# Patient Record
Sex: FEMALE | Race: WHITE | Employment: OTHER | URBAN - METROPOLITAN AREA
[De-identification: names, ages, dates, MRNs, and addresses within clinical notes are randomized per-mention and may not be internally consistent; named-entity substitution may affect disease eponyms.]

---

## 2017-02-06 ENCOUNTER — HOSPITAL ENCOUNTER (OUTPATIENT)
Dept: LAB | Age: 82
Discharge: HOME | End: 2017-02-06
Payer: COMMERCIAL

## 2017-02-06 LAB
ALBUMIN SERPL-MCNC: 3.8 G/DL (ref 3.9–4.9)
ALP BLD-CCNC: 94 U/L (ref 40–130)
ALT SERPL-CCNC: 18 U/L (ref 0–33)
ANION GAP SERPL CALCULATED.3IONS-SCNC: 14 MEQ/L (ref 7–13)
AST SERPL-CCNC: 21 U/L (ref 0–35)
BASOPHILS ABSOLUTE: 0.1 K/UL (ref 0–0.2)
BASOPHILS RELATIVE PERCENT: 0.7 %
BILIRUB SERPL-MCNC: 0.6 MG/DL (ref 0–1.2)
BUN BLDV-MCNC: 14 MG/DL (ref 8–23)
CALCIUM SERPL-MCNC: 8.4 MG/DL (ref 8.6–10.2)
CHLORIDE BLD-SCNC: 98 MEQ/L (ref 98–107)
CHOLESTEROL, TOTAL: 173 MG/DL (ref 0–199)
CO2: 26 MEQ/L (ref 22–29)
CREAT SERPL-MCNC: 0.58 MG/DL (ref 0.5–0.9)
EOSINOPHILS ABSOLUTE: 0.3 K/UL (ref 0–0.7)
EOSINOPHILS RELATIVE PERCENT: 3.5 %
GFR AFRICAN AMERICAN: >60
GFR NON-AFRICAN AMERICAN: >60
GLOBULIN: 2.8 G/DL (ref 2.3–3.5)
GLUCOSE BLD-MCNC: 192 MG/DL (ref 74–109)
HCT VFR BLD CALC: 43.1 % (ref 37–47)
HDLC SERPL-MCNC: 45 MG/DL (ref 40–59)
HEMOGLOBIN: 14.3 G/DL (ref 12–16)
LDL CHOLESTEROL CALCULATED: 91 MG/DL (ref 0–129)
LYMPHOCYTES ABSOLUTE: 2.7 K/UL (ref 1–4.8)
LYMPHOCYTES RELATIVE PERCENT: 35.1 %
MCH RBC QN AUTO: 28.5 PG (ref 27–31.3)
MCHC RBC AUTO-ENTMCNC: 33.3 % (ref 33–37)
MCV RBC AUTO: 85.5 FL (ref 82–100)
MONOCYTES ABSOLUTE: 0.8 K/UL (ref 0.2–0.8)
MONOCYTES RELATIVE PERCENT: 10.5 %
NEUTROPHILS ABSOLUTE: 3.9 K/UL (ref 1.4–6.5)
NEUTROPHILS RELATIVE PERCENT: 50.2 %
PDW BLD-RTO: 13.8 % (ref 11.5–14.5)
PLATELET # BLD: 218 K/UL (ref 130–400)
POTASSIUM SERPL-SCNC: 3.9 MEQ/L (ref 3.5–5.1)
PRO-BNP: 213 PG/ML
RBC # BLD: 5.04 M/UL (ref 4.2–5.4)
SODIUM BLD-SCNC: 138 MEQ/L (ref 132–144)
TOTAL PROTEIN: 6.6 G/DL (ref 6.4–8.1)
TRIGL SERPL-MCNC: 184 MG/DL (ref 0–200)
WBC # BLD: 7.8 K/UL (ref 4.8–10.8)

## 2017-02-06 PROCEDURE — 80053 COMPREHEN METABOLIC PANEL: CPT

## 2017-02-06 PROCEDURE — 80061 LIPID PANEL: CPT

## 2017-02-06 PROCEDURE — 85025 COMPLETE CBC W/AUTO DIFF WBC: CPT

## 2017-02-06 PROCEDURE — 83880 ASSAY OF NATRIURETIC PEPTIDE: CPT

## 2017-02-25 ENCOUNTER — HOSPITAL ENCOUNTER (OUTPATIENT)
Dept: ULTRASOUND IMAGING | Age: 82
Discharge: HOME | End: 2017-02-25
Payer: MEDICARE

## 2017-02-25 ENCOUNTER — HOSPITAL ENCOUNTER (OUTPATIENT)
Dept: GENERAL RADIOLOGY | Age: 82
Discharge: HOME | End: 2017-02-25
Payer: MEDICARE

## 2017-02-25 DIAGNOSIS — R31.9 HEMATURIA: ICD-10-CM

## 2017-02-25 DIAGNOSIS — Z00.00 REGULAR CHECK-UP: ICD-10-CM

## 2017-02-25 PROCEDURE — 76770 US EXAM ABDO BACK WALL COMP: CPT

## 2017-02-25 PROCEDURE — 71020 XR CHEST STANDARD TWO VW: CPT

## 2017-03-03 ENCOUNTER — HOSPITAL ENCOUNTER (OUTPATIENT)
Dept: WOMENS IMAGING | Age: 82
Discharge: HOME | End: 2017-03-03
Payer: MEDICARE

## 2017-03-03 DIAGNOSIS — Z12.39 SCREENING BREAST EXAMINATION: ICD-10-CM

## 2017-06-07 ENCOUNTER — HOSPITAL ENCOUNTER (OUTPATIENT)
Dept: LAB | Age: 82
Discharge: HOME | End: 2017-06-07
Payer: COMMERCIAL

## 2017-06-07 PROCEDURE — 85025 COMPLETE CBC W/AUTO DIFF WBC: CPT

## 2017-06-07 PROCEDURE — 80053 COMPREHEN METABOLIC PANEL: CPT

## 2017-06-07 PROCEDURE — 80061 LIPID PANEL: CPT

## 2017-10-13 ENCOUNTER — HOSPITAL ENCOUNTER (OUTPATIENT)
Dept: WOMENS IMAGING | Age: 82
Discharge: HOME | End: 2017-10-13
Payer: MEDICARE

## 2017-10-13 ENCOUNTER — HOSPITAL ENCOUNTER (OUTPATIENT)
Dept: LAB | Age: 82
Discharge: HOME | End: 2017-10-13
Payer: MEDICARE

## 2017-10-13 DIAGNOSIS — Z12.31 ENCOUNTER FOR SCREENING MAMMOGRAM FOR BREAST CANCER: ICD-10-CM

## 2017-10-13 LAB
ALBUMIN SERPL-MCNC: 3.8 G/DL (ref 3.9–4.9)
ALP BLD-CCNC: 101 U/L (ref 40–130)
ALT SERPL-CCNC: 18 U/L (ref 0–33)
ANION GAP SERPL CALCULATED.3IONS-SCNC: 16 MEQ/L (ref 7–13)
AST SERPL-CCNC: 21 U/L (ref 0–35)
BASOPHILS ABSOLUTE: 0.1 K/UL (ref 0–0.2)
BASOPHILS RELATIVE PERCENT: 0.8 %
BILIRUB SERPL-MCNC: 0.6 MG/DL (ref 0–1.2)
BUN BLDV-MCNC: 20 MG/DL (ref 8–23)
CALCIUM SERPL-MCNC: 8.5 MG/DL (ref 8.6–10.2)
CHLORIDE BLD-SCNC: 98 MEQ/L (ref 98–107)
CHOLESTEROL, TOTAL: 182 MG/DL (ref 0–199)
CO2: 26 MEQ/L (ref 22–29)
CREAT SERPL-MCNC: 0.5 MG/DL (ref 0.5–0.9)
EOSINOPHILS ABSOLUTE: 0.1 K/UL (ref 0–0.7)
EOSINOPHILS RELATIVE PERCENT: 1.8 %
GFR AFRICAN AMERICAN: >60
GFR NON-AFRICAN AMERICAN: >60
GLOBULIN: 3.3 G/DL (ref 2.3–3.5)
GLUCOSE BLD-MCNC: 226 MG/DL (ref 74–109)
HBA1C MFR BLD: 8.6 % (ref 4.8–5.9)
HCT VFR BLD CALC: 43.9 % (ref 37–47)
HDLC SERPL-MCNC: 46 MG/DL (ref 40–59)
HEMOGLOBIN: 14.4 G/DL (ref 12–16)
LDL CHOLESTEROL CALCULATED: 101 MG/DL (ref 0–129)
LYMPHOCYTES ABSOLUTE: 2.3 K/UL (ref 1–4.8)
LYMPHOCYTES RELATIVE PERCENT: 31.7 %
MCH RBC QN AUTO: 28.2 PG (ref 27–31.3)
MCHC RBC AUTO-ENTMCNC: 32.7 % (ref 33–37)
MCV RBC AUTO: 86.3 FL (ref 82–100)
MONOCYTES ABSOLUTE: 0.8 K/UL (ref 0.2–0.8)
MONOCYTES RELATIVE PERCENT: 11.9 %
NEUTROPHILS ABSOLUTE: 3.8 K/UL (ref 1.4–6.5)
NEUTROPHILS RELATIVE PERCENT: 53.8 %
PDW BLD-RTO: 13.8 % (ref 11.5–14.5)
PLATELET # BLD: 229 K/UL (ref 130–400)
POTASSIUM SERPL-SCNC: 4.4 MEQ/L (ref 3.5–5.1)
RBC # BLD: 5.08 M/UL (ref 4.2–5.4)
SODIUM BLD-SCNC: 140 MEQ/L (ref 132–144)
TOTAL PROTEIN: 7.1 G/DL (ref 6.4–8.1)
TRIGL SERPL-MCNC: 175 MG/DL (ref 0–200)
WBC # BLD: 7.1 K/UL (ref 4.8–10.8)

## 2017-10-13 PROCEDURE — 85025 COMPLETE CBC W/AUTO DIFF WBC: CPT

## 2017-10-13 PROCEDURE — 80053 COMPREHEN METABOLIC PANEL: CPT

## 2017-10-13 PROCEDURE — G0202 SCR MAMMO BI INCL CAD: HCPCS

## 2017-10-13 PROCEDURE — 83036 HEMOGLOBIN GLYCOSYLATED A1C: CPT

## 2017-10-13 PROCEDURE — 80061 LIPID PANEL: CPT

## 2018-01-12 ENCOUNTER — HOSPITAL ENCOUNTER (OUTPATIENT)
Dept: LAB | Age: 83
Discharge: HOME | End: 2018-01-12
Payer: MEDICARE

## 2018-01-12 LAB
ALBUMIN SERPL-MCNC: 3.7 G/DL (ref 3.9–4.9)
ALP BLD-CCNC: 87 U/L (ref 40–130)
ALT SERPL-CCNC: 19 U/L (ref 0–33)
ANION GAP SERPL CALCULATED.3IONS-SCNC: 16 MEQ/L (ref 7–13)
AST SERPL-CCNC: 20 U/L (ref 0–35)
BASOPHILS ABSOLUTE: 0.1 K/UL (ref 0–0.2)
BASOPHILS RELATIVE PERCENT: 0.8 %
BILIRUB SERPL-MCNC: 0.7 MG/DL (ref 0–1.2)
BUN BLDV-MCNC: 18 MG/DL (ref 8–23)
CALCIUM SERPL-MCNC: 8.8 MG/DL (ref 8.6–10.2)
CHLORIDE BLD-SCNC: 96 MEQ/L (ref 98–107)
CHOLESTEROL, TOTAL: 184 MG/DL (ref 0–199)
CO2: 27 MEQ/L (ref 22–29)
CREAT SERPL-MCNC: 0.56 MG/DL (ref 0.5–0.9)
EOSINOPHILS ABSOLUTE: 0.1 K/UL (ref 0–0.7)
EOSINOPHILS RELATIVE PERCENT: 1.4 %
GFR AFRICAN AMERICAN: >60
GFR NON-AFRICAN AMERICAN: >60
GLOBULIN: 3.1 G/DL (ref 2.3–3.5)
GLUCOSE BLD-MCNC: 238 MG/DL (ref 74–109)
HCT VFR BLD CALC: 42.9 % (ref 37–47)
HDLC SERPL-MCNC: 47 MG/DL (ref 40–59)
HEMOGLOBIN: 14.1 G/DL (ref 12–16)
LDL CHOLESTEROL CALCULATED: 104 MG/DL (ref 0–129)
LYMPHOCYTES ABSOLUTE: 2.5 K/UL (ref 1–4.8)
LYMPHOCYTES RELATIVE PERCENT: 30 %
MCH RBC QN AUTO: 28.7 PG (ref 27–31.3)
MCHC RBC AUTO-ENTMCNC: 32.8 % (ref 33–37)
MCV RBC AUTO: 87.6 FL (ref 82–100)
MONOCYTES ABSOLUTE: 0.8 K/UL (ref 0.2–0.8)
MONOCYTES RELATIVE PERCENT: 9.2 %
NEUTROPHILS ABSOLUTE: 4.8 K/UL (ref 1.4–6.5)
NEUTROPHILS RELATIVE PERCENT: 58.6 %
PDW BLD-RTO: 13.5 % (ref 11.5–14.5)
PLATELET # BLD: 226 K/UL (ref 130–400)
POTASSIUM SERPL-SCNC: 4.4 MEQ/L (ref 3.5–5.1)
RBC # BLD: 4.9 M/UL (ref 4.2–5.4)
SODIUM BLD-SCNC: 139 MEQ/L (ref 132–144)
TOTAL PROTEIN: 6.8 G/DL (ref 6.4–8.1)
TRIGL SERPL-MCNC: 163 MG/DL (ref 0–200)
WBC # BLD: 8.2 K/UL (ref 4.8–10.8)

## 2018-01-12 PROCEDURE — 87086 URINE CULTURE/COLONY COUNT: CPT

## 2018-01-12 PROCEDURE — 80053 COMPREHEN METABOLIC PANEL: CPT

## 2018-01-12 PROCEDURE — 80061 LIPID PANEL: CPT

## 2018-01-12 PROCEDURE — 85025 COMPLETE CBC W/AUTO DIFF WBC: CPT

## 2018-01-14 LAB — URINE CULTURE, ROUTINE: NORMAL

## 2018-02-15 ENCOUNTER — HOSPITAL ENCOUNTER (OUTPATIENT)
Dept: LAB | Age: 83
Discharge: HOME | End: 2018-02-15
Payer: MEDICARE

## 2018-02-15 LAB
ANION GAP SERPL CALCULATED.3IONS-SCNC: 15 MEQ/L (ref 7–13)
BUN BLDV-MCNC: 26 MG/DL (ref 8–23)
CALCIUM SERPL-MCNC: 8.5 MG/DL (ref 8.6–10.2)
CHLORIDE BLD-SCNC: 93 MEQ/L (ref 98–107)
CHOLESTEROL, TOTAL: 161 MG/DL (ref 0–199)
CO2: 27 MEQ/L (ref 22–29)
CREAT SERPL-MCNC: 0.69 MG/DL (ref 0.5–0.9)
GFR AFRICAN AMERICAN: >60
GFR NON-AFRICAN AMERICAN: >60
GLUCOSE BLD-MCNC: 172 MG/DL (ref 74–109)
HDLC SERPL-MCNC: 41 MG/DL (ref 40–59)
LDL CHOLESTEROL CALCULATED: 92 MG/DL (ref 0–129)
POTASSIUM SERPL-SCNC: 4.1 MEQ/L (ref 3.5–5.1)
SODIUM BLD-SCNC: 135 MEQ/L (ref 132–144)
TRIGL SERPL-MCNC: 139 MG/DL (ref 0–200)

## 2018-02-15 PROCEDURE — 80048 BASIC METABOLIC PNL TOTAL CA: CPT

## 2018-02-15 PROCEDURE — 80061 LIPID PANEL: CPT

## 2018-05-21 ENCOUNTER — HOSPITAL ENCOUNTER (OUTPATIENT)
Dept: LAB | Age: 83
Discharge: HOME | End: 2018-05-21
Payer: MEDICARE

## 2018-05-21 LAB
ALBUMIN SERPL-MCNC: 3.8 G/DL (ref 3.9–4.9)
ALP BLD-CCNC: 92 U/L (ref 40–130)
ALT SERPL-CCNC: 23 U/L (ref 0–33)
ANION GAP SERPL CALCULATED.3IONS-SCNC: 17 MEQ/L (ref 7–13)
AST SERPL-CCNC: 23 U/L (ref 0–35)
BASOPHILS ABSOLUTE: 0.1 K/UL (ref 0–0.2)
BASOPHILS RELATIVE PERCENT: 0.8 %
BILIRUB SERPL-MCNC: 0.6 MG/DL (ref 0–1.2)
BUN BLDV-MCNC: 23 MG/DL (ref 8–23)
CALCIUM SERPL-MCNC: 8.6 MG/DL (ref 8.6–10.2)
CHLORIDE BLD-SCNC: 96 MEQ/L (ref 98–107)
CHOLESTEROL, TOTAL: 141 MG/DL (ref 0–199)
CO2: 25 MEQ/L (ref 22–29)
CREAT SERPL-MCNC: 0.78 MG/DL (ref 0.5–0.9)
EOSINOPHILS ABSOLUTE: 0.1 K/UL (ref 0–0.7)
EOSINOPHILS RELATIVE PERCENT: 1.8 %
GFR AFRICAN AMERICAN: >60
GFR NON-AFRICAN AMERICAN: >60
GLOBULIN: 2.9 G/DL (ref 2.3–3.5)
GLUCOSE BLD-MCNC: 246 MG/DL (ref 74–109)
HBA1C MFR BLD: 8.2 % (ref 4.8–5.9)
HCT VFR BLD CALC: 36.5 % (ref 37–47)
HDLC SERPL-MCNC: 42 MG/DL (ref 40–59)
HEMOGLOBIN: 12.4 G/DL (ref 12–16)
LDL CHOLESTEROL CALCULATED: 81 MG/DL (ref 0–129)
LYMPHOCYTES ABSOLUTE: 2.3 K/UL (ref 1–4.8)
LYMPHOCYTES RELATIVE PERCENT: 32.3 %
MCH RBC QN AUTO: 29.5 PG (ref 27–31.3)
MCHC RBC AUTO-ENTMCNC: 34.1 % (ref 33–37)
MCV RBC AUTO: 86.5 FL (ref 82–100)
MONOCYTES ABSOLUTE: 0.8 K/UL (ref 0.2–0.8)
MONOCYTES RELATIVE PERCENT: 11.2 %
NEUTROPHILS ABSOLUTE: 3.9 K/UL (ref 1.4–6.5)
NEUTROPHILS RELATIVE PERCENT: 53.9 %
PDW BLD-RTO: 13.3 % (ref 11.5–14.5)
PLATELET # BLD: 224 K/UL (ref 130–400)
POTASSIUM SERPL-SCNC: 4.1 MEQ/L (ref 3.5–5.1)
RBC # BLD: 4.21 M/UL (ref 4.2–5.4)
SODIUM BLD-SCNC: 138 MEQ/L (ref 132–144)
TOTAL PROTEIN: 6.7 G/DL (ref 6.4–8.1)
TRIGL SERPL-MCNC: 92 MG/DL (ref 0–200)
WBC # BLD: 7.2 K/UL (ref 4.8–10.8)

## 2018-05-21 PROCEDURE — 83036 HEMOGLOBIN GLYCOSYLATED A1C: CPT

## 2018-05-21 PROCEDURE — 80061 LIPID PANEL: CPT

## 2018-05-21 PROCEDURE — 85025 COMPLETE CBC W/AUTO DIFF WBC: CPT

## 2018-05-21 PROCEDURE — 80053 COMPREHEN METABOLIC PANEL: CPT

## 2018-09-24 ENCOUNTER — HOSPITAL ENCOUNTER (OUTPATIENT)
Dept: LAB | Age: 83
Discharge: HOME | End: 2018-09-24
Payer: MEDICARE

## 2018-09-24 LAB
ALBUMIN SERPL-MCNC: 3.9 G/DL (ref 3.9–4.9)
ALP BLD-CCNC: 89 U/L (ref 40–130)
ALT SERPL-CCNC: 21 U/L (ref 0–33)
ANION GAP SERPL CALCULATED.3IONS-SCNC: 16 MEQ/L (ref 7–13)
AST SERPL-CCNC: 24 U/L (ref 0–35)
BASOPHILS ABSOLUTE: 0.1 K/UL (ref 0–0.2)
BASOPHILS RELATIVE PERCENT: 1.4 %
BILIRUB SERPL-MCNC: 0.5 MG/DL (ref 0–1.2)
BUN BLDV-MCNC: 21 MG/DL (ref 8–23)
CALCIUM SERPL-MCNC: 8.8 MG/DL (ref 8.6–10.2)
CHLORIDE BLD-SCNC: 97 MEQ/L (ref 98–107)
CHOLESTEROL, TOTAL: 144 MG/DL (ref 0–199)
CO2: 27 MEQ/L (ref 22–29)
CREAT SERPL-MCNC: 0.74 MG/DL (ref 0.5–0.9)
EOSINOPHILS ABSOLUTE: 0.2 K/UL (ref 0–0.7)
EOSINOPHILS RELATIVE PERCENT: 2.1 %
GFR AFRICAN AMERICAN: >60
GFR NON-AFRICAN AMERICAN: >60
GLOBULIN: 3.1 G/DL (ref 2.3–3.5)
GLUCOSE BLD-MCNC: 174 MG/DL (ref 74–109)
HCT VFR BLD CALC: 39.4 % (ref 37–47)
HDLC SERPL-MCNC: 44 MG/DL (ref 40–59)
HEMOGLOBIN: 13.1 G/DL (ref 12–16)
LDL CHOLESTEROL CALCULATED: 80 MG/DL (ref 0–129)
LYMPHOCYTES ABSOLUTE: 2.1 K/UL (ref 1–4.8)
LYMPHOCYTES RELATIVE PERCENT: 29.8 %
MCH RBC QN AUTO: 28.3 PG (ref 27–31.3)
MCHC RBC AUTO-ENTMCNC: 33.2 % (ref 33–37)
MCV RBC AUTO: 85.1 FL (ref 82–100)
MONOCYTES ABSOLUTE: 0.9 K/UL (ref 0.2–0.8)
MONOCYTES RELATIVE PERCENT: 12.7 %
NEUTROPHILS ABSOLUTE: 3.8 K/UL (ref 1.4–6.5)
NEUTROPHILS RELATIVE PERCENT: 54 %
PDW BLD-RTO: 14.2 % (ref 11.5–14.5)
PLATELET # BLD: 195 K/UL (ref 130–400)
POTASSIUM SERPL-SCNC: 4 MEQ/L (ref 3.5–5.1)
RBC # BLD: 4.62 M/UL (ref 4.2–5.4)
SODIUM BLD-SCNC: 140 MEQ/L (ref 132–144)
TOTAL PROTEIN: 7 G/DL (ref 6.4–8.1)
TRIGL SERPL-MCNC: 98 MG/DL (ref 0–200)
WBC # BLD: 7.1 K/UL (ref 4.8–10.8)

## 2018-09-24 PROCEDURE — 80053 COMPREHEN METABOLIC PANEL: CPT

## 2018-09-24 PROCEDURE — 85025 COMPLETE CBC W/AUTO DIFF WBC: CPT

## 2018-09-24 PROCEDURE — 80061 LIPID PANEL: CPT

## 2018-10-04 ENCOUNTER — HOSPITAL ENCOUNTER (OUTPATIENT)
Dept: WOMENS IMAGING | Age: 83
Discharge: HOME | End: 2018-10-06
Payer: MEDICARE

## 2018-10-04 DIAGNOSIS — Z12.31 ENCOUNTER FOR SCREENING MAMMOGRAM FOR BREAST CANCER: ICD-10-CM

## 2018-10-04 PROCEDURE — 77067 SCR MAMMO BI INCL CAD: CPT

## 2019-01-08 ENCOUNTER — HOSPITAL ENCOUNTER (OUTPATIENT)
Dept: LAB | Age: 84
Discharge: HOME | End: 2019-01-08
Payer: MEDICARE

## 2019-01-08 LAB
ALBUMIN SERPL-MCNC: 3.6 G/DL (ref 3.9–4.9)
ALP BLD-CCNC: 88 U/L (ref 40–130)
ALT SERPL-CCNC: 15 U/L (ref 0–33)
ANION GAP SERPL CALCULATED.3IONS-SCNC: 16 MEQ/L (ref 7–13)
AST SERPL-CCNC: 20 U/L (ref 0–35)
BASOPHILS ABSOLUTE: 0.1 K/UL (ref 0–0.2)
BASOPHILS RELATIVE PERCENT: 0.7 %
BILIRUB SERPL-MCNC: 0.7 MG/DL (ref 0–1.2)
BUN BLDV-MCNC: 22 MG/DL (ref 8–23)
CALCIUM SERPL-MCNC: 8.7 MG/DL (ref 8.6–10.2)
CHLORIDE BLD-SCNC: 99 MEQ/L (ref 98–107)
CHOLESTEROL, TOTAL: 145 MG/DL (ref 0–199)
CO2: 27 MEQ/L (ref 22–29)
CREAT SERPL-MCNC: 0.63 MG/DL (ref 0.5–0.9)
EOSINOPHILS ABSOLUTE: 0.2 K/UL (ref 0–0.7)
EOSINOPHILS RELATIVE PERCENT: 1.9 %
GFR AFRICAN AMERICAN: >60
GFR NON-AFRICAN AMERICAN: >60
GLOBULIN: 3.3 G/DL (ref 2.3–3.5)
GLUCOSE BLD-MCNC: 245 MG/DL (ref 74–109)
HCT VFR BLD CALC: 40.9 % (ref 37–47)
HDLC SERPL-MCNC: 41 MG/DL (ref 40–59)
HEMOGLOBIN: 13.7 G/DL (ref 12–16)
LDL CHOLESTEROL CALCULATED: 79 MG/DL (ref 0–129)
LYMPHOCYTES ABSOLUTE: 2.4 K/UL (ref 1–4.8)
LYMPHOCYTES RELATIVE PERCENT: 31.2 %
MCH RBC QN AUTO: 28.8 PG (ref 27–31.3)
MCHC RBC AUTO-ENTMCNC: 33.5 % (ref 33–37)
MCV RBC AUTO: 86 FL (ref 82–100)
MONOCYTES ABSOLUTE: 0.7 K/UL (ref 0.2–0.8)
MONOCYTES RELATIVE PERCENT: 9.3 %
NEUTROPHILS ABSOLUTE: 4.4 K/UL (ref 1.4–6.5)
NEUTROPHILS RELATIVE PERCENT: 56.9 %
PDW BLD-RTO: 13.6 % (ref 11.5–14.5)
PLATELET # BLD: 208 K/UL (ref 130–400)
POTASSIUM SERPL-SCNC: 3.8 MEQ/L (ref 3.5–5.1)
RBC # BLD: 4.76 M/UL (ref 4.2–5.4)
SODIUM BLD-SCNC: 142 MEQ/L (ref 132–144)
TOTAL PROTEIN: 6.9 G/DL (ref 6.4–8.1)
TRIGL SERPL-MCNC: 127 MG/DL (ref 0–200)
WBC # BLD: 7.8 K/UL (ref 4.8–10.8)

## 2019-01-08 PROCEDURE — 85025 COMPLETE CBC W/AUTO DIFF WBC: CPT

## 2019-01-08 PROCEDURE — 80061 LIPID PANEL: CPT

## 2019-01-08 PROCEDURE — 80053 COMPREHEN METABOLIC PANEL: CPT

## 2019-03-22 ENCOUNTER — HOSPITAL ENCOUNTER (OUTPATIENT)
Dept: LAB | Age: 84
Discharge: HOME | End: 2019-03-22
Payer: MEDICARE

## 2019-03-22 LAB
ALBUMIN SERPL-MCNC: 3.6 G/DL (ref 3.5–4.6)
ALP BLD-CCNC: 94 U/L (ref 40–130)
ALT SERPL-CCNC: 14 U/L (ref 0–33)
ANION GAP SERPL CALCULATED.3IONS-SCNC: 16 MEQ/L (ref 9–15)
AST SERPL-CCNC: 20 U/L (ref 0–35)
BASOPHILS ABSOLUTE: 0.1 K/UL (ref 0–0.2)
BASOPHILS RELATIVE PERCENT: 1.1 %
BILIRUB SERPL-MCNC: 0.5 MG/DL (ref 0.2–0.7)
BUN BLDV-MCNC: 22 MG/DL (ref 8–23)
CALCIUM SERPL-MCNC: 8.3 MG/DL (ref 8.5–9.9)
CHLORIDE BLD-SCNC: 97 MEQ/L (ref 95–107)
CHOLESTEROL, TOTAL: 170 MG/DL (ref 0–199)
CO2: 27 MEQ/L (ref 20–31)
CREAT SERPL-MCNC: 0.6 MG/DL (ref 0.5–0.9)
EOSINOPHILS ABSOLUTE: 0.2 K/UL (ref 0–0.7)
EOSINOPHILS RELATIVE PERCENT: 2.9 %
GFR AFRICAN AMERICAN: >60
GFR NON-AFRICAN AMERICAN: >60
GLOBULIN: 3.4 G/DL (ref 2.3–3.5)
GLUCOSE BLD-MCNC: 178 MG/DL (ref 70–99)
HCT VFR BLD CALC: 38.4 % (ref 37–47)
HDLC SERPL-MCNC: 43 MG/DL (ref 40–59)
HEMOGLOBIN: 12.9 G/DL (ref 12–16)
LDL CHOLESTEROL CALCULATED: 99 MG/DL (ref 0–129)
LYMPHOCYTES ABSOLUTE: 2.2 K/UL (ref 1–4.8)
LYMPHOCYTES RELATIVE PERCENT: 31.1 %
MCH RBC QN AUTO: 28.9 PG (ref 27–31.3)
MCHC RBC AUTO-ENTMCNC: 33.7 % (ref 33–37)
MCV RBC AUTO: 85.8 FL (ref 82–100)
MONOCYTES ABSOLUTE: 0.7 K/UL (ref 0.2–0.8)
MONOCYTES RELATIVE PERCENT: 9.3 %
NEUTROPHILS ABSOLUTE: 4 K/UL (ref 1.4–6.5)
NEUTROPHILS RELATIVE PERCENT: 55.6 %
PDW BLD-RTO: 13.7 % (ref 11.5–14.5)
PLATELET # BLD: 208 K/UL (ref 130–400)
POTASSIUM SERPL-SCNC: 3.5 MEQ/L (ref 3.4–4.9)
RBC # BLD: 4.47 M/UL (ref 4.2–5.4)
SODIUM BLD-SCNC: 140 MEQ/L (ref 135–144)
TOTAL PROTEIN: 7 G/DL (ref 6.3–8)
TRIGL SERPL-MCNC: 141 MG/DL (ref 0–150)
TSH SERPL DL<=0.05 MIU/L-ACNC: 0.45 UIU/ML (ref 0.44–3.86)
WBC # BLD: 7.1 K/UL (ref 4.8–10.8)

## 2019-03-22 PROCEDURE — 80053 COMPREHEN METABOLIC PANEL: CPT

## 2019-03-22 PROCEDURE — 85025 COMPLETE CBC W/AUTO DIFF WBC: CPT

## 2019-03-22 PROCEDURE — 84443 ASSAY THYROID STIM HORMONE: CPT

## 2019-03-22 PROCEDURE — 80061 LIPID PANEL: CPT

## 2019-05-24 ENCOUNTER — HOSPITAL ENCOUNTER (OUTPATIENT)
Dept: LAB | Age: 84
Discharge: HOME | End: 2019-05-24
Payer: MEDICARE

## 2019-05-24 LAB
ALBUMIN SERPL-MCNC: 3.5 G/DL (ref 3.5–4.6)
ALP BLD-CCNC: 96 U/L (ref 40–130)
ALT SERPL-CCNC: 16 U/L (ref 0–33)
ANION GAP SERPL CALCULATED.3IONS-SCNC: 15 MEQ/L (ref 9–15)
AST SERPL-CCNC: 23 U/L (ref 0–35)
BASOPHILS ABSOLUTE: 0 K/UL (ref 0–0.2)
BASOPHILS RELATIVE PERCENT: 0.6 %
BILIRUB SERPL-MCNC: 0.3 MG/DL (ref 0.2–0.7)
BUN BLDV-MCNC: 20 MG/DL (ref 8–23)
CALCIUM SERPL-MCNC: 8.5 MG/DL (ref 8.5–9.9)
CHLORIDE BLD-SCNC: 100 MEQ/L (ref 95–107)
CHOLESTEROL, TOTAL: 160 MG/DL (ref 0–199)
CO2: 25 MEQ/L (ref 20–31)
CREAT SERPL-MCNC: 0.64 MG/DL (ref 0.5–0.9)
EOSINOPHILS ABSOLUTE: 0.2 K/UL (ref 0–0.7)
EOSINOPHILS RELATIVE PERCENT: 2.8 %
GFR AFRICAN AMERICAN: >60
GFR NON-AFRICAN AMERICAN: >60
GLOBULIN: 3.5 G/DL (ref 2.3–3.5)
GLUCOSE BLD-MCNC: 206 MG/DL (ref 70–99)
HCT VFR BLD CALC: 38.4 % (ref 37–47)
HDLC SERPL-MCNC: 41 MG/DL (ref 40–59)
HEMOGLOBIN: 12.8 G/DL (ref 12–16)
LDL CHOLESTEROL CALCULATED: 88 MG/DL (ref 0–129)
LYMPHOCYTES ABSOLUTE: 2.5 K/UL (ref 1–4.8)
LYMPHOCYTES RELATIVE PERCENT: 31 %
MCH RBC QN AUTO: 28.9 PG (ref 27–31.3)
MCHC RBC AUTO-ENTMCNC: 33.3 % (ref 33–37)
MCV RBC AUTO: 86.8 FL (ref 82–100)
MONOCYTES ABSOLUTE: 0.9 K/UL (ref 0.2–0.8)
MONOCYTES RELATIVE PERCENT: 11 %
NEUTROPHILS ABSOLUTE: 4.4 K/UL (ref 1.4–6.5)
NEUTROPHILS RELATIVE PERCENT: 54.6 %
PDW BLD-RTO: 14 % (ref 11.5–14.5)
PLATELET # BLD: 230 K/UL (ref 130–400)
POTASSIUM SERPL-SCNC: 3.8 MEQ/L (ref 3.4–4.9)
RBC # BLD: 4.42 M/UL (ref 4.2–5.4)
SODIUM BLD-SCNC: 140 MEQ/L (ref 135–144)
TOTAL PROTEIN: 7 G/DL (ref 6.3–8)
TRIGL SERPL-MCNC: 153 MG/DL (ref 0–150)
WBC # BLD: 8.1 K/UL (ref 4.8–10.8)

## 2019-05-24 PROCEDURE — 80053 COMPREHEN METABOLIC PANEL: CPT

## 2019-05-24 PROCEDURE — 85025 COMPLETE CBC W/AUTO DIFF WBC: CPT

## 2019-05-24 PROCEDURE — 80061 LIPID PANEL: CPT

## 2019-06-12 ENCOUNTER — HOSPITAL ENCOUNTER (OUTPATIENT)
Dept: GENERAL RADIOLOGY | Age: 84
Discharge: HOME | End: 2019-06-14
Payer: MEDICARE

## 2019-06-12 DIAGNOSIS — R05.3 CHRONIC COUGH: ICD-10-CM

## 2019-06-12 PROCEDURE — 71046 X-RAY EXAM CHEST 2 VIEWS: CPT

## 2019-09-23 ENCOUNTER — HOSPITAL ENCOUNTER (OUTPATIENT)
Dept: LAB | Age: 84
Discharge: HOME | End: 2019-09-23
Payer: MEDICARE

## 2019-09-23 LAB
ALBUMIN SERPL-MCNC: 3.6 G/DL (ref 3.5–4.6)
ALP BLD-CCNC: 79 U/L (ref 40–130)
ALT SERPL-CCNC: 14 U/L (ref 0–33)
ANION GAP SERPL CALCULATED.3IONS-SCNC: 16 MEQ/L (ref 9–15)
AST SERPL-CCNC: 21 U/L (ref 0–35)
BASOPHILS ABSOLUTE: 0.1 K/UL (ref 0–0.2)
BASOPHILS RELATIVE PERCENT: 0.7 %
BILIRUB SERPL-MCNC: 0.5 MG/DL (ref 0.2–0.7)
BUN BLDV-MCNC: 25 MG/DL (ref 8–23)
CALCIUM SERPL-MCNC: 9 MG/DL (ref 8.5–9.9)
CHLORIDE BLD-SCNC: 99 MEQ/L (ref 95–107)
CHOLESTEROL, TOTAL: 155 MG/DL (ref 0–199)
CO2: 26 MEQ/L (ref 20–31)
CREAT SERPL-MCNC: 0.65 MG/DL (ref 0.5–0.9)
EOSINOPHILS ABSOLUTE: 0.1 K/UL (ref 0–0.7)
EOSINOPHILS RELATIVE PERCENT: 1.8 %
GFR AFRICAN AMERICAN: >60
GFR NON-AFRICAN AMERICAN: >60
GLOBULIN: 3.5 G/DL (ref 2.3–3.5)
GLUCOSE BLD-MCNC: 106 MG/DL (ref 70–99)
HCT VFR BLD CALC: 36.6 % (ref 37–47)
HDLC SERPL-MCNC: 40 MG/DL (ref 40–59)
HEMOGLOBIN: 12 G/DL (ref 12–16)
LDL CHOLESTEROL CALCULATED: 88 MG/DL (ref 0–129)
LYMPHOCYTES ABSOLUTE: 2.4 K/UL (ref 1–4.8)
LYMPHOCYTES RELATIVE PERCENT: 30.6 %
MCH RBC QN AUTO: 27.2 PG (ref 27–31.3)
MCHC RBC AUTO-ENTMCNC: 32.6 % (ref 33–37)
MCV RBC AUTO: 83.5 FL (ref 82–100)
MONOCYTES ABSOLUTE: 0.9 K/UL (ref 0.2–0.8)
MONOCYTES RELATIVE PERCENT: 11.2 %
NEUTROPHILS ABSOLUTE: 4.3 K/UL (ref 1.4–6.5)
NEUTROPHILS RELATIVE PERCENT: 55.7 %
PDW BLD-RTO: 14.3 % (ref 11.5–14.5)
PLATELET # BLD: 239 K/UL (ref 130–400)
POTASSIUM SERPL-SCNC: 3.8 MEQ/L (ref 3.4–4.9)
RBC # BLD: 4.39 M/UL (ref 4.2–5.4)
SODIUM BLD-SCNC: 141 MEQ/L (ref 135–144)
TOTAL PROTEIN: 7.1 G/DL (ref 6.3–8)
TRIGL SERPL-MCNC: 133 MG/DL (ref 0–150)
WBC # BLD: 7.8 K/UL (ref 4.8–10.8)

## 2019-09-23 PROCEDURE — 80061 LIPID PANEL: CPT

## 2019-09-23 PROCEDURE — 85025 COMPLETE CBC W/AUTO DIFF WBC: CPT

## 2019-09-23 PROCEDURE — 80053 COMPREHEN METABOLIC PANEL: CPT

## 2019-10-15 ENCOUNTER — HOSPITAL ENCOUNTER (OUTPATIENT)
Dept: WOMENS IMAGING | Age: 84
Discharge: HOME | End: 2019-10-17
Payer: MEDICARE

## 2019-10-15 DIAGNOSIS — Z12.31 ENCOUNTER FOR SCREENING MAMMOGRAM FOR BREAST CANCER: ICD-10-CM

## 2019-10-15 PROCEDURE — 77063 BREAST TOMOSYNTHESIS BI: CPT

## 2020-01-22 ENCOUNTER — HOSPITAL ENCOUNTER (OUTPATIENT)
Dept: LAB | Age: 85
Discharge: HOME | End: 2020-01-22
Payer: MEDICARE

## 2020-01-22 LAB
ALBUMIN SERPL-MCNC: 3.6 G/DL (ref 3.5–4.6)
ALP BLD-CCNC: 92 U/L (ref 40–130)
ALT SERPL-CCNC: 18 U/L (ref 0–33)
ANION GAP SERPL CALCULATED.3IONS-SCNC: 14 MEQ/L (ref 9–15)
AST SERPL-CCNC: 23 U/L (ref 0–35)
BASOPHILS ABSOLUTE: 0.1 K/UL (ref 0–0.2)
BASOPHILS RELATIVE PERCENT: 1 %
BILIRUB SERPL-MCNC: 0.3 MG/DL (ref 0.2–0.7)
BUN BLDV-MCNC: 20 MG/DL (ref 8–23)
CALCIUM SERPL-MCNC: 8.6 MG/DL (ref 8.5–9.9)
CHLORIDE BLD-SCNC: 100 MEQ/L (ref 95–107)
CHOLESTEROL, TOTAL: 153 MG/DL (ref 0–199)
CO2: 26 MEQ/L (ref 20–31)
CREAT SERPL-MCNC: 0.74 MG/DL (ref 0.5–0.9)
EOSINOPHILS ABSOLUTE: 0.2 K/UL (ref 0–0.7)
EOSINOPHILS RELATIVE PERCENT: 2.3 %
GFR AFRICAN AMERICAN: >60
GFR NON-AFRICAN AMERICAN: >60
GLOBULIN: 3.3 G/DL (ref 2.3–3.5)
GLUCOSE BLD-MCNC: 197 MG/DL (ref 70–99)
HCT VFR BLD CALC: 37.1 % (ref 37–47)
HDLC SERPL-MCNC: 45 MG/DL (ref 40–59)
HEMOGLOBIN: 11.9 G/DL (ref 12–16)
LDL CHOLESTEROL CALCULATED: 83 MG/DL (ref 0–129)
LYMPHOCYTES ABSOLUTE: 2 K/UL (ref 1–4.8)
LYMPHOCYTES RELATIVE PERCENT: 27.9 %
MCH RBC QN AUTO: 25.8 PG (ref 27–31.3)
MCHC RBC AUTO-ENTMCNC: 32 % (ref 33–37)
MCV RBC AUTO: 80.7 FL (ref 82–100)
MONOCYTES ABSOLUTE: 0.7 K/UL (ref 0.2–0.8)
MONOCYTES RELATIVE PERCENT: 10.3 %
NEUTROPHILS ABSOLUTE: 4.1 K/UL (ref 1.4–6.5)
NEUTROPHILS RELATIVE PERCENT: 58.5 %
PDW BLD-RTO: 15.3 % (ref 11.5–14.5)
PLATELET # BLD: 218 K/UL (ref 130–400)
POTASSIUM SERPL-SCNC: 4.4 MEQ/L (ref 3.4–4.9)
RBC # BLD: 4.59 M/UL (ref 4.2–5.4)
SODIUM BLD-SCNC: 140 MEQ/L (ref 135–144)
TOTAL PROTEIN: 6.9 G/DL (ref 6.3–8)
TRIGL SERPL-MCNC: 123 MG/DL (ref 0–150)
WBC # BLD: 7 K/UL (ref 4.8–10.8)

## 2020-01-22 PROCEDURE — 85025 COMPLETE CBC W/AUTO DIFF WBC: CPT

## 2020-01-22 PROCEDURE — 80053 COMPREHEN METABOLIC PANEL: CPT

## 2020-01-22 PROCEDURE — 80061 LIPID PANEL: CPT

## 2020-06-10 ENCOUNTER — HOSPITAL ENCOUNTER (OUTPATIENT)
Dept: ULTRASOUND IMAGING | Age: 85
Discharge: HOME | End: 2020-06-12
Payer: MEDICARE

## 2020-06-10 PROCEDURE — 76770 US EXAM ABDO BACK WALL COMP: CPT

## 2020-06-10 PROCEDURE — 51798 US URINE CAPACITY MEASURE: CPT

## 2020-09-21 ENCOUNTER — HOSPITAL ENCOUNTER (OUTPATIENT)
Dept: LAB | Age: 85
Discharge: HOME | End: 2020-09-21
Payer: MEDICARE

## 2020-09-21 LAB
ALBUMIN SERPL-MCNC: 3.8 G/DL (ref 3.5–4.6)
ALP BLD-CCNC: 103 U/L (ref 40–130)
ALT SERPL-CCNC: 28 U/L (ref 0–33)
ANION GAP SERPL CALCULATED.3IONS-SCNC: 16 MEQ/L (ref 9–15)
ANISOCYTOSIS: ABNORMAL
AST SERPL-CCNC: 30 U/L (ref 0–35)
ATYPICAL LYMPHOCYTE RELATIVE PERCENT: 3 %
BASOPHILS ABSOLUTE: 0 K/UL (ref 0–0.2)
BASOPHILS RELATIVE PERCENT: 0.8 %
BILIRUB SERPL-MCNC: 0.4 MG/DL (ref 0.2–0.7)
BUN BLDV-MCNC: 27 MG/DL (ref 8–23)
BURR CELLS: ABNORMAL
CALCIUM SERPL-MCNC: 8.7 MG/DL (ref 8.5–9.9)
CHLORIDE BLD-SCNC: 99 MEQ/L (ref 95–107)
CHOLESTEROL, TOTAL: 135 MG/DL (ref 0–199)
CO2: 24 MEQ/L (ref 20–31)
CREAT SERPL-MCNC: 0.87 MG/DL (ref 0.5–0.9)
EOSINOPHILS ABSOLUTE: 0.1 K/UL (ref 0–0.7)
EOSINOPHILS RELATIVE PERCENT: 1 %
GFR AFRICAN AMERICAN: >60
GFR NON-AFRICAN AMERICAN: >60
GLOBULIN: 3.4 G/DL (ref 2.3–3.5)
GLUCOSE BLD-MCNC: 205 MG/DL (ref 70–99)
HCT VFR BLD CALC: 29.7 % (ref 37–47)
HDLC SERPL-MCNC: 41 MG/DL (ref 40–59)
HEMOGLOBIN: 9.1 G/DL (ref 12–16)
LDL CHOLESTEROL CALCULATED: 71 MG/DL (ref 0–129)
LYMPHOCYTES ABSOLUTE: 1.8 K/UL (ref 1–4.8)
LYMPHOCYTES RELATIVE PERCENT: 16 %
MCH RBC QN AUTO: 21.1 PG (ref 27–31.3)
MCHC RBC AUTO-ENTMCNC: 30.7 % (ref 33–37)
MCV RBC AUTO: 69 FL (ref 82–100)
MONOCYTES ABSOLUTE: 0.5 K/UL (ref 0.2–0.8)
MONOCYTES RELATIVE PERCENT: 4.9 %
NEUTROPHILS ABSOLUTE: 7.4 K/UL (ref 1.4–6.5)
NEUTROPHILS RELATIVE PERCENT: 76 %
OVALOCYTES: ABNORMAL
PDW BLD-RTO: 17.7 % (ref 11.5–14.5)
PLATELET # BLD: 278 K/UL (ref 130–400)
PLATELET SLIDE REVIEW: ADEQUATE
POIKILOCYTES: ABNORMAL
POTASSIUM SERPL-SCNC: 4.6 MEQ/L (ref 3.4–4.9)
RBC # BLD: 4.31 M/UL (ref 4.2–5.4)
SODIUM BLD-SCNC: 139 MEQ/L (ref 135–144)
TOTAL PROTEIN: 7.2 G/DL (ref 6.3–8)
TRIGL SERPL-MCNC: 116 MG/DL (ref 0–150)
WBC # BLD: 9.7 K/UL (ref 4.8–10.8)

## 2020-09-21 PROCEDURE — 80061 LIPID PANEL: CPT

## 2020-09-21 PROCEDURE — 85025 COMPLETE CBC W/AUTO DIFF WBC: CPT

## 2020-09-21 PROCEDURE — 80053 COMPREHEN METABOLIC PANEL: CPT

## 2020-10-13 ENCOUNTER — OFFICE VISIT (OUTPATIENT)
Dept: ENDOCRINOLOGY | Age: 85
End: 2020-10-13
Payer: MEDICARE

## 2020-10-13 VITALS
SYSTOLIC BLOOD PRESSURE: 147 MMHG | BODY MASS INDEX: 30.39 KG/M2 | HEIGHT: 64 IN | DIASTOLIC BLOOD PRESSURE: 75 MMHG | HEART RATE: 66 BPM | WEIGHT: 178 LBS | OXYGEN SATURATION: 98 %

## 2020-10-13 PROBLEM — E11.65 UNCONTROLLED TYPE 2 DIABETES MELLITUS WITH HYPERGLYCEMIA (HCC): Status: ACTIVE | Noted: 2020-10-13

## 2020-10-13 PROCEDURE — 99203 OFFICE O/P NEW LOW 30 MIN: CPT | Performed by: INTERNAL MEDICINE

## 2020-10-13 PROCEDURE — 1090F PRES/ABSN URINE INCON ASSESS: CPT | Performed by: INTERNAL MEDICINE

## 2020-10-13 PROCEDURE — G8427 DOCREV CUR MEDS BY ELIG CLIN: HCPCS | Performed by: INTERNAL MEDICINE

## 2020-10-13 PROCEDURE — G8484 FLU IMMUNIZE NO ADMIN: HCPCS | Performed by: INTERNAL MEDICINE

## 2020-10-13 PROCEDURE — G8417 CALC BMI ABV UP PARAM F/U: HCPCS | Performed by: INTERNAL MEDICINE

## 2020-10-13 RX ORDER — NIFEDIPINE 60 MG/1
60 TABLET, EXTENDED RELEASE ORAL DAILY
COMMUNITY
Start: 2020-10-01

## 2020-10-13 RX ORDER — ALENDRONATE SODIUM 70 MG/1
70 TABLET ORAL DAILY
COMMUNITY
Start: 2020-08-10

## 2020-10-13 RX ORDER — PANTOPRAZOLE SODIUM 40 MG/1
40 TABLET, DELAYED RELEASE ORAL DAILY
COMMUNITY
Start: 2020-10-01

## 2020-10-13 ASSESSMENT — ENCOUNTER SYMPTOMS
TROUBLE SWALLOWING: 1
SHORTNESS OF BREATH: 1
SWOLLEN GLANDS: 0

## 2020-10-13 NOTE — PROGRESS NOTES
Subjective:      Patient ID: Devora Brooks is a 80 y.o. female. Patient referred here for multinodular goiter was evaluated 6 years ago in view of patient's age and abnormal thyroid function test was advised against biopsy or surgery patient still complains of occasional neck pain and minimal dysphagia no worsening over the last 6 years reviewed thyroid function test both have been normal patient does have anemia and is on replacement  Thyroid gland showing right lobe at 4.7 cm left lobe was 6.58 cm there was a large cystic area noted on the isthmus  measuring 2.4 cm  Other   This is a new (Goiter thyroid nodules cysts) problem. The current episode started more than 1 year ago. The problem occurs intermittently. The problem has been waxing and waning. Associated symptoms include fatigue and neck pain. Pertinent negatives include no anorexia, swollen glands or urinary symptoms. Associated symptoms comments: Patient denies any hot or cold intolerance does have minimal dysphagia. The symptoms are aggravated by eating. She has tried nothing for the symptoms. The treatment provided no relief. EXAMINATION THYROID ULTRASOUND         CLINICAL HISTORY Goiter         COMPARISONS None available.         FINDINGS Multiple sector real-time images of the thyroid gland were    obtained.  The gland is enlarged more pronounced on the left.  The    right lobe measures 4.79 x 3.2 x 2.86 cm. the left lobe measures 6.58    at 2.93 x 2.4 cm.  The isthmus measures . 43 cm.  The gland demonstrates    an inhomogeneous echotexture.  Multiple variable sized hypoechoic    nodules are seen.  Diffuse cystic changes are noted.  Largest cyst in    the isthmus is seen measuring 2.2 x 2.4 x 1.0 cm.  Dense calcification    in the left lobe is seen measuring 1 cm in size.         IMPRESSION FINDINGS ARE CONSISTENT WITH A MULTINODULAR GOITER. Results for Neris Griffiths (MRN [id number]) as of 10/13/2020 09:44   Ref.  Range 9/25/2015 14:20 2/8/2016 18:12 3/24/2016 08:45 6/6/2016 14:04 3/22/2019 18:17   TSH Latest Ref Range: 0.440 - 3.860 uIU/mL 0.580 0.559 0.865 0.552 0.448     Results for Gregor Cameron (MRN [id number]) as of 10/13/2020 09:44   Ref. Range 10/23/2012 09:15 2/25/2014 15:37   T4 Free Latest Ref Range: 0.93 - 1.70 ng/dL 1.33 1.43     Patient Active Problem List   Diagnosis    HTN (hypertension)    Goiter    Hypercholesterolemia    Anxiety    Uncontrolled type 2 diabetes mellitus with hyperglycemia (HCC)         Social History     Socioeconomic History    Marital status:      Spouse name: Not on file    Number of children: Not on file    Years of education: Not on file    Highest education level: Not on file   Occupational History    Not on file   Social Needs    Financial resource strain: Not on file    Food insecurity     Worry: Not on file     Inability: Not on file    Transportation needs     Medical: Not on file     Non-medical: Not on file   Tobacco Use    Smoking status: Never Smoker    Smokeless tobacco: Never Used   Substance and Sexual Activity    Alcohol use: Not on file    Drug use: Not on file    Sexual activity: Not on file   Lifestyle    Physical activity     Days per week: Not on file     Minutes per session: Not on file    Stress: Not on file   Relationships    Social connections     Talks on phone: Not on file     Gets together: Not on file     Attends Sabianism service: Not on file     Active member of club or organization: Not on file     Attends meetings of clubs or organizations: Not on file     Relationship status: Not on file    Intimate partner violence     Fear of current or ex partner: Not on file     Emotionally abused: Not on file     Physically abused: Not on file     Forced sexual activity: Not on file   Other Topics Concern    Not on file   Social History Narrative    Not on file     History reviewed. No pertinent surgical history. History reviewed.  No pertinent family history. No Known Allergies        Current Outpatient Medications:     alendronate (FOSAMAX) 70 MG tablet, Take 70 mg by mouth daily, Disp: , Rfl:     NIFEdipine (PROCARDIA XL) 60 MG extended release tablet, Take 60 mg by mouth daily, Disp: , Rfl:     pantoprazole (PROTONIX) 40 MG tablet, Take 40 mg by mouth daily, Disp: , Rfl:     amLODIPine (NORVASC) 5 MG tablet, Take 5 mg by mouth daily. , Disp: , Rfl:     cloNIDine (CATAPRES) 0.2 MG tablet, Take 0.2 mg by mouth Daily. , Disp: , Rfl:     metoprolol (TOPROL-XL) 100 MG XL tablet, Take 100 mg by mouth 2 times daily. , Disp: , Rfl:     torsemide (DEMADEX) 20 MG tablet, Take 20 mg by mouth daily. , Disp: , Rfl:     LORazepam (ATIVAN) 1 MG tablet, Take 1 mg by mouth every 6 hours as needed for Anxiety. , Disp: , Rfl:     insulin 70-30 (NOVOLIN 70/30) (70-30) 100 UNIT/ML injection, Inject  into the skin 2 times daily. 20 units am 15 units pm, Disp: , Rfl:     atorvastatin (LIPITOR) 40 MG tablet, Take 40 mg by mouth daily. , Disp: , Rfl:     losartan (COZAAR) 100 MG tablet, Take 100 mg by mouth daily. , Disp: , Rfl:     lisinopril (PRINIVIL;ZESTRIL) 20 MG tablet, Take 20 mg by mouth daily. , Disp: , Rfl:     Review of Systems   Constitutional: Positive for fatigue. Negative for unexpected weight change. Fatigue   HENT: Positive for trouble swallowing. Respiratory: Positive for shortness of breath. Gastrointestinal: Negative for anorexia. Endocrine: Negative for cold intolerance and heat intolerance. Musculoskeletal: Positive for neck pain. Hematological:        Anemia   All other systems reviewed and are negative. Vitals:    10/13/20 0937   BP: (!) 147/75   Pulse: 66   SpO2: 98%   Weight: 178 lb (80.7 kg)   Height: 5' 4\" (1.626 m)       Objective:   Physical Exam  Constitutional:       Appearance: Normal appearance. She is obese. HENT:      Head: Normocephalic and atraumatic.       Right Ear: External ear normal.      Left Ear: External ear normal.      Nose: Nose normal.      Mouth/Throat:      Mouth: Mucous membranes are moist.   Eyes:      General: No scleral icterus. Right eye: No discharge. Left eye: No discharge. Extraocular Movements: Extraocular movements intact. Conjunctiva/sclera: Conjunctivae normal.   Neck:      Musculoskeletal: Normal range of motion and neck supple. Cardiovascular:      Rate and Rhythm: Normal rate and regular rhythm. Heart sounds: Normal heart sounds. Abdominal:      Palpations: Abdomen is soft. Musculoskeletal: Normal range of motion. Skin:     General: Skin is warm. Neurological:      General: No focal deficit present. Mental Status: She is alert. Psychiatric:         Mood and Affect: Mood normal.         Behavior: Behavior normal.         Assessment:       Diagnosis Orders   1. Goiter, nontoxic, multinodular  T4, Free    TSH without Reflex    Thyroid Peroxidase Antibody    Thyroid Stimulating Immunoglobulin    US HEAD NECK SOFT TISSUE THYROID   2.  Cystic thyroid nodule             Plan:      Orders Placed This Encounter   Procedures    US HEAD NECK SOFT TISSUE THYROID     Standing Status:   Future     Standing Expiration Date:   10/13/2021    T4, Free     Standing Status:   Future     Standing Expiration Date:   10/13/2021    TSH without Reflex     Standing Status:   Future     Standing Expiration Date:   10/13/2021    Thyroid Peroxidase Antibody     Standing Status:   Future     Standing Expiration Date:   10/13/2021    Thyroid Stimulating Immunoglobulin     Standing Status:   Future     Standing Expiration Date:   10/13/2021     Will repeat another thyroid ultrasound get thyroid function test with thyroid antibodies  Condition discussed with patient's daughter who is here accompanied with patient made aware very low risk of thyroid cancer with her having the cyst and avoid any surgery or biopsy patient to follow-up in 4 weeks after she has repeat thyroid ultrasound and thyroid function test  More than 50% of 40 minutes spent patient education counseling thank you for the referral of Dr. Scottie Winters MD

## 2020-10-15 ENCOUNTER — HOSPITAL ENCOUNTER (OUTPATIENT)
Dept: LAB | Age: 85
Discharge: HOME | End: 2020-10-15
Payer: MEDICARE

## 2020-10-15 ENCOUNTER — HOSPITAL ENCOUNTER (OUTPATIENT)
Dept: GENERAL RADIOLOGY | Age: 85
Discharge: HOME | End: 2020-10-17
Payer: MEDICARE

## 2020-10-15 ENCOUNTER — HOSPITAL ENCOUNTER (OUTPATIENT)
Dept: ULTRASOUND IMAGING | Age: 85
Discharge: HOME | End: 2020-10-17
Payer: MEDICARE

## 2020-10-15 LAB
ANISOCYTOSIS: ABNORMAL
BASOPHILS ABSOLUTE: 0.2 K/UL (ref 0–0.2)
BASOPHILS RELATIVE PERCENT: 2.1 %
EOSINOPHILS ABSOLUTE: 0.2 K/UL (ref 0–0.7)
EOSINOPHILS RELATIVE PERCENT: 2.3 %
FERRITIN: 33.1 NG/ML (ref 13–150)
HCT VFR BLD CALC: 32.8 % (ref 37–47)
HEMOGLOBIN: 10.1 G/DL (ref 12–16)
HYPOCHROMIA: ABNORMAL
IRON SATURATION: 7 % (ref 11–46)
IRON: 27 UG/DL (ref 37–145)
LYMPHOCYTES ABSOLUTE: 2.5 K/UL (ref 1–4.8)
LYMPHOCYTES RELATIVE PERCENT: 30.6 %
MCH RBC QN AUTO: 21 PG (ref 27–31.3)
MCHC RBC AUTO-ENTMCNC: 30.8 % (ref 33–37)
MCV RBC AUTO: 68.3 FL (ref 82–100)
MICROCYTES: ABNORMAL
MONOCYTES ABSOLUTE: 0.8 K/UL (ref 0.2–0.8)
MONOCYTES RELATIVE PERCENT: 9.9 %
NEUTROPHILS ABSOLUTE: 4.4 K/UL (ref 1.4–6.5)
NEUTROPHILS RELATIVE PERCENT: 55.1 %
OVALOCYTES: ABNORMAL
PDW BLD-RTO: 19.1 % (ref 11.5–14.5)
PLATELET # BLD: 359 K/UL (ref 130–400)
PLATELET SLIDE REVIEW: NORMAL
POIKILOCYTES: ABNORMAL
RBC # BLD: 4.8 M/UL (ref 4.2–5.4)
SLIDE REVIEW: ABNORMAL
T4 FREE: 1.37 NG/DL (ref 0.84–1.68)
TOTAL IRON BINDING CAPACITY: 408 UG/DL (ref 178–450)
TSH SERPL DL<=0.05 MIU/L-ACNC: 0.81 UIU/ML (ref 0.44–3.86)
WBC # BLD: 8 K/UL (ref 4.8–10.8)

## 2020-10-15 PROCEDURE — 83550 IRON BINDING TEST: CPT

## 2020-10-15 PROCEDURE — 84443 ASSAY THYROID STIM HORMONE: CPT

## 2020-10-15 PROCEDURE — 76536 US EXAM OF HEAD AND NECK: CPT

## 2020-10-15 PROCEDURE — 84439 ASSAY OF FREE THYROXINE: CPT

## 2020-10-15 PROCEDURE — 84445 ASSAY OF TSI GLOBULIN: CPT

## 2020-10-15 PROCEDURE — 86376 MICROSOMAL ANTIBODY EACH: CPT

## 2020-10-15 PROCEDURE — 85025 COMPLETE CBC W/AUTO DIFF WBC: CPT

## 2020-10-15 PROCEDURE — 83540 ASSAY OF IRON: CPT

## 2020-10-15 PROCEDURE — 71046 X-RAY EXAM CHEST 2 VIEWS: CPT

## 2020-10-15 PROCEDURE — 82728 ASSAY OF FERRITIN: CPT

## 2020-10-17 LAB — THYROID PEROXIDASE (TPO) ABS: 13.1 IU/ML (ref 0–35)

## 2020-10-19 LAB — THYROID STIMULATING IMMUNOGLOBULIN: <0.1 IU/L

## 2020-11-11 ENCOUNTER — HOSPITAL ENCOUNTER (OUTPATIENT)
Dept: WOMENS IMAGING | Age: 85
Discharge: HOME | End: 2020-11-13
Payer: MEDICARE

## 2020-11-11 PROCEDURE — 77067 SCR MAMMO BI INCL CAD: CPT

## 2020-11-17 ENCOUNTER — OFFICE VISIT (OUTPATIENT)
Dept: ENDOCRINOLOGY | Age: 85
End: 2020-11-17
Payer: MEDICARE

## 2020-11-17 VITALS
OXYGEN SATURATION: 98 % | WEIGHT: 177 LBS | HEART RATE: 56 BPM | BODY MASS INDEX: 30.38 KG/M2 | SYSTOLIC BLOOD PRESSURE: 153 MMHG | DIASTOLIC BLOOD PRESSURE: 76 MMHG

## 2020-11-17 PROCEDURE — G8417 CALC BMI ABV UP PARAM F/U: HCPCS | Performed by: INTERNAL MEDICINE

## 2020-11-17 PROCEDURE — 1036F TOBACCO NON-USER: CPT | Performed by: INTERNAL MEDICINE

## 2020-11-17 PROCEDURE — G8484 FLU IMMUNIZE NO ADMIN: HCPCS | Performed by: INTERNAL MEDICINE

## 2020-11-17 PROCEDURE — 99213 OFFICE O/P EST LOW 20 MIN: CPT | Performed by: INTERNAL MEDICINE

## 2020-11-17 PROCEDURE — G8400 PT W/DXA NO RESULTS DOC: HCPCS | Performed by: INTERNAL MEDICINE

## 2020-11-17 PROCEDURE — 4040F PNEUMOC VAC/ADMIN/RCVD: CPT | Performed by: INTERNAL MEDICINE

## 2020-11-17 PROCEDURE — 1090F PRES/ABSN URINE INCON ASSESS: CPT | Performed by: INTERNAL MEDICINE

## 2020-11-17 PROCEDURE — 1123F ACP DISCUSS/DSCN MKR DOCD: CPT | Performed by: INTERNAL MEDICINE

## 2020-11-17 PROCEDURE — G8427 DOCREV CUR MEDS BY ELIG CLIN: HCPCS | Performed by: INTERNAL MEDICINE

## 2020-11-17 ASSESSMENT — ENCOUNTER SYMPTOMS
SWOLLEN GLANDS: 0
TROUBLE SWALLOWING: 0

## 2020-11-17 NOTE — PROGRESS NOTES
Subjective:      Patient ID: Baylee Koehler is a 80 y.o. female. Follow-up for multinodular goiter labs show normal thyroid function test thyroid antibodies were negative ruling out Hashimoto thyroiditis thyroid nodules have increased specially in the estimates from 2.4 to 3.2 cm patient denies any neck pain difficulty swallowing and at this time does not want any surgical intervention  Other   This is a chronic (Goiter) problem. The current episode started more than 1 year ago. The problem occurs rarely. The problem has been gradually worsening. Pertinent negatives include no neck pain or swollen glands. Nothing aggravates the symptoms. She has tried nothing for the symptoms. Results for Kanwal Dye (MRN [id number]) as of 11/17/2020 09:35   Ref. Range 10/15/2020 10:12   TSH Latest Ref Range: 0.440 - 3.860 uIU/mL 0.814   THYROID PEROXIDASE (TPO) ABS Latest Ref Range: 0.0 - 35.0 IU/mL 13.1   T4 Free Latest Ref Range: 0.84 - 1.68 ng/dL 1.37       EXAMINATION: ULTRASOUND THYROID         CLINICAL HISTORY: Follow-up multinodular nontoxic goiter. Palpable nodule at the isthmus         COMPARISONS: 2/25/2014; CT chest: 1/27/2014         FINDINGS: Biplanar images were obtained. The right lobe measures 5.8 x 3 x 3.2 cm with a volume of 29 mL.  The left lobe measures 6.3 x 2.9 x 2.6 cm with a volume of 24.7 mL.   The isthmus is difficult to measure since it contains a large nodule. The    thyroid gland is enlarged. There are multiple cysts and Spongiform cysts and calcifications throughout both lobes. The gland has a multinodular goiter appearance. It is difficult to single out dominant nodules due to the diffuse heterogeneous appearance.    Mariela Priyanka is a dominant enlarging nodule within the isthmus, see below. There are calcifications beneath this nodule which shadow and may contain difficult to evaluate the underlying thyroid tissue. These calcifications were seen on previous CT scan.  Also    possible prominent nodule in the inferior left lobe, see below.         Right Lobe:  No definite suspicious nodule in this lobe.      Isthmus: Location: Mid, slightly to left    Size: 2.8 x 3.2 x 2 cm, previously measured 2.4 x 2.2 x 1 cm (2014)    Composition: Mixed cystic and solid : 1 Point    Echogenicity:Hypoechoic: 2 Points    Shape: Wider than tall : 0 Points    Margin: Extrathyroidal extension: 3 Points    Echogenic Foci: Punctate echogenic foci: 3 Points    ACR Ti-Rads Category : TR 5: 7 Points or more         Highly suspicious      FNA if greater than 1 cm      Follow is greater than 0.5 cm         Left Lobe: Location: Inferior    Size: 2.2 x 2.1 cm    Composition: Solid or almost completely solid: 2 Points    Echogenicity:Hyperechoic or isechoic: 1 Point    Shape: Wider than tall : 0 Points    Margin: Ill-defined: 0 Points    Echogenic Foci: Punctate echogenic foci: 3 Points    ACR Ti-Rads Category : TR 4: 4-6 Points         Moderately suspicious      FNA if greater than 1.5 cm      Follow if greater than 1 cm              Impression         DIFFUSE MULTINODULAR GOITER CONTAIN NUMEROUS CYSTS AND CALCIFICATIONS.         ENLARGING SUSPICIOUS NODULE ISTHMUS, RECOMMEND BIOPSY TO EXCLUDE NEOPLASM.         MILDLY SUSPICIOUS NODULE LEFT INFERIOR LOBE, CONSIDER BIOPSY DUE TO LARGE SIZE.           Patient Active Problem List   Diagnosis    HTN (hypertension)    Goiter    Hypercholesterolemia    Anxiety    Uncontrolled type 2 diabetes mellitus with hyperglycemia (HCC)     No Known Allergies      Current Outpatient Medications:     alendronate (FOSAMAX) 70 MG tablet, Take 70 mg by mouth daily, Disp: , Rfl:     NIFEdipine (PROCARDIA XL) 60 MG extended release tablet, Take 60 mg by mouth daily, Disp: , Rfl:     pantoprazole (PROTONIX) 40 MG tablet, Take 40 mg by mouth daily, Disp: , Rfl:     amLODIPine (NORVASC) 5 MG tablet, Take 5 mg by mouth daily. , Disp: , Rfl:     cloNIDine (CATAPRES) 0.2 MG tablet, Take 0.2 mg by mouth Daily. , Disp: , Rfl:     metoprolol (TOPROL-XL) 100 MG XL tablet, Take 100 mg by mouth 2 times daily. , Disp: , Rfl:     torsemide (DEMADEX) 20 MG tablet, Take 20 mg by mouth daily. , Disp: , Rfl:     LORazepam (ATIVAN) 1 MG tablet, Take 1 mg by mouth every 6 hours as needed for Anxiety. , Disp: , Rfl:     insulin 70-30 (NOVOLIN 70/30) (70-30) 100 UNIT/ML injection, Inject  into the skin 2 times daily. 20 units am 15 units pm, Disp: , Rfl:     atorvastatin (LIPITOR) 40 MG tablet, Take 40 mg by mouth daily. , Disp: , Rfl:     losartan (COZAAR) 100 MG tablet, Take 100 mg by mouth daily. , Disp: , Rfl:     lisinopril (PRINIVIL;ZESTRIL) 20 MG tablet, Take 20 mg by mouth daily. , Disp: , Rfl:         Review of Systems   HENT: Negative for trouble swallowing. Endocrine: Negative. Musculoskeletal: Negative for neck pain. All other systems reviewed and are negative. Vitals:    11/17/20 0916 11/17/20 0918   BP: (!) 148/79 (!) 153/76   Pulse: 56    SpO2: 98%    Weight: 177 lb (80.3 kg)        Objective:   Physical Exam  Vitals signs reviewed. Constitutional:       Appearance: Normal appearance. She is obese. HENT:      Head: Normocephalic and atraumatic. Nose: Nose normal.   Neck:      Musculoskeletal: Normal range of motion and neck supple. Cardiovascular:      Rate and Rhythm: Bradycardia present. Musculoskeletal: Normal range of motion. Neurological:      General: No focal deficit present. Mental Status: She is alert. Psychiatric:         Mood and Affect: Mood normal.         Assessment:       Diagnosis Orders   1.  Goiter, nontoxic, multinodular  T4, Free    TSH without Reflex    US HEAD NECK SOFT TISSUE THYROID           Plan:      Orders Placed This Encounter   Procedures    US HEAD NECK SOFT TISSUE THYROID     Standing Status:   Future     Standing Expiration Date:   11/17/2021    T4, Free     Standing Status:   Future     Standing Expiration Date:   11/17/2021    TSH without Reflex     Standing Status:   Future     Standing Expiration Date:   11/17/2021     Repeat thyroid function test thyroid ultrasound in 12 months hold off any replacement we will also hold off any biopsy or surgery in view of patient's age and multiple other comorbid condition        Mariajose Victor MD

## 2021-01-08 ENCOUNTER — HOSPITAL ENCOUNTER (OUTPATIENT)
Dept: LAB | Age: 86
Discharge: HOME | End: 2021-01-08
Payer: MEDICARE

## 2021-01-08 LAB
T4 FREE: 1.08 NG/DL (ref 0.84–1.68)
TSH SERPL DL<=0.05 MIU/L-ACNC: 0.97 UIU/ML (ref 0.44–3.86)

## 2021-01-08 PROCEDURE — 84443 ASSAY THYROID STIM HORMONE: CPT

## 2021-01-08 PROCEDURE — 84439 ASSAY OF FREE THYROXINE: CPT

## 2021-04-09 ENCOUNTER — HOSPITAL ENCOUNTER (OUTPATIENT)
Dept: LAB | Age: 86
Discharge: HOME | End: 2021-04-09
Payer: MEDICARE

## 2021-04-09 LAB
ALBUMIN SERPL-MCNC: 3.6 G/DL (ref 3.5–4.6)
ALP BLD-CCNC: 101 U/L (ref 40–130)
ALT SERPL-CCNC: 12 U/L (ref 0–33)
ANION GAP SERPL CALCULATED.3IONS-SCNC: 14 MEQ/L (ref 9–15)
AST SERPL-CCNC: 20 U/L (ref 0–35)
BASOPHILS ABSOLUTE: 0 K/UL (ref 0–0.2)
BASOPHILS RELATIVE PERCENT: 0.7 %
BILIRUB SERPL-MCNC: 0.3 MG/DL (ref 0.2–0.7)
BUN BLDV-MCNC: 34 MG/DL (ref 8–23)
CALCIUM SERPL-MCNC: 8.8 MG/DL (ref 8.5–9.9)
CHLORIDE BLD-SCNC: 96 MEQ/L (ref 95–107)
CHOLESTEROL, TOTAL: 170 MG/DL (ref 0–199)
CO2: 29 MEQ/L (ref 20–31)
CREAT SERPL-MCNC: 0.99 MG/DL (ref 0.5–0.9)
EOSINOPHILS ABSOLUTE: 0.2 K/UL (ref 0–0.7)
EOSINOPHILS RELATIVE PERCENT: 2.4 %
GFR AFRICAN AMERICAN: >60
GFR NON-AFRICAN AMERICAN: 53.2
GLOBULIN: 3.3 G/DL (ref 2.3–3.5)
GLUCOSE BLD-MCNC: 187 MG/DL (ref 70–99)
HCT VFR BLD CALC: 33.8 % (ref 37–47)
HDLC SERPL-MCNC: 42 MG/DL (ref 40–59)
HEMOGLOBIN: 10.8 G/DL (ref 12–16)
LDL CHOLESTEROL CALCULATED: 94 MG/DL (ref 0–129)
LYMPHOCYTES ABSOLUTE: 2.8 K/UL (ref 1–4.8)
LYMPHOCYTES RELATIVE PERCENT: 36.3 %
MCH RBC QN AUTO: 25.3 PG (ref 27–31.3)
MCHC RBC AUTO-ENTMCNC: 31.8 % (ref 33–37)
MCV RBC AUTO: 79.4 FL (ref 82–100)
MONOCYTES ABSOLUTE: 1 K/UL (ref 0.2–0.8)
MONOCYTES RELATIVE PERCENT: 12.9 %
NEUTROPHILS ABSOLUTE: 3.7 K/UL (ref 1.4–6.5)
NEUTROPHILS RELATIVE PERCENT: 47.7 %
PDW BLD-RTO: 14.8 % (ref 11.5–14.5)
PLATELET # BLD: 278 K/UL (ref 130–400)
POTASSIUM SERPL-SCNC: 4 MEQ/L (ref 3.4–4.9)
RBC # BLD: 4.26 M/UL (ref 4.2–5.4)
SODIUM BLD-SCNC: 139 MEQ/L (ref 135–144)
TOTAL PROTEIN: 6.9 G/DL (ref 6.3–8)
TRIGL SERPL-MCNC: 172 MG/DL (ref 0–150)
WBC # BLD: 7.7 K/UL (ref 4.8–10.8)

## 2021-04-09 PROCEDURE — 80053 COMPREHEN METABOLIC PANEL: CPT

## 2021-04-09 PROCEDURE — 85025 COMPLETE CBC W/AUTO DIFF WBC: CPT

## 2021-04-09 PROCEDURE — 80061 LIPID PANEL: CPT

## 2021-07-19 ENCOUNTER — HOSPITAL ENCOUNTER (OUTPATIENT)
Dept: LAB | Age: 86
Discharge: HOME | End: 2021-07-19
Payer: MEDICARE

## 2021-07-19 LAB
ALBUMIN SERPL-MCNC: 3.5 G/DL (ref 3.5–4.6)
ALP BLD-CCNC: 121 U/L (ref 40–130)
ALT SERPL-CCNC: 19 U/L (ref 0–33)
ANION GAP SERPL CALCULATED.3IONS-SCNC: 16 MEQ/L (ref 9–15)
ANISOCYTOSIS: ABNORMAL
AST SERPL-CCNC: 27 U/L (ref 0–35)
BASOPHILS ABSOLUTE: 0.1 K/UL (ref 0–0.2)
BASOPHILS RELATIVE PERCENT: 0.8 %
BILIRUB SERPL-MCNC: 0.5 MG/DL (ref 0.2–0.7)
BUN BLDV-MCNC: 24 MG/DL (ref 8–23)
CALCIUM SERPL-MCNC: 8.6 MG/DL (ref 8.5–9.9)
CHLORIDE BLD-SCNC: 98 MEQ/L (ref 95–107)
CHOLESTEROL, TOTAL: 141 MG/DL (ref 0–199)
CO2: 26 MEQ/L (ref 20–31)
CREAT SERPL-MCNC: 0.95 MG/DL (ref 0.5–0.9)
EOSINOPHILS ABSOLUTE: 0.2 K/UL (ref 0–0.7)
EOSINOPHILS RELATIVE PERCENT: 1.9 %
GFR AFRICAN AMERICAN: >60
GFR NON-AFRICAN AMERICAN: 55.8
GLOBULIN: 3.4 G/DL (ref 2.3–3.5)
GLUCOSE BLD-MCNC: 241 MG/DL (ref 70–99)
HCT VFR BLD CALC: 32.7 % (ref 37–47)
HDLC SERPL-MCNC: 41 MG/DL (ref 40–59)
HEMOGLOBIN: 9.8 G/DL (ref 12–16)
LDL CHOLESTEROL CALCULATED: 76 MG/DL (ref 0–129)
LYMPHOCYTES ABSOLUTE: 2.3 K/UL (ref 1–4.8)
LYMPHOCYTES RELATIVE PERCENT: 28 %
MCH RBC QN AUTO: 22.1 PG (ref 27–31.3)
MCHC RBC AUTO-ENTMCNC: 30.1 % (ref 33–37)
MCV RBC AUTO: 73.3 FL (ref 82–100)
MONOCYTES ABSOLUTE: 0.9 K/UL (ref 0.2–0.8)
MONOCYTES RELATIVE PERCENT: 10.7 %
NEUTROPHILS ABSOLUTE: 4.8 K/UL (ref 1.4–6.5)
NEUTROPHILS RELATIVE PERCENT: 58.6 %
OVALOCYTES: ABNORMAL
PDW BLD-RTO: 19.3 % (ref 11.5–14.5)
PLATELET # BLD: 310 K/UL (ref 130–400)
PLATELET SLIDE REVIEW: NORMAL
POIKILOCYTES: ABNORMAL
POTASSIUM SERPL-SCNC: 4.1 MEQ/L (ref 3.4–4.9)
RBC # BLD: 4.46 M/UL (ref 4.2–5.4)
SLIDE REVIEW: ABNORMAL
SODIUM BLD-SCNC: 140 MEQ/L (ref 135–144)
TOTAL PROTEIN: 6.9 G/DL (ref 6.3–8)
TRIGL SERPL-MCNC: 120 MG/DL (ref 0–150)
WBC # BLD: 8.2 K/UL (ref 4.8–10.8)

## 2021-07-19 PROCEDURE — 80061 LIPID PANEL: CPT

## 2021-07-19 PROCEDURE — 80053 COMPREHEN METABOLIC PANEL: CPT

## 2021-07-19 PROCEDURE — 85025 COMPLETE CBC W/AUTO DIFF WBC: CPT

## 2021-11-30 LAB — VITAMIN D 25-HYDROXY: 13 NG/ML

## 2021-12-01 LAB
FOLATE: 18.1 NG/ML
TSH SERPL DL<=0.05 MIU/L-ACNC: 0.44 MIU/L (ref 0.44–3.9)
VITAMIN B-12: >1500 PG/ML (ref 211–911)

## 2021-12-28 ENCOUNTER — HOSPITAL ENCOUNTER (OUTPATIENT)
Dept: LAB | Age: 86
Discharge: HOME | End: 2021-12-28
Payer: MEDICARE

## 2021-12-28 LAB
ALBUMIN SERPL-MCNC: 4 G/DL (ref 3.5–4.6)
ALP BLD-CCNC: 113 U/L (ref 40–130)
ALT SERPL-CCNC: 14 U/L (ref 0–33)
ANION GAP SERPL CALCULATED.3IONS-SCNC: 26 MEQ/L (ref 9–15)
AST SERPL-CCNC: 22 U/L (ref 0–35)
BASOPHILS ABSOLUTE: 0 K/UL (ref 0–0.2)
BASOPHILS RELATIVE PERCENT: 0.7 %
BILIRUB SERPL-MCNC: 0.3 MG/DL (ref 0.2–0.7)
BUN BLDV-MCNC: 23 MG/DL (ref 8–23)
CALCIUM SERPL-MCNC: 9.1 MG/DL (ref 8.5–9.9)
CHLORIDE BLD-SCNC: 102 MEQ/L (ref 95–107)
CO2: 17 MEQ/L (ref 20–31)
CREAT SERPL-MCNC: 1.08 MG/DL (ref 0.5–0.9)
EOSINOPHILS ABSOLUTE: 0.1 K/UL (ref 0–0.7)
EOSINOPHILS RELATIVE PERCENT: 1.6 %
GFR AFRICAN AMERICAN: 58.1
GFR NON-AFRICAN AMERICAN: 48
GLOBULIN: 3.8 G/DL (ref 2.3–3.5)
GLUCOSE BLD-MCNC: 362 MG/DL (ref 70–99)
HCT VFR BLD CALC: 37.4 % (ref 37–47)
HEMOGLOBIN: 11.6 G/DL (ref 12–16)
LYMPHOCYTES ABSOLUTE: 2 K/UL (ref 1–4.8)
LYMPHOCYTES RELATIVE PERCENT: 29.6 %
MCH RBC QN AUTO: 26.7 PG (ref 27–31.3)
MCHC RBC AUTO-ENTMCNC: 31.1 % (ref 33–37)
MCV RBC AUTO: 85.7 FL (ref 82–100)
MONOCYTES ABSOLUTE: 0.7 K/UL (ref 0.2–0.8)
MONOCYTES RELATIVE PERCENT: 10.9 %
NEUTROPHILS ABSOLUTE: 3.9 K/UL (ref 1.4–6.5)
NEUTROPHILS RELATIVE PERCENT: 57.2 %
PDW BLD-RTO: 17 % (ref 11.5–14.5)
PLATELET # BLD: 265 K/UL (ref 130–400)
POTASSIUM SERPL-SCNC: 3.9 MEQ/L (ref 3.4–4.9)
RBC # BLD: 4.36 M/UL (ref 4.2–5.4)
SODIUM BLD-SCNC: 145 MEQ/L (ref 135–144)
TOTAL PROTEIN: 7.8 G/DL (ref 6.3–8)
WBC # BLD: 6.8 K/UL (ref 4.8–10.8)

## 2021-12-28 PROCEDURE — 85025 COMPLETE CBC W/AUTO DIFF WBC: CPT

## 2021-12-28 PROCEDURE — 80053 COMPREHEN METABOLIC PANEL: CPT

## 2022-02-23 LAB — SURGICAL PATHOLOGY REPORT: NORMAL

## 2022-03-01 LAB — OCCULT BLOOD, GASTRIC: POSITIVE

## 2022-04-11 ENCOUNTER — HOSPITAL ENCOUNTER (OUTPATIENT)
Dept: LAB | Age: 87
Discharge: HOME | End: 2022-04-11
Payer: MEDICARE

## 2022-04-11 LAB
ALBUMIN SERPL-MCNC: 3.7 G/DL (ref 3.5–4.6)
ALP BLD-CCNC: 131 U/L (ref 40–130)
ALT SERPL-CCNC: 19 U/L (ref 0–33)
ANION GAP SERPL CALCULATED.3IONS-SCNC: 15 MEQ/L (ref 9–15)
AST SERPL-CCNC: 29 U/L (ref 0–35)
BASOPHILS ABSOLUTE: 0.1 K/UL (ref 0–0.2)
BASOPHILS RELATIVE PERCENT: 0.9 %
BILIRUB SERPL-MCNC: 0.3 MG/DL (ref 0.2–0.7)
BUN BLDV-MCNC: 16 MG/DL (ref 8–23)
CALCIUM SERPL-MCNC: 8.9 MG/DL (ref 8.5–9.9)
CHLORIDE BLD-SCNC: 96 MEQ/L (ref 95–107)
CHOLESTEROL, TOTAL: 164 MG/DL (ref 0–199)
CO2: 25 MEQ/L (ref 20–31)
CREAT SERPL-MCNC: 0.77 MG/DL (ref 0.5–0.9)
EOSINOPHILS ABSOLUTE: 0.2 K/UL (ref 0–0.7)
EOSINOPHILS RELATIVE PERCENT: 2.3 %
GFR AFRICAN AMERICAN: >60
GFR NON-AFRICAN AMERICAN: >60
GLOBULIN: 3.6 G/DL (ref 2.3–3.5)
GLUCOSE BLD-MCNC: 205 MG/DL (ref 70–99)
HCT VFR BLD CALC: 36.7 % (ref 37–47)
HDLC SERPL-MCNC: 48 MG/DL (ref 40–59)
HEMOGLOBIN: 11.7 G/DL (ref 12–16)
LDL CHOLESTEROL CALCULATED: 82 MG/DL (ref 0–129)
LYMPHOCYTES ABSOLUTE: 2.1 K/UL (ref 1–4.8)
LYMPHOCYTES RELATIVE PERCENT: 30.5 %
MCH RBC QN AUTO: 27.6 PG (ref 27–31.3)
MCHC RBC AUTO-ENTMCNC: 31.8 % (ref 33–37)
MCV RBC AUTO: 86.7 FL (ref 82–100)
MONOCYTES ABSOLUTE: 0.8 K/UL (ref 0.2–0.8)
MONOCYTES RELATIVE PERCENT: 10.7 %
NEUTROPHILS ABSOLUTE: 3.9 K/UL (ref 1.4–6.5)
NEUTROPHILS RELATIVE PERCENT: 55.6 %
PDW BLD-RTO: 14.8 % (ref 11.5–14.5)
PLATELET # BLD: 338 K/UL (ref 130–400)
POTASSIUM SERPL-SCNC: 3.6 MEQ/L (ref 3.4–4.9)
RBC # BLD: 4.23 M/UL (ref 4.2–5.4)
SODIUM BLD-SCNC: 136 MEQ/L (ref 135–144)
TOTAL PROTEIN: 7.3 G/DL (ref 6.3–8)
TRIGL SERPL-MCNC: 169 MG/DL (ref 0–150)
URIC ACID, SERUM: 8.1 MG/DL (ref 2.4–5.7)
WBC # BLD: 7 K/UL (ref 4.8–10.8)

## 2022-04-11 PROCEDURE — 84550 ASSAY OF BLOOD/URIC ACID: CPT

## 2022-04-11 PROCEDURE — 80053 COMPREHEN METABOLIC PANEL: CPT

## 2022-04-11 PROCEDURE — 85025 COMPLETE CBC W/AUTO DIFF WBC: CPT

## 2022-04-11 PROCEDURE — 80061 LIPID PANEL: CPT

## 2022-07-08 ENCOUNTER — HOSPITAL ENCOUNTER (OUTPATIENT)
Dept: LAB | Age: 87
Discharge: HOME | End: 2022-07-08
Payer: MEDICARE

## 2022-07-08 LAB
ALBUMIN SERPL-MCNC: 4 G/DL (ref 3.5–4.6)
ALP BLD-CCNC: 117 U/L (ref 40–130)
ALT SERPL-CCNC: 19 U/L (ref 0–33)
ANION GAP SERPL CALCULATED.3IONS-SCNC: 15 MEQ/L (ref 9–15)
AST SERPL-CCNC: 26 U/L (ref 0–35)
BASOPHILS ABSOLUTE: 0.1 K/UL (ref 0–0.2)
BASOPHILS RELATIVE PERCENT: 0.8 %
BILIRUB SERPL-MCNC: 0.3 MG/DL (ref 0.2–0.7)
BUN BLDV-MCNC: 20 MG/DL (ref 8–23)
CALCIUM SERPL-MCNC: 8.7 MG/DL (ref 8.5–9.9)
CHLORIDE BLD-SCNC: 100 MEQ/L (ref 95–107)
CHOLESTEROL, TOTAL: 139 MG/DL (ref 0–199)
CO2: 24 MEQ/L (ref 20–31)
CREAT SERPL-MCNC: 0.95 MG/DL (ref 0.5–0.9)
EOSINOPHILS ABSOLUTE: 0.2 K/UL (ref 0–0.7)
EOSINOPHILS RELATIVE PERCENT: 3.1 %
GFR AFRICAN AMERICAN: >60
GFR NON-AFRICAN AMERICAN: 55.6
GLOBULIN: 2.9 G/DL (ref 2.3–3.5)
GLUCOSE BLD-MCNC: 241 MG/DL (ref 70–99)
HCT VFR BLD CALC: 35.1 % (ref 37–47)
HDLC SERPL-MCNC: 50 MG/DL (ref 40–59)
HEMOGLOBIN: 11.4 G/DL (ref 12–16)
LDL CHOLESTEROL CALCULATED: 68 MG/DL (ref 0–129)
LYMPHOCYTES ABSOLUTE: 2 K/UL (ref 1–4.8)
LYMPHOCYTES RELATIVE PERCENT: 29.5 %
MCH RBC QN AUTO: 29.3 PG (ref 27–31.3)
MCHC RBC AUTO-ENTMCNC: 32.4 % (ref 33–37)
MCV RBC AUTO: 90.4 FL (ref 82–100)
MONOCYTES ABSOLUTE: 0.6 K/UL (ref 0.2–0.8)
MONOCYTES RELATIVE PERCENT: 8.8 %
NEUTROPHILS ABSOLUTE: 4 K/UL (ref 1.4–6.5)
NEUTROPHILS RELATIVE PERCENT: 57.8 %
PDW BLD-RTO: 15.1 % (ref 11.5–14.5)
PLATELET # BLD: 259 K/UL (ref 130–400)
POTASSIUM SERPL-SCNC: 4.2 MEQ/L (ref 3.4–4.9)
RBC # BLD: 3.88 M/UL (ref 4.2–5.4)
SODIUM BLD-SCNC: 139 MEQ/L (ref 135–144)
TOTAL PROTEIN: 6.9 G/DL (ref 6.3–8)
TRIGL SERPL-MCNC: 107 MG/DL (ref 0–150)
WBC # BLD: 6.9 K/UL (ref 4.8–10.8)

## 2022-07-08 PROCEDURE — 85025 COMPLETE CBC W/AUTO DIFF WBC: CPT

## 2022-07-08 PROCEDURE — 80061 LIPID PANEL: CPT

## 2022-07-08 PROCEDURE — 80053 COMPREHEN METABOLIC PANEL: CPT

## 2022-11-10 ENCOUNTER — HOSPITAL ENCOUNTER (OUTPATIENT)
Dept: LAB | Age: 87
Discharge: HOME | End: 2022-11-10
Payer: MEDICARE

## 2022-11-10 LAB
ALBUMIN SERPL-MCNC: 4.2 G/DL (ref 3.5–4.6)
ALP BLD-CCNC: 111 U/L (ref 40–130)
ALT SERPL-CCNC: 7 U/L (ref 0–33)
ANION GAP SERPL CALCULATED.3IONS-SCNC: 14 MEQ/L (ref 9–15)
AST SERPL-CCNC: 19 U/L (ref 0–35)
BASOPHILS ABSOLUTE: 0.1 K/UL (ref 0–0.2)
BASOPHILS RELATIVE PERCENT: 0.6 %
BILIRUB SERPL-MCNC: 0.3 MG/DL (ref 0.2–0.7)
BUN BLDV-MCNC: 26 MG/DL (ref 8–23)
CALCIUM SERPL-MCNC: 8.9 MG/DL (ref 8.5–9.9)
CHLORIDE BLD-SCNC: 98 MEQ/L (ref 95–107)
CHOLESTEROL, TOTAL: 138 MG/DL (ref 0–199)
CO2: 26 MEQ/L (ref 20–31)
CREAT SERPL-MCNC: 0.83 MG/DL (ref 0.5–0.9)
EOSINOPHILS ABSOLUTE: 0.1 K/UL (ref 0–0.7)
EOSINOPHILS RELATIVE PERCENT: 1.2 %
GFR SERPL CREATININE-BSD FRML MDRD: >60 ML/MIN/{1.73_M2}
GLOBULIN: 3.1 G/DL (ref 2.3–3.5)
GLUCOSE BLD-MCNC: 328 MG/DL (ref 70–99)
HBA1C MFR BLD: 8 % (ref 4.8–5.9)
HCT VFR BLD CALC: 35 % (ref 37–47)
HDLC SERPL-MCNC: 45 MG/DL (ref 40–59)
HEMOGLOBIN: 11.6 G/DL (ref 12–16)
LDL CHOLESTEROL CALCULATED: 62 MG/DL (ref 0–129)
LYMPHOCYTES ABSOLUTE: 1.8 K/UL (ref 1–4.8)
LYMPHOCYTES RELATIVE PERCENT: 21.6 %
MCH RBC QN AUTO: 29.5 PG (ref 27–31.3)
MCHC RBC AUTO-ENTMCNC: 33.2 % (ref 33–37)
MCV RBC AUTO: 88.8 FL (ref 79.4–94.8)
MONOCYTES ABSOLUTE: 0.6 K/UL (ref 0.2–0.8)
MONOCYTES RELATIVE PERCENT: 7.6 %
NEUTROPHILS ABSOLUTE: 5.7 K/UL (ref 1.4–6.5)
NEUTROPHILS RELATIVE PERCENT: 69 %
PDW BLD-RTO: 15.1 % (ref 11.5–14.5)
PHOSPHORUS: 3.1 MG/DL (ref 2.3–4.8)
PLATELET # BLD: 256 K/UL (ref 130–400)
POTASSIUM SERPL-SCNC: 3.9 MEQ/L (ref 3.4–4.9)
RBC # BLD: 3.95 M/UL (ref 4.2–5.4)
SODIUM BLD-SCNC: 138 MEQ/L (ref 135–144)
TOTAL PROTEIN: 7.3 G/DL (ref 6.3–8)
TRIGL SERPL-MCNC: 157 MG/DL (ref 0–150)
URIC ACID, SERUM: 5.8 MG/DL (ref 2.4–5.7)
WBC # BLD: 8.3 K/UL (ref 4.8–10.8)

## 2022-11-10 PROCEDURE — 80061 LIPID PANEL: CPT

## 2022-11-10 PROCEDURE — 84100 ASSAY OF PHOSPHORUS: CPT

## 2022-11-10 PROCEDURE — 83036 HEMOGLOBIN GLYCOSYLATED A1C: CPT

## 2022-11-10 PROCEDURE — 83970 ASSAY OF PARATHORMONE: CPT

## 2022-11-10 PROCEDURE — 80053 COMPREHEN METABOLIC PANEL: CPT

## 2022-11-10 PROCEDURE — 85025 COMPLETE CBC W/AUTO DIFF WBC: CPT

## 2022-11-10 PROCEDURE — 84550 ASSAY OF BLOOD/URIC ACID: CPT

## 2022-11-12 LAB
MISCELLANEOUS LAB TEST ORDER: ABNORMAL
WHOPPER PROMPT: ABNORMAL

## 2022-12-06 ENCOUNTER — HOSPITAL ENCOUNTER (OUTPATIENT)
Dept: GENERAL RADIOLOGY | Age: 87
Discharge: HOME | End: 2022-12-08
Payer: MEDICARE

## 2022-12-06 DIAGNOSIS — R05.9 COUGH, UNSPECIFIED TYPE: ICD-10-CM

## 2022-12-06 PROCEDURE — 71046 X-RAY EXAM CHEST 2 VIEWS: CPT

## 2023-03-16 ENCOUNTER — HOSPITAL ENCOUNTER (OUTPATIENT)
Dept: LAB | Age: 88
Discharge: HOME | End: 2023-03-16
Payer: MEDICARE

## 2023-03-16 LAB
ALBUMIN SERPL-MCNC: 4.2 G/DL (ref 3.5–4.6)
ALP SERPL-CCNC: 117 U/L (ref 40–130)
ALT SERPL-CCNC: 12 U/L (ref 0–33)
ANION GAP SERPL CALCULATED.3IONS-SCNC: 13 MEQ/L (ref 9–15)
AST SERPL-CCNC: 20 U/L (ref 0–35)
BASOPHILS # BLD: 0.1 K/UL (ref 0–0.2)
BASOPHILS NFR BLD: 0.8 %
BILIRUB SERPL-MCNC: 0.3 MG/DL (ref 0.2–0.7)
BUN SERPL-MCNC: 34 MG/DL (ref 8–23)
CALCIUM SERPL-MCNC: 9.1 MG/DL (ref 8.5–9.9)
CHLORIDE SERPL-SCNC: 95 MEQ/L (ref 95–107)
CHOLEST SERPL-MCNC: 136 MG/DL (ref 0–199)
CO2 SERPL-SCNC: 26 MEQ/L (ref 20–31)
CREAT SERPL-MCNC: 0.97 MG/DL (ref 0.5–0.9)
EOSINOPHIL # BLD: 0.2 K/UL (ref 0–0.7)
EOSINOPHIL NFR BLD: 2.6 %
ERYTHROCYTE [DISTWIDTH] IN BLOOD BY AUTOMATED COUNT: 14.7 % (ref 11.5–14.5)
GLOBULIN SER CALC-MCNC: 3.3 G/DL (ref 2.3–3.5)
GLUCOSE SERPL-MCNC: 358 MG/DL (ref 70–99)
HBA1C MFR BLD: 8.8 % (ref 4.8–5.9)
HCT VFR BLD AUTO: 38.5 % (ref 37–47)
HDLC SERPL-MCNC: 45 MG/DL (ref 40–59)
HGB BLD-MCNC: 12.7 G/DL (ref 12–16)
LDLC SERPL CALC-MCNC: 61 MG/DL (ref 0–129)
LYMPHOCYTES # BLD: 2.2 K/UL (ref 1–4.8)
LYMPHOCYTES NFR BLD: 28 %
MCH RBC QN AUTO: 29.5 PG (ref 27–31.3)
MCHC RBC AUTO-ENTMCNC: 33 % (ref 33–37)
MCV RBC AUTO: 89.3 FL (ref 79.4–94.8)
MONOCYTES # BLD: 0.7 K/UL (ref 0.2–0.8)
MONOCYTES NFR BLD: 8.6 %
NEUTROPHILS # BLD: 4.6 K/UL (ref 1.4–6.5)
NEUTS SEG NFR BLD: 60 %
PHOSPHATE SERPL-MCNC: 3.6 MG/DL (ref 2.3–4.8)
PLATELET # BLD AUTO: 273 K/UL (ref 130–400)
POTASSIUM SERPL-SCNC: 4.2 MEQ/L (ref 3.4–4.9)
PROT SERPL-MCNC: 7.5 G/DL (ref 6.3–8)
RBC # BLD AUTO: 4.31 M/UL (ref 4.2–5.4)
SODIUM SERPL-SCNC: 134 MEQ/L (ref 135–144)
TRIGL SERPL-MCNC: 148 MG/DL (ref 0–150)
URATE SERPL-MCNC: 5.9 MG/DL (ref 2.4–5.7)
WBC # BLD AUTO: 7.7 K/UL (ref 4.8–10.8)

## 2023-03-16 PROCEDURE — 85025 COMPLETE CBC W/AUTO DIFF WBC: CPT

## 2023-03-16 PROCEDURE — 80053 COMPREHEN METABOLIC PANEL: CPT

## 2023-03-16 PROCEDURE — 84100 ASSAY OF PHOSPHORUS: CPT

## 2023-03-16 PROCEDURE — 84550 ASSAY OF BLOOD/URIC ACID: CPT

## 2023-03-16 PROCEDURE — 80061 LIPID PANEL: CPT

## 2023-03-16 PROCEDURE — 83970 ASSAY OF PARATHORMONE: CPT

## 2023-03-16 PROCEDURE — 83036 HEMOGLOBIN GLYCOSYLATED A1C: CPT

## 2023-03-18 LAB
MISCELLANEOUS LAB TEST RESULT: ABNORMAL
MISCELLANEOUS PROMPT 2: ABNORMAL

## 2023-03-24 LAB
MISCELLANEOUS LAB TEST RESULT: NORMAL
MISCELLANEOUS PROMPT 2: NORMAL

## 2023-07-31 ENCOUNTER — HOSPITAL ENCOUNTER (OUTPATIENT)
Dept: DATA CONVERSION | Facility: HOSPITAL | Age: 88
End: 2023-07-31
Attending: INTERNAL MEDICINE | Admitting: INTERNAL MEDICINE
Payer: MEDICARE

## 2023-07-31 DIAGNOSIS — Z86.010 PERSONAL HISTORY OF COLONIC POLYPS: ICD-10-CM

## 2023-07-31 DIAGNOSIS — Z12.11 ENCOUNTER FOR SCREENING FOR MALIGNANT NEOPLASM OF COLON: ICD-10-CM

## 2023-07-31 DIAGNOSIS — K63.89 OTHER SPECIFIED DISEASES OF INTESTINE: ICD-10-CM

## 2023-07-31 DIAGNOSIS — D12.3 BENIGN NEOPLASM OF TRANSVERSE COLON: ICD-10-CM

## 2023-07-31 LAB
ATRIAL RATE: 95 BPM
P AXIS: 75 DEGREES
P OFFSET: 186 MS
P ONSET: 136 MS
POCT GLUCOSE: 175 MG/DL (ref 74–99)
POCT GLUCOSE: 312 MG/DL (ref 74–99)
POCT GLUCOSE: 358 MG/DL (ref 74–99)
POCT GLUCOSE: 82 MG/DL (ref 74–99)
POCT GLUCOSE: 98 MG/DL (ref 74–99)
PR INTERVAL: 166 MS
Q ONSET: 219 MS
QRS COUNT: 16 BEATS
QRS DURATION: 76 MS
QT INTERVAL: 372 MS
QTC CALCULATION(BAZETT): 467 MS
QTC FREDERICIA: 433 MS
R AXIS: 21 DEGREES
T AXIS: 60 DEGREES
T OFFSET: 405 MS
VENTRICULAR RATE: 95 BPM

## 2023-08-04 LAB
COMPLETE PATHOLOGY REPORT: NORMAL
CONVERTED CLINICAL DIAGNOSIS-HISTORY: NORMAL
CONVERTED FINAL DIAGNOSIS: NORMAL
CONVERTED FINAL REPORT PDF LINK TO COPY AND PASTE: NORMAL
CONVERTED GROSS DESCRIPTION: NORMAL

## 2023-08-08 ENCOUNTER — HOSPITAL ENCOUNTER (OUTPATIENT)
Dept: LAB | Age: 88
Discharge: HOME | End: 2023-08-08
Payer: MEDICARE

## 2023-08-08 LAB
ALBUMIN SERPL-MCNC: 4.2 G/DL (ref 3.5–4.6)
ALP SERPL-CCNC: 108 U/L (ref 40–130)
ALT SERPL-CCNC: 14 U/L (ref 0–33)
ANION GAP SERPL CALCULATED.3IONS-SCNC: 15 MEQ/L (ref 9–15)
AST SERPL-CCNC: 18 U/L (ref 0–35)
BASOPHILS # BLD: 0.1 K/UL (ref 0–0.2)
BASOPHILS NFR BLD: 0.7 %
BILIRUB SERPL-MCNC: 0.4 MG/DL (ref 0.2–0.7)
BUN SERPL-MCNC: 27 MG/DL (ref 8–23)
CALCIUM SERPL-MCNC: 9.2 MG/DL (ref 8.5–9.9)
CHLORIDE SERPL-SCNC: 99 MEQ/L (ref 95–107)
CHOLEST SERPL-MCNC: 141 MG/DL (ref 0–199)
CO2 SERPL-SCNC: 25 MEQ/L (ref 20–31)
CREAT SERPL-MCNC: 0.99 MG/DL (ref 0.5–0.9)
CREAT UR-MCNC: 63.3 MG/DL
EOSINOPHIL # BLD: 0.2 K/UL (ref 0–0.7)
EOSINOPHIL NFR BLD: 2.4 %
ERYTHROCYTE [DISTWIDTH] IN BLOOD BY AUTOMATED COUNT: 14.4 % (ref 11.5–14.5)
GLOBULIN SER CALC-MCNC: 3.2 G/DL (ref 2.3–3.5)
GLUCOSE SERPL-MCNC: 324 MG/DL (ref 70–99)
HCT VFR BLD AUTO: 38.1 % (ref 37–47)
HDLC SERPL-MCNC: 47 MG/DL (ref 40–59)
HGB BLD-MCNC: 12.5 G/DL (ref 12–16)
LDLC SERPL CALC-MCNC: 63 MG/DL (ref 0–129)
LYMPHOCYTES # BLD: 2 K/UL (ref 1–4.8)
LYMPHOCYTES NFR BLD: 25.3 %
MCH RBC QN AUTO: 28.7 PG (ref 27–31.3)
MCHC RBC AUTO-ENTMCNC: 32.9 % (ref 33–37)
MCV RBC AUTO: 87.2 FL (ref 79.4–94.8)
MICROALBUMIN UR-MCNC: 7.5 MG/DL
MICROALBUMIN/CREAT UR-RTO: 118.5 MG/G (ref 0–30)
MONOCYTES # BLD: 0.7 K/UL (ref 0.2–0.8)
MONOCYTES NFR BLD: 8.2 %
NEUTROPHILS # BLD: 5.1 K/UL (ref 1.4–6.5)
NEUTS SEG NFR BLD: 63.4 %
PHOSPHATE SERPL-MCNC: 3.5 MG/DL (ref 2.3–4.8)
PLATELET # BLD AUTO: 267 K/UL (ref 130–400)
POTASSIUM SERPL-SCNC: 4.2 MEQ/L (ref 3.4–4.9)
PROT SERPL-MCNC: 7.4 G/DL (ref 6.3–8)
RBC # BLD AUTO: 4.37 M/UL (ref 4.2–5.4)
SODIUM SERPL-SCNC: 139 MEQ/L (ref 135–144)
TRIGL SERPL-MCNC: 155 MG/DL (ref 0–150)
URATE SERPL-MCNC: 5.6 MG/DL (ref 2.4–5.7)
WBC # BLD AUTO: 8 K/UL (ref 4.8–10.8)

## 2023-08-08 PROCEDURE — 80053 COMPREHEN METABOLIC PANEL: CPT

## 2023-08-08 PROCEDURE — 83970 ASSAY OF PARATHORMONE: CPT

## 2023-08-08 PROCEDURE — 84550 ASSAY OF BLOOD/URIC ACID: CPT

## 2023-08-08 PROCEDURE — 84100 ASSAY OF PHOSPHORUS: CPT

## 2023-08-08 PROCEDURE — 80061 LIPID PANEL: CPT

## 2023-08-08 PROCEDURE — 82043 UR ALBUMIN QUANTITATIVE: CPT

## 2023-08-08 PROCEDURE — 82570 ASSAY OF URINE CREATININE: CPT

## 2023-08-08 PROCEDURE — 36415 COLL VENOUS BLD VENIPUNCTURE: CPT

## 2023-08-08 PROCEDURE — 85025 COMPLETE CBC W/AUTO DIFF WBC: CPT

## 2023-08-10 LAB
COMMENT: ABNORMAL
MISCELLANEOUS LAB TEST RESULT: ABNORMAL

## 2023-09-30 NOTE — H&P
History & Physical Reviewed:   I have reviewed the History and Physical dated:  31-Jul-2023   History and Physical reviewed and relevant findings noted. Patient examined to review pertinent physical  findings.: No significant changes   Home Medications Reviewed: no changes noted   Allergies Reviewed: no changes noted       ERAS (Enhanced Recovery After Surgery):  ·  ERAS Patient: no     Consent:   COVID-19 Consent:  ·  COVID-19 Risk Consent Surgeon has reviewed key risks related to the risk of kimmy COVID-19 and if they contract COVID-19 what the risks are.       Electronic Signatures:  Clifton Deleon)  (Signed 31-Jul-2023 11:50)   Authored: History & Physical Reviewed, ERAS, Consent,  Note Completion      Last Updated: 31-Jul-2023 11:50 by Clifton Deleon)

## 2023-12-12 ENCOUNTER — HOSPITAL ENCOUNTER (OUTPATIENT)
Dept: LAB | Age: 88
Discharge: HOME | End: 2023-12-12
Payer: MEDICARE

## 2023-12-12 LAB
ALBUMIN SERPL-MCNC: 4.1 G/DL (ref 3.5–4.6)
ALP SERPL-CCNC: 119 U/L (ref 40–130)
ALT SERPL-CCNC: 15 U/L (ref 0–33)
ANION GAP SERPL CALCULATED.3IONS-SCNC: 13 MEQ/L (ref 9–15)
AST SERPL-CCNC: 21 U/L (ref 0–35)
BACTERIA URNS QL MICRO: ABNORMAL /HPF
BASOPHILS # BLD: 0.1 K/UL (ref 0–0.2)
BASOPHILS NFR BLD: 0.6 %
BILIRUB SERPL-MCNC: 0.4 MG/DL (ref 0.2–0.7)
BILIRUB UR QL STRIP: NEGATIVE
BUN SERPL-MCNC: 23 MG/DL (ref 8–23)
CALCIUM SERPL-MCNC: 9.2 MG/DL (ref 8.5–9.9)
CHLORIDE SERPL-SCNC: 100 MEQ/L (ref 95–107)
CHOLEST SERPL-MCNC: 149 MG/DL (ref 0–199)
CLARITY UR: ABNORMAL
CO2 SERPL-SCNC: 26 MEQ/L (ref 20–31)
COLOR UR: YELLOW
CREAT SERPL-MCNC: 1.01 MG/DL (ref 0.5–0.9)
EOSINOPHIL # BLD: 0.2 K/UL (ref 0–0.7)
EOSINOPHIL NFR BLD: 2.5 %
EPI CELLS #/AREA URNS AUTO: ABNORMAL /HPF (ref 0–5)
ERYTHROCYTE [DISTWIDTH] IN BLOOD BY AUTOMATED COUNT: 14.5 % (ref 11.5–14.5)
GLOBULIN SER CALC-MCNC: 3.3 G/DL (ref 2.3–3.5)
GLUCOSE SERPL-MCNC: 302 MG/DL (ref 70–99)
GLUCOSE UR STRIP-MCNC: NEGATIVE MG/DL
HBA1C MFR BLD: 8.6 % (ref 4.8–5.9)
HCT VFR BLD AUTO: 38.5 % (ref 37–47)
HDLC SERPL-MCNC: 55 MG/DL (ref 40–59)
HGB BLD-MCNC: 12.8 G/DL (ref 12–16)
HGB UR QL STRIP: NEGATIVE
HYALINE CASTS #/AREA URNS AUTO: ABNORMAL /HPF (ref 0–5)
HYALINE CASTS #/AREA URNS LPF: ABNORMAL /LPF (ref 0–5)
KETONES UR STRIP-MCNC: NEGATIVE MG/DL
LDLC SERPL CALC-MCNC: 78 MG/DL (ref 0–129)
LEUKOCYTE ESTERASE UR QL STRIP: ABNORMAL
LYMPHOCYTES # BLD: 1.9 K/UL (ref 1–4.8)
LYMPHOCYTES NFR BLD: 20 %
MCH RBC QN AUTO: 29.2 PG (ref 27–31.3)
MCHC RBC AUTO-ENTMCNC: 33.2 % (ref 33–37)
MCV RBC AUTO: 87.7 FL (ref 79.4–94.8)
MONOCYTES # BLD: 0.7 K/UL (ref 0.2–0.8)
MONOCYTES NFR BLD: 8 %
NEUTROPHILS # BLD: 6.4 K/UL (ref 1.4–6.5)
NEUTS SEG NFR BLD: 68.7 %
NITRITE UR QL STRIP: NEGATIVE
PH UR STRIP: 6 [PH] (ref 5–9)
PHOSPHATE SERPL-MCNC: 3.4 MG/DL (ref 2.3–4.8)
PLATELET # BLD AUTO: 282 K/UL (ref 130–400)
POTASSIUM SERPL-SCNC: 4.3 MEQ/L (ref 3.4–4.9)
PROT SERPL-MCNC: 7.4 G/DL (ref 6.3–8)
PROT UR STRIP-MCNC: 100 MG/DL
RBC # BLD AUTO: 4.39 M/UL (ref 4.2–5.4)
RBC #/AREA URNS AUTO: ABNORMAL /HPF (ref 0–5)
SODIUM SERPL-SCNC: 139 MEQ/L (ref 135–144)
SP GR UR STRIP: 1.01 (ref 1–1.03)
TRIGL SERPL-MCNC: 81 MG/DL (ref 0–150)
URATE SERPL-MCNC: 5.8 MG/DL (ref 2.4–5.7)
UROBILINOGEN UR STRIP-ACNC: 0.2 E.U./DL
WBC # BLD AUTO: 9.3 K/UL (ref 4.8–10.8)
WBC #/AREA URNS AUTO: ABNORMAL /HPF (ref 0–5)

## 2023-12-12 PROCEDURE — 80061 LIPID PANEL: CPT

## 2023-12-12 PROCEDURE — 81001 URINALYSIS AUTO W/SCOPE: CPT

## 2023-12-12 PROCEDURE — 36415 COLL VENOUS BLD VENIPUNCTURE: CPT

## 2023-12-12 PROCEDURE — 84100 ASSAY OF PHOSPHORUS: CPT

## 2023-12-12 PROCEDURE — 80053 COMPREHEN METABOLIC PANEL: CPT

## 2023-12-12 PROCEDURE — 84550 ASSAY OF BLOOD/URIC ACID: CPT

## 2023-12-12 PROCEDURE — 85025 COMPLETE CBC W/AUTO DIFF WBC: CPT

## 2023-12-12 PROCEDURE — 83970 ASSAY OF PARATHORMONE: CPT

## 2023-12-12 PROCEDURE — 83036 HEMOGLOBIN GLYCOSYLATED A1C: CPT

## 2023-12-14 LAB
COMMENT: ABNORMAL
MISCELLANEOUS LAB TEST RESULT: ABNORMAL

## 2024-03-21 ENCOUNTER — APPOINTMENT (OUTPATIENT)
Dept: SURGERY | Facility: CLINIC | Age: 89
End: 2024-03-21
Payer: MEDICARE

## 2024-04-15 ENCOUNTER — HOSPITAL ENCOUNTER (OUTPATIENT)
Dept: LAB | Age: 89
Discharge: HOME | End: 2024-04-15
Payer: MEDICARE

## 2024-04-15 LAB
ALBUMIN SERPL-MCNC: 4.1 G/DL (ref 3.5–4.6)
ALP SERPL-CCNC: 108 U/L (ref 40–130)
ALT SERPL-CCNC: 14 U/L (ref 0–33)
ANION GAP SERPL CALCULATED.3IONS-SCNC: 16 MEQ/L (ref 9–15)
AST SERPL-CCNC: 20 U/L (ref 0–35)
BASOPHILS # BLD: 0.1 K/UL (ref 0–0.2)
BASOPHILS NFR BLD: 0.7 %
BILIRUB SERPL-MCNC: 0.4 MG/DL (ref 0.2–0.7)
BUN SERPL-MCNC: 27 MG/DL (ref 8–23)
CALCIUM SERPL-MCNC: 9 MG/DL (ref 8.5–9.9)
CHLORIDE SERPL-SCNC: 101 MEQ/L (ref 95–107)
CHOLEST SERPL-MCNC: 139 MG/DL (ref 0–199)
CO2 SERPL-SCNC: 22 MEQ/L (ref 20–31)
CREAT SERPL-MCNC: 1.03 MG/DL (ref 0.5–0.9)
EOSINOPHIL # BLD: 0.3 K/UL (ref 0–0.7)
EOSINOPHIL NFR BLD: 2.7 %
ERYTHROCYTE [DISTWIDTH] IN BLOOD BY AUTOMATED COUNT: 14.5 % (ref 11.5–14.5)
ESTIMATED AVERAGE GLUCOSE: 183 MG/DL
GLOBULIN SER CALC-MCNC: 3.4 G/DL (ref 2.3–3.5)
GLUCOSE SERPL-MCNC: 316 MG/DL (ref 70–99)
HBA1C MFR BLD: 8 % (ref 4–6)
HCT VFR BLD AUTO: 38.8 % (ref 37–47)
HDLC SERPL-MCNC: 51 MG/DL (ref 40–59)
HGB BLD-MCNC: 12.7 G/DL (ref 12–16)
LDLC SERPL CALC-MCNC: 64 MG/DL (ref 0–129)
LYMPHOCYTES # BLD: 2.3 K/UL (ref 1–4.8)
LYMPHOCYTES NFR BLD: 24.1 %
MCH RBC QN AUTO: 28.9 PG (ref 27–31.3)
MCHC RBC AUTO-ENTMCNC: 32.7 % (ref 33–37)
MCV RBC AUTO: 88.4 FL (ref 79.4–94.8)
MONOCYTES # BLD: 0.8 K/UL (ref 0.2–0.8)
MONOCYTES NFR BLD: 7.9 %
NEUTROPHILS # BLD: 6.2 K/UL (ref 1.4–6.5)
NEUTS SEG NFR BLD: 64.3 %
PHOSPHATE SERPL-MCNC: 3.4 MG/DL (ref 2.3–4.8)
PLATELET # BLD AUTO: 304 K/UL (ref 130–400)
POTASSIUM SERPL-SCNC: 4.3 MEQ/L (ref 3.4–4.9)
PROT SERPL-MCNC: 7.5 G/DL (ref 6.3–8)
RBC # BLD AUTO: 4.39 M/UL (ref 4.2–5.4)
SODIUM SERPL-SCNC: 139 MEQ/L (ref 135–144)
TRIGL SERPL-MCNC: 120 MG/DL (ref 0–150)
URATE SERPL-MCNC: 5.7 MG/DL (ref 2.4–5.7)
WBC # BLD AUTO: 9.7 K/UL (ref 4.8–10.8)

## 2024-04-15 PROCEDURE — 80061 LIPID PANEL: CPT

## 2024-04-15 PROCEDURE — 85025 COMPLETE CBC W/AUTO DIFF WBC: CPT

## 2024-04-15 PROCEDURE — 83036 HEMOGLOBIN GLYCOSYLATED A1C: CPT

## 2024-04-15 PROCEDURE — 80053 COMPREHEN METABOLIC PANEL: CPT

## 2024-04-15 PROCEDURE — 84100 ASSAY OF PHOSPHORUS: CPT

## 2024-04-15 PROCEDURE — 84550 ASSAY OF BLOOD/URIC ACID: CPT

## 2024-04-15 PROCEDURE — 36415 COLL VENOUS BLD VENIPUNCTURE: CPT

## 2024-04-15 PROCEDURE — 83970 ASSAY OF PARATHORMONE: CPT

## 2024-04-17 LAB
MISCELLANEOUS LAB TEST ORDER: ABNORMAL
WHOPPER PROMPT: ABNORMAL

## 2024-07-10 ENCOUNTER — HOSPITAL ENCOUNTER (EMERGENCY)
Facility: HOSPITAL | Age: 89
Discharge: HOME | End: 2024-07-10
Attending: STUDENT IN AN ORGANIZED HEALTH CARE EDUCATION/TRAINING PROGRAM
Payer: MEDICARE

## 2024-07-10 ENCOUNTER — APPOINTMENT (OUTPATIENT)
Dept: RADIOLOGY | Facility: HOSPITAL | Age: 89
End: 2024-07-10
Payer: MEDICARE

## 2024-07-10 VITALS
RESPIRATION RATE: 16 BRPM | BODY MASS INDEX: 25.61 KG/M2 | SYSTOLIC BLOOD PRESSURE: 179 MMHG | DIASTOLIC BLOOD PRESSURE: 68 MMHG | HEART RATE: 95 BPM | HEIGHT: 64 IN | TEMPERATURE: 97.5 F | OXYGEN SATURATION: 98 % | WEIGHT: 150 LBS

## 2024-07-10 DIAGNOSIS — T14.90XA TRAUMA: ICD-10-CM
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
## 2024-07-10 DIAGNOSIS — S00.03XA HEMATOMA OF SCALP, INITIAL ENCOUNTER: ICD-10-CM

## 2024-07-10 PROCEDURE — 99285 EMERGENCY DEPT VISIT HI MDM: CPT

## 2024-07-10 PROCEDURE — 99285 EMERGENCY DEPT VISIT HI MDM: CPT | Mod: 25

## 2024-07-10 PROCEDURE — 70450 CT HEAD/BRAIN W/O DYE: CPT

## 2024-07-10 PROCEDURE — 72125 CT NECK SPINE W/O DYE: CPT | Performed by: RADIOLOGY

## 2024-07-10 PROCEDURE — 70450 CT HEAD/BRAIN W/O DYE: CPT | Performed by: RADIOLOGY

## 2024-07-10 PROCEDURE — 72125 CT NECK SPINE W/O DYE: CPT

## 2024-07-10 RX ORDER — ACETAMINOPHEN 325 MG/1
975 TABLET ORAL ONCE
Status: COMPLETED | OUTPATIENT
Start: 2024-07-10 | End: 2024-07-10

## 2024-07-10 ASSESSMENT — COLUMBIA-SUICIDE SEVERITY RATING SCALE - C-SSRS
1. IN THE PAST MONTH, HAVE YOU WISHED YOU WERE DEAD OR WISHED YOU COULD GO TO SLEEP AND NOT WAKE UP?: NO
6. HAVE YOU EVER DONE ANYTHING, STARTED TO DO ANYTHING, OR PREPARED TO DO ANYTHING TO END YOUR LIFE?: NO
2. HAVE YOU ACTUALLY HAD ANY THOUGHTS OF KILLING YOURSELF?: NO

## 2024-07-10 ASSESSMENT — LIFESTYLE VARIABLES
HAVE PEOPLE ANNOYED YOU BY CRITICIZING YOUR DRINKING: NO
EVER FELT BAD OR GUILTY ABOUT YOUR DRINKING: NO
HAVE YOU EVER FELT YOU SHOULD CUT DOWN ON YOUR DRINKING: NO
TOTAL SCORE: 0
EVER HAD A DRINK FIRST THING IN THE MORNING TO STEADY YOUR NERVES TO GET RID OF A HANGOVER: NO

## 2024-07-10 ASSESSMENT — PAIN SCALES - GENERAL: PAINLEVEL_OUTOF10: 6

## 2024-07-10 ASSESSMENT — PAIN - FUNCTIONAL ASSESSMENT: PAIN_FUNCTIONAL_ASSESSMENT: 0-10

## 2024-07-10 ASSESSMENT — PAIN DESCRIPTION - LOCATION: LOCATION: HEAD

## 2024-07-10 ASSESSMENT — PAIN DESCRIPTION - PAIN TYPE: TYPE: ACUTE PAIN

## 2024-07-11 NOTE — ED PROVIDER NOTES
HPI: The patient is a 89-year-old woman history of strokes who is on Plavix who presents to the Emergency Department with a chief complaint of fall.  Patient reports that she was standing up from a chair when she lost her balance and fell striking her head on the ground.  She reports that she has pain on her right parietal scalp but has no pain elsewhere in her body and denies loss of consciousness.  She denied feeling any dizziness or any chest pain prior to the fall.     PAST MEDICAL HISTORY:  as per HPI  ALLERGIES:  as per HPI  MEDICATIONS:  as per HPI  FAMILY HISTORY: as per HPI  SURGICAL HISTORY: as per HPI  SOCIAL HISTORY: as per HPI     PHYSICAL EXAM:  VITAL SIGNS: Nursing notes reviewed.  GENERAL:  Alert and interactive  EYES:   Eyes track.  ENT:  Airway patent.  RESPIRATORY:  Nonlabored breathing.  No chest wall tenderness  CARDIOVASCULAR:  [Regular rate.] [Regular rhythm.]  GASTROINTESTINAL:  No distension.  Nontender  MUSCULOSKELETAL:  No deformity.  No tenderness  NEUROLOGICAL:  Awake.  SKIN:  Dry.  HEAD: Mild tenderness over the right prior occipital scalp with a small hematoma in that area without any bleeding.  NECK: No midline C-spine tenderness step-offs or deformities.  BACK: Midline T L-spine tenderness step-offs or deformities.        MEDICAL DECISION MAKING (MDM):     Critical Care Time  Authorized and Performed by: Guille Shaw MD  Total critical care time: [32] minutes  Due to a high probability of clinically significant, life threatening deterioration, the patient required my highest level of preparedness to intervene emergently and I personally spent this critical care time directly and personally managing the patient. This critical care time included obtaining a history; examining the patient; pulse oximetry; ordering and review of studies; arranging urgent treatment with development of a management plan; evaluation of patient's response to treatment; frequent reassessment; and,  discussions with other providers and patient/family.  This critical care time was performed to assess and manage the high probability of imminent, life-threatening deterioration that could result in multi-organ failure. It was exclusive of separately billable procedures and treating other patients and teaching time.  Please see MDM section and the rest of the note for further information on patient assessment and treatment.     SUMMARY:  The patient is admitted to the Emergency Department for evaluation of above. Complete history and physical examination was performed by me.  Patient presents as an HIA activation due to a head strike while on anticoagulation.  She is hemodynamically stable and well-appearing I think the fall is likely mechanical.  She is prior to ice for CT head and C-spine.  Patient was given Tylenol for analgesia.  No evidence of traumatic injuries elsewhere.    CT head and C-spine were negative for acute traumatic pathology.  Patient did have incidental findings which he was notified of and recommended follow-up with her regular doctor to discuss.  Clinically she does not have any evidence of a fungal sinusitis or cellulitis.  Low suspicion for delayed bleed and did discuss return precautions as well as recommended using Tylenol as needed for analgesia.  The work-up and plan were discussed with the patient/caregiver and questions were answered regarding the ED visit.  Educational materials were provided as well as return precautions including returning for any persisting or worsening symptoms.  Patient was recommended to follow-up with PCP in the next few days in addition to any potential specialists that were discussed.  The patient/family expressed understanding and agreed to the described plan.  Patient was discharged in stable condition.     DIAGNOSIS:    Scalp hematoma  Trauma     DISPOSITION:    1) discharged  [2) Please see Exit Care and discharge instructions for complete details.]      Guille Shaw MD  07/10/24 [id number]

## 2024-08-19 ENCOUNTER — HOSPITAL ENCOUNTER (OUTPATIENT)
Dept: LAB | Age: 89
Discharge: HOME | End: 2024-08-19
Payer: MEDICARE

## 2024-08-19 LAB
ALBUMIN SERPL-MCNC: 3.9 G/DL (ref 3.5–4.6)
ALP SERPL-CCNC: 130 U/L (ref 40–130)
ALT SERPL-CCNC: 12 U/L (ref 0–33)
ANION GAP SERPL CALCULATED.3IONS-SCNC: 15 MEQ/L (ref 9–15)
AST SERPL-CCNC: 25 U/L (ref 0–35)
BASOPHILS # BLD: 0.1 K/UL (ref 0–0.2)
BASOPHILS NFR BLD: 0.8 %
BILIRUB SERPL-MCNC: 0.3 MG/DL (ref 0.2–0.7)
BUN SERPL-MCNC: 26 MG/DL (ref 8–23)
CALCIUM SERPL-MCNC: 9 MG/DL (ref 8.5–9.9)
CHLORIDE SERPL-SCNC: 98 MEQ/L (ref 95–107)
CO2 SERPL-SCNC: 24 MEQ/L (ref 20–31)
CREAT SERPL-MCNC: 1.04 MG/DL (ref 0.5–0.9)
EOSINOPHIL # BLD: 0.3 K/UL (ref 0–0.7)
EOSINOPHIL NFR BLD: 3.1 %
ERYTHROCYTE [DISTWIDTH] IN BLOOD BY AUTOMATED COUNT: 14 % (ref 11.5–14.5)
GLOBULIN SER CALC-MCNC: 3.5 G/DL (ref 2.3–3.5)
GLUCOSE SERPL-MCNC: 294 MG/DL (ref 70–99)
HCT VFR BLD AUTO: 34.6 % (ref 37–47)
HGB BLD-MCNC: 11.8 G/DL (ref 12–16)
LYMPHOCYTES # BLD: 2.2 K/UL (ref 1–4.8)
LYMPHOCYTES NFR BLD: 24.7 %
MCH RBC QN AUTO: 29.6 PG (ref 27–31.3)
MCHC RBC AUTO-ENTMCNC: 34.1 % (ref 33–37)
MCV RBC AUTO: 86.7 FL (ref 79.4–94.8)
MONOCYTES # BLD: 0.9 K/UL (ref 0.2–0.8)
MONOCYTES NFR BLD: 9.7 %
NEUTROPHILS # BLD: 5.4 K/UL (ref 1.4–6.5)
NEUTS SEG NFR BLD: 61.4 %
PHOSPHATE SERPL-MCNC: 3.6 MG/DL (ref 2.3–4.8)
PLATELET # BLD AUTO: 320 K/UL (ref 130–400)
POTASSIUM SERPL-SCNC: 4.8 MEQ/L (ref 3.4–4.9)
PROT SERPL-MCNC: 7.4 G/DL (ref 6.3–8)
RBC # BLD AUTO: 3.99 M/UL (ref 4.2–5.4)
SODIUM SERPL-SCNC: 137 MEQ/L (ref 135–144)
URATE SERPL-MCNC: 5.4 MG/DL (ref 2.4–5.7)
WBC # BLD AUTO: 8.8 K/UL (ref 4.8–10.8)

## 2024-08-19 PROCEDURE — 36415 COLL VENOUS BLD VENIPUNCTURE: CPT

## 2024-08-19 PROCEDURE — 85025 COMPLETE CBC W/AUTO DIFF WBC: CPT

## 2024-08-19 PROCEDURE — 80053 COMPREHEN METABOLIC PANEL: CPT

## 2024-08-19 PROCEDURE — 83036 HEMOGLOBIN GLYCOSYLATED A1C: CPT

## 2024-08-19 PROCEDURE — 84100 ASSAY OF PHOSPHORUS: CPT

## 2024-08-19 PROCEDURE — 84550 ASSAY OF BLOOD/URIC ACID: CPT

## 2024-08-20 LAB
ESTIMATED AVERAGE GLUCOSE: 197 MG/DL
HBA1C MFR BLD: 8.5 % (ref 4–6)

## 2024-08-21 LAB
REASON FOR REJECTION: NORMAL
REJECTED TEST: NORMAL

## 2025-01-06 ENCOUNTER — HOSPITAL ENCOUNTER (OUTPATIENT)
Dept: LAB | Age: 89
Discharge: HOME | End: 2025-01-06
Payer: MEDICARE

## 2025-01-06 LAB
ALBUMIN SERPL-MCNC: 4.1 G/DL (ref 3.5–4.6)
ALP SERPL-CCNC: 156 U/L (ref 40–130)
ALT SERPL-CCNC: 13 U/L (ref 0–33)
ANION GAP SERPL CALCULATED.3IONS-SCNC: 13 MEQ/L (ref 9–15)
AST SERPL-CCNC: 20 U/L (ref 0–35)
BASOPHILS # BLD: 0.1 K/UL (ref 0–0.2)
BASOPHILS NFR BLD: 0.8 %
BILIRUB SERPL-MCNC: 0.3 MG/DL (ref 0.2–0.7)
BUN SERPL-MCNC: 34 MG/DL (ref 8–23)
CALCIUM SERPL-MCNC: 8.9 MG/DL (ref 8.5–9.9)
CHLORIDE SERPL-SCNC: 102 MEQ/L (ref 95–107)
CHOLEST SERPL-MCNC: 137 MG/DL (ref 0–199)
CO2 SERPL-SCNC: 25 MEQ/L (ref 20–31)
CREAT SERPL-MCNC: 1.04 MG/DL (ref 0.5–0.9)
EOSINOPHIL # BLD: 0.4 K/UL (ref 0–0.7)
EOSINOPHIL NFR BLD: 3.7 %
ERYTHROCYTE [DISTWIDTH] IN BLOOD BY AUTOMATED COUNT: 14.6 % (ref 11.5–14.5)
GLOBULIN SER CALC-MCNC: 3.2 G/DL (ref 2.3–3.5)
GLUCOSE SERPL-MCNC: 336 MG/DL (ref 70–99)
HCT VFR BLD AUTO: 35.6 % (ref 37–47)
HDLC SERPL-MCNC: 52 MG/DL (ref 40–59)
HGB BLD-MCNC: 12 G/DL (ref 12–16)
LDLC SERPL CALC-MCNC: 69 MG/DL (ref 0–129)
LYMPHOCYTES # BLD: 2 K/UL (ref 1–4.8)
LYMPHOCYTES NFR BLD: 20.5 %
MCH RBC QN AUTO: 29.2 PG (ref 27–31.3)
MCHC RBC AUTO-ENTMCNC: 33.7 % (ref 33–37)
MCV RBC AUTO: 86.6 FL (ref 79.4–94.8)
MONOCYTES # BLD: 0.8 K/UL (ref 0.2–0.8)
MONOCYTES NFR BLD: 8.7 %
NEUTROPHILS # BLD: 6.3 K/UL (ref 1.4–6.5)
NEUTS SEG NFR BLD: 66 %
PHOSPHATE SERPL-MCNC: 3 MG/DL (ref 2.3–4.8)
PLATELET # BLD AUTO: 303 K/UL (ref 130–400)
POTASSIUM SERPL-SCNC: 4 MEQ/L (ref 3.4–4.9)
PROT SERPL-MCNC: 7.3 G/DL (ref 6.3–8)
PTH-INTACT SERPL-MCNC: 71.4 PG/ML (ref 15–65)
RBC # BLD AUTO: 4.11 M/UL (ref 4.2–5.4)
SODIUM SERPL-SCNC: 140 MEQ/L (ref 135–144)
TRIGL SERPL-MCNC: 80 MG/DL (ref 0–150)
URATE SERPL-MCNC: 8 MG/DL (ref 2.4–5.7)
WBC # BLD AUTO: 9.6 K/UL (ref 4.8–10.8)

## 2025-01-06 PROCEDURE — 80061 LIPID PANEL: CPT

## 2025-01-06 PROCEDURE — 83036 HEMOGLOBIN GLYCOSYLATED A1C: CPT

## 2025-01-06 PROCEDURE — 85025 COMPLETE CBC W/AUTO DIFF WBC: CPT

## 2025-01-06 PROCEDURE — 84100 ASSAY OF PHOSPHORUS: CPT

## 2025-01-06 PROCEDURE — 80053 COMPREHEN METABOLIC PANEL: CPT

## 2025-01-06 PROCEDURE — 36415 COLL VENOUS BLD VENIPUNCTURE: CPT

## 2025-01-06 PROCEDURE — 83970 ASSAY OF PARATHORMONE: CPT

## 2025-01-06 PROCEDURE — 84550 ASSAY OF BLOOD/URIC ACID: CPT

## 2025-01-07 LAB
ESTIMATED AVERAGE GLUCOSE: 180 MG/DL
HBA1C MFR BLD: 7.9 % (ref 4–6)

## 2025-04-06 ENCOUNTER — APPOINTMENT (OUTPATIENT)
Dept: CARDIOLOGY | Facility: HOSPITAL | Age: OVER 89
DRG: 194 | End: 2025-04-06
Payer: MEDICARE

## 2025-04-06 ENCOUNTER — APPOINTMENT (OUTPATIENT)
Dept: RADIOLOGY | Facility: HOSPITAL | Age: OVER 89
DRG: 194 | End: 2025-04-06
Payer: MEDICARE

## 2025-04-06 ENCOUNTER — HOSPITAL ENCOUNTER (INPATIENT)
Facility: HOSPITAL | Age: OVER 89
DRG: 194 | End: 2025-04-06
Attending: STUDENT IN AN ORGANIZED HEALTH CARE EDUCATION/TRAINING PROGRAM | Admitting: INTERNAL MEDICINE
Payer: MEDICARE

## 2025-04-06 DIAGNOSIS — N30.00 ACUTE CYSTITIS WITHOUT HEMATURIA: ICD-10-CM

## 2025-04-06 DIAGNOSIS — R53.1 GENERALIZED WEAKNESS: Primary | ICD-10-CM

## 2025-04-06 DIAGNOSIS — E11.22 TYPE 2 DIABETES MELLITUS WITH STAGE 3A CHRONIC KIDNEY DISEASE, WITH LONG-TERM CURRENT USE OF INSULIN (MULTI): ICD-10-CM

## 2025-04-06 DIAGNOSIS — R33.9 URINARY RETENTION: ICD-10-CM

## 2025-04-06 DIAGNOSIS — R60.9 EDEMA, UNSPECIFIED TYPE: ICD-10-CM

## 2025-04-06 DIAGNOSIS — J18.9 PNEUMONIA OF LEFT LOWER LOBE DUE TO INFECTIOUS ORGANISM: ICD-10-CM

## 2025-04-06 DIAGNOSIS — N18.31 TYPE 2 DIABETES MELLITUS WITH STAGE 3A CHRONIC KIDNEY DISEASE, WITH LONG-TERM CURRENT USE OF INSULIN (MULTI): ICD-10-CM

## 2025-04-06 DIAGNOSIS — I33.0 SBE (SUBACUTE BACTERIAL ENDOCARDITIS) (HHS-HCC): ICD-10-CM

## 2025-04-06 DIAGNOSIS — Z79.4 TYPE 2 DIABETES MELLITUS WITH STAGE 3A CHRONIC KIDNEY DISEASE, WITH LONG-TERM CURRENT USE OF INSULIN (MULTI): ICD-10-CM

## 2025-04-06 DIAGNOSIS — I12.9 HYPERTENSIVE CHRONIC KIDNEY DISEASE WITH STAGE 1 THROUGH STAGE 4 CHRONIC KIDNEY DISEASE, OR UNSPECIFIED CHRONIC KIDNEY DISEASE: ICD-10-CM

## 2025-04-06 LAB
ALBUMIN SERPL BCP-MCNC: 3.9 G/DL (ref 3.4–5)
ALP SERPL-CCNC: 176 U/L (ref 33–136)
ALT SERPL W P-5'-P-CCNC: 14 U/L (ref 7–45)
AMORPH CRY #/AREA UR COMP ASSIST: ABNORMAL /HPF
ANION GAP SERPL CALC-SCNC: 15 MMOL/L (ref 10–20)
APPEARANCE UR: ABNORMAL
AST SERPL W P-5'-P-CCNC: 25 U/L (ref 9–39)
BACTERIA #/AREA URNS AUTO: ABNORMAL /HPF
BASOPHILS # BLD AUTO: 0.07 X10*3/UL (ref 0–0.1)
BASOPHILS NFR BLD AUTO: 0.7 %
BILIRUB SERPL-MCNC: 0.2 MG/DL (ref 0–1.2)
BILIRUB UR STRIP.AUTO-MCNC: NEGATIVE MG/DL
BUN SERPL-MCNC: 35 MG/DL (ref 6–23)
CALCIUM SERPL-MCNC: 8.8 MG/DL (ref 8.6–10.3)
CARDIAC TROPONIN I PNL SERPL HS: 11 NG/L (ref 0–13)
CARDIAC TROPONIN I PNL SERPL HS: 12 NG/L (ref 0–13)
CHLORIDE SERPL-SCNC: 101 MMOL/L (ref 98–107)
CO2 SERPL-SCNC: 27 MMOL/L (ref 21–32)
COLOR UR: ABNORMAL
CREAT SERPL-MCNC: 1.19 MG/DL (ref 0.5–1.05)
EGFRCR SERPLBLD CKD-EPI 2021: 44 ML/MIN/1.73M*2
EOSINOPHIL # BLD AUTO: 0.17 X10*3/UL (ref 0–0.4)
EOSINOPHIL NFR BLD AUTO: 1.7 %
ERYTHROCYTE [DISTWIDTH] IN BLOOD BY AUTOMATED COUNT: 14.3 % (ref 11.5–14.5)
FLUAV RNA RESP QL NAA+PROBE: NOT DETECTED
FLUBV RNA RESP QL NAA+PROBE: NOT DETECTED
GLUCOSE BLD MANUAL STRIP-MCNC: 160 MG/DL (ref 74–99)
GLUCOSE SERPL-MCNC: 220 MG/DL (ref 74–99)
GLUCOSE UR STRIP.AUTO-MCNC: NORMAL MG/DL
HCT VFR BLD AUTO: 36.5 % (ref 36–46)
HGB BLD-MCNC: 12 G/DL (ref 12–16)
IMM GRANULOCYTES # BLD AUTO: 0.04 X10*3/UL (ref 0–0.5)
IMM GRANULOCYTES NFR BLD AUTO: 0.4 % (ref 0–0.9)
KETONES UR STRIP.AUTO-MCNC: NEGATIVE MG/DL
LEUKOCYTE ESTERASE UR QL STRIP.AUTO: ABNORMAL
LIPASE SERPL-CCNC: 42 U/L (ref 9–82)
LYMPHOCYTES # BLD AUTO: 0.99 X10*3/UL (ref 0.8–3)
LYMPHOCYTES NFR BLD AUTO: 10.1 %
MAGNESIUM SERPL-MCNC: 1.76 MG/DL (ref 1.6–2.4)
MCH RBC QN AUTO: 28.1 PG (ref 26–34)
MCHC RBC AUTO-ENTMCNC: 32.9 G/DL (ref 32–36)
MCV RBC AUTO: 86 FL (ref 80–100)
MONOCYTES # BLD AUTO: 1.1 X10*3/UL (ref 0.05–0.8)
MONOCYTES NFR BLD AUTO: 11.3 %
NEUTROPHILS # BLD AUTO: 7.39 X10*3/UL (ref 1.6–5.5)
NEUTROPHILS NFR BLD AUTO: 75.8 %
NITRITE UR QL STRIP.AUTO: NEGATIVE
NRBC BLD-RTO: 0 /100 WBCS (ref 0–0)
PH UR STRIP.AUTO: 5 [PH]
PLATELET # BLD AUTO: 286 X10*3/UL (ref 150–450)
POTASSIUM SERPL-SCNC: 3.9 MMOL/L (ref 3.5–5.3)
PROT SERPL-MCNC: 7.7 G/DL (ref 6.4–8.2)
PROT UR STRIP.AUTO-MCNC: ABNORMAL MG/DL
RBC # BLD AUTO: 4.27 X10*6/UL (ref 4–5.2)
RBC # UR STRIP.AUTO: ABNORMAL MG/DL
RBC #/AREA URNS AUTO: ABNORMAL /HPF
RSV RNA RESP QL NAA+PROBE: NOT DETECTED
SARS-COV-2 RNA RESP QL NAA+PROBE: NOT DETECTED
SODIUM SERPL-SCNC: 139 MMOL/L (ref 136–145)
SP GR UR STRIP.AUTO: 1.01
SQUAMOUS #/AREA URNS AUTO: ABNORMAL /HPF
UROBILINOGEN UR STRIP.AUTO-MCNC: NORMAL MG/DL
WBC # BLD AUTO: 9.8 X10*3/UL (ref 4.4–11.3)
WBC #/AREA URNS AUTO: ABNORMAL /HPF

## 2025-04-06 PROCEDURE — 36415 COLL VENOUS BLD VENIPUNCTURE: CPT | Performed by: STUDENT IN AN ORGANIZED HEALTH CARE EDUCATION/TRAINING PROGRAM

## 2025-04-06 PROCEDURE — 99285 EMERGENCY DEPT VISIT HI MDM: CPT | Mod: 25 | Performed by: STUDENT IN AN ORGANIZED HEALTH CARE EDUCATION/TRAINING PROGRAM

## 2025-04-06 PROCEDURE — 87637 SARSCOV2&INF A&B&RSV AMP PRB: CPT | Performed by: STUDENT IN AN ORGANIZED HEALTH CARE EDUCATION/TRAINING PROGRAM

## 2025-04-06 PROCEDURE — 2500000004 HC RX 250 GENERAL PHARMACY W/ HCPCS (ALT 636 FOR OP/ED): Performed by: STUDENT IN AN ORGANIZED HEALTH CARE EDUCATION/TRAINING PROGRAM

## 2025-04-06 PROCEDURE — 81001 URINALYSIS AUTO W/SCOPE: CPT | Performed by: STUDENT IN AN ORGANIZED HEALTH CARE EDUCATION/TRAINING PROGRAM

## 2025-04-06 PROCEDURE — 93005 ELECTROCARDIOGRAM TRACING: CPT

## 2025-04-06 PROCEDURE — 83735 ASSAY OF MAGNESIUM: CPT | Performed by: STUDENT IN AN ORGANIZED HEALTH CARE EDUCATION/TRAINING PROGRAM

## 2025-04-06 PROCEDURE — 82947 ASSAY GLUCOSE BLOOD QUANT: CPT

## 2025-04-06 PROCEDURE — 80053 COMPREHEN METABOLIC PANEL: CPT | Performed by: STUDENT IN AN ORGANIZED HEALTH CARE EDUCATION/TRAINING PROGRAM

## 2025-04-06 PROCEDURE — 71045 X-RAY EXAM CHEST 1 VIEW: CPT

## 2025-04-06 PROCEDURE — 71045 X-RAY EXAM CHEST 1 VIEW: CPT | Performed by: RADIOLOGY

## 2025-04-06 PROCEDURE — 84484 ASSAY OF TROPONIN QUANT: CPT | Performed by: STUDENT IN AN ORGANIZED HEALTH CARE EDUCATION/TRAINING PROGRAM

## 2025-04-06 PROCEDURE — 70450 CT HEAD/BRAIN W/O DYE: CPT | Performed by: STUDENT IN AN ORGANIZED HEALTH CARE EDUCATION/TRAINING PROGRAM

## 2025-04-06 PROCEDURE — 96374 THER/PROPH/DIAG INJ IV PUSH: CPT

## 2025-04-06 PROCEDURE — 70450 CT HEAD/BRAIN W/O DYE: CPT

## 2025-04-06 PROCEDURE — 85025 COMPLETE CBC W/AUTO DIFF WBC: CPT | Performed by: STUDENT IN AN ORGANIZED HEALTH CARE EDUCATION/TRAINING PROGRAM

## 2025-04-06 PROCEDURE — 87086 URINE CULTURE/COLONY COUNT: CPT | Mod: ELYLAB | Performed by: STUDENT IN AN ORGANIZED HEALTH CARE EDUCATION/TRAINING PROGRAM

## 2025-04-06 PROCEDURE — 83690 ASSAY OF LIPASE: CPT | Performed by: STUDENT IN AN ORGANIZED HEALTH CARE EDUCATION/TRAINING PROGRAM

## 2025-04-06 PROCEDURE — 1210000001 HC SEMI-PRIVATE ROOM DAILY

## 2025-04-06 RX ORDER — CEFTRIAXONE 1 G/50ML
1 INJECTION, SOLUTION INTRAVENOUS ONCE
Status: COMPLETED | OUTPATIENT
Start: 2025-04-06 | End: 2025-04-06

## 2025-04-06 RX ORDER — INSULIN LISPRO 100 [IU]/ML
0-5 INJECTION, SOLUTION INTRAVENOUS; SUBCUTANEOUS
Status: DISCONTINUED | OUTPATIENT
Start: 2025-04-07 | End: 2025-04-12

## 2025-04-06 RX ORDER — CEFTRIAXONE 1 G/50ML
1 INJECTION, SOLUTION INTRAVENOUS EVERY 24 HOURS
Status: DISCONTINUED | OUTPATIENT
Start: 2025-04-07 | End: 2025-04-10

## 2025-04-06 RX ORDER — DEXTROSE 50 % IN WATER (D50W) INTRAVENOUS SYRINGE
25
Status: DISCONTINUED | OUTPATIENT
Start: 2025-04-06 | End: 2025-04-23 | Stop reason: HOSPADM

## 2025-04-06 RX ORDER — DEXTROSE 50 % IN WATER (D50W) INTRAVENOUS SYRINGE
12.5
Status: DISCONTINUED | OUTPATIENT
Start: 2025-04-06 | End: 2025-04-23 | Stop reason: HOSPADM

## 2025-04-06 RX ADMIN — CEFTRIAXONE SODIUM 1 G: 1 INJECTION, SOLUTION INTRAVENOUS at 21:13

## 2025-04-06 RX ADMIN — DOXYCYCLINE 100 MG: 100 INJECTION, POWDER, LYOPHILIZED, FOR SOLUTION INTRAVENOUS at 21:45

## 2025-04-06 ASSESSMENT — PAIN SCALES - GENERAL: PAINLEVEL_OUTOF10: 10 - WORST POSSIBLE PAIN

## 2025-04-06 ASSESSMENT — LIFESTYLE VARIABLES
TOTAL SCORE: 0
HAVE PEOPLE ANNOYED YOU BY CRITICIZING YOUR DRINKING: NO
EVER HAD A DRINK FIRST THING IN THE MORNING TO STEADY YOUR NERVES TO GET RID OF A HANGOVER: NO
HAVE YOU EVER FELT YOU SHOULD CUT DOWN ON YOUR DRINKING: NO
EVER FELT BAD OR GUILTY ABOUT YOUR DRINKING: NO

## 2025-04-06 ASSESSMENT — COLUMBIA-SUICIDE SEVERITY RATING SCALE - C-SSRS
2. HAVE YOU ACTUALLY HAD ANY THOUGHTS OF KILLING YOURSELF?: NO
1. IN THE PAST MONTH, HAVE YOU WISHED YOU WERE DEAD OR WISHED YOU COULD GO TO SLEEP AND NOT WAKE UP?: NO
6. HAVE YOU EVER DONE ANYTHING, STARTED TO DO ANYTHING, OR PREPARED TO DO ANYTHING TO END YOUR LIFE?: NO

## 2025-04-06 ASSESSMENT — PAIN - FUNCTIONAL ASSESSMENT: PAIN_FUNCTIONAL_ASSESSMENT: 0-10

## 2025-04-06 NOTE — ED PROVIDER NOTES
HPI   Chief Complaint   Patient presents with    Weakness, Gen    Dizziness     Patient complains of dizziness, weakness, and headache started today. Hx of diabetes.  in triage.       Patient is an 89-year-old female with history of diabetes who presents for multiple complaints.  Patient states she has been feeling tremors, chills, body aches all over starting today.  Patient's last known well was noon.  She endorses tremors all over her body.  Versus generalized weakness.  No chest pain, shortness of breath, fevers, cough, runny nose, sore throat, vomiting or diarrhea or abdominal pain.  Denies dysuria, hematuria, polyuria.  No falls.  No history of MI, CVA, DVT or PE.  Patient's chart does note a stroke, however she and her son deny this.  Denies tobacco, ethanol.              Patient History   No past medical history on file.  Past Surgical History:   Procedure Laterality Date    MR HEAD ANGIO WO IV CONTRAST  11/30/2021    MR HEAD ANGIO WO IV CONTRAST 11/30/2021 [facility] CL[facility]ICAL [facility]GAC[facility]    MR NECK ANGIO WO IV CONTRAST  11/30/2021    MR NECK ANGIO WO IV CONTRAST 11/30/2021 [facility] CL[facility]ICAL [facility]    OTHER SURGICAL HISTORY  03/31/2022    Colectomy    OTHER SURGICAL HISTORY  03/31/2022    Appendectomy     No family history on file.  Social History     Tobacco Use    Smoking status: Not on file    Smokeless tobacco: Not on file   Substance Use Topics    Alcohol use: Not on file    Drug use: Not on file       Physical Exam   ED Triage Vitals [04/06/25 1807]   Temperature Heart Rate Respirations BP   36.6 °C (97.9 °F) (!) 107 18 165/72      Pulse Ox Temp Source Heart Rate Source Patient Position   97 % Temporal -- --      BP Location FiO2 (%)     -- --       Physical Exam  Constitutional:       General: She is not in acute distress.  HENT:      Head: Normocephalic.   Eyes:      Extraocular Movements: Extraocular movements intact.      Conjunctiva/sclera: Conjunctivae normal.      Pupils: Pupils are equal, round, and  reactive to light.   Cardiovascular:      Rate and Rhythm: Normal rate and regular rhythm.      Pulses: Normal pulses.      Heart sounds: Normal heart sounds.   Pulmonary:      Effort: Pulmonary effort is normal.      Breath sounds: Normal breath sounds.   Abdominal:      General: There is no distension.      Palpations: Abdomen is soft. There is no mass.      Tenderness: There is no abdominal tenderness. There is no guarding.   Musculoskeletal:         General: No deformity.      Cervical back: Normal range of motion and neck supple.      Right lower leg: No edema.      Left lower leg: No edema.   Skin:     General: Skin is warm and dry.      Findings: No lesion or rash.   Neurological:      General: No focal deficit present.      Mental Status: She is alert and oriented to person, place, and time. Mental status is at baseline.      Cranial Nerves: No cranial nerve deficit.      Sensory: No sensory deficit.      Motor: No weakness.   Psychiatric:         Mood and Affect: Mood normal.           ED Course & MDM   Diagnoses as of 04/07/25 1035   Generalized weakness   Pneumonia of left lower lobe due to infectious organism   Acute cystitis without hematuria                 No data recorded     Louise Coma Scale Score: 15 (04/06/25 1808 : Elizabeth Marin RN)                           Medical Decision Making  EKG on my interpretation: Rate 82, rhythm regular, axis normal, , QRS 74, QTc 455, T waves: Unremarkable, ST segments: No elevations or depressions, interpretation: Normal sinus rhythm, no STEMI    Patient is an 89-year-old female with the above-stated past medical history who presents for multiple medical complaints.  Patient's EKG is nonischemic, troponins are negative x 2, low suspicion for ACS.  Patient has a mildly elevated creatinine, though her previous value is from 3 years ago, unclear if this is acute.  CBC shows no leukocytosis, though she does have a left shift.  No anemia.  Influenza, COVID, RSV  are negative.  CT head on my review did not show signs of intracranial bleed or other acute findings, this was confirmed by Radiology.  I have low suspicion for a CVA, NIH is 0, test of skew was negative and she is ambulating without issue.  She has a previously imaged meningioma which appears stable.  Chest x-ray shows signs of possible pneumonia in the left lung base.  Urinalysis shows signs of urinary tract infection.  Patient was started on IV antibiotics.  Given her difficulty ambulating and a curb 65 score which makes her moderate risk, I discussed case with the medicine service who accepted the patient for admission.    Disclaimer: This note was dictated using speech recognition software. Minor errors in transcription may be present. Please call if questions.     Raul Diaz MD  [facility] Emergency Medicine  Contact on [employer]        Problems Addressed:  Acute cystitis without hematuria: acute illness or injury  Generalized weakness: acute illness or injury  Pneumonia of left lower lobe due to infectious organism: acute illness or injury    Amount and/or Complexity of Data Reviewed  Labs: ordered.  Radiology: ordered and independent interpretation performed.  ECG/medicine tests: ordered and independent interpretation performed.        Procedure  Procedures     Raul Diaz MD  04/07/25 5284

## 2025-04-06 NOTE — Clinical Note
Introducer needle being inserted into mass using CT guidance.  Patient tolerating well. Screening Questions  1. Are you active on mychart? YES    2. What insurance is in the chart? [employer] ON CHART     2.  Ordering/Referring Provider: Emma Guerra MD in [facility]/PEDS    3. BMI 18.3    4. Do you have any Lung issues?  NO If yes continue:   Do you use daily home oxygen? NO   Do you have Pulmonary Hypertension? NO   Do you have SEVERE asthma? NO    5. Have you had a heart, lung, or liver transplant? NO    6. Are you currently on dialysis or have chronic kidney disease? NO    7. Have you had a stroke or Transient ischemic attack (TIA) within 6 months? NO    8. In the past 6 months, have you had any heart related issues including cardiomyopathy or heart attack? NO      If yes, did it require cardiac stenting or other implantable device?NO      9. Do you have any implantable devices in your body (pacemaker, defib, LVAD)? NO    10. Do you take nitroglycerin? If yes, how often? NO    11. Are you currently taking any blood thinners?NO    12. Are you a diabetic? NO    13. (Females) Are you currently pregnant? NO  If yes, how many weeks?      15. Are you taking any prescription pain medications on a routine schedule? NO If yes, MAC sedation.    16. Do you have any chemical dependencies such as alcohol, street drugs, or methadone? NOIf yes, MAC sedation.    17. Do you have any history of post-traumatic stress syndrome, severe anxiety or history of psychosis? NO    18. Do you transfer independently? YES    19.  Do you have any issues with constipation? NO    20. Preferred Pharmacy for Pre Prescription [facility]     Scheduling Details    Which Colonoscopy Prep was Sent?: MIRALAX   Procedure Scheduled: COLONOSCOPY   Provider/Surgeon: DR. SANTANA  Date of Procedure: 01/03/2021  Location: [facility]  Caller (Please ask for phone number if not scheduled by patient): CANDIDO      Sedation Type: MAC  Conscious Sedation- Needs  for 6 hours after the procedure  MAC/General-Needs  for 24 hours after  procedure    Pre-op Required at [facility] [place], [place] and OR for MAC sedation:   (if yes advise patient they will need a pre-op prior to procedure)      Is patient on blood thinners? -NO (If yes- inform patient to follow up with PCP or provider for follow up instructions)     Informed patient they will need an adult  YES  Cannot take any type of public or medical transportation alone    [facility]Pro[facility] or [facility]: SCHEDULED     Confirmed Nurse will call to complete assessment YES    Additional comments: NO

## 2025-04-06 NOTE — Clinical Note
The site was marked. Prepped with: ChloraPrep. The patient was draped. Abdomen where liver mass bx will be taken

## 2025-04-06 NOTE — Clinical Note
Patient is waiting to return to floor.  She states she is having some pain/tenderness around biopsy site.  Site checked and there is no bleeding noted at site and VS are stable.  IKE Frye caring for patient was made aware.

## 2025-04-06 NOTE — Clinical Note
Procedure completed and introducer needle removed and bandaid placed over site.  Patient tolerated well.  Three samples collected and will be sent to lab.

## 2025-04-06 NOTE — Clinical Note
Patient in CT holding bay for CT guided liver biopsy of liver mass.  Patient is alert and oriented x3.

## 2025-04-07 ENCOUNTER — APPOINTMENT (OUTPATIENT)
Dept: CARDIOLOGY | Facility: HOSPITAL | Age: OVER 89
DRG: 194 | End: 2025-04-07
Payer: MEDICARE

## 2025-04-07 LAB
ATRIAL RATE: 82 BPM
CHOLEST SERPL-MCNC: 128 MG/DL (ref 0–199)
CHOLESTEROL/HDL RATIO: 2.8
GLUCOSE BLD MANUAL STRIP-MCNC: 306 MG/DL (ref 74–99)
GLUCOSE BLD MANUAL STRIP-MCNC: 336 MG/DL (ref 74–99)
GLUCOSE BLD MANUAL STRIP-MCNC: 356 MG/DL (ref 74–99)
GLUCOSE BLD MANUAL STRIP-MCNC: 407 MG/DL (ref 74–99)
HDLC SERPL-MCNC: 45.1 MG/DL
HOLD SPECIMEN: NORMAL
NON-HDL CHOLESTEROL: 83 MG/DL (ref 0–149)
P AXIS: 83 DEGREES
P OFFSET: 176 MS
P ONSET: 138 MS
PR INTERVAL: 164 MS
Q ONSET: 220 MS
QRS COUNT: 13 BEATS
QRS DURATION: 74 MS
QT INTERVAL: 390 MS
QTC CALCULATION(BAZETT): 455 MS
QTC FREDERICIA: 432 MS
R AXIS: 11 DEGREES
T AXIS: 77 DEGREES
T OFFSET: 415 MS
URATE SERPL-MCNC: 6 MG/DL (ref 2.3–6.7)
VENTRICULAR RATE: 82 BPM

## 2025-04-07 PROCEDURE — 2500000001 HC RX 250 WO HCPCS SELF ADMINISTERED DRUGS (ALT 637 FOR MEDICARE OP): Performed by: INTERNAL MEDICINE

## 2025-04-07 PROCEDURE — 1210000001 HC SEMI-PRIVATE ROOM DAILY

## 2025-04-07 PROCEDURE — 82947 ASSAY GLUCOSE BLOOD QUANT: CPT

## 2025-04-07 PROCEDURE — 36415 COLL VENOUS BLD VENIPUNCTURE: CPT | Performed by: INTERNAL MEDICINE

## 2025-04-07 PROCEDURE — 84550 ASSAY OF BLOOD/URIC ACID: CPT | Performed by: INTERNAL MEDICINE

## 2025-04-07 PROCEDURE — 83718 ASSAY OF LIPOPROTEIN: CPT | Performed by: INTERNAL MEDICINE

## 2025-04-07 PROCEDURE — 2500000004 HC RX 250 GENERAL PHARMACY W/ HCPCS (ALT 636 FOR OP/ED): Performed by: INTERNAL MEDICINE

## 2025-04-07 PROCEDURE — 2500000002 HC RX 250 W HCPCS SELF ADMINISTERED DRUGS (ALT 637 FOR MEDICARE OP, ALT 636 FOR OP/ED): Performed by: INTERNAL MEDICINE

## 2025-04-07 PROCEDURE — 93005 ELECTROCARDIOGRAM TRACING: CPT

## 2025-04-07 RX ORDER — CLONIDINE HYDROCHLORIDE 0.2 MG/1
0.2 TABLET ORAL 2 TIMES DAILY
COMMUNITY
Start: 2021-12-03 | End: 2025-04-23 | Stop reason: HOSPADM

## 2025-04-07 RX ORDER — NITROGLYCERIN 0.4 MG/1
0.4 TABLET SUBLINGUAL EVERY 5 MIN PRN
COMMUNITY
Start: 2025-03-03

## 2025-04-07 RX ORDER — HYDROCHLOROTHIAZIDE 25 MG/1
25 TABLET ORAL DAILY
COMMUNITY
Start: 2021-12-03

## 2025-04-07 RX ORDER — CLOPIDOGREL BISULFATE 75 MG/1
75 TABLET ORAL DAILY
COMMUNITY

## 2025-04-07 RX ORDER — DOCUSATE SODIUM 100 MG/1
100 CAPSULE, LIQUID FILLED ORAL DAILY
Status: DISCONTINUED | OUTPATIENT
Start: 2025-04-07 | End: 2025-04-23 | Stop reason: HOSPADM

## 2025-04-07 RX ORDER — NIFEDIPINE 90 MG/1
90 TABLET, EXTENDED RELEASE ORAL 2 TIMES DAILY
COMMUNITY

## 2025-04-07 RX ORDER — LORAZEPAM 1 MG/1
1 TABLET ORAL NIGHTLY
Status: DISCONTINUED | OUTPATIENT
Start: 2025-04-07 | End: 2025-04-23 | Stop reason: HOSPADM

## 2025-04-07 RX ORDER — FERROUS SULFATE 325(65) MG
325 TABLET ORAL 2 TIMES DAILY
COMMUNITY
Start: 2021-12-03

## 2025-04-07 RX ORDER — ATORVASTATIN CALCIUM 40 MG/1
1 TABLET, FILM COATED ORAL DAILY
COMMUNITY
Start: 2021-12-03

## 2025-04-07 RX ORDER — ENOXAPARIN SODIUM 100 MG/ML
40 INJECTION SUBCUTANEOUS DAILY
Status: DISCONTINUED | OUTPATIENT
Start: 2025-04-07 | End: 2025-04-07

## 2025-04-07 RX ORDER — VIT C/E/ZN/COPPR/LUTEIN/ZEAXAN 250MG-90MG
5000 CAPSULE ORAL DAILY
Status: DISCONTINUED | OUTPATIENT
Start: 2025-04-07 | End: 2025-04-23 | Stop reason: HOSPADM

## 2025-04-07 RX ORDER — DOCUSATE SODIUM 100 MG/1
100 CAPSULE, LIQUID FILLED ORAL 2 TIMES DAILY
COMMUNITY
Start: 2022-03-09

## 2025-04-07 RX ORDER — PHENAZOPYRIDINE HYDROCHLORIDE 100 MG/1
95 TABLET, FILM COATED ORAL 2 TIMES DAILY PRN
Status: DISCONTINUED | OUTPATIENT
Start: 2025-04-07 | End: 2025-04-07

## 2025-04-07 RX ORDER — ACETAMINOPHEN 325 MG/1
650 TABLET ORAL EVERY 6 HOURS PRN
Status: DISPENSED | OUTPATIENT
Start: 2025-04-07 | End: 2025-04-12

## 2025-04-07 RX ORDER — ALLOPURINOL 100 MG/1
100 TABLET ORAL DAILY
Status: DISCONTINUED | OUTPATIENT
Start: 2025-04-07 | End: 2025-04-23 | Stop reason: HOSPADM

## 2025-04-07 RX ORDER — LORAZEPAM 1 MG/1
1 TABLET ORAL NIGHTLY
COMMUNITY

## 2025-04-07 RX ORDER — HUMAN INSULIN 100 [IU]/ML
18 INJECTION, SUSPENSION SUBCUTANEOUS NIGHTLY
COMMUNITY
End: 2025-04-23 | Stop reason: HOSPADM

## 2025-04-07 RX ORDER — CLONIDINE HYDROCHLORIDE 0.2 MG/1
0.2 TABLET ORAL 2 TIMES DAILY
COMMUNITY

## 2025-04-07 RX ORDER — ISOSORBIDE MONONITRATE 60 MG/1
60 TABLET, EXTENDED RELEASE ORAL DAILY
COMMUNITY

## 2025-04-07 RX ORDER — LOSARTAN POTASSIUM 50 MG/1
100 TABLET ORAL DAILY
Status: DISCONTINUED | OUTPATIENT
Start: 2025-04-07 | End: 2025-04-23 | Stop reason: HOSPADM

## 2025-04-07 RX ORDER — LISINOPRIL 40 MG/1
1 TABLET ORAL DAILY
Status: ON HOLD | COMMUNITY
Start: 2021-12-03 | End: 2025-04-07 | Stop reason: ALTCHOICE

## 2025-04-07 RX ORDER — LOSARTAN POTASSIUM 100 MG/1
100 TABLET ORAL DAILY
COMMUNITY

## 2025-04-07 RX ORDER — LISINOPRIL 20 MG/1
20 TABLET ORAL DAILY
Status: ON HOLD | COMMUNITY
End: 2025-04-07 | Stop reason: ALTCHOICE

## 2025-04-07 RX ORDER — MIRTAZAPINE 15 MG/1
15 TABLET, FILM COATED ORAL NIGHTLY
Status: DISCONTINUED | OUTPATIENT
Start: 2025-04-07 | End: 2025-04-23 | Stop reason: HOSPADM

## 2025-04-07 RX ORDER — TORSEMIDE 20 MG/1
20 TABLET ORAL DAILY
Status: DISCONTINUED | OUTPATIENT
Start: 2025-04-07 | End: 2025-04-09

## 2025-04-07 RX ORDER — MENTHOL AND ZINC OXIDE .44; 20.625 G/100G; G/100G
1 OINTMENT TOPICAL 4 TIMES DAILY PRN
COMMUNITY
Start: 2022-02-25

## 2025-04-07 RX ORDER — AMLODIPINE BESYLATE 5 MG/1
5 TABLET ORAL
Status: DISCONTINUED | OUTPATIENT
Start: 2025-04-07 | End: 2025-04-08

## 2025-04-07 RX ORDER — METOPROLOL SUCCINATE 100 MG/1
100 TABLET, EXTENDED RELEASE ORAL 2 TIMES DAILY
COMMUNITY

## 2025-04-07 RX ORDER — CLOPIDOGREL 75 MG/1
75 TABLET, FILM COATED ORAL DAILY
COMMUNITY

## 2025-04-07 RX ORDER — AMLODIPINE BESYLATE 5 MG/1
5 TABLET ORAL
COMMUNITY

## 2025-04-07 RX ORDER — ACETAMINOPHEN 500 MG
5000 TABLET ORAL DAILY
COMMUNITY
Start: 2021-12-03

## 2025-04-07 RX ORDER — LISINOPRIL 30 MG/1
30 TABLET ORAL DAILY
COMMUNITY

## 2025-04-07 RX ORDER — CLONIDINE HYDROCHLORIDE 0.1 MG/1
0.2 TABLET ORAL EVERY 12 HOURS SCHEDULED
Status: DISCONTINUED | OUTPATIENT
Start: 2025-04-07 | End: 2025-04-08

## 2025-04-07 RX ORDER — METOPROLOL TARTRATE 1 MG/ML
5 INJECTION, SOLUTION INTRAVENOUS EVERY 6 HOURS PRN
Status: DISCONTINUED | OUTPATIENT
Start: 2025-04-07 | End: 2025-04-23 | Stop reason: HOSPADM

## 2025-04-07 RX ORDER — MIRTAZAPINE 15 MG/1
15 TABLET, FILM COATED ORAL NIGHTLY
COMMUNITY
Start: 2025-03-04

## 2025-04-07 RX ORDER — MENTHOL AND ZINC OXIDE .44; 20.625 G/100G; G/100G
1 OINTMENT TOPICAL 4 TIMES DAILY PRN
Status: DISCONTINUED | OUTPATIENT
Start: 2025-04-07 | End: 2025-04-23 | Stop reason: HOSPADM

## 2025-04-07 RX ORDER — ALLOPURINOL 100 MG/1
1 TABLET ORAL DAILY
COMMUNITY
Start: 2022-11-21

## 2025-04-07 RX ORDER — ACETAMINOPHEN 325 MG/1
650 TABLET ORAL EVERY 6 HOURS PRN
COMMUNITY
Start: 2021-12-03

## 2025-04-07 RX ORDER — NIFEDIPINE 90 MG/1
90 TABLET, EXTENDED RELEASE ORAL 2 TIMES DAILY
COMMUNITY
Start: 2022-02-25 | End: 2025-04-23 | Stop reason: HOSPADM

## 2025-04-07 RX ORDER — HYDROCHLOROTHIAZIDE 25 MG/1
25 TABLET ORAL DAILY
Status: DISCONTINUED | OUTPATIENT
Start: 2025-04-07 | End: 2025-04-23 | Stop reason: HOSPADM

## 2025-04-07 RX ORDER — ACETAMINOPHEN 325 MG/1
650 TABLET ORAL EVERY 6 HOURS PRN
Status: DISCONTINUED | OUTPATIENT
Start: 2025-04-07 | End: 2025-04-07

## 2025-04-07 RX ORDER — METOPROLOL SUCCINATE 50 MG/1
100 TABLET, EXTENDED RELEASE ORAL 2 TIMES DAILY
Status: DISCONTINUED | OUTPATIENT
Start: 2025-04-07 | End: 2025-04-23 | Stop reason: HOSPADM

## 2025-04-07 RX ORDER — NITROGLYCERIN 0.4 MG/1
0.4 TABLET SUBLINGUAL EVERY 5 MIN PRN
Status: DISCONTINUED | OUTPATIENT
Start: 2025-04-07 | End: 2025-04-23 | Stop reason: HOSPADM

## 2025-04-07 RX ORDER — ISOSORBIDE MONONITRATE 60 MG/1
60 TABLET, EXTENDED RELEASE ORAL DAILY
Status: DISCONTINUED | OUTPATIENT
Start: 2025-04-07 | End: 2025-04-23 | Stop reason: HOSPADM

## 2025-04-07 RX ORDER — NAPROXEN SODIUM 220 MG/1
81 TABLET, FILM COATED ORAL
COMMUNITY

## 2025-04-07 RX ORDER — ENOXAPARIN SODIUM 100 MG/ML
30 INJECTION SUBCUTANEOUS EVERY 24 HOURS
Status: DISCONTINUED | OUTPATIENT
Start: 2025-04-07 | End: 2025-04-23 | Stop reason: HOSPADM

## 2025-04-07 RX ORDER — NAPROXEN SODIUM 220 MG/1
81 TABLET, FILM COATED ORAL
Status: DISCONTINUED | OUTPATIENT
Start: 2025-04-07 | End: 2025-04-23 | Stop reason: HOSPADM

## 2025-04-07 RX ORDER — FERROUS SULFATE 325(65) MG
65 TABLET ORAL
Status: DISCONTINUED | OUTPATIENT
Start: 2025-04-08 | End: 2025-04-23 | Stop reason: HOSPADM

## 2025-04-07 RX ORDER — ENOXAPARIN SODIUM 100 MG/ML
40 INJECTION SUBCUTANEOUS DAILY
COMMUNITY
Start: 2022-03-09 | End: 2025-04-23 | Stop reason: HOSPADM

## 2025-04-07 RX ORDER — ASPIRIN 81 MG/1
1 TABLET ORAL DAILY
Status: ON HOLD | COMMUNITY
Start: 2021-12-03 | End: 2025-04-07 | Stop reason: ALTCHOICE

## 2025-04-07 RX ORDER — CLOPIDOGREL BISULFATE 75 MG/1
75 TABLET ORAL DAILY
Status: DISCONTINUED | OUTPATIENT
Start: 2025-04-07 | End: 2025-04-23 | Stop reason: HOSPADM

## 2025-04-07 RX ORDER — TORSEMIDE 20 MG/1
20 TABLET ORAL DAILY
COMMUNITY

## 2025-04-07 RX ORDER — ATORVASTATIN CALCIUM 20 MG/1
40 TABLET, FILM COATED ORAL DAILY
Status: DISCONTINUED | OUTPATIENT
Start: 2025-04-07 | End: 2025-04-23 | Stop reason: HOSPADM

## 2025-04-07 RX ADMIN — CLOPIDOGREL BISULFATE 75 MG: 75 TABLET, FILM COATED ORAL at 16:33

## 2025-04-07 RX ADMIN — LOSARTAN POTASSIUM 100 MG: 50 TABLET, FILM COATED ORAL at 16:33

## 2025-04-07 RX ADMIN — ATORVASTATIN CALCIUM 40 MG: 20 TABLET, FILM COATED ORAL at 22:41

## 2025-04-07 RX ADMIN — ALLOPURINOL 100 MG: 100 TABLET ORAL at 16:33

## 2025-04-07 RX ADMIN — METOPROLOL TARTRATE 5 MG: 5 INJECTION INTRAVENOUS at 16:42

## 2025-04-07 RX ADMIN — ASPIRIN 81 MG: 81 TABLET, CHEWABLE ORAL at 16:33

## 2025-04-07 RX ADMIN — TORSEMIDE 20 MG: 20 TABLET ORAL at 16:33

## 2025-04-07 RX ADMIN — INSULIN HUMAN 18 UNITS: 100 INJECTION, SUSPENSION SUBCUTANEOUS at 22:45

## 2025-04-07 RX ADMIN — ACETAMINOPHEN 650 MG: 325 TABLET ORAL at 16:33

## 2025-04-07 RX ADMIN — DOCUSATE SODIUM 100 MG: 100 CAPSULE, LIQUID FILLED ORAL at 16:33

## 2025-04-07 RX ADMIN — INSULIN LISPRO 5 UNITS: 100 INJECTION, SOLUTION INTRAVENOUS; SUBCUTANEOUS at 16:42

## 2025-04-07 RX ADMIN — HYDROCHLOROTHIAZIDE 25 MG: 25 TABLET ORAL at 16:33

## 2025-04-07 RX ADMIN — CLONIDINE HYDROCHLORIDE 0.2 MG: 0.1 TABLET ORAL at 22:41

## 2025-04-07 RX ADMIN — ISOSORBIDE MONONITRATE 60 MG: 60 TABLET, EXTENDED RELEASE ORAL at 16:33

## 2025-04-07 RX ADMIN — AMLODIPINE BESYLATE 5 MG: 5 TABLET ORAL at 16:33

## 2025-04-07 RX ADMIN — LORAZEPAM 1 MG: 1 TABLET ORAL at 22:41

## 2025-04-07 RX ADMIN — Medication 125 MCG: at 16:33

## 2025-04-07 RX ADMIN — ENOXAPARIN SODIUM 30 MG: 40 INJECTION SUBCUTANEOUS at 22:41

## 2025-04-07 RX ADMIN — INSULIN LISPRO 4 UNITS: 100 INJECTION, SOLUTION INTRAVENOUS; SUBCUTANEOUS at 08:16

## 2025-04-07 RX ADMIN — METOPROLOL SUCCINATE 100 MG: 50 TABLET, EXTENDED RELEASE ORAL at 22:40

## 2025-04-07 RX ADMIN — MIRTAZAPINE 15 MG: 15 TABLET, FILM COATED ORAL at 22:41

## 2025-04-07 RX ADMIN — INSULIN LISPRO 5 UNITS: 100 INJECTION, SOLUTION INTRAVENOUS; SUBCUTANEOUS at 11:44

## 2025-04-07 SDOH — SOCIAL STABILITY: SOCIAL INSECURITY
WITHIN THE LAST YEAR, HAVE YOU BEEN RAPED OR FORCED TO HAVE ANY KIND OF SEXUAL ACTIVITY BY YOUR PARTNER OR EX-PARTNER?: NO

## 2025-04-07 SDOH — SOCIAL STABILITY: SOCIAL INSECURITY: WITHIN THE LAST YEAR, HAVE YOU BEEN HUMILIATED OR EMOTIONALLY ABUSED IN OTHER WAYS BY YOUR PARTNER OR EX-PARTNER?: NO

## 2025-04-07 SDOH — SOCIAL STABILITY: SOCIAL INSECURITY: DOES ANYONE TRY TO KEEP YOU FROM HAVING/CONTACTING OTHER FRIENDS OR DOING THINGS OUTSIDE YOUR HOME?: NO

## 2025-04-07 SDOH — SOCIAL STABILITY: SOCIAL INSECURITY: ARE YOU OR HAVE YOU BEEN THREATENED OR ABUSED PHYSICALLY, EMOTIONALLY, OR SEXUALLY BY ANYONE?: NO

## 2025-04-07 SDOH — SOCIAL STABILITY: SOCIAL INSECURITY: HAVE YOU HAD ANY THOUGHTS OF HARMING ANYONE ELSE?: NO

## 2025-04-07 SDOH — SOCIAL STABILITY: SOCIAL INSECURITY
WITHIN THE LAST YEAR, HAVE YOU BEEN KICKED, HIT, SLAPPED, OR OTHERWISE PHYSICALLY HURT BY YOUR PARTNER OR EX-PARTNER?: NO

## 2025-04-07 SDOH — ECONOMIC STABILITY: HOUSING INSECURITY: IN THE LAST 12 MONTHS, WAS THERE A TIME WHEN YOU WERE NOT ABLE TO PAY THE MORTGAGE OR RENT ON TIME?: NO

## 2025-04-07 SDOH — SOCIAL STABILITY: SOCIAL INSECURITY: WITHIN THE LAST YEAR, HAVE YOU BEEN AFRAID OF YOUR PARTNER OR EX-PARTNER?: NO

## 2025-04-07 SDOH — ECONOMIC STABILITY: INCOME INSECURITY: IN THE PAST 12 MONTHS HAS THE ELECTRIC, GAS, OIL, OR WATER COMPANY THREATENED TO SHUT OFF SERVICES IN YOUR HOME?: NO

## 2025-04-07 SDOH — ECONOMIC STABILITY: HOUSING INSECURITY: AT ANY TIME IN THE PAST 12 MONTHS, WERE YOU HOMELESS OR LIVING IN A SHELTER (INCLUDING NOW)?: NO

## 2025-04-07 SDOH — ECONOMIC STABILITY: FOOD INSECURITY: WITHIN THE PAST 12 MONTHS, YOU WORRIED THAT YOUR FOOD WOULD RUN OUT BEFORE YOU GOT THE MONEY TO BUY MORE.: NEVER TRUE

## 2025-04-07 SDOH — SOCIAL STABILITY: SOCIAL INSECURITY: ABUSE: ADULT

## 2025-04-07 SDOH — SOCIAL STABILITY: SOCIAL INSECURITY: DO YOU FEEL ANYONE HAS EXPLOITED OR TAKEN ADVANTAGE OF YOU FINANCIALLY OR OF YOUR PERSONAL PROPERTY?: NO

## 2025-04-07 SDOH — ECONOMIC STABILITY: FOOD INSECURITY: HOW HARD IS IT FOR YOU TO PAY FOR THE VERY BASICS LIKE FOOD, HOUSING, MEDICAL CARE, AND HEATING?: NOT VERY HARD

## 2025-04-07 SDOH — ECONOMIC STABILITY: HOUSING INSECURITY: IN THE PAST 12 MONTHS, HOW MANY TIMES HAVE YOU MOVED WHERE YOU WERE LIVING?: 0

## 2025-04-07 SDOH — ECONOMIC STABILITY: TRANSPORTATION INSECURITY: IN THE PAST 12 MONTHS, HAS LACK OF TRANSPORTATION KEPT YOU FROM MEDICAL APPOINTMENTS OR FROM GETTING MEDICATIONS?: NO

## 2025-04-07 SDOH — ECONOMIC STABILITY: FOOD INSECURITY: WITHIN THE PAST 12 MONTHS, THE FOOD YOU BOUGHT JUST DIDN'T LAST AND YOU DIDN'T HAVE MONEY TO GET MORE.: NEVER TRUE

## 2025-04-07 SDOH — SOCIAL STABILITY: SOCIAL INSECURITY: ARE THERE ANY APPARENT SIGNS OF INJURIES/BEHAVIORS THAT COULD BE RELATED TO ABUSE/NEGLECT?: NO

## 2025-04-07 SDOH — SOCIAL STABILITY: SOCIAL INSECURITY: HAS ANYONE EVER THREATENED TO HURT YOUR FAMILY OR YOUR PETS?: NO

## 2025-04-07 SDOH — SOCIAL STABILITY: SOCIAL INSECURITY: DO YOU FEEL UNSAFE GOING BACK TO THE PLACE WHERE YOU ARE LIVING?: NO

## 2025-04-07 SDOH — SOCIAL STABILITY: SOCIAL INSECURITY: HAVE YOU HAD THOUGHTS OF HARMING ANYONE ELSE?: NO

## 2025-04-07 ASSESSMENT — COGNITIVE AND FUNCTIONAL STATUS - GENERAL
STANDING UP FROM CHAIR USING ARMS: A LITTLE
MOVING TO AND FROM BED TO CHAIR: A LITTLE
TOILETING: A LITTLE
PATIENT BASELINE BEDBOUND: NO
WALKING IN HOSPITAL ROOM: A LITTLE
MOVING FROM LYING ON BACK TO SITTING ON SIDE OF FLAT BED WITH BEDRAILS: A LITTLE
HELP NEEDED FOR BATHING: A LITTLE
TURNING FROM BACK TO SIDE WHILE IN FLAT BAD: A LITTLE
DAILY ACTIVITIY SCORE: 21
MOBILITY SCORE: 18
DRESSING REGULAR LOWER BODY CLOTHING: A LITTLE
CLIMB 3 TO 5 STEPS WITH RAILING: A LITTLE

## 2025-04-07 ASSESSMENT — ACTIVITIES OF DAILY LIVING (ADL)
JUDGMENT_ADEQUATE_SAFELY_COMPLETE_DAILY_ACTIVITIES: YES
WALKS IN HOME: NEEDS ASSISTANCE
PATIENT'S MEMORY ADEQUATE TO SAFELY COMPLETE DAILY ACTIVITIES?: YES
GROOMING: NEEDS ASSISTANCE
FEEDING YOURSELF: NEEDS ASSISTANCE
JUDGMENT_ADEQUATE_SAFELY_COMPLETE_DAILY_ACTIVITIES: YES
LACK_OF_TRANSPORTATION: NO
BATHING: NEEDS ASSISTANCE
ADEQUATE_TO_COMPLETE_ADL: YES
HEARING - LEFT EAR: FUNCTIONAL
PATIENT'S MEMORY ADEQUATE TO SAFELY COMPLETE DAILY ACTIVITIES?: YES
ADEQUATE_TO_COMPLETE_ADL: YES
DRESSING YOURSELF: NEEDS ASSISTANCE
FEEDING YOURSELF: INDEPENDENT
LACK_OF_TRANSPORTATION: NO
HEARING - RIGHT EAR: FUNCTIONAL
TOILETING: NEEDS ASSISTANCE
DRESSING YOURSELF: NEEDS ASSISTANCE
BATHING: NEEDS ASSISTANCE
WALKS IN HOME: NEEDS ASSISTANCE
HEARING - LEFT EAR: FUNCTIONAL
GROOMING: NEEDS ASSISTANCE
HEARING - RIGHT EAR: FUNCTIONAL
TOILETING: NEEDS ASSISTANCE

## 2025-04-07 ASSESSMENT — LIFESTYLE VARIABLES
AUDIT-C TOTAL SCORE: 0
HOW OFTEN DO YOU HAVE 6 OR MORE DRINKS ON ONE OCCASION: NEVER
SKIP TO QUESTIONS 9-10: 1
HOW OFTEN DO YOU HAVE A DRINK CONTAINING ALCOHOL: NEVER
HOW MANY STANDARD DRINKS CONTAINING ALCOHOL DO YOU HAVE ON A TYPICAL DAY: PATIENT DOES NOT DRINK
AUDIT-C TOTAL SCORE: 0

## 2025-04-07 ASSESSMENT — PAIN SCALES - GENERAL: PAINLEVEL_OUTOF10: 0 - NO PAIN

## 2025-04-07 ASSESSMENT — PATIENT HEALTH QUESTIONNAIRE - PHQ9
SUM OF ALL RESPONSES TO PHQ9 QUESTIONS 1 & 2: 0
1. LITTLE INTEREST OR PLEASURE IN DOING THINGS: NOT AT ALL
2. FEELING DOWN, DEPRESSED OR HOPELESS: NOT AT ALL

## 2025-04-07 NOTE — CARE PLAN
The patient's goals for the shift include feel better, rest    The clinical goals for the shift include patient will remain safe and without fall or injury this shift      Problem: Pain - Adult  Goal: Verbalizes/displays adequate comfort level or baseline comfort level  Outcome: Progressing     Problem: Safety - Adult  Goal: Free from fall injury  Outcome: Progressing     Problem: Discharge Planning  Goal: Discharge to home or other facility with appropriate resources  Outcome: Progressing     Problem: Chronic Conditions and Co-morbidities  Goal: Patient's chronic conditions and co-morbidity symptoms are monitored and maintained or improved  Outcome: Progressing     Problem: Nutrition  Goal: Nutrient intake appropriate for maintaining nutritional needs  Outcome: Progressing     Problem: Fall/Injury  Goal: Not fall by end of shift  Outcome: Progressing  Goal: Be free from injury by end of the shift  Outcome: Progressing  Goal: Verbalize understanding of personal risk factors for fall in the hospital  Outcome: Progressing  Goal: Verbalize understanding of risk factor reduction measures to prevent injury from fall in the home  Outcome: Progressing  Goal: Use assistive devices by end of the shift  Outcome: Progressing  Goal: Pace activities to prevent fatigue by end of the shift  Outcome: Progressing

## 2025-04-07 NOTE — H&P
History Of Present Illness  Lela Barba is a 89 y.o. female presenting with with a history of feeling cold and uncontrolled chills, tremors and generalized bodyaches as well as generalized weakness.  She also had a headache and felt dizzy.  She then went to the emergency room.  Emergency room workup shows CT scan of the brain with meningioma which is chronic and no acute findings.  Chest x-ray suggestive of a hazy opacity of the left lung base possible pneumonia and urinalysis was suggestive of UTI.  The patient was started on Rocephin IV and Vibramycin and eventually admitted.  Electrolytes and acid-base balance are normal.  Patient known to have chronic kidney disease stage IIIb and BUN and creatinine were compatible with chronic kidney disease stage IIIb GFR 44 which is baseline.  Liver function tests are normal.  White cell count is normal at 9.8, hemoglobin of 12.0 and platelet count of 286.  Urinalysis with proteinuria, negative for nitrates but 75 leukocytes with microscopic or 21-50 white blood cells.  EKG was regular sinus rhythm.  Other medical problems of this patient include hypertension, chronic kidney disease stage IIIb, dyslipidemia, arthritis, carotid artery disease, gastroesophageal reflux disease, idiopathic edema, type 2 diabetes, history of left temporal occipital infarct, history of GI bleeding, history of colon cancer status post resection, status post laparoscopic colectomy..     Past Medical History  No past medical history on file.  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    Family History  No family history on file.     Allergies  Hydralazine    Review of Systems  Patient complains of headaches but no dizziness or fainting.  She complained of tremors and uncontrolled shaking with feeling cold most likely with chills.  Generalized muscle weakness with generalized muscle aching.  Denies any shortness of breath or coughing.  Denies any chest pains or any palpitation.  Reportedly moves her bowels.  Denies any dysuria.  Denies any focal neurological deficits     Physical Exam  Patient is alert oriented not in distress blood pressure 141/71 heart rate of 86 pulse oximeter reading of 94%  Head examination normal pupils equal sclera nonicteric no facial asymmetry  Neck veins are flat without bruits no lymphadenopathy or thyroid enlargement  Lungs are clear without any wheezing crackles or rhonchi  Heart was regular with a grade 2 out of 6 systolic ejection murmur  Abdomen is soft nontender good bowel sounds no guarding and no organomegaly  Extremities without any edema with strong peripheral pulses     Last Recorded Vitals  Blood pressure 141/71, pulse 86, temperature 36.6 °C (97.9 °F), temperature source Temporal, resp. rate 16, height 1.626 m (5' 4\"), weight 64.9 kg (143 lb), SpO2 94%.    Relevant Results        Results for orders placed or performed during the hospital encounter of 04/06/25 (from the past 96 hours)   POCT GLUCOSE   Result Value Ref Range    POCT Glucose 160 (H) 74 - 99 mg/dL   CBC and Auto Differential   Result Value Ref Range "    WBC 9.8 4.4 - 11.3 x10*3/uL    nRBC 0.0 0.0 - 0.0 /100 WBCs    RBC 4.27 4.00 - 5.20 x10*6/uL    Hemoglobin 12.0 12.0 - 16.0 g/dL    Hematocrit 36.5 36.0 - 46.0 %    MCV 86 80 - 100 fL    MCH 28.1 26.0 - 34.0 pg    MCHC 32.9 32.0 - 36.0 g/dL    RDW 14.3 11.5 - 14.5 %    Platelets 286 150 - 450 x10*3/uL    Neutrophils % 75.8 40.0 - 80.0 %    Immature Granulocytes %, Automated 0.4 0.0 - 0.9 %    Lymphocytes % 10.1 13.0 - 44.0 %    Monocytes % 11.3 2.0 - 10.0 %    Eosinophils % 1.7 0.0 - 6.0 %    Basophils % 0.7 0.0 - 2.0 %    Neutrophils Absolute 7.39 (H) 1.60 - 5.50 x10*3/uL    Immature Granulocytes Absolute, Automated 0.04 0.00 - 0.50 x10*3/uL    Lymphocytes Absolute 0.99 0.80 - 3.00 x10*3/uL    Monocytes Absolute 1.10 (H) 0.05 - 0.80 x10*3/uL    Eosinophils Absolute 0.17 0.00 - 0.40 x10*3/uL    Basophils Absolute 0.07 0.00 - 0.10 x10*3/uL   Comprehensive Metabolic Panel   Result Value Ref Range    Glucose 220 (H) 74 - 99 mg/dL    Sodium 139 136 - 145 mmol/L    Potassium 3.9 3.5 - 5.3 mmol/L    Chloride 101 98 - 107 mmol/L    Bicarbonate 27 21 - 32 mmol/L    Anion Gap 15 10 - 20 mmol/L    Urea Nitrogen 35 (H) 6 - 23 mg/dL    Creatinine 1.19 (H) 0.50 - 1.05 mg/dL    eGFR 44 (L) >60 mL/min/1.73m*2    Calcium 8.8 8.6 - 10.3 mg/dL    Albumin 3.9 3.4 - 5.0 g/dL    Alkaline Phosphatase 176 (H) 33 - 136 U/L    Total Protein 7.7 6.4 - 8.2 g/dL    AST 25 9 - 39 U/L    Bilirubin, Total 0.2 0.0 - 1.2 mg/dL    ALT 14 7 - 45 U/L   Lipase   Result Value Ref Range    Lipase 42 9 - 82 U/L   Troponin I, High Sensitivity, Initial   Result Value Ref Range    Troponin I, High Sensitivity 12 0 - 13 ng/L   Magnesium   Result Value Ref Range    Magnesium 1.76 1.60 - 2.40 mg/dL   Sars-CoV-2 and Influenza A/B PCR   Result Value Ref Range    Flu A Result Not Detected Not Detected    Flu B Result Not Detected Not Detected    Coronavirus 2019, PCR Not Detected Not Detected   RSV PCR   Result Value Ref Range    RSV PCR Not Detected Not  Detected   ECG 12 lead   Result Value Ref Range    Ventricular Rate 82 BPM    Atrial Rate 82 BPM    MA Interval 164 ms    QRS Duration 74 ms    QT Interval 390 ms    QTC Calculation(Bazett) 455 ms    P Axis 83 degrees    R Axis 11 degrees    T Axis 77 degrees    QRS Count 13 beats    Q Onset 220 ms    P Onset 138 ms    P Offset 176 ms    T Offset 415 ms    QTC Fredericia 432 ms   Urinalysis with Reflex Culture and Microscopic   Result Value Ref Range    Color, Urine Light-Orange (N) Light-Yellow, Yellow, Dark-Yellow    Appearance, Urine Ex.Turbid (N) Clear    Specific Gravity, Urine 1.009 1.005 - 1.035    pH, Urine 5.0 5.0, 5.5, 6.0, 6.5, 7.0, 7.5, 8.0    Protein, Urine 50 (1+) (A) NEGATIVE, 10 (TRACE), 20 (TRACE) mg/dL    Glucose, Urine Normal Normal mg/dL    Blood, Urine 0.2 (2+) (A) NEGATIVE mg/dL    Ketones, Urine NEGATIVE NEGATIVE mg/dL    Bilirubin, Urine NEGATIVE NEGATIVE mg/dL    Urobilinogen, Urine Normal Normal mg/dL    Nitrite, Urine NEGATIVE NEGATIVE    Leukocyte Esterase, Urine 75 Aleksey/uL (A) NEGATIVE   Microscopic Only, Urine   Result Value Ref Range    WBC, Urine 21-50 (A) 1-5, NONE /HPF    RBC, Urine 3-5 NONE, 1-2, 3-5 /HPF    Squamous Epithelial Cells, Urine 1-9 (SPARSE) Reference range not established. /HPF    Bacteria, Urine 4+ (A) NONE SEEN /HPF    Amorphous Crystals, Urine 2+ NONE, 1+, 2+ /HPF   Troponin, High Sensitivity, 1 Hour   Result Value Ref Range    Troponin I, High Sensitivity 11 0 - 13 ng/L    CT head wo IV contrast    Result Date: 4/6/2025  Interpreted By:  Stevan Disla, STUDY: CT HEAD WO IV CONTRAST;  4/6/2025 8:07 pm   INDICATION: Signs/Symptoms:tremor, generalized weakness.     COMPARISON: CT head dated 07/10/2024.   ACCESSION NUMBER(S): [id number]   ORDERING CLINICIAN: KYLE BECKHAM   TECHNIQUE: Noncontrast axial CT scan of head was performed. Angled reformats in brain and bone windows were generated. The images were reviewed in bone, brain, blood and soft tissue  windows.   FINDINGS: No hyperdense intracranial hemorrhage is identified. There is no new mass effect or midline shift present.   Geographic area of cystic encephalomalacia and gliosis is present in the right frontoparietal junction, likely representing sequela of prior infarct/injury, similar in appearance to prior exam. Low volume of attenuation changes in the periventricular and subcortical white matter of bilateral cerebral hemispheres likely represent sequela of microvascular disease. Focus of decreased attenuation in the right cerebellum likely represent sequela of prior infarct/injury, also similar in appearance to prior study.   Gray-white differentiation is intact, without evidence of CT apparent transcortical infarct.   There is redemonstration of the mildly hyperdense extra-axial mass overlying the right frontoparietal junction likely representing a meningioma, similar to prior study. There is slight effacement of the adjacent brain parenchyma without significant low-density edema.   Mild-to-moderate brain parenchymal volume loss is present without abnormal ventricular dilatation. Basal cisterns are patent. No extra-axial fluid collection is identified.   Scalp soft tissues do not demonstrate any acute abnormality. No skull fracture or other acute calvarial abnormality is present.   Mastoid air cells and middle ear cavities are clear. Complete opacification of the left maxillary sinus, similar to prior study in July of 2024.       1.  No evidence of hemorrhage, CT apparent transcortical infarct, or other acute intracranial abnormality. 2. Mild volume of attenuation changes in the periventricular and subcortical white matter of bilateral cerebral hemispheres is favored to represent sequela of microvascular disease. Geographic area of cystic encephalomalacia and gliosis in the right frontoparietal junction likely represent sequela of prior infarct/injury. 3. Extra-axial mildly hyperdense mass overlies the  medial posterior right frontal lobe with slight effacement of the adjacent brain parenchyma but without significant low-density edema, essentially unchanged in appearance to prior study, likely representing a meningioma. 4. Near-complete opacification of the partially visualized left maxillary sinus, similar to prior exam. Correlate with symptoms of chronic sinusitis.   MACRO: None   Signed by: Stevan Disla 4/6/2025 8:46 PM Dictation workstation:   [id number]    ECG 12 lead    Result Date: 4/6/2025  Normal sinus rhythm Septal infarct , age undetermined Abnormal ECG When compared with ECG of 31-JUL-2023 10:58, No significant change was found    XR chest 1 view    Result Date: 4/6/2025  Interpreted By:  Elbert Patel, STUDY: XR CHEST 1 VIEW  4/6/2025 6:49 pm   INDICATION: Signs/Symptoms:Chest Pain   COMPARISON: 03/03/2022   ACCESSION NUMBER(S): [id number]   ORDERING CLINICIAN: KYLE BECKHAM   TECHNIQUE: A single AP portable radiograph of the chest was obtained.   FINDINGS: Mild hazy opacity is seen at the left lung base, and may represent atelectasis and/or pneumonia. No pneumothorax is identified. The cardiac silhouette is within normal limits for size.       Mild hazy opacity at the left lung base, as above. Clinical correlation and continued follow-up until clearing is recommended.   MACRO: None.   Signed by: Elbert Patel 4/6/2025 6:52 PM Dictation workstation:   [id number]       Assessment/Plan   Assessment & Plan  Generalized weakness      Probable pneumonia and UTI, will wait for culture results in the meantime patient will be maintained on Rocephin 1 g daily  Type 2 diabetes we will try to keep on Humulin 70/30 28 units twice a day as per home regimen  Hyperuricemia will resume allopurinol 200 mg daily  Dyslipidemia on atorvastatin 40 mg daily  Coronary artery disease on isosorbide mononitrate ER 30 mg daily, Plavix 75 mg daily  Vitamin D3 deficiency on vitamin D3 5000 units daily  Insomnia on  trazodone 50 mg at bedtime  Chronic edema on Demadex 20 mg daily  Anxiety disorder on lorazepam 1 mg daily  Osteoporosis on Fosamax 70 mg daily  Hypertension will keep on lisinopril 40 mg daily with parameters, clonidine 0.2 mg twice daily, hydrochlorothiazide 25 mg daily, nifedipine ER 90 mg daily       I spent 60 minutes in the professional and overall care of this patient.      Giuseppe Francois MD FACP

## 2025-04-08 ENCOUNTER — APPOINTMENT (OUTPATIENT)
Dept: RADIOLOGY | Facility: HOSPITAL | Age: OVER 89
DRG: 193 | End: 2025-04-08
Payer: MEDICARE

## 2025-04-08 LAB
ALBUMIN SERPL BCP-MCNC: 3.6 G/DL (ref 3.4–5)
ALP SERPL-CCNC: 149 U/L (ref 33–136)
ALT SERPL W P-5'-P-CCNC: 12 U/L (ref 7–45)
ANION GAP SERPL CALC-SCNC: 14 MMOL/L (ref 10–20)
AST SERPL W P-5'-P-CCNC: 20 U/L (ref 9–39)
BACTERIA UR CULT: NORMAL
BASOPHILS # BLD AUTO: 0.08 X10*3/UL (ref 0–0.1)
BASOPHILS NFR BLD AUTO: 0.8 %
BILIRUB SERPL-MCNC: 0.4 MG/DL (ref 0–1.2)
BUN SERPL-MCNC: 24 MG/DL (ref 6–23)
CALCIUM SERPL-MCNC: 8.9 MG/DL (ref 8.6–10.3)
CHLORIDE SERPL-SCNC: 102 MMOL/L (ref 98–107)
CO2 SERPL-SCNC: 28 MMOL/L (ref 21–32)
CREAT SERPL-MCNC: 0.92 MG/DL (ref 0.5–1.05)
EGFRCR SERPLBLD CKD-EPI 2021: 60 ML/MIN/1.73M*2
EOSINOPHIL # BLD AUTO: 0.11 X10*3/UL (ref 0–0.4)
EOSINOPHIL NFR BLD AUTO: 1 %
ERYTHROCYTE [DISTWIDTH] IN BLOOD BY AUTOMATED COUNT: 14.4 % (ref 11.5–14.5)
GLUCOSE BLD MANUAL STRIP-MCNC: 156 MG/DL (ref 74–99)
GLUCOSE BLD MANUAL STRIP-MCNC: 246 MG/DL (ref 74–99)
GLUCOSE BLD MANUAL STRIP-MCNC: 299 MG/DL (ref 74–99)
GLUCOSE BLD MANUAL STRIP-MCNC: 97 MG/DL (ref 74–99)
GLUCOSE SERPL-MCNC: 154 MG/DL (ref 74–99)
HCT VFR BLD AUTO: 33.4 % (ref 36–46)
HGB BLD-MCNC: 11.1 G/DL (ref 12–16)
IMM GRANULOCYTES # BLD AUTO: 0.05 X10*3/UL (ref 0–0.5)
IMM GRANULOCYTES NFR BLD AUTO: 0.5 % (ref 0–0.9)
LYMPHOCYTES # BLD AUTO: 1.94 X10*3/UL (ref 0.8–3)
LYMPHOCYTES NFR BLD AUTO: 18.3 %
MCH RBC QN AUTO: 28.4 PG (ref 26–34)
MCHC RBC AUTO-ENTMCNC: 33.2 G/DL (ref 32–36)
MCV RBC AUTO: 85 FL (ref 80–100)
MONOCYTES # BLD AUTO: 1.27 X10*3/UL (ref 0.05–0.8)
MONOCYTES NFR BLD AUTO: 12 %
NEUTROPHILS # BLD AUTO: 7.14 X10*3/UL (ref 1.6–5.5)
NEUTROPHILS NFR BLD AUTO: 67.4 %
NRBC BLD-RTO: 0 /100 WBCS (ref 0–0)
PLATELET # BLD AUTO: 300 X10*3/UL (ref 150–450)
POTASSIUM SERPL-SCNC: 3.7 MMOL/L (ref 3.5–5.3)
PROT SERPL-MCNC: 7 G/DL (ref 6.4–8.2)
RBC # BLD AUTO: 3.91 X10*6/UL (ref 4–5.2)
SODIUM SERPL-SCNC: 140 MMOL/L (ref 136–145)
WBC # BLD AUTO: 10.6 X10*3/UL (ref 4.4–11.3)

## 2025-04-08 PROCEDURE — 2500000004 HC RX 250 GENERAL PHARMACY W/ HCPCS (ALT 636 FOR OP/ED): Performed by: INTERNAL MEDICINE

## 2025-04-08 PROCEDURE — 2500000002 HC RX 250 W HCPCS SELF ADMINISTERED DRUGS (ALT 637 FOR MEDICARE OP, ALT 636 FOR OP/ED): Performed by: INTERNAL MEDICINE

## 2025-04-08 PROCEDURE — 82947 ASSAY GLUCOSE BLOOD QUANT: CPT

## 2025-04-08 PROCEDURE — 85025 COMPLETE CBC W/AUTO DIFF WBC: CPT | Performed by: INTERNAL MEDICINE

## 2025-04-08 PROCEDURE — 71045 X-RAY EXAM CHEST 1 VIEW: CPT

## 2025-04-08 PROCEDURE — 36415 COLL VENOUS BLD VENIPUNCTURE: CPT | Performed by: INTERNAL MEDICINE

## 2025-04-08 PROCEDURE — 71045 X-RAY EXAM CHEST 1 VIEW: CPT | Performed by: RADIOLOGY

## 2025-04-08 PROCEDURE — 1210000001 HC SEMI-PRIVATE ROOM DAILY

## 2025-04-08 PROCEDURE — 2500000001 HC RX 250 WO HCPCS SELF ADMINISTERED DRUGS (ALT 637 FOR MEDICARE OP): Performed by: INTERNAL MEDICINE

## 2025-04-08 PROCEDURE — 84075 ASSAY ALKALINE PHOSPHATASE: CPT | Performed by: INTERNAL MEDICINE

## 2025-04-08 RX ORDER — CLONIDINE HYDROCHLORIDE 0.1 MG/1
0.3 TABLET ORAL EVERY 12 HOURS SCHEDULED
Status: DISCONTINUED | OUTPATIENT
Start: 2025-04-09 | End: 2025-04-10

## 2025-04-08 RX ORDER — AMLODIPINE BESYLATE 5 MG/1
10 TABLET ORAL
Status: DISCONTINUED | OUTPATIENT
Start: 2025-04-09 | End: 2025-04-23 | Stop reason: HOSPADM

## 2025-04-08 RX ADMIN — INSULIN LISPRO 1 UNITS: 100 INJECTION, SOLUTION INTRAVENOUS; SUBCUTANEOUS at 06:39

## 2025-04-08 RX ADMIN — FERROUS SULFATE TAB 325 MG (65 MG ELEMENTAL FE) 325 MG: 325 (65 FE) TAB at 08:35

## 2025-04-08 RX ADMIN — CLONIDINE HYDROCHLORIDE 0.2 MG: 0.1 TABLET ORAL at 08:35

## 2025-04-08 RX ADMIN — DOCUSATE SODIUM 100 MG: 100 CAPSULE, LIQUID FILLED ORAL at 08:35

## 2025-04-08 RX ADMIN — INSULIN HUMAN 18 UNITS: 100 INJECTION, SUSPENSION SUBCUTANEOUS at 20:47

## 2025-04-08 RX ADMIN — ASPIRIN 81 MG: 81 TABLET, CHEWABLE ORAL at 06:09

## 2025-04-08 RX ADMIN — CEFTRIAXONE SODIUM 1 G: 1 INJECTION, SOLUTION INTRAVENOUS at 23:11

## 2025-04-08 RX ADMIN — ATORVASTATIN CALCIUM 40 MG: 20 TABLET, FILM COATED ORAL at 20:22

## 2025-04-08 RX ADMIN — Medication 125 MCG: at 08:35

## 2025-04-08 RX ADMIN — AMLODIPINE BESYLATE 5 MG: 5 TABLET ORAL at 06:09

## 2025-04-08 RX ADMIN — METOPROLOL SUCCINATE 100 MG: 50 TABLET, EXTENDED RELEASE ORAL at 08:35

## 2025-04-08 RX ADMIN — CEFTRIAXONE SODIUM 1 G: 1 INJECTION, SOLUTION INTRAVENOUS at 00:00

## 2025-04-08 RX ADMIN — ALLOPURINOL 100 MG: 100 TABLET ORAL at 08:35

## 2025-04-08 RX ADMIN — METOPROLOL SUCCINATE 100 MG: 50 TABLET, EXTENDED RELEASE ORAL at 20:21

## 2025-04-08 RX ADMIN — ENOXAPARIN SODIUM 30 MG: 40 INJECTION SUBCUTANEOUS at 17:29

## 2025-04-08 RX ADMIN — HYDROCHLOROTHIAZIDE 25 MG: 25 TABLET ORAL at 08:35

## 2025-04-08 RX ADMIN — LOSARTAN POTASSIUM 100 MG: 50 TABLET, FILM COATED ORAL at 08:35

## 2025-04-08 RX ADMIN — CLOPIDOGREL BISULFATE 75 MG: 75 TABLET, FILM COATED ORAL at 08:35

## 2025-04-08 RX ADMIN — TORSEMIDE 20 MG: 20 TABLET ORAL at 08:35

## 2025-04-08 RX ADMIN — ISOSORBIDE MONONITRATE 60 MG: 60 TABLET, EXTENDED RELEASE ORAL at 08:35

## 2025-04-08 RX ADMIN — CLONIDINE HYDROCHLORIDE 0.2 MG: 0.1 TABLET ORAL at 20:21

## 2025-04-08 RX ADMIN — MIRTAZAPINE 15 MG: 15 TABLET, FILM COATED ORAL at 20:21

## 2025-04-08 RX ADMIN — INSULIN HUMAN 20 UNITS: 100 INJECTION, SUSPENSION SUBCUTANEOUS at 08:35

## 2025-04-08 RX ADMIN — INSULIN LISPRO 2 UNITS: 100 INJECTION, SOLUTION INTRAVENOUS; SUBCUTANEOUS at 11:57

## 2025-04-08 RX ADMIN — LORAZEPAM 1 MG: 1 TABLET ORAL at 20:22

## 2025-04-08 ASSESSMENT — COGNITIVE AND FUNCTIONAL STATUS - GENERAL
CLIMB 3 TO 5 STEPS WITH RAILING: A LITTLE
TOILETING: A LITTLE
DAILY ACTIVITIY SCORE: 21
TOILETING: A LITTLE
MOVING FROM LYING ON BACK TO SITTING ON SIDE OF FLAT BED WITH BEDRAILS: A LITTLE
TURNING FROM BACK TO SIDE WHILE IN FLAT BAD: A LITTLE
DRESSING REGULAR LOWER BODY CLOTHING: A LITTLE
TOILETING: A LITTLE
DAILY ACTIVITIY SCORE: 21
MOBILITY SCORE: 18
WALKING IN HOSPITAL ROOM: A LITTLE
MOBILITY SCORE: 18
HELP NEEDED FOR BATHING: A LITTLE
MOVING TO AND FROM BED TO CHAIR: A LITTLE
DRESSING REGULAR LOWER BODY CLOTHING: A LITTLE
CLIMB 3 TO 5 STEPS WITH RAILING: A LITTLE
MOBILITY SCORE: 18
WALKING IN HOSPITAL ROOM: A LITTLE
MOVING TO AND FROM BED TO CHAIR: A LITTLE
MOVING FROM LYING ON BACK TO SITTING ON SIDE OF FLAT BED WITH BEDRAILS: A LITTLE
CLIMB 3 TO 5 STEPS WITH RAILING: A LITTLE
MOVING TO AND FROM BED TO CHAIR: A LITTLE
TURNING FROM BACK TO SIDE WHILE IN FLAT BAD: A LITTLE
STANDING UP FROM CHAIR USING ARMS: A LITTLE
STANDING UP FROM CHAIR USING ARMS: A LITTLE
DRESSING REGULAR LOWER BODY CLOTHING: A LITTLE
DAILY ACTIVITIY SCORE: 21
STANDING UP FROM CHAIR USING ARMS: A LITTLE
TURNING FROM BACK TO SIDE WHILE IN FLAT BAD: A LITTLE
HELP NEEDED FOR BATHING: A LITTLE
MOVING FROM LYING ON BACK TO SITTING ON SIDE OF FLAT BED WITH BEDRAILS: A LITTLE
HELP NEEDED FOR BATHING: A LITTLE
WALKING IN HOSPITAL ROOM: A LITTLE

## 2025-04-08 ASSESSMENT — PAIN - FUNCTIONAL ASSESSMENT: PAIN_FUNCTIONAL_ASSESSMENT: 0-10

## 2025-04-08 ASSESSMENT — PAIN SCALES - GENERAL
PAINLEVEL_OUTOF10: 0 - NO PAIN

## 2025-04-08 NOTE — PROGRESS NOTES
"Lela Barba is a 89 y.o. female on day 1 of admission presenting with Generalized weakness.    Subjective   The patient's home meds were ordered at about 1 PM today.  And I got called at about 4 to 4:30 in the afternoon that the patient's blood pressure was very high.  Also her blood sugars were high.  I informed the nurse that her home meds were already ordered by 1:00 this afternoon and has to be acknowledge are verified.  Apparently the patient has not gotten her medications up to this time.  Currently the patient is wide-awake and alert and did not seem to be in distress.  Her last blood pressure was slightly high at 172/76 heart rate of 99 and pulse oximeter reading of 94% and she she claims that she threw up her medications earlier because she was given other medicines at 1 time.  Patient denies any headache dizziness or fainting  She denies any coughing or phlegm  Denies any chest pains or any palpitation  Denies any abdominal discomfort and with dysuria given a dose of Pyridium earlier.       Objective   Blood sugars elevated at 358 and has not gotten her Novolin 70/30 yet.  Uric acid level is 6.0  Urinalysis compatible with UTI cultures pending    Physical Exam  Alert oriented not in distress  Head examination benign pupils equal sclera nonicteric no facial asymmetry  Neck veins flat without bruits  Lungs clear without wheezing crackles or rhonchi  Heart regular with a grade 2 out of 6 systolic ejection murmur  Abdomen soft and nontender good bowel sounds no guarding  Extremities without any edema with strong peripheral pulses    Last Recorded Vitals  Blood pressure 172/76, pulse 99, temperature 37.4 °C (99.3 °F), temperature source Temporal, resp. rate 20, height 1.626 m (5' 4\"), weight 64.9 kg (143 lb), SpO2 94%.  Intake/Output last 3 Shifts:  I/O last 3 completed shifts:  In: 700 (10.8 mL/kg) [P.O.:700]  Out: 1100 (17 mL/kg) [Urine:1100 (0.5 mL/kg/hr)]  Weight: 64.9 kg     Current " Facility-Administered Medications:     acetaminophen (Tylenol) tablet 650 mg, 650 mg, oral, q6h PRN, Giuseppe Francois MD, 650 mg at 04/07/25 1633    allopurinol (Zyloprim) tablet 100 mg, 100 mg, oral, Daily, Giuseppe Francois MD, 100 mg at 04/07/25 1633    amLODIPine (Norvasc) tablet 5 mg, 5 mg, oral, Daily, Giuseppe Francois MD, 5 mg at 04/07/25 1633    aspirin chewable tablet 81 mg, 81 mg, oral, Daily, Giuseppe Francois MD, 81 mg at 04/07/25 1633    atorvastatin (Lipitor) tablet 40 mg, 40 mg, oral, Daily, Giuseppe Francois MD, 40 mg at 04/07/25 2241    cefTRIAXone (Rocephin) 1 g in dextrose (iso) IV 50 mL, 1 g, intravenous, q24h, Giuseppe Francois MD    cholecalciferol (Vitamin D-3) capsule 125 mcg, 5,000 Units, oral, Daily, Giuseppe Francois MD, 125 mcg at 04/07/25 1633    cloNIDine (Catapres) tablet 0.2 mg, 0.2 mg, oral, q12h YANG, Giuseppe Francois MD, 0.2 mg at 04/07/25 2241    clopidogrel (Plavix) tablet 75 mg, 75 mg, oral, Daily, Giuseppe Francois MD, 75 mg at 04/07/25 1633    dextrose 50 % injection 12.5 g, 12.5 g, intravenous, q15 min PRN, Giuseppe Francois MD    dextrose 50 % injection 25 g, 25 g, intravenous, q15 min PRN, Giuseppe Francois MD    docusate sodium (Colace) capsule 100 mg, 100 mg, oral, Daily, Giuseppe Francois MD, 100 mg at 04/07/25 1633    enoxaparin (Lovenox) syringe 30 mg, 30 mg, subcutaneous, q24h, Giuseppe Francois MD, 30 mg at 04/07/25 2241    [START ON 4/8/2025] ferrous sulfate tablet 325 mg, 65 mg of iron, oral, Daily with breakfast, Giuseppe Francois MD    glucagon (Glucagen) injection 1 mg, 1 mg, intramuscular, q15 min PRN, Giuseppe Francois MD    glucagon (Glucagen) injection 1 mg, 1 mg, intramuscular, q15 min PRN, Giuseppe Francois MD    hydroCHLOROthiazide (HYDRODiuril) tablet 25 mg, 25 mg, oral, Daily, Giuseppe Francois MD, 25 mg at 04/07/25 1633    insulin lispro injection 0-5 Units, 0-5 Units, subcutaneous, TID AC, Giuseppe Francois MD, 5 Units at 04/07/25 1642    insulin NPH and regular human (HumuLIN 70-30,  NovoLIN 70-30) 100 unit/mL (70-30) injection 18 Units, 18 Units, subcutaneous, Nightly, Giuseppe Francois MD, 18 Units at 04/07/25 2245    [START ON 4/8/2025] insulin NPH and regular human (HumuLIN 70-30, NovoLIN 70-30) 100 unit/mL (70-30) injection 20 Units, 20 Units, subcutaneous, q AM, Giuseppe Francois MD    isosorbide mononitrate ER (Imdur) 24 hr tablet 60 mg, 60 mg, oral, Daily, Giuseppe Francois MD, 60 mg at 04/07/25 1633    LORazepam (Ativan) tablet 1 mg, 1 mg, oral, Nightly, Giuseppe Francois MD, 1 mg at 04/07/25 2241    losartan (Cozaar) tablet 100 mg, 100 mg, oral, Daily, Giuseppe Francois MD, 100 mg at 04/07/25 1633    menthol-zinc oxide (Calmoseptine - Risamine) 0.44-20.6 % ointment 1 Application, 1 Application, Topical, 4x daily PRN, Giuseppe Francois MD    metoprolol succinate XL (Toprol-XL) 24 hr tablet 100 mg, 100 mg, oral, BID, Giuseppe Francois MD, 100 mg at 04/07/25 2240    metoprolol tartrate (Lopressor) injection 5 mg, 5 mg, intravenous, q6h PRN, Giuseppe Francois MD, 5 mg at 04/07/25 1642    mirtazapine (Remeron) tablet 15 mg, 15 mg, oral, Nightly, Giuseppe Francois MD, 15 mg at 04/07/25 2241    nitroglycerin (Nitrostat) SL tablet 0.4 mg, 0.4 mg, sublingual, q5 min PRN, Giuseppe Francois MD    torsemide (Demadex) tablet 20 mg, 20 mg, oral, Daily, Giuseppe Francois MD, 20 mg at 04/07/25 1633     Relevant Results      Results for orders placed or performed during the hospital encounter of 04/06/25 (from the past 24 hours)   Lipid Panel Non-Fasting   Result Value Ref Range    Cholesterol 128 0 - 199 mg/dL    HDL-Cholesterol 45.1 mg/dL    Cholesterol/HDL Ratio 2.8     Non-HDL Cholesterol 83 0 - 149 mg/dL   Uric Acid   Result Value Ref Range    Uric Acid 6.0 2.3 - 6.7 mg/dL   Lavender Top   Result Value Ref Range    Extra Tube Hold for add-ons.    SST TOP   Result Value Ref Range    Extra Tube Hold for add-ons.    POCT GLUCOSE   Result Value Ref Range    POCT Glucose 336 (H) 74 - 99 mg/dL   POCT GLUCOSE   Result Value  Ref Range    POCT Glucose 356 (H) 74 - 99 mg/dL   POCT GLUCOSE   Result Value Ref Range    POCT Glucose 407 (H) 74 - 99 mg/dL   POCT GLUCOSE   Result Value Ref Range    POCT Glucose 306 (H) 74 - 99 mg/dL    ECG 12 lead    Result Date: 4/7/2025  Normal sinus rhythm Septal infarct , age undetermined Abnormal ECG When compared with ECG of 31-JUL-2023 10:58, No significant change was found See ED provider note for full interpretation and clinical correlation Confirmed by Dede Smalls ([id number]) on 4/7/2025 7:32:10 PM    CT head wo IV contrast    Result Date: 4/6/2025  Interpreted By:  Stevan Disla, STUDY: CT HEAD WO IV CONTRAST;  4/6/2025 8:07 pm   INDICATION: Signs/Symptoms:tremor, generalized weakness.     COMPARISON: CT head dated 07/10/2024.   ACCESSION NUMBER(S): [id number]   ORDERING CLINICIAN: KYLE BECKHAM   TECHNIQUE: Noncontrast axial CT scan of head was performed. Angled reformats in brain and bone windows were generated. The images were reviewed in bone, brain, blood and soft tissue windows.   FINDINGS: No hyperdense intracranial hemorrhage is identified. There is no new mass effect or midline shift present.   Geographic area of cystic encephalomalacia and gliosis is present in the right frontoparietal junction, likely representing sequela of prior infarct/injury, similar in appearance to prior exam. Low volume of attenuation changes in the periventricular and subcortical white matter of bilateral cerebral hemispheres likely represent sequela of microvascular disease. Focus of decreased attenuation in the right cerebellum likely represent sequela of prior infarct/injury, also similar in appearance to prior study.   Gray-white differentiation is intact, without evidence of CT apparent transcortical infarct.   There is redemonstration of the mildly hyperdense extra-axial mass overlying the right frontoparietal junction likely representing a meningioma, similar to prior study. There is slight  effacement of the adjacent brain parenchyma without significant low-density edema.   Mild-to-moderate brain parenchymal volume loss is present without abnormal ventricular dilatation. Basal cisterns are patent. No extra-axial fluid collection is identified.   Scalp soft tissues do not demonstrate any acute abnormality. No skull fracture or other acute calvarial abnormality is present.   Mastoid air cells and middle ear cavities are clear. Complete opacification of the left maxillary sinus, similar to prior study in July of 2024.       1.  No evidence of hemorrhage, CT apparent transcortical infarct, or other acute intracranial abnormality. 2. Mild volume of attenuation changes in the periventricular and subcortical white matter of bilateral cerebral hemispheres is favored to represent sequela of microvascular disease. Geographic area of cystic encephalomalacia and gliosis in the right frontoparietal junction likely represent sequela of prior infarct/injury. 3. Extra-axial mildly hyperdense mass overlies the medial posterior right frontal lobe with slight effacement of the adjacent brain parenchyma but without significant low-density edema, essentially unchanged in appearance to prior study, likely representing a meningioma. 4. Near-complete opacification of the partially visualized left maxillary sinus, similar to prior exam. Correlate with symptoms of chronic sinusitis.   MACRO: None   Signed by: Stevan Disla 4/6/2025 8:46 PM Dictation workstation:   [id number]    XR chest 1 view    Result Date: 4/6/2025  Interpreted By:  Elbert Patel, STUDY: XR CHEST 1 VIEW  4/6/2025 6:49 pm   INDICATION: Signs/Symptoms:Chest Pain   COMPARISON: 03/03/2022   ACCESSION NUMBER(S): [id number]   ORDERING CLINICIAN: KYLE BECKHAM   TECHNIQUE: A single AP portable radiograph of the chest was obtained.   FINDINGS: Mild hazy opacity is seen at the left lung base, and may represent atelectasis and/or pneumonia. No pneumothorax  is identified. The cardiac silhouette is within normal limits for size.       Mild hazy opacity at the left lung base, as above. Clinical correlation and continued follow-up until clearing is recommended.   MACRO: None.   Signed by: Elbert Patel 4/6/2025 6:52 PM Dictation workstation:   [id number]                Assessment/Plan   Assessment & Plan  Generalized weakness    Urinary tract infection with dysuria, possible pneumonia.  Currently on Rocephin IV.  Will recheck chest x-ray tomorrow as well as CBC.  Continue Rocephin  Type 2 diabetes Humulin 70/30 was restarted together with Humalog with coverage.  Blood sugar should come down otherwise adjustments will be made  Hyperuricemia on allopurinol 200 mg daily with normal uric acid level of 6.0  Dyslipidemia on atorvastatin 40 mg daily  Coronary artery disease without any chest pains on isosorbide mononitrate ER 30 mg daily, Plavix 75 mg daily  Vitamin D3 deficiency on vitamin D3 5000 units daily  Insomnia on trazodone 50 mg at bedtime  Chronic edema on Demadex 20 mg daily  Anxiety disorder on lorazepam 1 mg daily  Osteoporosis on Fosamax 70 mg weekly  Hypertension on lisinopril, clonidine hydrochlorothiazide, and nifedipine will adjust as needed.  IV Lopressor as needed for systolics greater than 180       I spent 40 minutes in the professional and overall care of this patient.      Giuseppe Francois MD FACP

## 2025-04-08 NOTE — CARE PLAN
The patient's goals for the shift include rest and comfort    The clinical goals for the shift include patient will remain free from fall/injury throughout shift    Over the shift, the patient did not make progress toward the following goals. Barriers to progression include; none. Recommendations to address these barriers include; continue current plan of care.        Problem: Pain - Adult  Goal: Verbalizes/displays adequate comfort level or baseline comfort level  Outcome: Progressing  Flowsheets (Taken 4/8/2025 1056)  Verbalizes/displays adequate comfort level or baseline comfort level:   Encourage patient to monitor pain and request assistance   Administer analgesics based on type and severity of pain and evaluate response   Consider cultural and social influences on pain and pain management   Assess pain using appropriate pain scale   Implement non-pharmacological measures as appropriate and evaluate response     Problem: Safety - Adult  Goal: Free from fall injury  Outcome: Progressing  Flowsheets (Taken 4/8/2025 1056)  Free from fall injury: Instruct family/caregiver on patient safety

## 2025-04-08 NOTE — CONSULTS
"Nutrition Initial Assessment:   Nutrition Assessment    Reason for Assessment: Admission nursing screening    Patient is a 89 y.o. female presenting with generalized weakness.       Nutrition History:  Energy Intake: Good > 75 %  Pain affecting nutrition status: N/A  Food and Nutrient History: Pt hard to hear due to quiet/soft voice. States she did not eat well yesterday due to feeling ill but ate well at breakfast today. No meal intakes documented in EMR this admission.  Vitamin/Herbal Supplement Use: vitamin D3, ferrous sulfate, vitamin A,C,E,zinc, & copper, per home med list       Anthropometrics:  Height: 162.6 cm (5' 4\")   Weight: 64.9 kg (143 lb)   BMI (Calculated): 24.53  IBW/kg (Dietitian Calculated): 54.5 kg                         Weight History:   Wt Readings from Last 10 Encounters:   04/06/25 64.9 kg (143 lb)   07/10/24 68 kg (150 lb)   02/07/23 68 kg (149 lb 14.6 oz)   11/21/22 70.4 kg (155 lb 3 oz)   03/31/22 69.4 kg (153 lb)   03/17/22 71.7 kg (158 lb)      Weight Change %:  Weight History / % Weight Change: Pt reports having a significant weight loss about 3 years ago. Based on available wt records in EMR, pt with 3.1 kg (4.5%) wt loss x 9 months.  Significant Weight Loss: No    Nutrition Focused Physical Exam Findings:    Subcutaneous Fat Loss:   Orbital Fat Pads: Well nourished (slightly bulging fat pads)  Buccal Fat Pads: Well nourished (full, rounded cheeks)  Triceps: Well nourished (ample fat tissue)  Muscle Wasting:  Temporalis: Mild-Moderate (slight depression)  Pectoralis (Clavicular Region): Well nourished (clavicle not visible)  Deltoid/Trapezius: Well nourished (rounded appearance at arm, shoulder, neck)  Edema:  Edema: none  Physical Findings:  Skin: Negative  Teeth Findings: Impaired dentition    Nutrition Significant Labs:    Reviewed   Nutrition Specific Medications:  Reviewed     I/O:   Last BM Date: 04/06/25;      Dietary Orders (From admission, onward)       Start     Ordered    " 04/07/25 0957  May Participate in Room Service  ( ROOM SERVICE MAY PARTICIPATE)  Once        Question:  .  Answer:  Yes    04/07/25 0956    04/06/25 2315  Adult diet Regular, Consistent Carb; CCD 75 gm/meal  Diet effective now        Question Answer Comment   Diet type Regular    Diet type Consistent Carb    Carb diet selection: CCD 75 gm/meal        04/06/25 2315                     Estimated Needs:   Total Energy Estimated Needs in 24 hours (kCal): 1625 kCal  Method for Estimating Needs: 25 kcal/kg ABW  Total Protein Estimated Needs in 24 Hours (g): 52 g  Method for Estimating 24 Hour Protein Needs: 0.8 g/kg ABW  Total Fluid Estimated Needs in 24 Hours (mL): 1625 mL  Method for Estimating 24 Hour Fluid Needs: 1 ml/kcal or per MD  Patient on Order Fluid Restriction: No        Nutrition Diagnosis   Malnutrition Diagnosis  Patient has Malnutrition Diagnosis: No    Nutrition Diagnosis  Patient has Nutrition Diagnosis: No  Additional Nutrition Diagnosis: Diagnosis 2  Nutrition Diagnosis 2: No nutrition diagnosis at this time       Nutrition Interventions/Recommendations   Nutrition prescription for oral nutrition    Nutrition Recommendations:  Individualized Nutrition Prescription Provided for : 75 g CHO/meal consistent carbohydrate diet - monitor for need for ONS    Nutrition Interventions/Goals:   Meals and Snacks: Carbohydrate-modified diet  Goal: Consumes 3 meals per day      Education Documentation  No documentation found.     Patient with no diet related questions at this time.         Nutrition Monitoring and Evaluation   Food/Nutrient Related History Monitoring  Monitoring and Evaluation Plan: Intake / amount of food, Estimated Energy Intake  Estimated Energy Intake: Energy intake greater or equal to 75% of estimated energy needs  Intake / Amount of food: Consumes at least 75% or more of meals/snacks/supplements, Meets > 75% estimated energy needs    Anthropometric Measurements  Monitoring and Evaluation  Plan: Body weight  Body Weight: Body weight - Maintain stable weight              Goal Status: New goal(s) identified    Time Spent (min): 60 minutes

## 2025-04-09 LAB
ALBUMIN SERPL BCP-MCNC: 3.7 G/DL (ref 3.4–5)
ALP SERPL-CCNC: 147 U/L (ref 33–136)
ALT SERPL W P-5'-P-CCNC: 13 U/L (ref 7–45)
ANION GAP SERPL CALC-SCNC: 19 MMOL/L (ref 10–20)
APPEARANCE UR: CLEAR
AST SERPL W P-5'-P-CCNC: 34 U/L (ref 9–39)
BASOPHILS # BLD AUTO: 0.07 X10*3/UL (ref 0–0.1)
BASOPHILS NFR BLD AUTO: 0.6 %
BILIRUB SERPL-MCNC: 0.3 MG/DL (ref 0–1.2)
BILIRUB UR STRIP.AUTO-MCNC: NEGATIVE MG/DL
BUN SERPL-MCNC: 27 MG/DL (ref 6–23)
CALCIUM SERPL-MCNC: 8.8 MG/DL (ref 8.6–10.3)
CHLORIDE SERPL-SCNC: 102 MMOL/L (ref 98–107)
CO2 SERPL-SCNC: 24 MMOL/L (ref 21–32)
COLOR UR: ABNORMAL
CREAT SERPL-MCNC: 1.08 MG/DL (ref 0.5–1.05)
EGFRCR SERPLBLD CKD-EPI 2021: 49 ML/MIN/1.73M*2
EOSINOPHIL # BLD AUTO: 0.04 X10*3/UL (ref 0–0.4)
EOSINOPHIL NFR BLD AUTO: 0.3 %
ERYTHROCYTE [DISTWIDTH] IN BLOOD BY AUTOMATED COUNT: 14.5 % (ref 11.5–14.5)
GLUCOSE BLD MANUAL STRIP-MCNC: 125 MG/DL (ref 74–99)
GLUCOSE BLD MANUAL STRIP-MCNC: 148 MG/DL (ref 74–99)
GLUCOSE BLD MANUAL STRIP-MCNC: 258 MG/DL (ref 74–99)
GLUCOSE BLD MANUAL STRIP-MCNC: 293 MG/DL (ref 74–99)
GLUCOSE SERPL-MCNC: 98 MG/DL (ref 74–99)
GLUCOSE UR STRIP.AUTO-MCNC: NORMAL MG/DL
HCT VFR BLD AUTO: 40.3 % (ref 36–46)
HGB BLD-MCNC: 13.4 G/DL (ref 12–16)
HOLD SPECIMEN: NORMAL
IMM GRANULOCYTES # BLD AUTO: 0.08 X10*3/UL (ref 0–0.5)
IMM GRANULOCYTES NFR BLD AUTO: 0.6 % (ref 0–0.9)
KETONES UR STRIP.AUTO-MCNC: NEGATIVE MG/DL
LEUKOCYTE ESTERASE UR QL STRIP.AUTO: NEGATIVE
LYMPHOCYTES # BLD AUTO: 1.48 X10*3/UL (ref 0.8–3)
LYMPHOCYTES NFR BLD AUTO: 11.7 %
MCH RBC QN AUTO: 28.8 PG (ref 26–34)
MCHC RBC AUTO-ENTMCNC: 33.3 G/DL (ref 32–36)
MCV RBC AUTO: 87 FL (ref 80–100)
MONOCYTES # BLD AUTO: 1.12 X10*3/UL (ref 0.05–0.8)
MONOCYTES NFR BLD AUTO: 8.9 %
NEUTROPHILS # BLD AUTO: 9.84 X10*3/UL (ref 1.6–5.5)
NEUTROPHILS NFR BLD AUTO: 77.9 %
NITRITE UR QL STRIP.AUTO: NEGATIVE
NRBC BLD-RTO: 0 /100 WBCS (ref 0–0)
PH UR STRIP.AUTO: 6 [PH]
PLATELET # BLD AUTO: 305 X10*3/UL (ref 150–450)
POTASSIUM SERPL-SCNC: 3.5 MMOL/L (ref 3.5–5.3)
PROT SERPL-MCNC: 7.4 G/DL (ref 6.4–8.2)
PROT UR STRIP.AUTO-MCNC: ABNORMAL MG/DL
RBC # BLD AUTO: 4.65 X10*6/UL (ref 4–5.2)
RBC # UR STRIP.AUTO: ABNORMAL MG/DL
RBC #/AREA URNS AUTO: NORMAL /HPF
SODIUM SERPL-SCNC: 141 MMOL/L (ref 136–145)
SP GR UR STRIP.AUTO: 1.01
SQUAMOUS #/AREA URNS AUTO: NORMAL /HPF
UROBILINOGEN UR STRIP.AUTO-MCNC: NORMAL MG/DL
WBC # BLD AUTO: 12.6 X10*3/UL (ref 4.4–11.3)
WBC #/AREA URNS AUTO: NORMAL /HPF

## 2025-04-09 PROCEDURE — 87040 BLOOD CULTURE FOR BACTERIA: CPT | Mod: ELYLAB | Performed by: INTERNAL MEDICINE

## 2025-04-09 PROCEDURE — 1210000001 HC SEMI-PRIVATE ROOM DAILY

## 2025-04-09 PROCEDURE — 36415 COLL VENOUS BLD VENIPUNCTURE: CPT | Performed by: INTERNAL MEDICINE

## 2025-04-09 PROCEDURE — 81001 URINALYSIS AUTO W/SCOPE: CPT | Performed by: INTERNAL MEDICINE

## 2025-04-09 PROCEDURE — 2500000004 HC RX 250 GENERAL PHARMACY W/ HCPCS (ALT 636 FOR OP/ED): Performed by: INTERNAL MEDICINE

## 2025-04-09 PROCEDURE — 2500000002 HC RX 250 W HCPCS SELF ADMINISTERED DRUGS (ALT 637 FOR MEDICARE OP, ALT 636 FOR OP/ED): Performed by: INTERNAL MEDICINE

## 2025-04-09 PROCEDURE — 80053 COMPREHEN METABOLIC PANEL: CPT | Performed by: INTERNAL MEDICINE

## 2025-04-09 PROCEDURE — 97165 OT EVAL LOW COMPLEX 30 MIN: CPT | Mod: GO

## 2025-04-09 PROCEDURE — 2500000001 HC RX 250 WO HCPCS SELF ADMINISTERED DRUGS (ALT 637 FOR MEDICARE OP): Performed by: INTERNAL MEDICINE

## 2025-04-09 PROCEDURE — 82947 ASSAY GLUCOSE BLOOD QUANT: CPT

## 2025-04-09 PROCEDURE — 85025 COMPLETE CBC W/AUTO DIFF WBC: CPT | Performed by: INTERNAL MEDICINE

## 2025-04-09 PROCEDURE — 97161 PT EVAL LOW COMPLEX 20 MIN: CPT | Mod: GP | Performed by: PHYSICAL THERAPIST

## 2025-04-09 RX ORDER — SODIUM CHLORIDE 9 MG/ML
125 INJECTION, SOLUTION INTRAVENOUS CONTINUOUS
Status: DISCONTINUED | OUTPATIENT
Start: 2025-04-09 | End: 2025-04-09

## 2025-04-09 RX ORDER — TORSEMIDE 20 MG/1
20 TABLET ORAL EVERY OTHER DAY
Status: DISCONTINUED | OUTPATIENT
Start: 2025-04-10 | End: 2025-04-23 | Stop reason: HOSPADM

## 2025-04-09 RX ADMIN — TORSEMIDE 20 MG: 20 TABLET ORAL at 08:33

## 2025-04-09 RX ADMIN — HYDROCHLOROTHIAZIDE 25 MG: 25 TABLET ORAL at 08:33

## 2025-04-09 RX ADMIN — FERROUS SULFATE TAB 325 MG (65 MG ELEMENTAL FE) 325 MG: 325 (65 FE) TAB at 08:34

## 2025-04-09 RX ADMIN — AMLODIPINE BESYLATE 10 MG: 5 TABLET ORAL at 06:25

## 2025-04-09 RX ADMIN — INSULIN LISPRO 3 UNITS: 100 INJECTION, SOLUTION INTRAVENOUS; SUBCUTANEOUS at 12:58

## 2025-04-09 RX ADMIN — CEFTRIAXONE SODIUM 1 G: 1 INJECTION, SOLUTION INTRAVENOUS at 20:54

## 2025-04-09 RX ADMIN — LORAZEPAM 1 MG: 1 TABLET ORAL at 20:53

## 2025-04-09 RX ADMIN — SODIUM CHLORIDE 125 ML/HR: 0.9 INJECTION, SOLUTION INTRAVENOUS at 15:55

## 2025-04-09 RX ADMIN — LOSARTAN POTASSIUM 100 MG: 50 TABLET, FILM COATED ORAL at 08:34

## 2025-04-09 RX ADMIN — CLONIDINE HYDROCHLORIDE 0.3 MG: 0.1 TABLET ORAL at 08:33

## 2025-04-09 RX ADMIN — ASPIRIN 81 MG: 81 TABLET, CHEWABLE ORAL at 06:25

## 2025-04-09 RX ADMIN — Medication 125 MCG: at 08:34

## 2025-04-09 RX ADMIN — ENOXAPARIN SODIUM 30 MG: 40 INJECTION SUBCUTANEOUS at 15:57

## 2025-04-09 RX ADMIN — INSULIN HUMAN 20 UNITS: 100 INJECTION, SUSPENSION SUBCUTANEOUS at 08:37

## 2025-04-09 RX ADMIN — CLONIDINE HYDROCHLORIDE 0.3 MG: 0.1 TABLET ORAL at 20:54

## 2025-04-09 RX ADMIN — ALLOPURINOL 100 MG: 100 TABLET ORAL at 08:34

## 2025-04-09 RX ADMIN — MIRTAZAPINE 15 MG: 15 TABLET, FILM COATED ORAL at 20:53

## 2025-04-09 RX ADMIN — METOPROLOL TARTRATE 5 MG: 5 INJECTION INTRAVENOUS at 05:49

## 2025-04-09 RX ADMIN — ISOSORBIDE MONONITRATE 60 MG: 60 TABLET, EXTENDED RELEASE ORAL at 08:34

## 2025-04-09 RX ADMIN — ATORVASTATIN CALCIUM 40 MG: 20 TABLET, FILM COATED ORAL at 20:53

## 2025-04-09 RX ADMIN — INSULIN HUMAN 18 UNITS: 100 INJECTION, SUSPENSION SUBCUTANEOUS at 20:54

## 2025-04-09 RX ADMIN — METOPROLOL SUCCINATE 100 MG: 50 TABLET, EXTENDED RELEASE ORAL at 08:33

## 2025-04-09 RX ADMIN — CLOPIDOGREL BISULFATE 75 MG: 75 TABLET, FILM COATED ORAL at 08:34

## 2025-04-09 RX ADMIN — DOCUSATE SODIUM 100 MG: 100 CAPSULE, LIQUID FILLED ORAL at 08:33

## 2025-04-09 RX ADMIN — METOPROLOL SUCCINATE 100 MG: 50 TABLET, EXTENDED RELEASE ORAL at 20:53

## 2025-04-09 ASSESSMENT — PAIN - FUNCTIONAL ASSESSMENT
PAIN_FUNCTIONAL_ASSESSMENT: 0-10
PAIN_FUNCTIONAL_ASSESSMENT: 0-10

## 2025-04-09 ASSESSMENT — COGNITIVE AND FUNCTIONAL STATUS - GENERAL
MOVING TO AND FROM BED TO CHAIR: A LITTLE
HELP NEEDED FOR BATHING: A LITTLE
MOBILITY SCORE: 18
MOVING FROM LYING ON BACK TO SITTING ON SIDE OF FLAT BED WITH BEDRAILS: A LITTLE
DRESSING REGULAR LOWER BODY CLOTHING: A LITTLE
TURNING FROM BACK TO SIDE WHILE IN FLAT BAD: A LITTLE
WALKING IN HOSPITAL ROOM: A LITTLE
STANDING UP FROM CHAIR USING ARMS: A LITTLE
DAILY ACTIVITIY SCORE: 19
STANDING UP FROM CHAIR USING ARMS: A LITTLE
MOVING TO AND FROM BED TO CHAIR: A LITTLE
PERSONAL GROOMING: A LITTLE
CLIMB 3 TO 5 STEPS WITH RAILING: A LITTLE
WALKING IN HOSPITAL ROOM: A LITTLE
TOILETING: A LITTLE
DRESSING REGULAR UPPER BODY CLOTHING: A LITTLE
MOBILITY SCORE: 20
CLIMB 3 TO 5 STEPS WITH RAILING: A LITTLE

## 2025-04-09 ASSESSMENT — PAIN SCALES - GENERAL
PAINLEVEL_OUTOF10: 0 - NO PAIN

## 2025-04-09 ASSESSMENT — ACTIVITIES OF DAILY LIVING (ADL): BATHING_ASSISTANCE: MINIMAL

## 2025-04-09 NOTE — CARE PLAN
The patient's goals for the shift include rest and comfort    The clinical goals for the shift include safety and comfort    Over the shift, the patient did not make progress toward the following goals. Barriers to progression include . Recommendations to address these barriers include .

## 2025-04-09 NOTE — PROGRESS NOTES
"Lela Barba is a 89 y.o. female on day 2 of admission presenting with Generalized weakness.    Subjective   Patient is difficult to assess.  However she does not seem to be in any distress.  Review of systems was basically benign.       Objective   Patient presumed to have urinary tract infection however urine culture done at the emergency room showed mixed ca probably contaminated  Electrolytes and acid-base balance are normal with a creatinine of 0.92 normal kidney function.  Cholesterol is 128  White cell count is normal at 10.6 with a hemoglobin of 11.1 and platelet of 360  Blood pressures have been fluctuating widely depending on whether or not she takes her blood pressure medicines with a prior blood pressure of 133/60 heart rate of 64 pulse oximeter reading of 95%  Uric acid level is normal at 6.0  Blood sugars have been fluctuating, at 97, 246, 156  Repeat chest x-ray shows no active lung disease  Recent blood pressures have been markedly high    Physical Exam  Patient extremely excitable very difficult to understand but did not seem to be in distress, afebrile blood pressure is up  Patient is alert and seems to be partly oriented  Head examination benign there was no facial asymmetry  Neck veins are flat without bruits  Lungs are clear without wheezing crackles or rhonchi  Heart regular with a grade 2 out of 6 systolic ejection murmur  Abdomen soft and nontender good bowel sounds  Extremities without any edema with strong peripheral pulses  No focal neurological deficits      Last Recorded Vitals  Blood pressure 173/74, pulse 61, temperature 37.2 °C (99 °F), temperature source Temporal, resp. rate 17, height 1.626 m (5' 4\"), weight 64.9 kg (143 lb), SpO2 95%.  Intake/Output last 3 Shifts:  I/O last 3 completed shifts:  In: 750 (11.6 mL/kg) [P.O.:700; IV Piggyback:50]  Out: 3000 (46.3 mL/kg) [Urine:3000 (1.3 mL/kg/hr)]  Weight: 64.9 kg     Current Facility-Administered Medications:     " acetaminophen (Tylenol) tablet 650 mg, 650 mg, oral, q6h PRN, Giuseppe Francois MD, 650 mg at 04/07/25 1633    allopurinol (Zyloprim) tablet 100 mg, 100 mg, oral, Daily, Giuseppe Francois MD, 100 mg at 04/08/25 0835    [START ON 4/9/2025] amLODIPine (Norvasc) tablet 10 mg, 10 mg, oral, Daily, Giuseppe Francois MD    aspirin chewable tablet 81 mg, 81 mg, oral, Daily, Giuseppe Francois MD, 81 mg at 04/08/25 0609    atorvastatin (Lipitor) tablet 40 mg, 40 mg, oral, Daily, Giuseppe Francois MD, 40 mg at 04/08/25 2022    cefTRIAXone (Rocephin) 1 g in dextrose (iso) IV 50 mL, 1 g, intravenous, q24h, Giuseppe Francois MD, Stopped at 04/08/25 0033    cholecalciferol (Vitamin D-3) capsule 125 mcg, 5,000 Units, oral, Daily, Giuseppe Francois MD, 125 mcg at 04/08/25 0835    [START ON 4/9/2025] cloNIDine (Catapres) tablet 0.3 mg, 0.3 mg, oral, q12h YANG, Giuseppe Francois MD    clopidogrel (Plavix) tablet 75 mg, 75 mg, oral, Daily, Giuseppe Francois MD, 75 mg at 04/08/25 0835    dextrose 50 % injection 12.5 g, 12.5 g, intravenous, q15 min PRN, Giuseppe Francois MD    dextrose 50 % injection 25 g, 25 g, intravenous, q15 min PRN, Giuseppe Francois MD    docusate sodium (Colace) capsule 100 mg, 100 mg, oral, Daily, Giuseppe Francois MD, 100 mg at 04/08/25 0835    enoxaparin (Lovenox) syringe 30 mg, 30 mg, subcutaneous, q24h, Giuseppe Francois MD, 30 mg at 04/08/25 1729    ferrous sulfate tablet 325 mg, 65 mg of iron, oral, Daily with breakfast, Giuseppe Francois MD, 325 mg at 04/08/25 0835    glucagon (Glucagen) injection 1 mg, 1 mg, intramuscular, q15 min PRN, Giuseppe Francois MD    glucagon (Glucagen) injection 1 mg, 1 mg, intramuscular, q15 min PRN, Giuseppe Francois MD    hydroCHLOROthiazide (HYDRODiuril) tablet 25 mg, 25 mg, oral, Daily, Giuesppe Francois MD, 25 mg at 04/08/25 0835    insulin lispro injection 0-5 Units, 0-5 Units, subcutaneous, TID AC, Giuseppe Francois MD, 2 Units at 04/08/25 1157    insulin NPH and regular human (HumuLIN 70-30, NovoLIN 70-30)  100 unit/mL (70-30) injection 18 Units, 18 Units, subcutaneous, Nightly, Giuseppe Francois MD, 18 Units at 04/08/25 2047    insulin NPH and regular human (HumuLIN 70-30, NovoLIN 70-30) 100 unit/mL (70-30) injection 20 Units, 20 Units, subcutaneous, q AM, Giuseppe Francois MD, 20 Units at 04/08/25 0835    isosorbide mononitrate ER (Imdur) 24 hr tablet 60 mg, 60 mg, oral, Daily, Giuseppe Francois MD, 60 mg at 04/08/25 0835    LORazepam (Ativan) tablet 1 mg, 1 mg, oral, Nightly, Giuseppe Francois MD, 1 mg at 04/08/25 2022    losartan (Cozaar) tablet 100 mg, 100 mg, oral, Daily, Giuseppe Francois MD, 100 mg at 04/08/25 0835    menthol-zinc oxide (Calmoseptine - Risamine) 0.44-20.6 % ointment 1 Application, 1 Application, Topical, 4x daily PRN, Giuseppe Francois MD    metoprolol succinate XL (Toprol-XL) 24 hr tablet 100 mg, 100 mg, oral, BID, Giuseppe Francois MD, 100 mg at 04/08/25 2021    metoprolol tartrate (Lopressor) injection 5 mg, 5 mg, intravenous, q6h PRN, Giuseppe Francois MD, 5 mg at 04/07/25 1642    mirtazapine (Remeron) tablet 15 mg, 15 mg, oral, Nightly, Giuseppe Francois MD, 15 mg at 04/08/25 2021    nitroglycerin (Nitrostat) SL tablet 0.4 mg, 0.4 mg, sublingual, q5 min PRN, Giuseppe Francois MD    torsemide (Demadex) tablet 20 mg, 20 mg, oral, Daily, Giuseppe Francois MD, 20 mg at 04/08/25 0835   Relevant Results      Results for orders placed or performed during the hospital encounter of 04/06/25 (from the past 24 hours)   Comprehensive metabolic panel   Result Value Ref Range    Glucose 154 (H) 74 - 99 mg/dL    Sodium 140 136 - 145 mmol/L    Potassium 3.7 3.5 - 5.3 mmol/L    Chloride 102 98 - 107 mmol/L    Bicarbonate 28 21 - 32 mmol/L    Anion Gap 14 10 - 20 mmol/L    Urea Nitrogen 24 (H) 6 - 23 mg/dL    Creatinine 0.92 0.50 - 1.05 mg/dL    eGFR 60 (L) >60 mL/min/1.73m*2    Calcium 8.9 8.6 - 10.3 mg/dL    Albumin 3.6 3.4 - 5.0 g/dL    Alkaline Phosphatase 149 (H) 33 - 136 U/L    Total Protein 7.0 6.4 - 8.2 g/dL    AST 20 9  - 39 U/L    Bilirubin, Total 0.4 0.0 - 1.2 mg/dL    ALT 12 7 - 45 U/L   CBC and Auto Differential   Result Value Ref Range    WBC 10.6 4.4 - 11.3 x10*3/uL    nRBC 0.0 0.0 - 0.0 /100 WBCs    RBC 3.91 (L) 4.00 - 5.20 x10*6/uL    Hemoglobin 11.1 (L) 12.0 - 16.0 g/dL    Hematocrit 33.4 (L) 36.0 - 46.0 %    MCV 85 80 - 100 fL    MCH 28.4 26.0 - 34.0 pg    MCHC 33.2 32.0 - 36.0 g/dL    RDW 14.4 11.5 - 14.5 %    Platelets 300 150 - 450 x10*3/uL    Neutrophils % 67.4 40.0 - 80.0 %    Immature Granulocytes %, Automated 0.5 0.0 - 0.9 %    Lymphocytes % 18.3 13.0 - 44.0 %    Monocytes % 12.0 2.0 - 10.0 %    Eosinophils % 1.0 0.0 - 6.0 %    Basophils % 0.8 0.0 - 2.0 %    Neutrophils Absolute 7.14 (H) 1.60 - 5.50 x10*3/uL    Immature Granulocytes Absolute, Automated 0.05 0.00 - 0.50 x10*3/uL    Lymphocytes Absolute 1.94 0.80 - 3.00 x10*3/uL    Monocytes Absolute 1.27 (H) 0.05 - 0.80 x10*3/uL    Eosinophils Absolute 0.11 0.00 - 0.40 x10*3/uL    Basophils Absolute 0.08 0.00 - 0.10 x10*3/uL   POCT GLUCOSE   Result Value Ref Range    POCT Glucose 156 (H) 74 - 99 mg/dL   POCT GLUCOSE   Result Value Ref Range    POCT Glucose 246 (H) 74 - 99 mg/dL   POCT GLUCOSE   Result Value Ref Range    POCT Glucose 97 74 - 99 mg/dL   POCT GLUCOSE   Result Value Ref Range    POCT Glucose 299 (H) 74 - 99 mg/dL    XR chest 1 view    Result Date: 4/8/2025  Interpreted By:  Scooter Ribera, STUDY: XR CHEST 1 VIEW;  4/8/2025 10:01 am   INDICATION: Signs/Symptoms:pneumonia?.     COMPARISON: Portable chest, 6 April 2025   ACCESSION NUMBER(S): [id number]   ORDERING CLINICIAN: PATRICIA PIZARRO   TECHNIQUE: Single frontal view of the chest; Portable technique   FINDINGS: Enlarged cardiac silhouette is unchanged   The aorta is calcified at the arch and proximal descending segments, but normal in caliber, without any interval change or acute finding   Lungs clear. No consolidation/infiltrate or edema       NO ACUTE DISEASE IN THE CHEST   MACRO: None   Signed by:  Scooter Ribera 4/8/2025 10:54 AM Dictation workstation:   [id number]                Assessment/Plan   Assessment & Plan  Generalized weakness    Probable urinary tract infection despite lack of definitive bacteria on culture.  Urinalysis definitely with pyuria and positive nitrates.  Unfortunately collection was bad and she has mixed ca but she does have symptoms of chills and at least some thing that sounds like SIRS syndrome.  Patient will be kept on IV Rocephin, we will recheck her urine culture.  Chest x-ray shows no active lung disease I doubt if the patient has pneumonia.  Type 2 diabetes with fluctuating blood sugars insulin-dependent, on Humulin 70/30 and Humalog with coverage depending on blood sugars tomorrow we will adjust insulin dosing  Hyperuricemia on allopurinol 200 mg daily  Dyslipidemia on atorvastatin 40 mg daily  Coronary artery disease on Plavix 75 mg daily and isosorbide mononitrate ER 30 mg daily without any complaints of chest pains  Vitamin D3 deficiency on vitamin D3 5000 units daily  Insomnia on trazodone 50 mg daily  Chronic edema on Demadex 20 mg daily currently without any edema  Anxiety disorder on lorazepam 1 mg daily  Osteoporosis on Fosamax 70 mg weekly  Hypertension on lisinopril, clonidine, hydrochlorothiazide and nifedipine and as needed IV Lopressor.  Will increase clonidine to 0.3 mg twice daily       I spent 40 minutes in the professional and overall care of this patient.      Giuseppe Francois MD FACP

## 2025-04-09 NOTE — PROGRESS NOTES
04/09/25 1035   Discharge Planning   Living Arrangements Children  (Lives with daughter & HARPER)   Support Systems Children;Family members   Type of Residence Private residence   Number of Stairs to Enter Residence 1   Number of Stairs Within Residence   (lives in split level home/ bed & bathroom on 2nd floor/ No DME used , Bilat HR on stairs)   Who is requesting discharge planning? Provider   Expected Discharge Disposition Home   Does the patient need discharge transport arranged? No   Intensity of Service   Intensity of Service 0-30 min     Met with pt & her daughter at bedside. Pt lives with her daughter Deisi & Deisi's . Has cane but does not use. Able to ambulate independently to bathroom & up & down stairs using HR. Daughter does cooking , IADL's & provides transportation. Plan for discharge home , declines need for HHC.

## 2025-04-09 NOTE — PROGRESS NOTES
Physical Therapy    Physical Therapy Evaluation    Patient Name: Lela Barba  MRN: [id number]  Today's Date: 4/9/2025   Time Calculation  Start Time: 0924  Stop Time: 0938  Time Calculation (min): 14 min  1116/1116-A    Assessment/Plan   PT Assessment  PT Assessment Results: Decreased endurance, Impaired balance, Decreased mobility  Rehab Prognosis: Good  Barriers to Discharge Home: No anticipated barriers (Recommend continued 24/7 supervision/assist at home)  Evaluation/Treatment Tolerance: Patient tolerated treatment well  Medical Staff Made Aware: Yes  Strengths: Support of Caregivers, Living arrangement secure  Barriers to Participation: Comorbidities  End of Session Communication: Bedside nurse  Assessment Comment: Recommend continued therapy in acute care followed by continued therapy at a low intensity level following D/C.  End of Session Patient Position: Up in chair, Alarm on (Call light within reach)  IP OR SWING BED PT PLAN  Inpatient or Swing Bed: Inpatient  PT Plan  Treatment/Interventions: Bed mobility, Transfer training, Gait training, Balance training, Strengthening, Endurance training, Therapeutic exercise, Stair training  PT Plan: Ongoing PT  PT Frequency: 2 times per week  PT Discharge Recommendations: Low intensity level of continued care  PT Recommended Transfer Status: Contact guard  Physical Therapy eval completed per MD requisition. P.T. recommendations as outlined above. Recommend D/C from acute care when medically appropriate as deemed by medical staff.          General Visit Information:  General  Reason for Referral: impaired mobility  Referred By: Dr. Francois (PT/OT 4/8)  Past Medical History Relevant to Rehab: includes: HTN, HLD, CAD, CVA, OA, anxiety, CKD, DM, GERD, edema, meningioma, colon CA with colectomy, appy, osteoporosis  Family/Caregiver Present: Yes  Caregiver Feedback: Dtr present, suppportive and A with translation (Khmer)  Co-Treatment: OT  Co-Treatment Reason: Pt. seen  with OT to maximize safety and function  Prior to Session Communication: Bedside nurse  Patient Position Received: Bed, 3 rail up, Alarm on  Preferred Learning Style: auditory, verbal  General Comment: Pt. is an 88yo who presented to [facility] [place] on 4/6/2025 with c/o tremors, chills, body achrs all over, weakness, dizziness, headache x 1 day. In ED, BP = 160, HR = 107, UA (+).    CXR (4/8) (-) acute findings    Head CT (4/6) (-) acuye findings, (+) encephalomalacia and gliosis R frontal/parietal region, (+) chronic known meningioma    CXR (4/6) (+) hazy opacity L base    Dx: Weakness, pneumonia, cystitis    Home Living:  Home Living  Home Living Comments: Pt. lives with dtr and son-in-law in a split level house with 1 YUSRA with HR. Bed/bath on upper level with 5 steps with B HR. Tub shower with grab bar but no seat.    Prior Level of Function:  Prior Function Per Pt/Caregiver Report  Prior Function Comments: Pt. amb without AD PTA. Pt was I with ADLs and dtr completed IADLs PTA. Pt. denied falls in last 3 months. Pt. does not drive.    Precautions:  Precautions  Precautions Comment: Per EMR: High fall risk       Objective     Pain:  Pain Assessment  Pain Assessment: 0-10  0-10 (Numeric) Pain Score: 0 - No pain    Cognition:  Cognition  Overall Cognitive Status: Within Functional Limits    General Assessments:  General Observation  General Observation: Tele, Wilver   Activity Tolerance  Endurance: Decreased tolerance for upright activites                 Dynamic Sitting Balance  Dynamic Sitting-Comments: Good static and dynamic sitting balance  Dynamic Standing Balance  Dynamic Standing-Comments: Good static and Fair+ progressing to Good- dynamic standing balance    Functional Assessments:     Bed Mobility  Bed Mobility: Yes  Bed Mobility 1  Bed Mobility 1: Supine to sitting  Level of Assistance 1: Modified independent  Transfers  Transfer: Yes  Transfer 1  Technique 1: Sit to stand, Stand to sit  Transfer Device 1:  (No  AD)  Transfer Level of Assistance 1: Contact guard  Trials/Comments 1: CGA for safety  Ambulation/Gait Training  Ambulation/Gait Training Performed: Yes  Ambulation/Gait Training 1  Surface 1: Level tile  Device 1: No device  Assistance 1: Contact guard  Quality of Gait 1: Narrow base of support (slow, reciprocal gait with initally small steps progressing to larger, steadier steps the more she amb.)  Comments/Distance (ft) 1: 10'; 45'; CGA for safety  Stairs  Stairs: No       Extremity/Trunk Assessments:  RUE   RUE : Within Functional Limits  LUE   LUE: Within Functional Limits  RLE   RLE : Within Functional Limits  LLE   LLE : Within Functional Limits    Outcome Measures:     [facility] Basic Mobility  Turning from your back to your side while in a flat bed without using bedrails: None  Moving from lying on your back to sitting on the side of a flat bed without using bedrails: None  Moving to and from bed to chair (including a wheelchair): A little  Standing up from a chair using your arms (e.g. wheelchair or bedside chair): A little  To walk in hospital room: A little  Climbing 3-5 steps with railing: A little  Basic Mobility - Total Score: 20                                                             Goals:  Encounter Problems       Encounter Problems (Active)       PT Problem       Pt. will transfer sit/stand with MOD I (Progressing)       Start:  04/09/25    Expected End:  04/23/25            Pt.will ambulate 75' with MOD I (Progressing)       Start:  04/09/25    Expected End:  04/23/25            Pt. will amb up/down 5 steps with B HR with supervision (Not Progressing)       Start:  04/09/25    Expected End:  04/23/25            Pt. will perform 2 x 15 B LE AROM exercises        Start:  04/09/25    Expected End:  04/23/25                 Education Documentation  Mobility Training, taught by Lazaro Younger, PT at 4/9/2025 11:09 AM.  Learner: Family, Patient  Readiness: Acceptance  Method: Explanation  Response:  Verbalizes Understanding, Needs Reinforcement  Comment: Role of PT, transfers, amb, safety, PT POC

## 2025-04-09 NOTE — CARE PLAN
The patient's goals for the shift include rest and comfort    The clinical goals for the shift include safety and comfort      Problem: Pain - Adult  Goal: Verbalizes/displays adequate comfort level or baseline comfort level  Outcome: Progressing     Problem: Safety - Adult  Goal: Free from fall injury  Outcome: Progressing

## 2025-04-09 NOTE — PROGRESS NOTES
Occupational Therapy    Evaluation    Patient Name: Lela Barba  MRN: [id number]  Department: [facility]  Room: [address] [place] [address]-  Today's Date: 4/9/2025  Time Calculation  Start Time: 0921  Stop Time: 0938  Time Calculation (min): 17 min      Assessment:  OT Assessment: Pt. presents with impaired balance, endurance, and generalized strength. Would benefit from continued OT to address deficits to improve independence with ADLs and mobility  Prognosis: Good  Barriers to Discharge Home: No anticipated barriers  Evaluation/Treatment Tolerance: Patient tolerated treatment well  End of Session Communication: Bedside nurse  End of Session Patient Position: Up in chair, Alarm on (Call light within reach)  OT Assessment Results: Decreased ADL status, Decreased endurance, Decreased functional mobility, Decreased trunk control for functional activities  Prognosis: Good  Evaluation/Treatment Tolerance: Patient tolerated treatment well  Strengths: Support of Caregivers  Plan:  Treatment Interventions: ADL retraining, Functional transfer training, Endurance training  OT Frequency: 2 times per week  OT Discharge Recommendations: Low intensity level of continued care  OT - OK to Discharge: Yes (when medically stable/cleared)    Subjective   Current Problem:  1. Generalized weakness        2. Pneumonia of left lower lobe due to infectious organism        3. Acute cystitis without hematuria          General:  General  Reason for Referral: ADl impairment  Referred By: Priscila 4/8, OT/PT  Past Medical History Relevant to Rehab: HLD, HTN, Arthritis, CKD, DM, osteoporosis, colon cancer, CAD, CVA, GI bleed, macular degeneration. 1.5cm meningioma. GERD  Family/Caregiver Present: Yes  Caregiver Feedback: daughter present  Co-Treatment: PT  Co-Treatment Reason: to maximize pt. safety  Prior to Session Communication: Bedside nurse  Patient Position Received: Bed, 3 rail up, Alarm on  General Comment: Pt. is an 89 y/o female to ED with c/o tremors,  chills, body aches all over, weakness, dizziness, headache x 1 day. UA (+). CXR (4/8) (-) acute findings Head CT (4/6) (-) acute findings, (+) encephalomalacia and gliosis R frontal/parietal region, (+) chronic known meningioma CXR (4/6) (+) hazy opacity L base Dx: Weakness, pneumonia, cystitis  Precautions:  Medical Precautions: Fall precautions    Pain:  Pain Assessment  Pain Assessment: 0-10  0-10 (Numeric) Pain Score: 0 - No pain    Objective   Cognition:  Overall Cognitive Status: Within Functional Limits    Home Living:  Home Living Comments: Pt. lives with her daughter in split level house. Has 1 YUSRA with HR. Pt. has 5 steps up with B hand rails to bed/bath level. Has tub shower and grab bars.  Prior Function:  Prior Function Comments: Pt. does not use AD at baseline. owns cane. Independent with ADLs, daughter does IADLs. No falls. Does not drive.    ADL:  Eating Assistance: Independent  Grooming Assistance: Stand by  Bathing Assistance: Minimal  UE Dressing Assistance: Stand by  LE Dressing Assistance: Minimal  Toileting Assistance with Device: Stand by  Activity Tolerance:  Endurance: Decreased tolerance for upright activites  Activity Tolerance Comments: Pt. states she feels weak and wobbly after being in the bed for a few days  Bed Mobility/Transfers: Bed Mobility  Bed Mobility: Yes  Bed Mobility 1  Bed Mobility 1: Supine to sitting  Level of Assistance 1: Modified independent    Transfers  Transfer: Yes  Transfer 1  Technique 1: Sit to stand  Transfer Level of Assistance 1: Contact guard  Trials/Comments 1: No AD use. CGA for  steadying and safety . Pt. standing from bed level as well as raised toilet seat.    Functional Mobility:  Functional Mobility  Functional Mobility Performed:  (Hallway and room distances. CGA without AD for safety and steadying.)  Sitting Balance:  Static Sitting Balance  Static Sitting-Level of Assistance: Independent  Standing Balance:  Static Standing Balance  Static  Standing-Level of Assistance: Contact guard  Dynamic Standing Balance  Dynamic Standing-Comments: Fair +, CGA     Strength:  Strength Comments: BUEs 4/5 MMT    Coordination:  Coordination Comment: WNL   Hand Function:  Gross Grasp: Functional  Extremities: RUE   RUE : Within Functional Limits and LUE   LUE: Within Functional Limits    Outcome Measures:[facility] Daily Activity  Putting on and taking off regular lower body clothing: A little  Bathing (including washing, rinsing, drying): A little  Putting on and taking off regular upper body clothing: A little  Toileting, which includes using toilet, bedpan or urinal: A little  Taking care of personal grooming such as brushing teeth: A little  Eating Meals: None  Daily Activity - Total Score: 19    Education Documentation  Body Mechanics, taught by Yoli Mayo OT at 4/9/2025 11:38 AM.  Learner: Patient  Readiness: Acceptance  Method: Explanation  Response: Verbalizes Understanding, Needs Reinforcement    ADL Training, taught by Yoli Mayo OT at 4/9/2025 11:38 AM.  Learner: Patient  Readiness: Acceptance  Method: Explanation  Response: Verbalizes Understanding, Needs Reinforcement    IP EDUCATION:  Education  Individual(s) Educated: Patient (Daughter)  Education Provided: Risk and benefits of OT discussed with patient or other, POC discussed and agreed upon    Goals:  Encounter Problems       Encounter Problems (Active)       OT Goals       Independent with all functional transfers  (Progressing)       Start:  04/09/25    Expected End:  04/23/25            Good dyn standing balance during ADLs and functional activities  (Progressing)       Start:  04/09/25    Expected End:  04/23/25            Independent for LB dressing  (Progressing)       Start:  04/09/25    Expected End:  04/23/25            Independent for toileting tasks and clothing mgmt  (Progressing)       Start:  04/09/25    Expected End:  04/23/25            Pt. will tolerate 10  minutes ADLs/therapeutic activity without rest break.  (Progressing)       Start:  04/09/25    Expected End:  04/23/25

## 2025-04-10 LAB
BASOPHILS # BLD AUTO: 0.08 X10*3/UL (ref 0–0.1)
BASOPHILS NFR BLD AUTO: 0.6 %
CRP SERPL-MCNC: 10.22 MG/DL
EOSINOPHIL # BLD AUTO: 0.3 X10*3/UL (ref 0–0.4)
EOSINOPHIL NFR BLD AUTO: 2.2 %
ERYTHROCYTE [DISTWIDTH] IN BLOOD BY AUTOMATED COUNT: 14.1 % (ref 11.5–14.5)
ERYTHROCYTE [SEDIMENTATION RATE] IN BLOOD BY WESTERGREN METHOD: 99 MM/H (ref 0–30)
GLUCOSE BLD MANUAL STRIP-MCNC: 127 MG/DL (ref 74–99)
GLUCOSE BLD MANUAL STRIP-MCNC: 142 MG/DL (ref 74–99)
GLUCOSE BLD MANUAL STRIP-MCNC: 281 MG/DL (ref 74–99)
GLUCOSE BLD MANUAL STRIP-MCNC: 305 MG/DL (ref 74–99)
HCT VFR BLD AUTO: 30.7 % (ref 36–46)
HGB BLD-MCNC: 10.2 G/DL (ref 12–16)
HOLD SPECIMEN: NORMAL
IMM GRANULOCYTES # BLD AUTO: 0.07 X10*3/UL (ref 0–0.5)
IMM GRANULOCYTES NFR BLD AUTO: 0.5 % (ref 0–0.9)
LYMPHOCYTES # BLD AUTO: 2.45 X10*3/UL (ref 0.8–3)
LYMPHOCYTES NFR BLD AUTO: 18.3 %
MCH RBC QN AUTO: 27.9 PG (ref 26–34)
MCHC RBC AUTO-ENTMCNC: 33.2 G/DL (ref 32–36)
MCV RBC AUTO: 84 FL (ref 80–100)
MONOCYTES # BLD AUTO: 2.28 X10*3/UL (ref 0.05–0.8)
MONOCYTES NFR BLD AUTO: 17 %
NEUTROPHILS # BLD AUTO: 8.2 X10*3/UL (ref 1.6–5.5)
NEUTROPHILS NFR BLD AUTO: 61.4 %
NRBC BLD-RTO: 0 /100 WBCS (ref 0–0)
PLATELET # BLD AUTO: 257 X10*3/UL (ref 150–450)
RBC # BLD AUTO: 3.66 X10*6/UL (ref 4–5.2)
WBC # BLD AUTO: 13.4 X10*3/UL (ref 4.4–11.3)

## 2025-04-10 PROCEDURE — 36415 COLL VENOUS BLD VENIPUNCTURE: CPT | Performed by: INTERNAL MEDICINE

## 2025-04-10 PROCEDURE — 97116 GAIT TRAINING THERAPY: CPT | Mod: GP,CQ

## 2025-04-10 PROCEDURE — 2500000002 HC RX 250 W HCPCS SELF ADMINISTERED DRUGS (ALT 637 FOR MEDICARE OP, ALT 636 FOR OP/ED): Performed by: INTERNAL MEDICINE

## 2025-04-10 PROCEDURE — 85652 RBC SED RATE AUTOMATED: CPT | Performed by: INTERNAL MEDICINE

## 2025-04-10 PROCEDURE — 86140 C-REACTIVE PROTEIN: CPT | Performed by: INTERNAL MEDICINE

## 2025-04-10 PROCEDURE — 84145 PROCALCITONIN (PCT): CPT | Mod: ELYLAB | Performed by: INTERNAL MEDICINE

## 2025-04-10 PROCEDURE — 2500000004 HC RX 250 GENERAL PHARMACY W/ HCPCS (ALT 636 FOR OP/ED): Performed by: INTERNAL MEDICINE

## 2025-04-10 PROCEDURE — 2500000001 HC RX 250 WO HCPCS SELF ADMINISTERED DRUGS (ALT 637 FOR MEDICARE OP): Performed by: INTERNAL MEDICINE

## 2025-04-10 PROCEDURE — 82947 ASSAY GLUCOSE BLOOD QUANT: CPT

## 2025-04-10 PROCEDURE — 1210000001 HC SEMI-PRIVATE ROOM DAILY

## 2025-04-10 PROCEDURE — 85025 COMPLETE CBC W/AUTO DIFF WBC: CPT | Performed by: INTERNAL MEDICINE

## 2025-04-10 RX ORDER — VANCOMYCIN HYDROCHLORIDE 1 G/20ML
INJECTION, POWDER, LYOPHILIZED, FOR SOLUTION INTRAVENOUS DAILY PRN
Status: DISCONTINUED | OUTPATIENT
Start: 2025-04-10 | End: 2025-04-12

## 2025-04-10 RX ORDER — CLONIDINE HYDROCHLORIDE 0.1 MG/1
0.1 TABLET ORAL EVERY 12 HOURS SCHEDULED
Status: DISCONTINUED | OUTPATIENT
Start: 2025-04-10 | End: 2025-04-14

## 2025-04-10 RX ORDER — VANCOMYCIN HYDROCHLORIDE 1 G/200ML
1000 INJECTION, SOLUTION INTRAVENOUS EVERY 24 HOURS
Status: DISCONTINUED | OUTPATIENT
Start: 2025-04-11 | End: 2025-04-12

## 2025-04-10 RX ADMIN — ALLOPURINOL 100 MG: 100 TABLET ORAL at 09:20

## 2025-04-10 RX ADMIN — TORSEMIDE 20 MG: 20 TABLET ORAL at 09:20

## 2025-04-10 RX ADMIN — MIRTAZAPINE 15 MG: 15 TABLET, FILM COATED ORAL at 20:00

## 2025-04-10 RX ADMIN — ISOSORBIDE MONONITRATE 60 MG: 60 TABLET, EXTENDED RELEASE ORAL at 09:20

## 2025-04-10 RX ADMIN — ENOXAPARIN SODIUM 30 MG: 40 INJECTION SUBCUTANEOUS at 17:41

## 2025-04-10 RX ADMIN — FERROUS SULFATE TAB 325 MG (65 MG ELEMENTAL FE) 325 MG: 325 (65 FE) TAB at 09:20

## 2025-04-10 RX ADMIN — DOCUSATE SODIUM 100 MG: 100 CAPSULE, LIQUID FILLED ORAL at 09:20

## 2025-04-10 RX ADMIN — METOPROLOL SUCCINATE 100 MG: 50 TABLET, EXTENDED RELEASE ORAL at 20:00

## 2025-04-10 RX ADMIN — CEFTRIAXONE SODIUM 1 G: 1 INJECTION, SOLUTION INTRAVENOUS at 20:04

## 2025-04-10 RX ADMIN — LOSARTAN POTASSIUM 100 MG: 50 TABLET, FILM COATED ORAL at 09:20

## 2025-04-10 RX ADMIN — ATORVASTATIN CALCIUM 40 MG: 20 TABLET, FILM COATED ORAL at 20:04

## 2025-04-10 RX ADMIN — INSULIN LISPRO 4 UNITS: 100 INJECTION, SOLUTION INTRAVENOUS; SUBCUTANEOUS at 12:26

## 2025-04-10 RX ADMIN — CLONIDINE HYDROCHLORIDE 0.1 MG: 0.1 TABLET ORAL at 20:00

## 2025-04-10 RX ADMIN — INSULIN HUMAN 18 UNITS: 100 INJECTION, SUSPENSION SUBCUTANEOUS at 21:23

## 2025-04-10 RX ADMIN — INSULIN HUMAN 20 UNITS: 100 INJECTION, SUSPENSION SUBCUTANEOUS at 09:25

## 2025-04-10 RX ADMIN — LORAZEPAM 1 MG: 1 TABLET ORAL at 20:00

## 2025-04-10 RX ADMIN — CLOPIDOGREL BISULFATE 75 MG: 75 TABLET, FILM COATED ORAL at 09:20

## 2025-04-10 RX ADMIN — Medication 125 MCG: at 09:20

## 2025-04-10 RX ADMIN — METOPROLOL SUCCINATE 100 MG: 50 TABLET, EXTENDED RELEASE ORAL at 09:20

## 2025-04-10 RX ADMIN — AMLODIPINE BESYLATE 10 MG: 5 TABLET ORAL at 06:37

## 2025-04-10 RX ADMIN — ASPIRIN 81 MG: 81 TABLET, CHEWABLE ORAL at 06:37

## 2025-04-10 RX ADMIN — CLONIDINE HYDROCHLORIDE 0.3 MG: 0.1 TABLET ORAL at 09:20

## 2025-04-10 RX ADMIN — ACETAMINOPHEN 650 MG: 325 TABLET ORAL at 06:37

## 2025-04-10 RX ADMIN — ACETAMINOPHEN 650 MG: 325 TABLET ORAL at 19:57

## 2025-04-10 RX ADMIN — HYDROCHLOROTHIAZIDE 25 MG: 25 TABLET ORAL at 09:20

## 2025-04-10 ASSESSMENT — COGNITIVE AND FUNCTIONAL STATUS - GENERAL
MOVING TO AND FROM BED TO CHAIR: A LITTLE
CLIMB 3 TO 5 STEPS WITH RAILING: A LITTLE
DAILY ACTIVITIY SCORE: 19
DRESSING REGULAR UPPER BODY CLOTHING: A LITTLE
TOILETING: A LITTLE
MOBILITY SCORE: 20
HELP NEEDED FOR BATHING: A LITTLE
CLIMB 3 TO 5 STEPS WITH RAILING: A LITTLE
DRESSING REGULAR LOWER BODY CLOTHING: A LITTLE
PERSONAL GROOMING: A LITTLE
STANDING UP FROM CHAIR USING ARMS: A LOT
WALKING IN HOSPITAL ROOM: A LOT
TOILETING: A LITTLE
TURNING FROM BACK TO SIDE WHILE IN FLAT BAD: A LITTLE
MOVING FROM LYING ON BACK TO SITTING ON SIDE OF FLAT BED WITH BEDRAILS: A LITTLE
DRESSING REGULAR LOWER BODY CLOTHING: A LITTLE
PERSONAL GROOMING: A LITTLE
STANDING UP FROM CHAIR USING ARMS: A LITTLE
HELP NEEDED FOR BATHING: A LITTLE
DAILY ACTIVITIY SCORE: 18
STANDING UP FROM CHAIR USING ARMS: A LITTLE
DRESSING REGULAR UPPER BODY CLOTHING: A LITTLE
MOVING TO AND FROM BED TO CHAIR: A LITTLE
WALKING IN HOSPITAL ROOM: A LITTLE
CLIMB 3 TO 5 STEPS WITH RAILING: A LITTLE
MOBILITY SCORE: 17
MOBILITY SCORE: 20
WALKING IN HOSPITAL ROOM: A LITTLE
EATING MEALS: A LITTLE

## 2025-04-10 ASSESSMENT — PAIN SCALES - GENERAL
PAINLEVEL_OUTOF10: 0 - NO PAIN

## 2025-04-10 ASSESSMENT — PAIN - FUNCTIONAL ASSESSMENT
PAIN_FUNCTIONAL_ASSESSMENT: 0-10
PAIN_FUNCTIONAL_ASSESSMENT: 0-10

## 2025-04-10 NOTE — PROGRESS NOTES
"Physical Therapy    Physical Therapy Treatment    Patient Name: Lela Barba  MRN: [id number]  Today's Date: 4/10/2025  Time Calculation  Start Time: 1032  Stop Time: 1048  Time Calculation (min): 16 min     1116/1116-A    Assessment/Plan   Barriers to Discharge Home: Physical needs    Assessment Comment:  (Patient required more assist this date than on initial PT evaluation.  Decreased standing balance, increased fatigue and lower ampac score.  Patient does report increased fatigue and dizziness this date which may account for the need for increased assist this date. Patient would benefit from continued PT)    End of Session Patient Position:  (Refused to sit up in chair. Returned to bed and closed her eyes immediately. Call light in reach. Daughter at bedside. All four rails returned to up position)    PT Plan  Inpatient/Swing Bed or Outpatient: Inpatient  Treatment/Interventions: Bed mobility, Transfer training, Gait training, Balance training, Strengthening, Endurance training, Therapeutic exercise, Stair training  PT Plan: Ongoing PT  PT Frequency: 2 times per week  PT Discharge Recommendations: Low intensity level of continued care    PT Recommended Transfer Status: min/mod     General Visit Information:   PT  Visit  PT Received On: 04/10/25    General  Family/Caregiver Present:  (Patient's daughter present and very supportive of care. Assisted with translation)  Caregiver Feedback:  (Patient's daughter is requesting a ww be issued to patient for home use)  Prior to Session Communication:  (Cleared by RN for PT)  Patient Position Received:  (Patient presents sleeping in bed with all four rails up.  Slow to rouse, lethargic, falling back to sleep.  Required much encouragement from daughter for participation)    General Comment:  (Dx: Weakness, pneumonia, cystitis)    General Observations:   General Observation:  (purwick; alarm   Belarusian speaking    Subjective \"I'm tired\"    Precautions:  Precautions  Medical " "Precautions: Fall precautions    Pain:  Pain Assessment  Pain Assessment: 0-10  0-10 (Numeric) Pain Score: 0 - No pain  Pain Interventions:  (No intervention required)    Cognition:  Cognition  Overall Cognitive Status:  (lethargic initially; patient speaking mostly in Guamanian, appeared to be yelling.  Her daughter states the patient is \"stubborn\")  Orientation Level: Unable to assess  Safety/Judgement: Exceptions to WFL  Insight: Severe  Impulsive: Moderately      Balance:   Dynamic Standing Balance  Dynamic Standing-Balance:  (Fair-/Poor+ without use of ww; Fair dynamic stand with ww)      Activity Tolerance:  Activity Tolerance  Endurance:  (poor tolerance to activity this date. Limited by fatigue)       Bed Mobility  Bed Mobility:  (supine->sit: SBAx1 HOB raised. Increased time and effort needed. Patient reported dizziness.   sit->supine: SBAx1  Patient crawled back into bed head first, rolling onto her side and curling herself into fetal position. She was able to reach for bed rail and pull herself up higher in bed.     Ambulation/Gait Training  Ambulation/Gait Training Performed:  (Patient initally amb 40' without AD and mod x1.  Patient very unsteady with multiple lateral and anterior LOB. Patient c/o feeling dizzy.  Switched to ww, she amb an additional 60' with min x1. Cues to remain inside walkers VANDANA. \"Prieto\" ww off to the side when approaching the bed. Patient stopped in bathroom prior to returning to bed. Daughter went in and assisted her    Transfers  Transfer:  (sit->stand: Patient's daughter assisted her into stand. She reports the patient normally doesn't need any help.)    Stairs  Stairs:  (Patient ascended/descended 3 steps (wooden practice steps) using bilat HR's and minx1. Step over step, difficulty clearing toes when lifting foot. Patient becoming increasingly unsteady, letting go of one rail and losing balance anteriorly.)          Outcome Measures:   [facility] Basic Mobility  Turning from your back " to your side while in a flat bed without using bedrails: A little  Moving from lying on your back to sitting on the side of a flat bed without using bedrails: A little  Moving to and from bed to chair (including a wheelchair): A little  Standing up from a chair using your arms (e.g. wheelchair or bedside chair): A little  To walk in hospital room: A lot  Climbing 3-5 steps with railing: A little  Basic Mobility - Total Score: 17          Education Documentation  Mobility Training, taught by Mariza Light PTA at 4/10/2025 10:59 AM.  Learner: Patient  Readiness: Acceptance  Method: Explanation, Demonstration  Response: Needs Reinforcement         Encounter Problems       Encounter Problems (Active)       PT Problem       Pt. will transfer sit/stand with MOD I (Progressing)       Start:  04/09/25    Expected End:  04/23/25            Pt.will ambulate 75' with MOD I (Progressing)       Start:  04/09/25    Expected End:  04/23/25            Pt. will amb up/down 5 steps with B HR with supervision (Progressing)       Start:  04/09/25    Expected End:  04/23/25            Pt. will perform 2 x 15 B LE AROM exercises  (Not Progressing)       Start:  04/09/25    Expected End:  04/23/25

## 2025-04-10 NOTE — PROGRESS NOTES
"Lela Barba is a 89 y.o. female on day 3 of admission presenting with Generalized weakness.    Subjective   The patient seems to be quite comfortable.  Ambulates with a walker with help.  Discussed with daughter earlier and would like to  have the patient home soon as possible because reportedly she is going to [place] on Tuesday next week.   to take care of of patient.  Review of systems was essentially benign but patient is a very unreliable source of information.  Blood pressures were low earlier, white cell count is also elevated but patient is afebrile.  Still on Rocephin.  Obtain the blood cultures to make sure were not dealing with an infection.  Last urinalysis was normal however and no signs of UTI       Objective   Blood pressures were low and went back up to reasonable levels after IV fluids most likely dehydrated.  Afebrile.  Liver function tests are normal  Blood sugars controlled at 148, 299, 97 and 246 fluctuating widely  Electrolytes and acid-base balance are normal with creatinine at 1.08 which is at baseline  White blood cell count is elevated 12.6 hemoglobin normal at 13.4 and platelet count of 305  Urinalysis normal without pyuria negative for nitrates and does not look infected.  Repeat chest x-ray shows no active lung disease    Physical Exam  Patient is alert seemingly oriented and does not seem to be in any distress, ambulating with help using a walker.  Patient is very excitable and difficult to assess  Head examination normal pupils equal sclera nonicteric no facial asymmetry  Neck veins are flat without bruits  Lungs are clear without crackles wheezing or rhonchi  Abdomen soft and nontender good bowel sounds  Extremities without edema with strong peripheral pulses  No focal neurological deficits    Last Recorded Vitals  Blood pressure 156/76, pulse 69, temperature 36.9 °C (98.4 °F), temperature source Temporal, resp. rate 18, height 1.626 m (5' 4\"), weight 64.9 kg (143 lb), " SpO2 93%.  Intake/Output last 3 Shifts:  I/O last 3 completed shifts:  In: 260.4 (4 mL/kg) [I.V.:260.4 (4 mL/kg)]  Out: 800 (12.3 mL/kg) [Urine:800 (0.3 mL/kg/hr)]  Weight: 64.9 kg     Current Facility-Administered Medications:     acetaminophen (Tylenol) tablet 650 mg, 650 mg, oral, q6h PRN, Giuseppe Francois MD, 650 mg at 04/07/25 1633    allopurinol (Zyloprim) tablet 100 mg, 100 mg, oral, Daily, Giuseppe Francois MD, 100 mg at 04/09/25 0834    amLODIPine (Norvasc) tablet 10 mg, 10 mg, oral, Daily, Giuseppe Francois MD, 10 mg at 04/09/25 0625    aspirin chewable tablet 81 mg, 81 mg, oral, Daily, Giuseppe Francois MD, 81 mg at 04/09/25 0625    atorvastatin (Lipitor) tablet 40 mg, 40 mg, oral, Daily, Giuseppe Francois MD, 40 mg at 04/09/25 2053    cefTRIAXone (Rocephin) 1 g in dextrose (iso) IV 50 mL, 1 g, intravenous, q24h, Giuseppe Francois MD, Stopped at 04/09/25 2152    cholecalciferol (Vitamin D-3) capsule 125 mcg, 5,000 Units, oral, Daily, Giuseppe Francois MD, 125 mcg at 04/09/25 0834    cloNIDine (Catapres) tablet 0.3 mg, 0.3 mg, oral, q12h YANG, Giuseppe Francois MD, 0.3 mg at 04/09/25 2054    clopidogrel (Plavix) tablet 75 mg, 75 mg, oral, Daily, Giuseppe Francois MD, 75 mg at 04/09/25 0834    dextrose 50 % injection 12.5 g, 12.5 g, intravenous, q15 min PRN, Giuseppe Francois MD    dextrose 50 % injection 25 g, 25 g, intravenous, q15 min PRN, Giuseppe Francois MD    docusate sodium (Colace) capsule 100 mg, 100 mg, oral, Daily, Giuseppe Francois MD, 100 mg at 04/09/25 0833    enoxaparin (Lovenox) syringe 30 mg, 30 mg, subcutaneous, q24h, Giuseppe Francois MD, 30 mg at 04/09/25 1557    ferrous sulfate tablet 325 mg, 65 mg of iron, oral, Daily with breakfast, Giuseppe Francois MD, 325 mg at 04/09/25 0834    glucagon (Glucagen) injection 1 mg, 1 mg, intramuscular, q15 min PRN, Giuseppe Francois MD    glucagon (Glucagen) injection 1 mg, 1 mg, intramuscular, q15 min PRN, Giuseppe Francois MD    hydroCHLOROthiazide (HYDRODiuril) tablet 25 mg, 25  mg, oral, Daily, Giuseppe Francois MD, 25 mg at 04/09/25 0833    insulin lispro injection 0-5 Units, 0-5 Units, subcutaneous, TID AC, Giuseppe Francois MD, 3 Units at 04/09/25 1258    insulin NPH and regular human (HumuLIN 70-30, NovoLIN 70-30) 100 unit/mL (70-30) injection 18 Units, 18 Units, subcutaneous, Nightly, Giuseppe rFancois MD, 18 Units at 04/09/25 2054    insulin NPH and regular human (HumuLIN 70-30, NovoLIN 70-30) 100 unit/mL (70-30) injection 20 Units, 20 Units, subcutaneous, q AM, Giuseppe Francois MD, 20 Units at 04/09/25 0837    isosorbide mononitrate ER (Imdur) 24 hr tablet 60 mg, 60 mg, oral, Daily, Giuseppe Francois MD, 60 mg at 04/09/25 0834    LORazepam (Ativan) tablet 1 mg, 1 mg, oral, Nightly, Giuseppe Francois MD, 1 mg at 04/09/25 2053    losartan (Cozaar) tablet 100 mg, 100 mg, oral, Daily, Giuseppe Francois MD, 100 mg at 04/09/25 0834    menthol-zinc oxide (Calmoseptine - Risamine) 0.44-20.6 % ointment 1 Application, 1 Application, Topical, 4x daily PRN, Giuseppe Francois MD    metoprolol succinate XL (Toprol-XL) 24 hr tablet 100 mg, 100 mg, oral, BID, Giuseppe Francois MD, 100 mg at 04/09/25 2053    metoprolol tartrate (Lopressor) injection 5 mg, 5 mg, intravenous, q6h PRN, Giuseppe Francois MD, 5 mg at 04/09/25 0549    mirtazapine (Remeron) tablet 15 mg, 15 mg, oral, Nightly, Giuseppe Francois MD, 15 mg at 04/09/25 2053    nitroglycerin (Nitrostat) SL tablet 0.4 mg, 0.4 mg, sublingual, q5 min PRN, Giuseppe Francois MD    [START ON 4/10/2025] torsemide (Demadex) tablet 20 mg, 20 mg, oral, Every other day, Giuseppe Francois MD     Relevant Results      Results for orders placed or performed during the hospital encounter of 04/06/25 (from the past 24 hours)   Comprehensive metabolic panel   Result Value Ref Range    Glucose 98 74 - 99 mg/dL    Sodium 141 136 - 145 mmol/L    Potassium 3.5 3.5 - 5.3 mmol/L    Chloride 102 98 - 107 mmol/L    Bicarbonate 24 21 - 32 mmol/L    Anion Gap 19 10 - 20 mmol/L    Urea Nitrogen  27 (H) 6 - 23 mg/dL    Creatinine 1.08 (H) 0.50 - 1.05 mg/dL    eGFR 49 (L) >60 mL/min/1.73m*2    Calcium 8.8 8.6 - 10.3 mg/dL    Albumin 3.7 3.4 - 5.0 g/dL    Alkaline Phosphatase 147 (H) 33 - 136 U/L    Total Protein 7.4 6.4 - 8.2 g/dL    AST 34 9 - 39 U/L    Bilirubin, Total 0.3 0.0 - 1.2 mg/dL    ALT 13 7 - 45 U/L   CBC and Auto Differential   Result Value Ref Range    WBC 12.6 (H) 4.4 - 11.3 x10*3/uL    nRBC 0.0 0.0 - 0.0 /100 WBCs    RBC 4.65 4.00 - 5.20 x10*6/uL    Hemoglobin 13.4 12.0 - 16.0 g/dL    Hematocrit 40.3 36.0 - 46.0 %    MCV 87 80 - 100 fL    MCH 28.8 26.0 - 34.0 pg    MCHC 33.3 32.0 - 36.0 g/dL    RDW 14.5 11.5 - 14.5 %    Platelets 305 150 - 450 x10*3/uL    Neutrophils % 77.9 40.0 - 80.0 %    Immature Granulocytes %, Automated 0.6 0.0 - 0.9 %    Lymphocytes % 11.7 13.0 - 44.0 %    Monocytes % 8.9 2.0 - 10.0 %    Eosinophils % 0.3 0.0 - 6.0 %    Basophils % 0.6 0.0 - 2.0 %    Neutrophils Absolute 9.84 (H) 1.60 - 5.50 x10*3/uL    Immature Granulocytes Absolute, Automated 0.08 0.00 - 0.50 x10*3/uL    Lymphocytes Absolute 1.48 0.80 - 3.00 x10*3/uL    Monocytes Absolute 1.12 (H) 0.05 - 0.80 x10*3/uL    Eosinophils Absolute 0.04 0.00 - 0.40 x10*3/uL    Basophils Absolute 0.07 0.00 - 0.10 x10*3/uL   Urinalysis with Reflex Culture and Microscopic   Result Value Ref Range    Color, Urine Light-Yellow Light-Yellow, Yellow, Dark-Yellow    Appearance, Urine Clear Clear    Specific Gravity, Urine 1.010 1.005 - 1.035    pH, Urine 6.0 5.0, 5.5, 6.0, 6.5, 7.0, 7.5, 8.0    Protein, Urine 200 (2+) (A) NEGATIVE, 10 (TRACE), 20 (TRACE) mg/dL    Glucose, Urine Normal Normal mg/dL    Blood, Urine 0.03 (TRACE) (A) NEGATIVE mg/dL    Ketones, Urine NEGATIVE NEGATIVE mg/dL    Bilirubin, Urine NEGATIVE NEGATIVE mg/dL    Urobilinogen, Urine Normal Normal mg/dL    Nitrite, Urine NEGATIVE NEGATIVE    Leukocyte Esterase, Urine NEGATIVE NEGATIVE   Extra Urine Gray Tube   Result Value Ref Range    Extra Tube Hold for add-ons.     Urinalysis Microscopic   Result Value Ref Range    WBC, Urine 1-5 1-5, NONE /HPF    RBC, Urine 3-5 NONE, 1-2, 3-5 /HPF    Squamous Epithelial Cells, Urine 1-9 (SPARSE) Reference range not established. /HPF   POCT GLUCOSE   Result Value Ref Range    POCT Glucose 148 (H) 74 - 99 mg/dL   POCT GLUCOSE   Result Value Ref Range    POCT Glucose 293 (H) 74 - 99 mg/dL   Blood Culture    Specimen: Peripheral Venipuncture; Blood culture   Result Value Ref Range    Blood Culture Loaded on Instrument - Culture in progress    POCT GLUCOSE   Result Value Ref Range    POCT Glucose 125 (H) 74 - 99 mg/dL   POCT GLUCOSE   Result Value Ref Range    POCT Glucose 258 (H) 74 - 99 mg/dL   XR chest 1 view    Result Date: 4/8/2025  Interpreted By:  Scooter Ribera, STUDY: XR CHEST 1 VIEW;  4/8/2025 10:01 am   INDICATION: Signs/Symptoms:pneumonia?.     COMPARISON: Portable chest, 6 April 2025   ACCESSION NUMBER(S): [id number]   ORDERING CLINICIAN: PATRICIA PIZARRO   TECHNIQUE: Single frontal view of the chest; Portable technique   FINDINGS: Enlarged cardiac silhouette is unchanged   The aorta is calcified at the arch and proximal descending segments, but normal in caliber, without any interval change or acute finding   Lungs clear. No consolidation/infiltrate or edema       NO ACUTE DISEASE IN THE CHEST   MACRO: None   Signed by: Scooter Ribera 4/8/2025 10:54 AM Dictation workstation:   [id number]                Assessment/Plan   Assessment & Plan  Generalized weakness    Probable urinary tract infection, repeat urinalysis shows no pyuria and negative for nitrates.  Culture sent from the emergency room shows contamination and was not done.  Patient currently afebrile and denies any dysuria.  White cell count however is elevated at 12.6, we will get blood cultures specially since blood pressure was low earlier today.  Will continue Rocephin for now  Type 2 diabetes with fluctuating blood sugars, insulin-dependent on Humulin 70/30 and Humalog on a  sliding scale with coverage  Hyperuricemia on allopurinol 200 mg daily  Dyslipidemia on atorvastatin 40 mg daily  Coronary artery disease on Plavix, isosorbide mononitrate ER without any complaints of chest pains or palpitation  Vitamin D3 deficiency on vitamin D3 5000 units daily  Insomnia on trazodone 50 mg daily working well  Chronic edema on Demadex 20 mg daily currently without edema and possibly may be dehydrated will decrease to every other day  Anxiety disorder lorazepam 1 mg daily  Osteoporosis on Flomax 70 mg weekly  Hypertension on lisinopril, clonidine, hydrochlorothiazide and nifedipine with as needed IV Lopressor with widely fluctuating blood pressures.  Blood pressure today was low and was resolved with a bolus of normal saline.  Demadex will be decreased to every other day  Discussed with daughter.  Daughter is more anxious to get her home as soon as possible because she.  Reportedly is leaving for [place] on Tuesday and her  will take care of her.  Discussed home health care and nursing home placement, she wants her home.  We offered home health care and are skilled nursing rehab which was refused.       I spent 40 minutes in the professional and overall care of this patient.      Giuseppe Francois MD FACP

## 2025-04-10 NOTE — CARE PLAN
The patient's goals for the shift include rest and comfort    The clinical goals for the shift include Safety    Over the shift, the patient did not make progress toward the following goals. Barriers to progression include . Recommendations to address these barriers include   Problem: Safety - Adult  Goal: Free from fall injury  Outcome: Progressing     Problem: Discharge Planning  Goal: Discharge to home or other facility with appropriate resources  Outcome: Progressing     Problem: Chronic Conditions and Co-morbidities  Goal: Patient's chronic conditions and co-morbidity symptoms are monitored and maintained or improved  Outcome: Progressing     Problem: Nutrition  Goal: Nutrient intake appropriate for maintaining nutritional needs  Outcome: Progressing     Problem: Fall/Injury  Goal: Not fall by end of shift  Outcome: Progressing  Goal: Be free from injury by end of the shift  Outcome: Progressing  Goal: Verbalize understanding of personal risk factors for fall in the hospital  Outcome: Progressing  Goal: Verbalize understanding of risk factor reduction measures to prevent injury from fall in the home  Outcome: Progressing  Goal: Use assistive devices by end of the shift  Outcome: Progressing  Goal: Pace activities to prevent fatigue by end of the shift  Outcome: Progressing   .

## 2025-04-11 ENCOUNTER — APPOINTMENT (OUTPATIENT)
Dept: CARDIOLOGY | Facility: HOSPITAL | Age: OVER 89
DRG: 193 | End: 2025-04-11
Payer: MEDICARE

## 2025-04-11 ENCOUNTER — APPOINTMENT (OUTPATIENT)
Dept: RADIOLOGY | Facility: HOSPITAL | Age: OVER 89
DRG: 194 | End: 2025-04-11
Payer: MEDICARE

## 2025-04-11 LAB
AORTIC VALVE MEAN GRADIENT: 9 MMHG
AORTIC VALVE PEAK VELOCITY: 2.36 M/S
AV PEAK GRADIENT: 22 MMHG
EJECTION FRACTION: 63 %
GLUCOSE BLD MANUAL STRIP-MCNC: 254 MG/DL (ref 74–99)
GLUCOSE BLD MANUAL STRIP-MCNC: 394 MG/DL (ref 74–99)
GLUCOSE BLD MANUAL STRIP-MCNC: 482 MG/DL (ref 74–99)
GLUCOSE BLD MANUAL STRIP-MCNC: 75 MG/DL (ref 74–99)
HOLD SPECIMEN: NORMAL
PROCALCITONIN SERPL-MCNC: 0.7 NG/ML
RIGHT VENTRICLE PEAK SYSTOLIC PRESSURE: 22.9 MMHG
VANCOMYCIN SERPL-MCNC: 5.5 UG/ML (ref 5–20)
VANCOMYCIN SERPL-MCNC: 7.7 UG/ML (ref 5–20)

## 2025-04-11 PROCEDURE — 93320 DOPPLER ECHO COMPLETE: CPT | Performed by: INTERNAL MEDICINE

## 2025-04-11 PROCEDURE — 74176 CT ABD & PELVIS W/O CONTRAST: CPT

## 2025-04-11 PROCEDURE — 93319 3D ECHO IMG CGEN CAR ANOMAL: CPT

## 2025-04-11 PROCEDURE — 87040 BLOOD CULTURE FOR BACTERIA: CPT | Mod: ELYLAB | Performed by: INTERNAL MEDICINE

## 2025-04-11 PROCEDURE — 82947 ASSAY GLUCOSE BLOOD QUANT: CPT

## 2025-04-11 PROCEDURE — 36415 COLL VENOUS BLD VENIPUNCTURE: CPT

## 2025-04-11 PROCEDURE — 87086 URINE CULTURE/COLONY COUNT: CPT | Mod: ELYLAB | Performed by: INTERNAL MEDICINE

## 2025-04-11 PROCEDURE — 99152 MOD SED SAME PHYS/QHP 5/>YRS: CPT | Performed by: INTERNAL MEDICINE

## 2025-04-11 PROCEDURE — 80202 ASSAY OF VANCOMYCIN: CPT

## 2025-04-11 PROCEDURE — 93312 ECHO TRANSESOPHAGEAL: CPT | Performed by: INTERNAL MEDICINE

## 2025-04-11 PROCEDURE — 7100000002 HC RECOVERY ROOM TIME - EACH INCREMENTAL 1 MINUTE

## 2025-04-11 PROCEDURE — 99222 1ST HOSP IP/OBS MODERATE 55: CPT | Performed by: INTERNAL MEDICINE

## 2025-04-11 PROCEDURE — 7100000009 HC PHASE TWO TIME - INITIAL BASE CHARGE

## 2025-04-11 PROCEDURE — 74176 CT ABD & PELVIS W/O CONTRAST: CPT | Performed by: RADIOLOGY

## 2025-04-11 PROCEDURE — 2500000004 HC RX 250 GENERAL PHARMACY W/ HCPCS (ALT 636 FOR OP/ED)

## 2025-04-11 PROCEDURE — 2500000002 HC RX 250 W HCPCS SELF ADMINISTERED DRUGS (ALT 637 FOR MEDICARE OP, ALT 636 FOR OP/ED): Performed by: INTERNAL MEDICINE

## 2025-04-11 PROCEDURE — 93325 DOPPLER ECHO COLOR FLOW MAPG: CPT | Performed by: INTERNAL MEDICINE

## 2025-04-11 PROCEDURE — 2500000001 HC RX 250 WO HCPCS SELF ADMINISTERED DRUGS (ALT 637 FOR MEDICARE OP): Performed by: INTERNAL MEDICINE

## 2025-04-11 PROCEDURE — 7100000010 HC PHASE TWO TIME - EACH INCREMENTAL 1 MINUTE

## 2025-04-11 PROCEDURE — 1210000001 HC SEMI-PRIVATE ROOM DAILY

## 2025-04-11 PROCEDURE — 7100000001 HC RECOVERY ROOM TIME - INITIAL BASE CHARGE

## 2025-04-11 PROCEDURE — 97535 SELF CARE MNGMENT TRAINING: CPT | Mod: GO,CO

## 2025-04-11 PROCEDURE — 99152 MOD SED SAME PHYS/QHP 5/>YRS: CPT

## 2025-04-11 PROCEDURE — 2500000004 HC RX 250 GENERAL PHARMACY W/ HCPCS (ALT 636 FOR OP/ED): Performed by: INTERNAL MEDICINE

## 2025-04-11 RX ORDER — MIDAZOLAM HYDROCHLORIDE 1 MG/ML
INJECTION, SOLUTION INTRAMUSCULAR; INTRAVENOUS AS NEEDED
Status: DISCONTINUED | OUTPATIENT
Start: 2025-04-11 | End: 2025-04-11 | Stop reason: HOSPADM

## 2025-04-11 RX ORDER — FENTANYL CITRATE 50 UG/ML
INJECTION, SOLUTION INTRAMUSCULAR; INTRAVENOUS AS NEEDED
Status: DISCONTINUED | OUTPATIENT
Start: 2025-04-11 | End: 2025-04-11 | Stop reason: HOSPADM

## 2025-04-11 RX ADMIN — METOPROLOL SUCCINATE 100 MG: 50 TABLET, EXTENDED RELEASE ORAL at 20:55

## 2025-04-11 RX ADMIN — ISOSORBIDE MONONITRATE 60 MG: 60 TABLET, EXTENDED RELEASE ORAL at 08:24

## 2025-04-11 RX ADMIN — FENTANYL CITRATE 25 MCG: 50 INJECTION, SOLUTION INTRAMUSCULAR; INTRAVENOUS at 10:20

## 2025-04-11 RX ADMIN — INSULIN HUMAN 18 UNITS: 100 INJECTION, SUSPENSION SUBCUTANEOUS at 21:08

## 2025-04-11 RX ADMIN — METOPROLOL TARTRATE 5 MG: 5 INJECTION INTRAVENOUS at 01:40

## 2025-04-11 RX ADMIN — MIRTAZAPINE 15 MG: 15 TABLET, FILM COATED ORAL at 20:55

## 2025-04-11 RX ADMIN — CLONIDINE HYDROCHLORIDE 0.1 MG: 0.1 TABLET ORAL at 08:24

## 2025-04-11 RX ADMIN — HYDROCHLOROTHIAZIDE 25 MG: 25 TABLET ORAL at 08:24

## 2025-04-11 RX ADMIN — METOPROLOL SUCCINATE 100 MG: 50 TABLET, EXTENDED RELEASE ORAL at 08:24

## 2025-04-11 RX ADMIN — Medication 125 MCG: at 08:24

## 2025-04-11 RX ADMIN — MIDAZOLAM 2 MG: 1 INJECTION INTRAMUSCULAR; INTRAVENOUS at 10:19

## 2025-04-11 RX ADMIN — INSULIN LISPRO 5 UNITS: 100 INJECTION, SOLUTION INTRAVENOUS; SUBCUTANEOUS at 17:02

## 2025-04-11 RX ADMIN — ASPIRIN 81 MG: 81 TABLET, CHEWABLE ORAL at 06:16

## 2025-04-11 RX ADMIN — ALLOPURINOL 100 MG: 100 TABLET ORAL at 08:24

## 2025-04-11 RX ADMIN — DOCUSATE SODIUM 100 MG: 100 CAPSULE, LIQUID FILLED ORAL at 08:24

## 2025-04-11 RX ADMIN — AMLODIPINE BESYLATE 10 MG: 5 TABLET ORAL at 06:16

## 2025-04-11 RX ADMIN — ACETAMINOPHEN 650 MG: 325 TABLET ORAL at 08:24

## 2025-04-11 RX ADMIN — INSULIN LISPRO 3 UNITS: 100 INJECTION, SOLUTION INTRAVENOUS; SUBCUTANEOUS at 11:22

## 2025-04-11 RX ADMIN — ATORVASTATIN CALCIUM 40 MG: 20 TABLET, FILM COATED ORAL at 20:55

## 2025-04-11 RX ADMIN — VANCOMYCIN HYDROCHLORIDE 1000 MG: 1 INJECTION, SOLUTION INTRAVENOUS at 01:21

## 2025-04-11 RX ADMIN — CLOPIDOGREL BISULFATE 75 MG: 75 TABLET, FILM COATED ORAL at 08:24

## 2025-04-11 RX ADMIN — LOSARTAN POTASSIUM 100 MG: 50 TABLET, FILM COATED ORAL at 08:24

## 2025-04-11 RX ADMIN — CLONIDINE HYDROCHLORIDE 0.1 MG: 0.1 TABLET ORAL at 20:55

## 2025-04-11 RX ADMIN — LORAZEPAM 1 MG: 1 TABLET ORAL at 20:55

## 2025-04-11 RX ADMIN — ENOXAPARIN SODIUM 30 MG: 40 INJECTION SUBCUTANEOUS at 17:02

## 2025-04-11 ASSESSMENT — COGNITIVE AND FUNCTIONAL STATUS - GENERAL
TOILETING: A LOT
PERSONAL GROOMING: A LITTLE
DAILY ACTIVITIY SCORE: 16
DRESSING REGULAR UPPER BODY CLOTHING: A LITTLE
DAILY ACTIVITIY SCORE: 19
CLIMB 3 TO 5 STEPS WITH RAILING: A LITTLE
MOBILITY SCORE: 20
PERSONAL GROOMING: A LITTLE
DRESSING REGULAR UPPER BODY CLOTHING: A LITTLE
MOVING TO AND FROM BED TO CHAIR: A LITTLE
TOILETING: A LITTLE
HELP NEEDED FOR BATHING: A LITTLE
STANDING UP FROM CHAIR USING ARMS: A LITTLE
DRESSING REGULAR LOWER BODY CLOTHING: A LOT
HELP NEEDED FOR BATHING: A LOT
DRESSING REGULAR LOWER BODY CLOTHING: A LITTLE
WALKING IN HOSPITAL ROOM: A LITTLE

## 2025-04-11 ASSESSMENT — ACTIVITIES OF DAILY LIVING (ADL): HOME_MANAGEMENT_TIME_ENTRY: 30

## 2025-04-11 ASSESSMENT — PAIN SCALES - GENERAL
PAINLEVEL_OUTOF10: 0 - NO PAIN

## 2025-04-11 ASSESSMENT — PAIN - FUNCTIONAL ASSESSMENT
PAIN_FUNCTIONAL_ASSESSMENT: 0-10

## 2025-04-11 NOTE — POST-PROCEDURE NOTE
Physician Transition of Care Summary  Invasive Cardiovascular Lab    Procedure Date: 4/11/2025  Attending: Yamil Farmer MD  Resident/Fellow/Other Assistant: None    Indications:   Elevated sed rate and CRP slight elevated white count possibility of endocarditis    Post-procedure diagnosis:   Normal LV/RV systolic function  No clear vegetations  Significant aortic valve Lambl's excrescence  Trivial AI/MR  Normal chamber dimensions    Procedure(s):   Transesophageal echo      Procedure Findings:   No pericardial effusion  LVEF 60% , normal RV function  Normal tricuspid/pulmonic/mitral valves  Normal RVSP  Tricuspid aortic valve with trivial-1+ aortic insufficiency  There is evidence of Lambl's excrescence on the aortic valve leaflet  Endocarditis cannot be completely excluded if multiple positive blood cultures.  The appearance is more consistent with Lambl's excrescence however.      Description of the Procedure:   Patient in fasting state continuous oximetry recorded.  Intermittent blood pressures recorded.  Cetacaine topical spray and viscous Xylocaine utilized anesthetize the posterior pharynx.  Transesophageal probe easily advanced into posterior pharynx without difficulty.  Multiple views of myocardium obtained.  Color and spectral Doppler analysis performed.  Agitated saline bubble contrast was performed.  Probe was withdrawn as aorta is visualized.  Patient Toller procedure well there are no complications    Complications:   None      Anesthesia: Moderate 2 mg Versed 25 mcg fentanyl       Disposition:   To return to medical floor  Medical care per medicine service.    11:26 AM I did speak with the patient's daughter Mrs. Luong the only abnormality is Lambl's excresense but cannot completely exclude vegetation though I think less likely.  Further medical decisions will be per the medical service and Dr. Francois.    Electronically signed by: Yamil Farmer MD, 4/11/2025 10:43 AM

## 2025-04-11 NOTE — NURSING NOTE
Pt arrived via bed with cath lab staff post ARELI. A&O, responding appropriately denying any discomfort. Slightly difficult to understand pt d/t no dentures and accent. Pt in no distress and call light in easy reach.

## 2025-04-11 NOTE — PROGRESS NOTES
Occupational Therapy    OT Treatment    Patient Name: Lela Barba  MRN: [id number]  Department: [facility]  Room: [address] [place] [address]  Today's Date: 4/11/2025  Time Calculation  Start Time: 1405  Stop Time: 1435  Time Calculation (min): 30 min      Assessment:  OT Assessment:  (pt presents with decrease safety awareness, impaired Ind w/ Adls, pt would benefits from con't OT to address deficits.)  End of Session Communication: Bedside nurse  End of Session Patient Position: Bed, 3 rail up, Alarm on (call light and all needs within reach)     Plan:  Treatment Interventions: ADL retraining, Functional transfer training, Endurance training  OT Frequency: 2 times per week  OT Discharge Recommendations: Low intensity level of continued care  OT - OK to Discharge: Yes (when medically stable/cleared)  Treatment Interventions: ADL retraining, Functional transfer training, Endurance training    Subjective   Previous Visit Info:  OT Last Visit  OT Received On: 04/11/25  General:  General  Prior to Session Communication: Bedside nurse  Patient Position Received: Bed, 3 rail up, Alarm on (daughter in room)  Preferred Learning Style: auditory, verbal  General Comment:  (pt pleasantly agreeable to therapy. Daughter initermittenly translate.)  Precautions:  Medical Precautions: Fall precautions    Vital Signs Comment:  (SpO2 88% RA after activity, SpO2 93% 2L NC and PL breathing. Daughter state she does not uses O2 NC at home)     Pain:  Pain Assessment  Pain Assessment: 0-10  0-10 (Numeric) Pain Score: 0 - No pain    Objective    Cognition:  Cognition  Safety/Judgement: Exceptions to WFL  Other (Comment):  (pt leaves walker behind or sets it to the side during Adl task and transfers.)  Impulsive: Moderately    Activities of Daily Living:    Toileting  Toileting Level of Assistance: Maximum assistance  Where Assessed: Toilet  Toileting Comments:  (Max A for posterior hygiene in stance w/ B UE support to fww.)    Bed Mobility/Transfers:  Bed Mobility 1  Bed Mobility 1: Supine to sitting  Level of Assistance 1: Minimum assistance, Minimal verbal cues  Bed Mobility Comments 1:  (Min A for trunk control, vc for sequencing/ safety.)  Bed Mobility 2  Bed Mobility  2: Sitting to supine  Level of Assistance 2: Moderate verbal cues, Maximum assistance  Bed Mobility Comments 2:  (Mod vc x2 for sequencing/safety, daughter translate therapy vc, pt attempts to crawl on bed, educate pt on safety, cues to redirect in safe bed mobility,pt verbalize/ demo understanding. Max A x 2 to boost pt to HOB.)    Transfer 1  Technique 1: Sit to stand, Stand to sit  Transfer Device 1: Walker  Transfer Level of Assistance 1: Contact guard  Trials/Comments 1:  (pt slight impulsive sit>stand, pt denies any dizziness or light headedness, vc for hand placement/ safety. x3 trial at EOB, x1 trial at toilet.)    Functional Mobility:  Functional Mobility  Functional Mobility Performed:  (pt completed household distances w/ fww, CGA for safety. Pt impulsive, vc for safety and fww management. Daughter state pt does not use walker at home)    Standing Balance:  Dynamic Standing Balance  Dynamic Standing-Level of Assistance: Contact guard  Dynamic Standing-Comments:  (tolerated >5 min of standing, demo fair dyn standing balance during Adls, CGA for safety)    Outcome Measures:[facility] Daily Activity  Putting on and taking off regular lower body clothing: A lot  Bathing (including washing, rinsing, drying): A lot  Putting on and taking off regular upper body clothing: A little  Toileting, which includes using toilet, bedpan or urinal: A lot  Taking care of personal grooming such as brushing teeth: A little  Eating Meals: None  Daily Activity - Total Score: 16    Education Documentation  ADL Training, taught by KATERIN Taylor at 4/11/2025  3:51 PM.  Learner: Patient  Readiness: Acceptance  Method: Explanation, Demonstration, Teach-back  Response: Verbalizes Understanding, Needs  Reinforcement    EDUCATION:  Education  Individual(s) Educated: Patient  Education Provided: Fall precautons  Patient Response to Education: Patient/Caregiver Verbalized Understanding of Information  Education Comment:  (Discussed w/ pt on fall risk/prevention. Pt verbalized understanding, pt benefits from reinforcements.)    Goals:  Encounter Problems       Encounter Problems (Active)       OT Goals       Independent with all functional transfers  (Progressing)       Start:  04/09/25    Expected End:  04/23/25            Good dyn standing balance during ADLs and functional activities  (Progressing)       Start:  04/09/25    Expected End:  04/23/25            Independent for LB dressing  (Progressing)       Start:  04/09/25    Expected End:  04/23/25            Independent for toileting tasks and clothing mgmt  (Progressing)       Start:  04/09/25    Expected End:  04/23/25            Pt. will tolerate 10 minutes ADLs/therapeutic activity without rest break.  (Progressing)       Start:  04/09/25    Expected End:  04/23/25

## 2025-04-11 NOTE — CARE PLAN
Problem: Safety - Adult  Goal: Free from fall injury  Outcome: Progressing  Flowsheets (Taken 4/11/2025 1315)  Free from fall injury:   Instruct family/caregiver on patient safety   Based on caregiver fall risk screen, instruct family/caregiver to ask for assistance with transferring infant if caregiver noted to have fall risk factors     Problem: Discharge Planning  Goal: Discharge to home or other facility with appropriate resources  Outcome: Progressing  Flowsheets (Taken 4/11/2025 1315)  Discharge to home or other facility with appropriate resources:   Identify barriers to discharge with patient and caregiver   Identify discharge learning needs (meds, wound care, etc)   Refer to discharge planning if patient needs post-hospital services based on physician order or complex needs related to functional status, cognitive ability or social support system     Problem: Chronic Conditions and Co-morbidities  Goal: Patient's chronic conditions and co-morbidity symptoms are monitored and maintained or improved  Outcome: Progressing  Flowsheets (Taken 4/11/2025 1315)  Care Plan - Patient's Chronic Conditions and Co-Morbidity Symptoms are Monitored and Maintained or Improved:   Monitor and assess patient's chronic conditions and comorbid symptoms for stability, deterioration, or improvement   Collaborate with multidisciplinary team to address chronic and comorbid conditions and prevent exacerbation or deterioration   Update acute care plan with appropriate goals if chronic or comorbid symptoms are exacerbated and prevent overall improvement and discharge     Problem: Nutrition  Goal: Nutrient intake appropriate for maintaining nutritional needs  Outcome: Progressing     Problem: Fall/Injury  Goal: Not fall by end of shift  Outcome: Progressing  Goal: Be free from injury by end of the shift  Outcome: Progressing  Goal: Verbalize understanding of personal risk factors for fall in the hospital  Outcome: Progressing  Goal:  Verbalize understanding of risk factor reduction measures to prevent injury from fall in the home  Outcome: Progressing  Goal: Use assistive devices by end of the shift  Outcome: Progressing  Goal: Pace activities to prevent fatigue by end of the shift  Outcome: Progressing   The patient's goals for the shift include rest and comfort    The clinical goals for the shift include patient's temperature will be <38.0 C for shift

## 2025-04-11 NOTE — NURSING NOTE
"Pt had a slight reaction when gag reflex checked. Had difficulty explaining to pt open mouth, stick tongue out and say \"ahh\" even after demonstrating. No PO fluid given to pt at this time.  Report called to Viola on 11S with the gag reflex check discussed, she will check when pt returns before giving PO fluids. Pt alert, in no distress.  Monitor pattern remains SR with HR in the 60's with Spo2 96% on 2L.  "

## 2025-04-11 NOTE — PROGRESS NOTES
Vancomycin Dosing by Pharmacy- FOLLOW UP    Lela Barba is a 89 y.o. year old female who Pharmacy has been consulted for vancomycin dosing for other UTI . Based on the patient's indication and renal status this patient is being dosed based on a goal AUC of 400-600.     Renal function is currently stable.    Current vancomycin dose: 1000 mg given every 24 hours    Estimated vancomycin AUC on current dose: 536 mg/L.hr     Visit Vitals  BP (!) 128/48   Pulse 66   Temp 37 °C (98.6 °F)   Resp 14        Lab Results   Component Value Date    CREATININE 1.08 (H) 2025    CREATININE 0.92 2025    CREATININE 1.19 (H) 2025    CREATININE 0.79 2022    CREATININE 0.80 2022    CREATININE 0.85 2022    CREATININE 0.86 2022        Patient weight is as follows:   Vitals:    25 1807   Weight: 64.9 kg (143 lb)       Cultures:  No results found for the encounter in last 14 days.       I/O last 3 completed shifts:  In: 1137.5 (17.5 mL/kg) [P.O.:300; I.V.:537.5 (8.3 mL/kg); IV Piggyback:300]  Out: 2000 (30.8 mL/kg) [Urine:2000 (0.9 mL/kg/hr)]  Weight: 64.9 kg   I/O during current shift:  No intake/output data recorded.    Temp (24hrs), Av.1 °C (100.6 °F), Min:37 °C (98.6 °F), Max:39.6 °C (103.3 °F)      Assessment/Plan    Within goal AUC range. Continue current vancomycin regimen.    This dosing regimen is predicted by [employer] to result in the following pharmacokinetic parameters:  Regimen: 1000 mg IV every 24 hours.  Start time: 01:21 on 2025  Exposure target: AUC24 (range)400-600 mg/L.hr   [id number]: 341 mg/L.hr  AUC24,ss: 536 mg/L.hr  Probability of AUC24 > 400: 90 %  Ctrough,ss: 18.9 mg/L  Probability of Ctrough,ss > 20: 42 %    The next level will be obtained on  at 0500. May be obtained sooner if clinically indicated.   Will continue to monitor renal function daily while on vancomycin and order serum creatinine at least every 48 hours if not already ordered.  Follow  for continued vancomycin needs, clinical response, and signs/symptoms of toxicity.       Mary Lou Adam, PharmD

## 2025-04-11 NOTE — PROGRESS NOTES
Lela Barba is a 89 y.o. female on day 4 of admission presenting with Generalized weakness.    Subjective   Patient with episodes of hypotension associated with febrile episode.  White cell count is trending upwards currently up to 13,400 with normal hemoglobin of 12.2 and platelet of 257.  Sedimentation rate is elevated at 99 and CRP also elevated at 1022 procalcitonin is pending.  Patient thought to have UTI but repeat urinalysis were essentially benign and repeat chest x-ray shows no active lung disease.  Source of infection is uncertain.  Possibly patient may be septic.  He will change antibiotics to vancomycin and cefepime for now until seen by infectious disease consult.  Will also do a ARELI to make sure the patient is not dealing with SBE.  I will also do a CT scan of the abdomen and pelvis without IV contrast to make sure patient does not have pyelonephritis.  Patient otherwise has no complaints today and review of systems were essentially benign     Objective   Blood pressures have been fluctuating today with episodes of extremely low blood pressure although last blood pressure was good at 138/94 and prior to that was 118/56 heart rate of 47.  Catapres dose was decreased.  Blood sugars fluctuating at 305, 127 and 258  White cell count is elevated at 13.4 with hemoglobin of 12.2 and platelet of 251  Sedimentation rate is elevated at 99 and CRP elevated at 10.22    Physical Exam  Patient is hyperexcitable and very difficult to assess  She seems to be oriented and did not seem to be in distress ambulates with a walker with help  Head examination benign no facial asymmetry  Neck veins flat without bruits  Lungs are clear without wheezing crackles or rhonchi  Heart was regular  Abdomen soft and nontender good bowel sounds  Extremities without edema with strong peripheral pulses  No focal neurological deficits      Last Recorded Vitals  Blood pressure (!) 138/94, pulse 67, temperature (!) 39.6 °C (103.3  "°F), temperature source Temporal, resp. rate 20, height 1.626 m (5' 4\"), weight 64.9 kg (143 lb), SpO2 93%.  Intake/Output last 3 Shifts:  I/O last 3 completed shifts:  In: 847.9 (13.1 mL/kg) [I.V.:797.9 (12.3 mL/kg); IV Piggyback:50]  Out: 900 (13.9 mL/kg) [Urine:900 (0.4 mL/kg/hr)]  Weight: 64.9 kg     Current Facility-Administered Medications:     acetaminophen (Tylenol) tablet 650 mg, 650 mg, oral, q6h PRN, Giuseppe Francois MD, 650 mg at 04/10/25 1957    allopurinol (Zyloprim) tablet 100 mg, 100 mg, oral, Daily, Giuseppe Francois MD, 100 mg at 04/10/25 0920    amLODIPine (Norvasc) tablet 10 mg, 10 mg, oral, Daily, Giuseppe Francois MD, 10 mg at 04/10/25 0637    aspirin chewable tablet 81 mg, 81 mg, oral, Daily, Giuseppe Francois MD, 81 mg at 04/10/25 0637    atorvastatin (Lipitor) tablet 40 mg, 40 mg, oral, Daily, Giuseppe Francois MD, 40 mg at 04/10/25 2004    [START ON 4/12/2025] cefepime (Maxipime) 1 g in dextrose 5% IV 50 mL, 1 g, intravenous, q12h, Giuseppe Francois MD    cholecalciferol (Vitamin D-3) capsule 125 mcg, 5,000 Units, oral, Daily, Giuseppe Francois MD, 125 mcg at 04/10/25 0920    cloNIDine (Catapres) tablet 0.1 mg, 0.1 mg, oral, q12h YANG, Giuseppe Francois MD, 0.1 mg at 04/10/25 2000    clopidogrel (Plavix) tablet 75 mg, 75 mg, oral, Daily, Giuseppe Francois MD, 75 mg at 04/10/25 0920    dextrose 50 % injection 12.5 g, 12.5 g, intravenous, q15 min PRN, Giuseppe Francois MD    dextrose 50 % injection 25 g, 25 g, intravenous, q15 min PRN, Giuseppe Francois MD    docusate sodium (Colace) capsule 100 mg, 100 mg, oral, Daily, Giuseppe Francois MD, 100 mg at 04/10/25 0920    enoxaparin (Lovenox) syringe 30 mg, 30 mg, subcutaneous, q24h, Giuseppe Francois MD, 30 mg at 04/10/25 1741    ferrous sulfate tablet 325 mg, 65 mg of iron, oral, Daily with breakfast, Giuseppe Francois MD, 325 mg at 04/10/25 0920    glucagon (Glucagen) injection 1 mg, 1 mg, intramuscular, q15 min PRN, Giuseppe Francois MD    glucagon (Glucagen) injection 1 " mg, 1 mg, intramuscular, q15 min PRN, Giuseppe Francois MD    hydroCHLOROthiazide (HYDRODiuril) tablet 25 mg, 25 mg, oral, Daily, Giuseppe Francois MD, 25 mg at 04/10/25 0920    insulin lispro injection 0-5 Units, 0-5 Units, subcutaneous, TID AC, Giuseppe Francois MD, 4 Units at 04/10/25 1226    insulin NPH and regular human (HumuLIN 70-30, NovoLIN 70-30) 100 unit/mL (70-30) injection 18 Units, 18 Units, subcutaneous, Nightly, Giuseppe Francois MD, 18 Units at 04/10/25 2123    insulin NPH and regular human (HumuLIN 70-30, NovoLIN 70-30) 100 unit/mL (70-30) injection 20 Units, 20 Units, subcutaneous, q AM, Giuseppe Francois MD, 20 Units at 04/10/25 0925    isosorbide mononitrate ER (Imdur) 24 hr tablet 60 mg, 60 mg, oral, Daily, Giuseppe Francois MD, 60 mg at 04/10/25 0920    LORazepam (Ativan) tablet 1 mg, 1 mg, oral, Nightly, Giuseppe Francois MD, 1 mg at 04/10/25 2000    losartan (Cozaar) tablet 100 mg, 100 mg, oral, Daily, Giuseppe Francois MD, 100 mg at 04/10/25 0920    menthol-zinc oxide (Calmoseptine - Risamine) 0.44-20.6 % ointment 1 Application, 1 Application, Topical, 4x daily PRN, Giuseppe Francois MD    metoprolol succinate XL (Toprol-XL) 24 hr tablet 100 mg, 100 mg, oral, BID, Giuseppe Francois MD, 100 mg at 04/10/25 2000    metoprolol tartrate (Lopressor) injection 5 mg, 5 mg, intravenous, q6h PRN, Giuseppe Francois MD, 5 mg at 04/09/25 0549    mirtazapine (Remeron) tablet 15 mg, 15 mg, oral, Nightly, Giuseppe Francois MD, 15 mg at 04/10/25 2000    nitroglycerin (Nitrostat) SL tablet 0.4 mg, 0.4 mg, sublingual, q5 min PRN, Giuseppe Francois MD    torsemide (Demadex) tablet 20 mg, 20 mg, oral, Every other day, Giuseppe Francois MD, 20 mg at 04/10/25 0920    vancomycin (Vancocin) pharmacy to dose - pharmacy monitoring, , miscellaneous, Daily PRN, Giuseppe Francois MD   XR chest 1 view    Result Date: 4/8/2025  Interpreted By:  Scooter Ribera, STUDY: XR CHEST 1 VIEW;  4/8/2025 10:01 am   INDICATION: Signs/Symptoms:pneumonia?.      COMPARISON: Portable chest, 6 April 2025   ACCESSION NUMBER(S): [id number]   ORDERING CLINICIAN: PATRICIA PIZARRO   TECHNIQUE: Single frontal view of the chest; Portable technique   FINDINGS: Enlarged cardiac silhouette is unchanged   The aorta is calcified at the arch and proximal descending segments, but normal in caliber, without any interval change or acute finding   Lungs clear. No consolidation/infiltrate or edema       NO ACUTE DISEASE IN THE CHEST   MACRO: None   Signed by: Scooter Ribera 4/8/2025 10:54 AM Dictation workstation:   [id number]    ECG 12 lead    Result Date: 4/7/2025  Normal sinus rhythm Septal infarct , age undetermined Abnormal ECG When compared with ECG of 31-JUL-2023 10:58, No significant change was found See ED provider note for full interpretation and clinical correlation Confirmed by Dede Smalls ([id number]) on 4/7/2025 7:32:10 PM    CT head wo IV contrast    Result Date: 4/6/2025  Interpreted By:  Stevan Disla, STUDY: CT HEAD WO IV CONTRAST;  4/6/2025 8:07 pm   INDICATION: Signs/Symptoms:tremor, generalized weakness.     COMPARISON: CT head dated 07/10/2024.   ACCESSION NUMBER(S): [id number]   ORDERING CLINICIAN: KYLE BECKHAM   TECHNIQUE: Noncontrast axial CT scan of head was performed. Angled reformats in brain and bone windows were generated. The images were reviewed in bone, brain, blood and soft tissue windows.   FINDINGS: No hyperdense intracranial hemorrhage is identified. There is no new mass effect or midline shift present.   Geographic area of cystic encephalomalacia and gliosis is present in the right frontoparietal junction, likely representing sequela of prior infarct/injury, similar in appearance to prior exam. Low volume of attenuation changes in the periventricular and subcortical white matter of bilateral cerebral hemispheres likely represent sequela of microvascular disease. Focus of decreased attenuation in the right cerebellum likely represent sequela of  prior infarct/injury, also similar in appearance to prior study.   Gray-white differentiation is intact, without evidence of CT apparent transcortical infarct.   There is redemonstration of the mildly hyperdense extra-axial mass overlying the right frontoparietal junction likely representing a meningioma, similar to prior study. There is slight effacement of the adjacent brain parenchyma without significant low-density edema.   Mild-to-moderate brain parenchymal volume loss is present without abnormal ventricular dilatation. Basal cisterns are patent. No extra-axial fluid collection is identified.   Scalp soft tissues do not demonstrate any acute abnormality. No skull fracture or other acute calvarial abnormality is present.   Mastoid air cells and middle ear cavities are clear. Complete opacification of the left maxillary sinus, similar to prior study in July of 2024.       1.  No evidence of hemorrhage, CT apparent transcortical infarct, or other acute intracranial abnormality. 2. Mild volume of attenuation changes in the periventricular and subcortical white matter of bilateral cerebral hemispheres is favored to represent sequela of microvascular disease. Geographic area of cystic encephalomalacia and gliosis in the right frontoparietal junction likely represent sequela of prior infarct/injury. 3. Extra-axial mildly hyperdense mass overlies the medial posterior right frontal lobe with slight effacement of the adjacent brain parenchyma but without significant low-density edema, essentially unchanged in appearance to prior study, likely representing a meningioma. 4. Near-complete opacification of the partially visualized left maxillary sinus, similar to prior exam. Correlate with symptoms of chronic sinusitis.   MACRO: None   Signed by: Stevan Disla 4/6/2025 8:46 PM Dictation workstation:   [id number]    XR chest 1 view    Result Date: 4/6/2025  Interpreted By:  Elbert Patel, STUDY: XR CHEST 1 VIEW   4/6/2025 6:49 pm   INDICATION: Signs/Symptoms:Chest Pain   COMPARISON: 03/03/2022   ACCESSION NUMBER(S): [id number]   ORDERING CLINICIAN: KYLE BECKHAM   TECHNIQUE: A single AP portable radiograph of the chest was obtained.   FINDINGS: Mild hazy opacity is seen at the left lung base, and may represent atelectasis and/or pneumonia. No pneumothorax is identified. The cardiac silhouette is within normal limits for size.       Mild hazy opacity at the left lung base, as above. Clinical correlation and continued follow-up until clearing is recommended.   MACRO: None.   Signed by: Elbert Patel 4/6/2025 6:52 PM Dictation workstation:   [id number]      Relevant Results      Results for orders placed or performed during the hospital encounter of 04/06/25 (from the past 96 hours)   Lipid Panel Non-Fasting   Result Value Ref Range    Cholesterol 128 0 - 199 mg/dL    HDL-Cholesterol 45.1 mg/dL    Cholesterol/HDL Ratio 2.8     Non-HDL Cholesterol 83 0 - 149 mg/dL   Uric Acid   Result Value Ref Range    Uric Acid 6.0 2.3 - 6.7 mg/dL   Lavender Top   Result Value Ref Range    Extra Tube Hold for add-ons.    SST TOP   Result Value Ref Range    Extra Tube Hold for add-ons.    POCT GLUCOSE   Result Value Ref Range    POCT Glucose 336 (H) 74 - 99 mg/dL   POCT GLUCOSE   Result Value Ref Range    POCT Glucose 356 (H) 74 - 99 mg/dL   POCT GLUCOSE   Result Value Ref Range    POCT Glucose 407 (H) 74 - 99 mg/dL   POCT GLUCOSE   Result Value Ref Range    POCT Glucose 306 (H) 74 - 99 mg/dL   Comprehensive metabolic panel   Result Value Ref Range    Glucose 154 (H) 74 - 99 mg/dL    Sodium 140 136 - 145 mmol/L    Potassium 3.7 3.5 - 5.3 mmol/L    Chloride 102 98 - 107 mmol/L    Bicarbonate 28 21 - 32 mmol/L    Anion Gap 14 10 - 20 mmol/L    Urea Nitrogen 24 (H) 6 - 23 mg/dL    Creatinine 0.92 0.50 - 1.05 mg/dL    eGFR 60 (L) >60 mL/min/1.73m*2    Calcium 8.9 8.6 - 10.3 mg/dL    Albumin 3.6 3.4 - 5.0 g/dL    Alkaline Phosphatase 149 (H) 33  - 136 U/L    Total Protein 7.0 6.4 - 8.2 g/dL    AST 20 9 - 39 U/L    Bilirubin, Total 0.4 0.0 - 1.2 mg/dL    ALT 12 7 - 45 U/L   CBC and Auto Differential   Result Value Ref Range    WBC 10.6 4.4 - 11.3 x10*3/uL    nRBC 0.0 0.0 - 0.0 /100 WBCs    RBC 3.91 (L) 4.00 - 5.20 x10*6/uL    Hemoglobin 11.1 (L) 12.0 - 16.0 g/dL    Hematocrit 33.4 (L) 36.0 - 46.0 %    MCV 85 80 - 100 fL    MCH 28.4 26.0 - 34.0 pg    MCHC 33.2 32.0 - 36.0 g/dL    RDW 14.4 11.5 - 14.5 %    Platelets 300 150 - 450 x10*3/uL    Neutrophils % 67.4 40.0 - 80.0 %    Immature Granulocytes %, Automated 0.5 0.0 - 0.9 %    Lymphocytes % 18.3 13.0 - 44.0 %    Monocytes % 12.0 2.0 - 10.0 %    Eosinophils % 1.0 0.0 - 6.0 %    Basophils % 0.8 0.0 - 2.0 %    Neutrophils Absolute 7.14 (H) 1.60 - 5.50 x10*3/uL    Immature Granulocytes Absolute, Automated 0.05 0.00 - 0.50 x10*3/uL    Lymphocytes Absolute 1.94 0.80 - 3.00 x10*3/uL    Monocytes Absolute 1.27 (H) 0.05 - 0.80 x10*3/uL    Eosinophils Absolute 0.11 0.00 - 0.40 x10*3/uL    Basophils Absolute 0.08 0.00 - 0.10 x10*3/uL   POCT GLUCOSE   Result Value Ref Range    POCT Glucose 156 (H) 74 - 99 mg/dL   POCT GLUCOSE   Result Value Ref Range    POCT Glucose 246 (H) 74 - 99 mg/dL   POCT GLUCOSE   Result Value Ref Range    POCT Glucose 97 74 - 99 mg/dL   POCT GLUCOSE   Result Value Ref Range    POCT Glucose 299 (H) 74 - 99 mg/dL   Comprehensive metabolic panel   Result Value Ref Range    Glucose 98 74 - 99 mg/dL    Sodium 141 136 - 145 mmol/L    Potassium 3.5 3.5 - 5.3 mmol/L    Chloride 102 98 - 107 mmol/L    Bicarbonate 24 21 - 32 mmol/L    Anion Gap 19 10 - 20 mmol/L    Urea Nitrogen 27 (H) 6 - 23 mg/dL    Creatinine 1.08 (H) 0.50 - 1.05 mg/dL    eGFR 49 (L) >60 mL/min/1.73m*2    Calcium 8.8 8.6 - 10.3 mg/dL    Albumin 3.7 3.4 - 5.0 g/dL    Alkaline Phosphatase 147 (H) 33 - 136 U/L    Total Protein 7.4 6.4 - 8.2 g/dL    AST 34 9 - 39 U/L    Bilirubin, Total 0.3 0.0 - 1.2 mg/dL    ALT 13 7 - 45 U/L   CBC and  Auto Differential   Result Value Ref Range    WBC 12.6 (H) 4.4 - 11.3 x10*3/uL    nRBC 0.0 0.0 - 0.0 /100 WBCs    RBC 4.65 4.00 - 5.20 x10*6/uL    Hemoglobin 13.4 12.0 - 16.0 g/dL    Hematocrit 40.3 36.0 - 46.0 %    MCV 87 80 - 100 fL    MCH 28.8 26.0 - 34.0 pg    MCHC 33.3 32.0 - 36.0 g/dL    RDW 14.5 11.5 - 14.5 %    Platelets 305 150 - 450 x10*3/uL    Neutrophils % 77.9 40.0 - 80.0 %    Immature Granulocytes %, Automated 0.6 0.0 - 0.9 %    Lymphocytes % 11.7 13.0 - 44.0 %    Monocytes % 8.9 2.0 - 10.0 %    Eosinophils % 0.3 0.0 - 6.0 %    Basophils % 0.6 0.0 - 2.0 %    Neutrophils Absolute 9.84 (H) 1.60 - 5.50 x10*3/uL    Immature Granulocytes Absolute, Automated 0.08 0.00 - 0.50 x10*3/uL    Lymphocytes Absolute 1.48 0.80 - 3.00 x10*3/uL    Monocytes Absolute 1.12 (H) 0.05 - 0.80 x10*3/uL    Eosinophils Absolute 0.04 0.00 - 0.40 x10*3/uL    Basophils Absolute 0.07 0.00 - 0.10 x10*3/uL   Urinalysis with Reflex Culture and Microscopic   Result Value Ref Range    Color, Urine Light-Yellow Light-Yellow, Yellow, Dark-Yellow    Appearance, Urine Clear Clear    Specific Gravity, Urine 1.010 1.005 - 1.035    pH, Urine 6.0 5.0, 5.5, 6.0, 6.5, 7.0, 7.5, 8.0    Protein, Urine 200 (2+) (A) NEGATIVE, 10 (TRACE), 20 (TRACE) mg/dL    Glucose, Urine Normal Normal mg/dL    Blood, Urine 0.03 (TRACE) (A) NEGATIVE mg/dL    Ketones, Urine NEGATIVE NEGATIVE mg/dL    Bilirubin, Urine NEGATIVE NEGATIVE mg/dL    Urobilinogen, Urine Normal Normal mg/dL    Nitrite, Urine NEGATIVE NEGATIVE    Leukocyte Esterase, Urine NEGATIVE NEGATIVE   Extra Urine Gray Tube   Result Value Ref Range    Extra Tube Hold for add-ons.    Urinalysis Microscopic   Result Value Ref Range    WBC, Urine 1-5 1-5, NONE /HPF    RBC, Urine 3-5 NONE, 1-2, 3-5 /HPF    Squamous Epithelial Cells, Urine 1-9 (SPARSE) Reference range not established. /HPF   POCT GLUCOSE   Result Value Ref Range    POCT Glucose 148 (H) 74 - 99 mg/dL   POCT GLUCOSE   Result Value Ref Range     POCT Glucose 293 (H) 74 - 99 mg/dL   Blood Culture    Specimen: Peripheral Venipuncture; Blood culture   Result Value Ref Range    Blood Culture No growth at 1 day    POCT GLUCOSE   Result Value Ref Range    POCT Glucose 125 (H) 74 - 99 mg/dL   POCT GLUCOSE   Result Value Ref Range    POCT Glucose 258 (H) 74 - 99 mg/dL   CBC and Auto Differential   Result Value Ref Range    WBC 13.4 (H) 4.4 - 11.3 x10*3/uL    nRBC 0.0 0.0 - 0.0 /100 WBCs    RBC 3.66 (L) 4.00 - 5.20 x10*6/uL    Hemoglobin 10.2 (L) 12.0 - 16.0 g/dL    Hematocrit 30.7 (L) 36.0 - 46.0 %    MCV 84 80 - 100 fL    MCH 27.9 26.0 - 34.0 pg    MCHC 33.2 32.0 - 36.0 g/dL    RDW 14.1 11.5 - 14.5 %    Platelets 257 150 - 450 x10*3/uL    Neutrophils % 61.4 40.0 - 80.0 %    Immature Granulocytes %, Automated 0.5 0.0 - 0.9 %    Lymphocytes % 18.3 13.0 - 44.0 %    Monocytes % 17.0 2.0 - 10.0 %    Eosinophils % 2.2 0.0 - 6.0 %    Basophils % 0.6 0.0 - 2.0 %    Neutrophils Absolute 8.20 (H) 1.60 - 5.50 x10*3/uL    Immature Granulocytes Absolute, Automated 0.07 0.00 - 0.50 x10*3/uL    Lymphocytes Absolute 2.45 0.80 - 3.00 x10*3/uL    Monocytes Absolute 2.28 (H) 0.05 - 0.80 x10*3/uL    Eosinophils Absolute 0.30 0.00 - 0.40 x10*3/uL    Basophils Absolute 0.08 0.00 - 0.10 x10*3/uL   POCT GLUCOSE   Result Value Ref Range    POCT Glucose 127 (H) 74 - 99 mg/dL   POCT GLUCOSE   Result Value Ref Range    POCT Glucose 305 (H) 74 - 99 mg/dL   Light Blue Top   Result Value Ref Range    Extra Tube Hold for add-ons.    Sedimentation rate, automated   Result Value Ref Range    Sedimentation Rate 99 (H) 0 - 30 mm/h   C-reactive protein   Result Value Ref Range    C-Reactive Protein 10.22 (H) <1.00 mg/dL   POCT GLUCOSE   Result Value Ref Range    POCT Glucose 142 (H) 74 - 99 mg/dL   POCT GLUCOSE   Result Value Ref Range    POCT Glucose 281 (H) 74 - 99 mg/dL                  Assessment/Plan   Assessment & Plan  Generalized weakness    Leukocytosis with febrile episodes and elevated CRP  and sedimentation rate initially presumed to be UTI and was on Rocephin.  Febrile episodes persist no culture evidence of bacteria with empirical antibiotics.  Repeat blood cultures were done.  He will do a CT scan of the abdomen and pelvis to look for any signs of pyelonephritis.  Infectious disease consult will be done.  Last chest x-ray shows no active lung disease no evidence of pneumonia.  He will change antibiotics to vancomycin and cefepime for now until seen by infectious disease.  Will also do a ARELI to make sure were not dealing with SBE.  Procalcitonin is pending.  Type 2 diabetes with fluctuating blood sugars on Humulin 70/30 and Humalog and sliding scale  Hyperuricemia on allopurinol with controlled uric acid level  Dyslipidemia continued on atorvastatin 40 mg daily  Coronary artery disease on Plavix, isosorbide mononitrate ER without complaints of any chest pains or palpitation  Vitamin D3 deficiency on vitamin D3 5000 units daily  Insomnia on trazodone 50 mg daily  Chronic edema, Demadex was decreased to 20 mg every other day, no edema at this time with no signs of congestive heart failure  Anxiety disorder on lorazepam 1 mg daily  Osteoporosis on Fosamax 70 mg weekly  Hypertension on lisinopril, hydrochlorothiazide, nifedipine as well as clonidine with parameters.  Due to the slow heart rate clonidine dose was decreased.  With IV Lopressor Lopressor as needed.  Discussed with daughter the possibility of sepsis and inability to discharge patient at this time.       I spent 40 minutes in the professional and overall care of this patient.      Giuseppe Francois MD FACP

## 2025-04-11 NOTE — PRE-PROCEDURE ASSESSMENT
[facility]-NCDR CathPCI V5 Collection Form    Pre-Procedure Information  - Electrocardiac assessment method: telemetry monitor     - The assessment result was normal.     Indications and Presentation  - Indication(s) for cath lab visit: other    - Ventricular support was not required.     89-year-old man with infection elevated white count, elevated sed rate, elevated CRP.  Concern as to potential endocarditis as no source of infection and transesophageal echo requested by managing medical service.    2018 coronary angiography with trivial coronary disease less than 10% throughout except for distal RCA described as 30%.  EF at that time 50%    Has not had prior echocardiogram.    Chart review pertinent for patient with 3B chronic kidney disease, diabetes and hypertension.  Has had remote parietal infarct with encephalomalacia and resection of colon malignancy 2022.  No history of esophageal pathology.    Blood cultures remain negative to date.    Transesophageal echo to determine if there is any evidence of endocarditis that would explain his clinical situation which does appear to involve infection though source is not yet determined and blood cultures remain negative.    Yamil Farmer MD

## 2025-04-11 NOTE — PRE-SEDATION DOCUMENTATION
Sedation Plan    ASA 3     Mallampati class: IV.    Risks, benefits, and alternatives discussed with patient.    Patient is 89 years old without significant cardiovascular disease trivial coronary irregularities of previous cath.  Presents with fever chills very high ERP and sed rate and elevated white count as well.  No clear source of infection.  Thus far blood cultures have been negative.    I reviewed with her the indications for testing the fact that no clear source of infection was present.  Reviewed with her that the procedure involved placing a transesophageal echo probe in her posterior pharynx.  She would be sedated for that procedure and that she would receive local sedation as well to prevent gagging.  I reviewed with her that a rare person can have damage to the pharynx.  Also reviewed with her that there are potential side effects of anesthesia.  She understands these risks and agrees to proceed.    Current exam: Comfortable vital signs are stable sinus rhythm.  Mallampati score is 4.  She has no difficulty swallowing.  She describes no history of esophageal pathology.  She has no abdominal pain.  Laboratory studies are stable    Plan to proceed with transesophageal echo.

## 2025-04-11 NOTE — PROGRESS NOTES
04/11/25 0949   Discharge Planning   Home or Post Acute Services Post acute facilities (Rehab/SNF/etc)   Type of Post Acute Facility Services Skilled nursing   Expected Discharge Disposition SNF   Does the patient need discharge transport arranged? Yes   RoundTrip coordination needed? Yes   Has discharge transport been arranged? No   Patient Choice   Provider Choice list and CMS website (https://medicare.gov/care-compare#search) for post-acute Quality and Resource Measure Data were provided and reviewed with: Family   Intensity of Service   Intensity of Service 0-30 min     Discussed pt with nurse Hayley & pt daughter this am . Pt remains weak with fever. Therapy working with pt [facility] PT 17 OT 19. Per notes; (Patient initally amb 40' without AD and mod x1. Patient very unsteady with multiple lateral and anterior LOB. Patient c/o feeling dizzy. Switched to ww, she amb an additional 60' with min x1. Pt may also need IV ABX on discharge due to infection. Plan for Echo. IV ABX cont. ID consulted. Pt daughter reports she is going to [place] on Tuesday & she is pt caregiver. Pt HARPER will be in charge of pt while daughter is gone for 2 weeks. Pt daughter's  Rufina cell # [phone] home # [phone].  Pt daughter requesting referral to SNF [facility].

## 2025-04-11 NOTE — CONSULTS
Vancomycin Dosing by Pharmacy- INITIAL    Lela Barba is a 89 y.o. year old female who Pharmacy has been consulted for vancomycin dosing for other UTI . Based on the patient's indication and renal status this patient will be dosed based on a goal AUC of 400-600.     Renal function is currently poor.  CrCl 30.5ml/min    Visit Vitals  BP (!) 138/94 (BP Location: Right arm, Patient Position: Lying)   Pulse 67   Temp (!) 39.6 °C (103.3 °F) (Temporal)   Resp 20        Lab Results   Component Value Date    CREATININE 1.08 (H) 2025    CREATININE 0.92 2025    CREATININE 1.19 (H) 2025    CREATININE 0.79 2022    CREATININE 0.80 2022    CREATININE 0.85 2022    CREATININE 0.86 2022        Patient weight is as follows:   Vitals:    25 1807   Weight: 64.9 kg (143 lb)       Cultures:  No results found for the encounter in last 14 days.        I/O last 3 completed shifts:  In: 847.9 (13.1 mL/kg) [I.V.:797.9 (12.3 mL/kg); IV Piggyback:50]  Out: 900 (13.9 mL/kg) [Urine:900 (0.4 mL/kg/hr)]  Weight: 64.9 kg   I/O during current shift:  I/O this shift:  In: 50 [IV Piggyback:50]  Out: -     Temp (24hrs), Av.7 °C (99.8 °F), Min:36.4 °C (97.5 °F), Max:39.6 °C (103.3 °F)         Assessment/Plan     Patient will not be given a loading dose.  Will initiate vancomycin maintenance,  1000 mg every 24 hours.    This dosing regimen is predicted by [employer] to result in the following pharmacokinetic parameters:  Regimen: 1000 mg IV every 24 hours.  Start time: 23:18 on 04/10/2025  Exposure target: AUC24 (range)400-600 mg/L.hr   LWH27-23: 345 mg/L.hr  AUC24,ss: 543 mg/L.hr  Probability of AUC24 > 400: 83 %  Ctrough,ss: 17.4 mg/L  Probability of Ctrough,ss > 20: 35 %    Follow-up level will be ordered on 25 at 0500 and 1000 unless clinically indicated sooner.  Will continue to monitor renal function daily while on vancomycin and order serum creatinine at least every 48 hours if not  already ordered.  Follow for continued vancomycin needs, clinical response, and signs/symptoms of toxicity.       FANNY MONTANA, PharmD

## 2025-04-11 NOTE — CARE PLAN
Problem: Safety - Adult  Goal: Free from fall injury  4/11/2025 0340 by Brisa Silvestre RN  Outcome: Progressing  4/10/2025 1921 by Brisa Silvestre RN  Flowsheets (Taken 4/10/2025 1921)  Free from fall injury: Instruct family/caregiver on patient safety   The patient's goals for the shift include rest and comfort    The clinical goals for the shift include safety/ comfort    Over the shift, the patient did make progress toward the following goals.

## 2025-04-11 NOTE — CONSULTS
Infectious Disease    Patient Name: Lela Barba  Date: 4/11/2025  YOB: 1935  Medical Record Number: [id number]        Chief Complaint   Patient presents with    Weakness, Gen    Dizziness     Patient complains of dizziness, weakness, and headache started today. Hx of diabetes.  in triage.         History of Present Illness:   Brought in with having chills.  Body aches.  Apparently just with new findings over several days prior to admission.  According to the daughter who helps the mother and lives close by this was a new finding mother was not really complaining of anything other than feeling tired.  The emergency room there is some question of pneumonia and UTI she was started on doxycycline and Rocephin.  She is continuing to have chills at this time she does not give any very specific symptoms otherwise.  No recent travel history she is originally from [place] no sick contacts no animal exposures.  Does not denies any headache denies any neck stiffness.  Mother's mental status seems to be at baseline according to daughter.      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,  1.5 cm right meningioma  Colon cancer resected February 23, 2022        Past Surgical History:   Procedure Laterality Date    MR HEAD ANGIO WO IV CONTRAST  11/30/2021    MR HEAD ANGIO WO IV CONTRAST 11/30/2021 [facility] CLINICAL LEGACY    MR NECK ANGIO WO IV CONTRAST  11/30/2021    MR NECK ANGIO WO IV CONTRAST 11/30/2021  [facility] CL[facility]ICAL [facility]GAC[facility]    OTHER SURGICAL HISTORY  03/31/2022    Colectomy    OTHER SURGICAL HISTORY  03/31/2022    Appendectomy           Current Facility-Administered Medications:     acetaminophen (Tylenol) tablet 650 mg, 650 mg, oral, q6h PRN, Giuseppe Francois MD, 650 mg at 04/11/25 0824    allopurinol (Zyloprim) tablet 100 mg, 100 mg, oral, Daily, Giuseppe Francois MD, 100 mg at 04/11/25 0824    amLODIPine (Norvasc) tablet 10 mg, 10 mg, oral, Daily, Giuseppe Francois MD, 10 mg at 04/11/25 0616    aspirin chewable tablet 81 mg, 81 mg, oral, Daily, Giuseppe Francois MD, 81 mg at 04/11/25 0616    atorvastatin (Lipitor) tablet 40 mg, 40 mg, oral, Daily, Giuseppe Francois MD, 40 mg at 04/10/25 2004    [START ON 4/12/2025] cefepime (Maxipime) 1 g in dextrose 5% IV 50 mL, 1 g, intravenous, q12h, Giuseppe Francois MD    cholecalciferol (Vitamin D-3) capsule 125 mcg, 5,000 Units, oral, Daily, Giuseppe Francois MD, 125 mcg at 04/11/25 0824    cloNIDine (Catapres) tablet 0.1 mg, 0.1 mg, oral, q12h YANG, Giuseppe Francois MD, 0.1 mg at 04/11/25 0824    clopidogrel (Plavix) tablet 75 mg, 75 mg, oral, Daily, Giuseppe Francois MD, 75 mg at 04/11/25 0824    dextrose 50 % injection 12.5 g, 12.5 g, intravenous, q15 min PRN, Giuseppe Francois MD    dextrose 50 % injection 25 g, 25 g, intravenous, q15 min PRN, Giuseppe Francois MD    docusate sodium (Colace) capsule 100 mg, 100 mg, oral, Daily, Giuseppe Francois MD, 100 mg at 04/11/25 0824    enoxaparin (Lovenox) syringe 30 mg, 30 mg, subcutaneous, q24h, Giuseppe Francois MD, 30 mg at 04/10/25 1741    ferrous sulfate tablet 325 mg, 65 mg of iron, oral, Daily with breakfast, Giuseppe Francois MD, 325 mg at 04/10/25 0920    glucagon (Glucagen) injection 1 mg, 1 mg, intramuscular, q15 min PRN, Giuseppe Francois MD    glucagon (Glucagen) injection 1 mg, 1 mg, intramuscular, q15 min PRN, Giuseppe Francois MD    hydroCHLOROthiazide (HYDRODiuril) tablet 25 mg, 25 mg, oral, Daily, Giuseppe Francois MD, 25 mg at 04/11/25  0824    insulin lispro injection 0-5 Units, 0-5 Units, subcutaneous, TID AC, Giuseppe Francois MD, 3 Units at 04/11/25 1122    insulin NPH and regular human (HumuLIN 70-30, NovoLIN 70-30) 100 unit/mL (70-30) injection 18 Units, 18 Units, subcutaneous, Nightly, Giuseppe Francois MD, 18 Units at 04/10/25 2123    insulin NPH and regular human (HumuLIN 70-30, NovoLIN 70-30) 100 unit/mL (70-30) injection 20 Units, 20 Units, subcutaneous, q AM, Giuseppe Francois MD, 20 Units at 04/10/25 0925    isosorbide mononitrate ER (Imdur) 24 hr tablet 60 mg, 60 mg, oral, Daily, Giuseppe Francois MD, 60 mg at 04/11/25 0824    LORazepam (Ativan) tablet 1 mg, 1 mg, oral, Nightly, Giuseppe Francois MD, 1 mg at 04/10/25 2000    losartan (Cozaar) tablet 100 mg, 100 mg, oral, Daily, Giuseppe Francois MD, 100 mg at 04/11/25 0824    menthol-zinc oxide (Calmoseptine - Risamine) 0.44-20.6 % ointment 1 Application, 1 Application, Topical, 4x daily PRN, Giuseppe Francois MD    metoprolol succinate XL (Toprol-XL) 24 hr tablet 100 mg, 100 mg, oral, BID, Giuseppe Francois MD, 100 mg at 04/11/25 0824    metoprolol tartrate (Lopressor) injection 5 mg, 5 mg, intravenous, q6h PRN, Giuseppe Francois MD, 5 mg at 04/11/25 0140    mirtazapine (Remeron) tablet 15 mg, 15 mg, oral, Nightly, Giuseppe Francois MD, 15 mg at 04/10/25 2000    nitroglycerin (Nitrostat) SL tablet 0.4 mg, 0.4 mg, sublingual, q5 min PRN, Giuseppe Francois MD    torsemide (Demadex) tablet 20 mg, 20 mg, oral, Every other day, Giuseppe Francois MD, 20 mg at 04/10/25 0920    vancomycin (Vancocin) 1,000 mg in dextrose 5%  mL, 1,000 mg, intravenous, q24h, Lazaro Zavala, PharmD, Stopped at 04/11/25 0240    vancomycin (Vancocin) pharmacy to dose - pharmacy monitoring, , miscellaneous, Daily PRN, Giuseppe Francois MD     Allergies   Allergen Reactions    Hydralazine Other     chest pain tachycardia        FMH  No immune suppressive conditions in family      Physical Exam:    Blood pressure 118/65, pulse 60,  "temperature 37.3 °C (99.1 °F), resp. rate 14, height 1.626 m (5' 4\"), weight 64.9 kg (143 lb), SpO2 98%.  General: Patient appears ok at the present time. NAD  Skin: no new rashes  HEENT:  Neck is supple, No subconjunctival hemorrhages, no oral exudates  Heart: S1 S2  Lungs:diminished mildly bases  Abdomen: soft, ND, NTTP,  Back :no CVA tenderness  Extrem: No edema, non tender  Neuro exam: CN II-XII intact  Psych: cooperative    Labs:  I have reviewed all lab results by electronic record, including most recent CBC, metabolic panel, and pertinent abnormalities were addressed from an infectious disease perspective.  Trends are being monitored over time.    Lab Results   Component Value Date    WBC 13.4 (H) 04/10/2025    HGB 10.2 (L) 04/10/2025    HCT 30.7 (L) 04/10/2025    MCV 84 04/10/2025     04/10/2025     Lab Results   Component Value Date    GLUCOSE 98 04/09/2025    CALCIUM 8.8 04/09/2025     04/09/2025    K 3.5 04/09/2025    CO2 24 04/09/2025     04/09/2025    BUN 27 (H) 04/09/2025    CREATININE 1.08 (H) 04/09/2025       Radiology:  I have reviewed imaging results per electronic record and most pertinent abnormalities are being addressed from an infectious disease standpoint.            ASSESSMENT:  Problem List Items Addressed This Visit          Symptoms and Signs    * (Principal) Generalized weakness - Primary     Other Visit Diagnoses       Pneumonia of left lower lobe due to infectious organism        Acute cystitis without hematuria        Relevant Orders    Transesophageal Echo (ARELI) (Completed)    SBE (subacute bacterial endocarditis) ([facility])        Relevant Medications    amLODIPine (Norvasc) 5 mg tablet    Plavix 75 mg tablet    isosorbide mononitrate ER (Imdur) 60 mg 24 hr tablet    metoprolol succinate XL (Toprol-XL) 100 mg 24 hr tablet    nitroglycerin (Nitrostat) 0.4 mg SL tablet    isosorbide mononitrate ER (Imdur) 24 hr tablet 60 mg    metoprolol succinate XL (Toprol-XL) 24 " hr tablet 100 mg    nitroglycerin (Nitrostat) SL tablet 0.4 mg    clopidogrel (Plavix) tablet 75 mg    NIFEdipine ER (Adalat CC) 90 mg 24 hr tablet    clopidogrel (Plavix) 75 mg tablet    NIFEdipine ER (NIFEdipine CC) 90 mg 24 hr tablet    metoprolol tartrate (Lopressor) injection 5 mg    amLODIPine (Norvasc) tablet 10 mg    Other Relevant Orders    Transesophageal Echo (ARELI) (Completed)           FUO    Urine culture was contaminated, does appear to have significant pyuria.Blood culture neg so far but on antibiotics    PLAN:   Repeat cultures  Antibiotics appear to have been broadened  Patient going for CT

## 2025-04-12 ENCOUNTER — APPOINTMENT (OUTPATIENT)
Dept: RADIOLOGY | Facility: HOSPITAL | Age: OVER 89
DRG: 193 | End: 2025-04-12
Payer: MEDICARE

## 2025-04-12 LAB
BASOPHILS # BLD AUTO: 0.05 X10*3/UL (ref 0–0.1)
BASOPHILS NFR BLD AUTO: 0.3 %
EOSINOPHIL # BLD AUTO: 0.01 X10*3/UL (ref 0–0.4)
EOSINOPHIL NFR BLD AUTO: 0.1 %
ERYTHROCYTE [DISTWIDTH] IN BLOOD BY AUTOMATED COUNT: 13.8 % (ref 11.5–14.5)
GLUCOSE BLD MANUAL STRIP-MCNC: 244 MG/DL (ref 74–99)
GLUCOSE BLD MANUAL STRIP-MCNC: 289 MG/DL (ref 74–99)
GLUCOSE BLD MANUAL STRIP-MCNC: 332 MG/DL (ref 74–99)
GLUCOSE BLD MANUAL STRIP-MCNC: 371 MG/DL (ref 74–99)
GLUCOSE BLD MANUAL STRIP-MCNC: 387 MG/DL (ref 74–99)
GLUCOSE BLD MANUAL STRIP-MCNC: 564 MG/DL (ref 74–99)
GLUCOSE BLD MANUAL STRIP-MCNC: 579 MG/DL (ref 74–99)
HCT VFR BLD AUTO: 33.8 % (ref 36–46)
HGB BLD-MCNC: 11.1 G/DL (ref 12–16)
HOLD SPECIMEN: NORMAL
IMM GRANULOCYTES # BLD AUTO: 0.09 X10*3/UL (ref 0–0.5)
IMM GRANULOCYTES NFR BLD AUTO: 0.5 % (ref 0–0.9)
LYMPHOCYTES # BLD AUTO: 1.42 X10*3/UL (ref 0.8–3)
LYMPHOCYTES NFR BLD AUTO: 8.4 %
MCH RBC QN AUTO: 27.5 PG (ref 26–34)
MCHC RBC AUTO-ENTMCNC: 32.8 G/DL (ref 32–36)
MCV RBC AUTO: 84 FL (ref 80–100)
MONOCYTES # BLD AUTO: 2.33 X10*3/UL (ref 0.05–0.8)
MONOCYTES NFR BLD AUTO: 13.8 %
NEUTROPHILS # BLD AUTO: 12.94 X10*3/UL (ref 1.6–5.5)
NEUTROPHILS NFR BLD AUTO: 76.9 %
NRBC BLD-RTO: 0 /100 WBCS (ref 0–0)
PLATELET # BLD AUTO: 287 X10*3/UL (ref 150–450)
RBC # BLD AUTO: 4.04 X10*6/UL (ref 4–5.2)
VANCOMYCIN SERPL-MCNC: 19.3 UG/ML (ref 5–20)
WBC # BLD AUTO: 16.8 X10*3/UL (ref 4.4–11.3)

## 2025-04-12 PROCEDURE — 80202 ASSAY OF VANCOMYCIN: CPT

## 2025-04-12 PROCEDURE — A9575 INJ GADOTERATE MEGLUMI 0.1ML: HCPCS | Performed by: INTERNAL MEDICINE

## 2025-04-12 PROCEDURE — 74183 MRI ABD W/O CNTR FLWD CNTR: CPT

## 2025-04-12 PROCEDURE — 36415 COLL VENOUS BLD VENIPUNCTURE: CPT | Performed by: INTERNAL MEDICINE

## 2025-04-12 PROCEDURE — 82947 ASSAY GLUCOSE BLOOD QUANT: CPT

## 2025-04-12 PROCEDURE — 74183 MRI ABD W/O CNTR FLWD CNTR: CPT | Performed by: RADIOLOGY

## 2025-04-12 PROCEDURE — 2500000001 HC RX 250 WO HCPCS SELF ADMINISTERED DRUGS (ALT 637 FOR MEDICARE OP): Performed by: INTERNAL MEDICINE

## 2025-04-12 PROCEDURE — 2500000002 HC RX 250 W HCPCS SELF ADMINISTERED DRUGS (ALT 637 FOR MEDICARE OP, ALT 636 FOR OP/ED): Performed by: INTERNAL MEDICINE

## 2025-04-12 PROCEDURE — 1210000001 HC SEMI-PRIVATE ROOM DAILY

## 2025-04-12 PROCEDURE — 36415 COLL VENOUS BLD VENIPUNCTURE: CPT

## 2025-04-12 PROCEDURE — 2550000001 HC RX 255 CONTRASTS: Performed by: INTERNAL MEDICINE

## 2025-04-12 PROCEDURE — 2500000004 HC RX 250 GENERAL PHARMACY W/ HCPCS (ALT 636 FOR OP/ED): Performed by: INTERNAL MEDICINE

## 2025-04-12 PROCEDURE — 99232 SBSQ HOSP IP/OBS MODERATE 35: CPT | Performed by: INTERNAL MEDICINE

## 2025-04-12 PROCEDURE — 85025 COMPLETE CBC W/AUTO DIFF WBC: CPT | Performed by: INTERNAL MEDICINE

## 2025-04-12 PROCEDURE — 2500000004 HC RX 250 GENERAL PHARMACY W/ HCPCS (ALT 636 FOR OP/ED)

## 2025-04-12 PROCEDURE — 97116 GAIT TRAINING THERAPY: CPT | Mod: GP,CQ | Performed by: PHYSICAL THERAPY ASSISTANT

## 2025-04-12 PROCEDURE — 97530 THERAPEUTIC ACTIVITIES: CPT | Mod: GP,CQ | Performed by: PHYSICAL THERAPY ASSISTANT

## 2025-04-12 PROCEDURE — 87040 BLOOD CULTURE FOR BACTERIA: CPT | Mod: ELYLAB | Performed by: INTERNAL MEDICINE

## 2025-04-12 RX ORDER — INSULIN LISPRO 100 [IU]/ML
0-10 INJECTION, SOLUTION INTRAVENOUS; SUBCUTANEOUS 4 TIMES DAILY
Status: DISCONTINUED | OUTPATIENT
Start: 2025-04-12 | End: 2025-04-22

## 2025-04-12 RX ORDER — MEROPENEM 1 G/1
1 INJECTION, POWDER, FOR SOLUTION INTRAVENOUS EVERY 12 HOURS
Status: DISCONTINUED | OUTPATIENT
Start: 2025-04-13 | End: 2025-04-23 | Stop reason: HOSPADM

## 2025-04-12 RX ORDER — ACETAMINOPHEN 325 MG/1
650 TABLET ORAL EVERY 6 HOURS PRN
Status: DISCONTINUED | OUTPATIENT
Start: 2025-04-12 | End: 2025-04-23 | Stop reason: HOSPADM

## 2025-04-12 RX ORDER — INSULIN LISPRO 100 [IU]/ML
10 INJECTION, SOLUTION INTRAVENOUS; SUBCUTANEOUS ONCE
Status: COMPLETED | OUTPATIENT
Start: 2025-04-12 | End: 2025-04-12

## 2025-04-12 RX ORDER — GADOTERATE MEGLUMINE 376.9 MG/ML
0.2 INJECTION INTRAVENOUS
Status: COMPLETED | OUTPATIENT
Start: 2025-04-12 | End: 2025-04-12

## 2025-04-12 RX ORDER — ACETAMINOPHEN 325 MG/1
650 TABLET ORAL EVERY 6 HOURS PRN
Status: ACTIVE | OUTPATIENT
Start: 2025-04-12 | End: 2025-04-17

## 2025-04-12 RX ADMIN — MIRTAZAPINE 15 MG: 15 TABLET, FILM COATED ORAL at 20:22

## 2025-04-12 RX ADMIN — CEFEPIME 1 G: 1 INJECTION, POWDER, FOR SOLUTION INTRAMUSCULAR; INTRAVENOUS at 20:22

## 2025-04-12 RX ADMIN — CLOPIDOGREL BISULFATE 75 MG: 75 TABLET, FILM COATED ORAL at 09:47

## 2025-04-12 RX ADMIN — ALLOPURINOL 100 MG: 100 TABLET ORAL at 09:46

## 2025-04-12 RX ADMIN — METOPROLOL TARTRATE 5 MG: 5 INJECTION INTRAVENOUS at 00:43

## 2025-04-12 RX ADMIN — METOPROLOL SUCCINATE 100 MG: 50 TABLET, EXTENDED RELEASE ORAL at 09:46

## 2025-04-12 RX ADMIN — VANCOMYCIN HYDROCHLORIDE 1000 MG: 1 INJECTION, SOLUTION INTRAVENOUS at 00:13

## 2025-04-12 RX ADMIN — LOSARTAN POTASSIUM 100 MG: 50 TABLET, FILM COATED ORAL at 09:47

## 2025-04-12 RX ADMIN — CLONIDINE HYDROCHLORIDE 0.1 MG: 0.1 TABLET ORAL at 09:47

## 2025-04-12 RX ADMIN — ASPIRIN 81 MG: 81 TABLET, CHEWABLE ORAL at 06:28

## 2025-04-12 RX ADMIN — INSULIN LISPRO 3 UNITS: 100 INJECTION, SOLUTION INTRAVENOUS; SUBCUTANEOUS at 12:03

## 2025-04-12 RX ADMIN — ACETAMINOPHEN 650 MG: 325 TABLET ORAL at 20:55

## 2025-04-12 RX ADMIN — INSULIN HUMAN 18 UNITS: 100 INJECTION, SUSPENSION SUBCUTANEOUS at 17:55

## 2025-04-12 RX ADMIN — FERROUS SULFATE TAB 325 MG (65 MG ELEMENTAL FE) 325 MG: 325 (65 FE) TAB at 11:40

## 2025-04-12 RX ADMIN — ENOXAPARIN SODIUM 30 MG: 40 INJECTION SUBCUTANEOUS at 16:46

## 2025-04-12 RX ADMIN — CEFEPIME 1 G: 1 INJECTION, POWDER, FOR SOLUTION INTRAMUSCULAR; INTRAVENOUS at 09:46

## 2025-04-12 RX ADMIN — METOPROLOL SUCCINATE 100 MG: 50 TABLET, EXTENDED RELEASE ORAL at 20:22

## 2025-04-12 RX ADMIN — TORSEMIDE 20 MG: 20 TABLET ORAL at 09:47

## 2025-04-12 RX ADMIN — Medication 125 MCG: at 09:46

## 2025-04-12 RX ADMIN — INSULIN LISPRO 5 UNITS: 100 INJECTION, SOLUTION INTRAVENOUS; SUBCUTANEOUS at 17:49

## 2025-04-12 RX ADMIN — DOCUSATE SODIUM 100 MG: 100 CAPSULE, LIQUID FILLED ORAL at 09:47

## 2025-04-12 RX ADMIN — AMLODIPINE BESYLATE 10 MG: 5 TABLET ORAL at 06:27

## 2025-04-12 RX ADMIN — HYDROCHLOROTHIAZIDE 25 MG: 25 TABLET ORAL at 09:47

## 2025-04-12 RX ADMIN — ATORVASTATIN CALCIUM 40 MG: 20 TABLET, FILM COATED ORAL at 20:22

## 2025-04-12 RX ADMIN — CLONIDINE HYDROCHLORIDE 0.1 MG: 0.1 TABLET ORAL at 20:22

## 2025-04-12 RX ADMIN — ACETAMINOPHEN 650 MG: 325 TABLET ORAL at 00:43

## 2025-04-12 RX ADMIN — INSULIN LISPRO 10 UNITS: 100 INJECTION, SOLUTION INTRAVENOUS; SUBCUTANEOUS at 20:55

## 2025-04-12 RX ADMIN — INSULIN LISPRO 4 UNITS: 100 INJECTION, SOLUTION INTRAVENOUS; SUBCUTANEOUS at 06:28

## 2025-04-12 RX ADMIN — ISOSORBIDE MONONITRATE 60 MG: 60 TABLET, EXTENDED RELEASE ORAL at 09:47

## 2025-04-12 RX ADMIN — GADOTERATE MEGLUMINE 12.5 ML: 376.9 INJECTION INTRAVENOUS at 10:44

## 2025-04-12 RX ADMIN — LORAZEPAM 1 MG: 1 TABLET ORAL at 20:55

## 2025-04-12 ASSESSMENT — COGNITIVE AND FUNCTIONAL STATUS - GENERAL
MOVING TO AND FROM BED TO CHAIR: A LITTLE
TURNING FROM BACK TO SIDE WHILE IN FLAT BAD: A LITTLE
STANDING UP FROM CHAIR USING ARMS: A LITTLE
MOVING FROM LYING ON BACK TO SITTING ON SIDE OF FLAT BED WITH BEDRAILS: A LITTLE
MOVING TO AND FROM BED TO CHAIR: A LITTLE
MOBILITY SCORE: 21
CLIMB 3 TO 5 STEPS WITH RAILING: A LOT
WALKING IN HOSPITAL ROOM: A LITTLE
MOBILITY SCORE: 17
WALKING IN HOSPITAL ROOM: A LITTLE
TOILETING: A LITTLE
MOVING TO AND FROM BED TO CHAIR: A LITTLE
TURNING FROM BACK TO SIDE WHILE IN FLAT BAD: A LITTLE
CLIMB 3 TO 5 STEPS WITH RAILING: A LITTLE
CLIMB 3 TO 5 STEPS WITH RAILING: A LITTLE
WALKING IN HOSPITAL ROOM: A LITTLE
MOBILITY SCORE: 20
DAILY ACTIVITIY SCORE: 23

## 2025-04-12 ASSESSMENT — PAIN SCALES - GENERAL
PAINLEVEL_OUTOF10: 0 - NO PAIN
PAINLEVEL_OUTOF10: 3

## 2025-04-12 ASSESSMENT — PAIN - FUNCTIONAL ASSESSMENT
PAIN_FUNCTIONAL_ASSESSMENT: 0-10
PAIN_FUNCTIONAL_ASSESSMENT: 0-10

## 2025-04-12 NOTE — PROGRESS NOTES
Physical Therapy    Physical Therapy Treatment    Patient Name: Lela Barba  MRN: [id number]  Today's Date: 4/12/2025  Time Calculation  Start Time: 0850  Stop Time: 0920  Time Calculation (min): 30 min     1116/1116-A    Assessment/Plan   PT Assessment  PT Assessment Results: Decreased endurance, Impaired balance, Decreased mobility  Rehab Prognosis: Good  Barriers to Discharge Home: Physical needs  Evaluation/Treatment Tolerance: Patient tolerated treatment well  Medical Staff Made Aware: Yes  Strengths: Support of Caregivers, Living arrangement secure  Barriers to Participation: Comorbidities  End of Session Communication: Bedside nurse  Assessment Comment:  (Patient required more assist this date than on initial PT evaluation.  Decreased standing balance, increased fatigue and lower ampac score. Patient would benefit from continued PT)  End of Session Patient Position: Up in chair  PT Plan  Inpatient/Swing Bed or Outpatient: Inpatient  PT Plan  Treatment/Interventions: Bed mobility, Transfer training, Gait training, Balance training, Therapeutic activity  PT Plan: Ongoing PT  PT Frequency: 2 times per week  General Visit Information:   PT  Visit  PT Received On: 04/12/25  General  Family/Caregiver Present: Yes (pt. daughter present inquiring about pt. liver MRI, informed RN who stated will look into when procedure to be done.)  General Comment:  (Pt. compliant with PT tx. requesting to go to bathroom.)    Precautions:  Precautions  Medical Precautions: Fall precautions  Precautions Comment:  (fall)    Pain:  Pain Assessment  0-10 (Numeric) Pain Score: 0 - No pain    Treatments:  Bed Mobility  Bed Mobility: Yes (segmental rolling using bed rails, supine to sit with rail and sba, sit to stand with min/cg from varying surfaces ( min x 1 from toilet))  Ambulation/Gait Training  Ambulation/Gait Training Performed: Yes (standing pre gait trials progressing to gait training with fww 20' x 1, 65' x 1, nbos   decreased proximity to ad with verbal and tactile instruct for corrections, abandons device when approaching to sit, min x 1 for gait safety.)  Transfers  Transfer: Yes (sit to stand trials with hand placement and positioning instructions cg/min, w/c <> bed with walker/hand held assist x 1)     Other Activity  Other Activity Performed: Yes (standing at toilet to don underwear with min x 1 required secondary to unsteady if attempting to self perform and walker support required.)    Outcome Measures:     [facility] Basic Mobility  Turning from your back to your side while in a flat bed without using bedrails: A little  Moving from lying on your back to sitting on the side of a flat bed without using bedrails: A little  Moving to and from bed to chair (including a wheelchair): A little  Standing up from a chair using your arms (e.g. wheelchair or bedside chair): A little  To walk in hospital room: A little  Climbing 3-5 steps with railing: A lot  Basic Mobility - Total Score: 17      Education Documentation  Mobility Training, taught by Jan Payne PTA at 4/12/2025 11:45 AM.  Learner: Family, Patient  Readiness: Acceptance  Method: Explanation, Demonstration, Teach-back  Response: Demonstrated Understanding, Needs Reinforcement    Education Comments  No comments found.       EDUCATION:  Outpatient Education  Individual(s) Educated: Patient, Child  Education Provided: Fall Risk, Posture  Equipment: Other (fww issued and adjusted for pt. height pt and daughter educated on use of device.)  Patient Response to Education: Patient/Caregiver Verbalized Understanding of Information  Education Comment:  (Pt. impulsive on occassion requiring verbal and tactile instructions for corrections.)  Encounter Problems       Encounter Problems (Active)       Compromised Skin Integrity       LTG - Patient will be free from infection       Start:  04/11/25            LTG - Patient will maintain/improve skin integrity through proper skin care  techniques       Start:  04/11/25            LTG - Patient will demonstrate appropriate pressure relief techniques       Start:  04/11/25            LTG - Patient will demonstrate appropriate skin care techniques       Start:  04/11/25            LTG - Patient will be free from infection       Start:  04/11/25            STG - Patient demonstrates skin care/treatment/dressing change       Start:  04/11/25            STG - Patient will maintain good skin integrity       Start:  04/11/25            STG - Patient demonstrates pressure reduction techniques       Start:  04/11/25            STG - Patient demonstrates preventative skin care measures       Start:  04/11/25               PT Problem       Pt. will transfer sit/stand with MOD I (Progressing)       Start:  04/09/25    Expected End:  04/23/25            Pt.will ambulate 75' with MOD I (Progressing)       Start:  04/09/25    Expected End:  04/23/25            Pt. will amb up/down 5 steps with B HR with supervision (Progressing)       Start:  04/09/25    Expected End:  04/23/25            Pt. will perform 2 x 15 B LE AROM exercises  (Not Progressing)       Start:  04/09/25    Expected End:  04/23/25

## 2025-04-12 NOTE — PROGRESS NOTES
"Lela Barba is a 89 y.o. female on day 5 of admission presenting with Generalized weakness.    Subjective   Patient clinically stable.  Remains weak and getting physical therapy and Occupational Therapy.  She denies any chest pains or any palpitation.  She does agree to having shortness of breath on exertion.  She complains of leg weakness.  Otherwise review of systems benign       Objective   CT scan of the abdomen and pelvis shows a partially collapsed hemorrhagic cyst in the kidney.  Adrenal gland has a small left adrenal nodule lipid rich adenoma most likely incidentaloma.  A new large mass 10 x 10 x 7 cm occupies the majority if not the entirety of the lateral segment of the left hepatic lobe needing further evaluation for a solid mass.  An MRI was ordered for tomorrow  ARELI was discussed with Dr. Farmer, ejection fraction was 60 to 65% with a Lambl's excrescence at 6 mm  at the aortic valve cusps most likely benign but not able to rule out vegetation.  Blood sugars have been dropping down in the afternoon, will hold a.m. dose of Humalog 70/30 for now  Vital signs are stable otherwise with a prior blood pressure 165/72 heart rate of 70 and pulse oximeter reading of 92% on room air    Physical Exam  Patient is alert very excitable but not in any distress.  Very difficult to assess.  She seems to be oriented.  Walks with a walker with help getting physical therapy.  Head examination benign no facial asymmetry  Neck veins flat without bruits  Lungs are clear without wheezing crackles or rhonchi  Heart is regular  Abdomen soft and nontender good bowel sounds  Extremities without edema with strong peripheral pulses.  No focal neurological deficits    Last Recorded Vitals  Blood pressure 165/70, pulse 70, temperature 36.9 °C (98.4 °F), temperature source Temporal, resp. rate 18, height 1.626 m (5' 4\"), weight 64.9 kg (143 lb), SpO2 92%.  Intake/Output last 3 Shifts:  I/O last 3 completed shifts:  In: 550 (8.5 " mL/kg) [P.O.:300; IV Piggyback:250]  Out: 1101 (17 mL/kg) [Urine:1101 (0.5 mL/kg/hr)]  Weight: 64.9 kg     Current Facility-Administered Medications:     acetaminophen (Tylenol) tablet 650 mg, 650 mg, oral, q6h PRN, Giuseppe Francois MD, 650 mg at 04/11/25 0824    allopurinol (Zyloprim) tablet 100 mg, 100 mg, oral, Daily, Giuseppe Francois MD, 100 mg at 04/11/25 0824    amLODIPine (Norvasc) tablet 10 mg, 10 mg, oral, Daily, Giuseppe Francois MD, 10 mg at 04/11/25 0616    aspirin chewable tablet 81 mg, 81 mg, oral, Daily, Giuseppe Francois MD, 81 mg at 04/11/25 0616    atorvastatin (Lipitor) tablet 40 mg, 40 mg, oral, Daily, Giuseppe Francois MD, 40 mg at 04/11/25 2055    [START ON 4/12/2025] cefepime (Maxipime) 1 g in dextrose 5% IV 50 mL, 1 g, intravenous, q12h, Giuseppe Francois MD    cholecalciferol (Vitamin D-3) capsule 125 mcg, 5,000 Units, oral, Daily, Giuseppe Francois MD, 125 mcg at 04/11/25 0824    cloNIDine (Catapres) tablet 0.1 mg, 0.1 mg, oral, q12h YANG, Giuseppe Francois MD, 0.1 mg at 04/11/25 2055    clopidogrel (Plavix) tablet 75 mg, 75 mg, oral, Daily, Giuseppe Francois MD, 75 mg at 04/11/25 0824    dextrose 50 % injection 12.5 g, 12.5 g, intravenous, q15 min PRN, Giuseppe Francois MD    dextrose 50 % injection 25 g, 25 g, intravenous, q15 min PRN, Giuseppe Francois MD    docusate sodium (Colace) capsule 100 mg, 100 mg, oral, Daily, Giuseppe Francois MD, 100 mg at 04/11/25 0824    enoxaparin (Lovenox) syringe 30 mg, 30 mg, subcutaneous, q24h, Giuseppe Francois MD, 30 mg at 04/11/25 1702    ferrous sulfate tablet 325 mg, 65 mg of iron, oral, Daily with breakfast, Giuseppe Francois MD, 325 mg at 04/10/25 0920    glucagon (Glucagen) injection 1 mg, 1 mg, intramuscular, q15 min PRN, Giuseppe Francois MD    glucagon (Glucagen) injection 1 mg, 1 mg, intramuscular, q15 min PRN, Giuseppe Francois MD    hydroCHLOROthiazide (HYDRODiuril) tablet 25 mg, 25 mg, oral, Daily, Giuseppe Francois MD, 25 mg at 04/11/25 0824    insulin lispro injection  0-5 Units, 0-5 Units, subcutaneous, TID AC, Giuseppe Francois MD, 5 Units at 04/11/25 1702    insulin NPH and regular human (HumuLIN 70-30, NovoLIN 70-30) 100 unit/mL (70-30) injection 18 Units, 18 Units, subcutaneous, Nightly, Giuseppe Francois MD, 18 Units at 04/11/25 2108    [Held by provider] insulin NPH and regular human (HumuLIN 70-30, NovoLIN 70-30) 100 unit/mL (70-30) injection 20 Units, 20 Units, subcutaneous, q AM, Giuseppe Francois MD, 20 Units at 04/10/25 0925    isosorbide mononitrate ER (Imdur) 24 hr tablet 60 mg, 60 mg, oral, Daily, Giuseppe Francois MD, 60 mg at 04/11/25 0824    LORazepam (Ativan) tablet 1 mg, 1 mg, oral, Nightly, Giuseppe Francois MD, 1 mg at 04/11/25 2055    losartan (Cozaar) tablet 100 mg, 100 mg, oral, Daily, Giuseppe Francois MD, 100 mg at 04/11/25 0824    menthol-zinc oxide (Calmoseptine - Risamine) 0.44-20.6 % ointment 1 Application, 1 Application, Topical, 4x daily PRN, Giuseppe Francois MD    metoprolol succinate XL (Toprol-XL) 24 hr tablet 100 mg, 100 mg, oral, BID, Giuseppe Francois MD, 100 mg at 04/11/25 2055    metoprolol tartrate (Lopressor) injection 5 mg, 5 mg, intravenous, q6h PRN, Giuseppe Francois MD, 5 mg at 04/11/25 0140    mirtazapine (Remeron) tablet 15 mg, 15 mg, oral, Nightly, Giuseppe Francois MD, 15 mg at 04/11/25 2055    nitroglycerin (Nitrostat) SL tablet 0.4 mg, 0.4 mg, sublingual, q5 min PRN, Giuseppe Francois MD    torsemide (Demadex) tablet 20 mg, 20 mg, oral, Every other day, Giuseppe Francois MD, 20 mg at 04/10/25 0920    vancomycin (Vancocin) 1,000 mg in dextrose 5%  mL, 1,000 mg, intravenous, q24h, Danny CordobaD, Stopped at 04/11/25 0240    vancomycin (Vancocin) pharmacy to dose - pharmacy monitoring, , miscellaneous, Daily PRN, Giuseppe Francois MD     Relevant Results      Results for orders placed or performed during the hospital encounter of 04/06/25 (from the past 24 hours)   Vancomycin   Result Value Ref Range    Vancomycin 7.7 5.0 - 20.0 ug/mL   POCT  GLUCOSE   Result Value Ref Range    POCT Glucose 75 74 - 99 mg/dL   Lavender Top   Result Value Ref Range    Extra Tube Hold for add-ons.    SST TOP   Result Value Ref Range    Extra Tube Hold for add-ons.    Transesophageal Echo (ARELI)   Result Value Ref Range    AV mn grad 9 mmHg    AV pk vitaliy 2.36 m/s    LV EF 63 %    RVSP 22.9 mmHg    AV pk grad 22 mmHg   POCT GLUCOSE   Result Value Ref Range    POCT Glucose 254 (H) 74 - 99 mg/dL   Vancomycin   Result Value Ref Range    Vancomycin 5.5 5.0 - 20.0 ug/mL   POCT GLUCOSE   Result Value Ref Range    POCT Glucose 394 (H) 74 - 99 mg/dL   Gray Top   Result Value Ref Range    Extra Tube Hold for add-ons.    PST Top   Result Value Ref Range    Extra Tube Hold for add-ons.    POCT GLUCOSE   Result Value Ref Range    POCT Glucose 482 (H) 74 - 99 mg/dL   Transesophageal Echo (ARELI)    Result Date: 4/11/2025          [place] [address]  Tel [phone] Fax [phone] TRANSESOPHAGEAL ECHOCARDIOGRAM REPORT Patient Name:       NADEEM TABOR   Reading Physician:    [id number] Yamil Farmer MD, [facility] Study Date:         4/11/2025            Ordering Provider:    [id number] PATRICIA PIZARRO MRN/PID:            [id number]             Fellow: Accession#:         [id number]         Nurse: Date of Birth/Age:  1935 / 89 years Sonographer:          Edwina CLEMENS Gender Assigned at  F                    Additional Staff: Birth: Height:             162.56 cm            Admit Date:           4/6/2025 Weight:             64.86 kg             Admission Status:     Inpatient -                                                                Routine BSA / BMI:          1.70 m2 / 24.55      Department Location:   CathLab                     kg/m2 Blood Pressure: 132 /78  mmHg Study Type:    TRANSESOPHAGEAL ECHO (ARELI) Diagnosis/ICD: Acute and subacute infective endocarditis-I33.0 Indication:    ASSESS FOR ENDOCARDITIS CPT Codes:     ARELI Complete-61850; Moderate Sedation Services initial 15 minutes                patient >5 years-90471  Study Detail: The following Echo studies were performed: 2D, Doppler and color               flow. Agitated saline used as a contrast agent for intraseptal               flow evaluation. The patient was sedated.  PHYSICIAN INTERPRETATION: ARELI Details: The ARELI probe used was [id number]. Technically adequate omniplane transesophageal echocardiogram performed. Agitated saline contrast and color flow Doppler echo was performed to assess for the presence of a patent foramen ovale. ARELI Medication: The pharynx was anesthetized with Cetacaine spray and viscous lidocaine. The patient was sedated using moderate sedation. Midazolam and Fentanyl was used to sedate the patient for this exam. ARELI Procedure: The probe was passed without difficulty. The following complication was encountered during the procedure: Patient tolerated the procedure well without any apparent complications. Left Ventricle: The left ventricular systolic function is normal, with a visually estimated ejection fraction of 60-65%. There are no regional wall motion abnormalities. The left ventricular cavity size is normal. Left ventricular diastolic filling was not assessed. Mild concentric LVH. Left Atrium: The left atrial size is normal. There is no definite left atrial thrombus present. A bubble study using agitated saline was performed. Bubble study is negative. There is a normal sized left atrial appendage. There is no definite left atrial mass present. No evidence of right-to-left shunting on agitated saline bubble contrast Normal left atrial appendage. Right Ventricle: The right ventricle is normal in size. There is normal right ventricular global systolic function. Right Atrium: The right  atrial size is normal. Aortic Valve: The aortic valve is trileaflet. There is trace aortic valve regurgitation. The peak instantaneous gradient of the aortic valve is 22 mmHg. The mean gradient of the aortic valve is 9 mmHg. Aortic valve is trileaflet. Planimetry area 2.3 cm sq. there is trace-1+ aortic insufficiency at the base of the annulus at the level of right and left coronary cusp. This is due to mild leaflet coaptation secondary to calcification. There is no aortic stenosis. There is evidence of significant Lambl's excrescence primarily on the right coronary cusp. If multiple positive blood cultures could not completely exclude a small vegetation. The length is 6 mm and most consistent with Lambl's excrescence though vegetation could not be completely excluded. Mitral Valve: The mitral valve is mildly thickened. There is trace mitral valve regurgitation. Trace MR. Tricuspid Valve: The tricuspid valve is structurally normal. There is mild tricuspid regurgitation. The Doppler estimated RVSP is within normal limits at 22.9 mmHg. Pulmonic Valve: The pulmonic valve is structurally normal. There is trace pulmonic valve regurgitation. Pericardium: No pericardial effusion noted. Aorta: The aortic root is normal. Mild plaque descending thoracic aorta. Pulmonary Artery: The main pulmonary artery is normal in size, and position, with normal bifurcation into the left and right pulmonary arteries. Pulmonary Veins: The pulmonary veins appear normal and return normally to the left atrium.  CONCLUSIONS:  1. The left ventricular systolic function is normal, with a visually estimated ejection fraction of 60-65%.  2. Mild concentric LVH.  3. There is normal right ventricular global systolic function.  4. No evidence of right-to-left shunting on agitated saline bubble contrast     Normal left atrial appendage.  5. Trace MR.  6. Right ventricular systolic pressure is within normal limits.  7. Aortic valve is trileaflet.  Planimetry area 2.3 cm sq. there is trace-1+ aortic insufficiency at the base of the annulus at the level of right and left coronary cusp. This is due to mild leaflet coaptation secondary to calcification. There is no aortic stenosis. There is evidence of significant Lambl's excrescence primarily on the right coronary cusp. If multiple positive blood cultures could not completely exclude a small vegetation. The length is 6 mm and most consistent with Lambl's excrescence though vegetation could not be completely excluded.  8. Mild plaque descending thoracic aorta.  9. The main pulmonary artery is normal in size, and position, with normal bifurcation into the left and right pulmonary arteries. 10. The pulmonary veins appear normal and return normally to the left atrium. 11. No left atrial mass. 12. No left atrial thrombus. QUANTITATIVE DATA SUMMARY:  LV SYSTOLIC FUNCTION:                      Normal Ranges: EF-Visual:      63 % LV EF Reported: 63 %  AORTIC VALVE:           Normal Ranges: AoV Vmax:     2.36 m/s  (<=1.7m/s) AoV Peak P.3 mmHg (<20mmHg) AoV Mean P.0 mmHg  (1.7-11.5mmHg) AoV VTI:      46.20 cm  (18-25cm)  TRICUSPID VALVE/RVSP:          Normal Ranges: Peak TR Velocity:     2.23 m/s RV Syst Pressure:     23 mmHg  (< 30mmHg)  [id number] Yamil Farmer MD, [facility] Electronically signed on 2025 at 11:10:09 AM  ** Final **     CT abdomen pelvis wo IV contrast    Result Date: 2025  Interpreted By:  Scooter Ribera, STUDY: CT ABDOMEN PELVIS WO IV CONTRAST;  2025 9:02 am   INDICATION: Signs/Symptoms:Possible sepsis with pyelonephritis.     COMPARISON: Renal ultrasound and CT chest, abdomen and pelvis without IV contrast 2022, both from 2022   ACCESSION NUMBER(S): [id number]   ORDERING CLINICIAN: PATRICIA PIZARRO   TECHNIQUE: CT of the abdomen and pelvis from the lung bases through the symphysis pubis without oral or IV contrast   FINDINGS: LOWER CHEST: Dependent atelectasis in the left  lung base   BONES: No acute skeletal findings.   RIGHT KIDNEY AND URETER: Radiodense stone: Negative Hydronephrosis: Negative Congenital variant anatomy: Negative. No duplicated ureter or other variant anatomy. Other: n/a   LEFT KIDNEY AND URETER: Radiodense stone: Negative Hydronephrosis: Negative Congenital variant anatomy: Negative. No duplicated ureter or other variant anatomy. Other: Previously at least 26 mm left renal cyst is now only 18 mm, has changed from simple water attenuation composition to slightly above simple water attenuation composition. Findings highly suggestive of a partially collapsed hemorrhagic cyst   URINARY BLADDER: Radiodense stone: Negative Wall thickening / other inflammatory change: Negative Diverticula: Negative Congenital variant anatomy: Negative. No variant anatomy such as urachal remnant. Other: Seems over distended but otherwise normal   LIVER:   Background liver not fatty or substantially enlarged or overtly cirrhotic   Sometime since 2 March 2022, new, approximately 10 x 10 x 7 cm low-attenuation mass occupying the majority if not the entirety of the lateral segment left hepatic lobe. Within limitations of this exam without contrast, no other new mass   SPLEEN: Normal. No enlargement.   PANCREAS: Normal. No CT evidence of acute or chronic pancreatitis. No obvious  duct dilation. No gross evidence of a mass, noting low sensitivity and specificity without IV contrast.   GALLBLADDER: Normal CT appearance. No dilation, calcified, or gas-containing stones.  Other types of gallstones could be occult on CT and detectable only by ultrasound.   BILE DUCTS: Normal. No biliary duct dilation.   ADRENAL GLANDS: On both prior CTs, a small left adrenal nodule measured in the range of a lipid rich adenoma; today it does not but it has not changed in size. Unchanged subcentimeter right adrenal lipid rich adenoma   LYMPH NODES: No adenopathy, intraperitoneal, retroperitoneal, pelvic, inguinal or  otherwise.   BOWEL: Surgical change. No dilation or inflammation   STOMACH / DUODENUM: Grossly normal by CT which has limited sensitivity and specificity for the stomach and duodenum.   RETROPERITONEUM: Normal.  No acute hemorrhage or inflammatory change. Lymph nodes in a separate dedicated section.   OMENTUM, MESENTERY AND PERITONEAL SPACES: Free intraperitoneal air: Negative Free intraperitoneal fluid: Tiny quantity not acutely hemorrhagic but nevertheless abnormal free fluid adjacent to the lower margin of the liver Abscess: Negative Other: n/a   PELVIS: No obvious acute adnexal abnormality by CT   VASCULATURE: Aortic and iliac atherosclerotic calcifications without aneurysm or other acute finding.   ABDOMINAL WALL: Hernia: Negative Other: No acute or contributory abnormality.       New large (approximately 10 x 10 x 7 cm) mass occupying the majority if not the entirety of the lateral segment left hepatic lobe needs further evaluation, but cannot be fully characterized without IV contrast. MRI liver without and with intravenous contrast would have the best sensitivity and specificity. The new liver lesion is not an abscess, but a solid mass   New tiny quantity of free fluid adjacent to the liver amounts to less than 100 mL, far too small for paracentesis   No other acute findings   No hydronephrosis on either side   No stone anywhere in the urinary tract   Urinary bladder is over distended but otherwise normal, without wall thickening, stone or acute blood clot   Small left adrenal nodule has not changed in size from 2022, but no longer meets unenhanced criteria for lipid rich adenoma. It could still be an adenoma but lipid poor adenoma   MACRO: None   Signed by: Scooter Ribera 4/11/2025 9:13 AM Dictation workstation:   [id number]                Assessment/Plan   Assessment & Plan  Generalized weakness    Leukocytosis and febrile episodes with elevated CRP and sedimentation rate currently on stronger antibiotics  on vancomycin and cefepime and seen by infectious disease.  Suspected to have either SIRS syndrome or sepsis.  Workup also shows a liver mass on the left side which seems to be solid possible fever and leukocytosis related to malignancy further workup in progress including an MRI to be done tomorrow.  ARELI shows Lambl's excrescence but unable to rule out vegetation.  Blood cultures were repeated likewise urine culture  Type 2 diabetes with widely fluctuating blood sugars currently on 2 doses of Humulin 70/30 daily, will hold the morning dose for now and adjust insulin further as needed  Hyperuricemia on allopurinol with controlled uric acid level  Dyslipidemia continued on atorvastatin  Coronary artery disease on Plavix, isosorbide mononitrate ER without complaints of chest pains or palpitation  Vitamin D3 deficiency on vitamin D3 5000 units daily  Insomnia on trazodone 50 mg daily  Chronic edema, on Demadex will decrease dose to 20 mg every other day currently no edema no signs of decompensated congestive heart failure  Current conditions together with extensive plan of care discussed with daughter earlier today.  Apparently daughter is going to [place] on Tuesday, patient most likely is going to end up in the nursing home for short time anyway         I spent 40 minutes in the professional and overall care of this patient.      Giuseppe Francois MD FACP

## 2025-04-12 NOTE — CARE PLAN
The patient's goals for the shift include rest and comfort    The clinical goals for the shift include patient's temperature will be <38.0 C for shift

## 2025-04-13 VITALS
BODY MASS INDEX: 24.41 KG/M2 | TEMPERATURE: 98.4 F | HEART RATE: 71 BPM | RESPIRATION RATE: 17 BRPM | DIASTOLIC BLOOD PRESSURE: 76 MMHG | OXYGEN SATURATION: 93 % | WEIGHT: 143 LBS | SYSTOLIC BLOOD PRESSURE: 175 MMHG | HEIGHT: 64 IN

## 2025-04-13 LAB
ALBUMIN SERPL BCP-MCNC: 3 G/DL (ref 3.4–5)
ALP SERPL-CCNC: 120 U/L (ref 33–136)
ALT SERPL W P-5'-P-CCNC: 17 U/L (ref 7–45)
ANION GAP SERPL CALC-SCNC: 17 MMOL/L (ref 10–20)
AST SERPL W P-5'-P-CCNC: 18 U/L (ref 9–39)
BACTERIA BLD CULT: NORMAL
BACTERIA UR CULT: NO GROWTH
BASOPHILS # BLD AUTO: 0.05 X10*3/UL (ref 0–0.1)
BASOPHILS NFR BLD AUTO: 0.4 %
BILIRUB SERPL-MCNC: 0.4 MG/DL (ref 0–1.2)
BUN SERPL-MCNC: 66 MG/DL (ref 6–23)
CALCIUM SERPL-MCNC: 8.1 MG/DL (ref 8.6–10.3)
CHLORIDE SERPL-SCNC: 91 MMOL/L (ref 98–107)
CO2 SERPL-SCNC: 26 MMOL/L (ref 21–32)
CREAT SERPL-MCNC: 2.02 MG/DL (ref 0.5–1.05)
EGFRCR SERPLBLD CKD-EPI 2021: 23 ML/MIN/1.73M*2
EOSINOPHIL # BLD AUTO: 0.02 X10*3/UL (ref 0–0.4)
EOSINOPHIL NFR BLD AUTO: 0.2 %
ERYTHROCYTE [DISTWIDTH] IN BLOOD BY AUTOMATED COUNT: 13.5 % (ref 11.5–14.5)
GLUCOSE BLD MANUAL STRIP-MCNC: 241 MG/DL (ref 74–99)
GLUCOSE BLD MANUAL STRIP-MCNC: 274 MG/DL (ref 74–99)
GLUCOSE BLD MANUAL STRIP-MCNC: 284 MG/DL (ref 74–99)
GLUCOSE BLD MANUAL STRIP-MCNC: 370 MG/DL (ref 74–99)
GLUCOSE BLD MANUAL STRIP-MCNC: 385 MG/DL (ref 74–99)
GLUCOSE BLD MANUAL STRIP-MCNC: 403 MG/DL (ref 74–99)
GLUCOSE SERPL-MCNC: 280 MG/DL (ref 74–99)
HCT VFR BLD AUTO: 29.7 % (ref 36–46)
HGB BLD-MCNC: 10.1 G/DL (ref 12–16)
IMM GRANULOCYTES # BLD AUTO: 0.05 X10*3/UL (ref 0–0.5)
IMM GRANULOCYTES NFR BLD AUTO: 0.4 % (ref 0–0.9)
LYMPHOCYTES # BLD AUTO: 1.68 X10*3/UL (ref 0.8–3)
LYMPHOCYTES NFR BLD AUTO: 13.3 %
MCH RBC QN AUTO: 28.3 PG (ref 26–34)
MCHC RBC AUTO-ENTMCNC: 34 G/DL (ref 32–36)
MCV RBC AUTO: 83 FL (ref 80–100)
MONOCYTES # BLD AUTO: 1.6 X10*3/UL (ref 0.05–0.8)
MONOCYTES NFR BLD AUTO: 12.7 %
NEUTROPHILS # BLD AUTO: 9.22 X10*3/UL (ref 1.6–5.5)
NEUTROPHILS NFR BLD AUTO: 73 %
NRBC BLD-RTO: 0 /100 WBCS (ref 0–0)
PLATELET # BLD AUTO: 317 X10*3/UL (ref 150–450)
POTASSIUM SERPL-SCNC: 3.1 MMOL/L (ref 3.5–5.3)
PROT SERPL-MCNC: 6.4 G/DL (ref 6.4–8.2)
RBC # BLD AUTO: 3.57 X10*6/UL (ref 4–5.2)
SODIUM SERPL-SCNC: 131 MMOL/L (ref 136–145)
WBC # BLD AUTO: 12.6 X10*3/UL (ref 4.4–11.3)

## 2025-04-13 PROCEDURE — 2500000004 HC RX 250 GENERAL PHARMACY W/ HCPCS (ALT 636 FOR OP/ED): Performed by: INTERNAL MEDICINE

## 2025-04-13 PROCEDURE — 2500000002 HC RX 250 W HCPCS SELF ADMINISTERED DRUGS (ALT 637 FOR MEDICARE OP, ALT 636 FOR OP/ED): Performed by: INTERNAL MEDICINE

## 2025-04-13 PROCEDURE — 82947 ASSAY GLUCOSE BLOOD QUANT: CPT

## 2025-04-13 PROCEDURE — 2500000001 HC RX 250 WO HCPCS SELF ADMINISTERED DRUGS (ALT 637 FOR MEDICARE OP): Performed by: INTERNAL MEDICINE

## 2025-04-13 PROCEDURE — 1210000001 HC SEMI-PRIVATE ROOM DAILY

## 2025-04-13 PROCEDURE — 85025 COMPLETE CBC W/AUTO DIFF WBC: CPT | Performed by: INTERNAL MEDICINE

## 2025-04-13 PROCEDURE — 36415 COLL VENOUS BLD VENIPUNCTURE: CPT | Performed by: INTERNAL MEDICINE

## 2025-04-13 PROCEDURE — 80053 COMPREHEN METABOLIC PANEL: CPT | Performed by: INTERNAL MEDICINE

## 2025-04-13 PROCEDURE — 99232 SBSQ HOSP IP/OBS MODERATE 35: CPT | Performed by: INTERNAL MEDICINE

## 2025-04-13 RX ORDER — SODIUM CHLORIDE AND POTASSIUM CHLORIDE 150; 900 MG/100ML; MG/100ML
75 INJECTION, SOLUTION INTRAVENOUS CONTINUOUS
Status: ACTIVE | OUTPATIENT
Start: 2025-04-13 | End: 2025-04-14

## 2025-04-13 RX ADMIN — INSULIN LISPRO 6 UNITS: 100 INJECTION, SOLUTION INTRAVENOUS; SUBCUTANEOUS at 06:58

## 2025-04-13 RX ADMIN — INSULIN LISPRO 4 UNITS: 100 INJECTION, SOLUTION INTRAVENOUS; SUBCUTANEOUS at 17:20

## 2025-04-13 RX ADMIN — CLOPIDOGREL BISULFATE 75 MG: 75 TABLET, FILM COATED ORAL at 08:06

## 2025-04-13 RX ADMIN — ENOXAPARIN SODIUM 30 MG: 40 INJECTION SUBCUTANEOUS at 16:14

## 2025-04-13 RX ADMIN — FERROUS SULFATE TAB 325 MG (65 MG ELEMENTAL FE) 325 MG: 325 (65 FE) TAB at 08:06

## 2025-04-13 RX ADMIN — Medication 125 MCG: at 08:06

## 2025-04-13 RX ADMIN — INSULIN LISPRO 10 UNITS: 100 INJECTION, SOLUTION INTRAVENOUS; SUBCUTANEOUS at 00:13

## 2025-04-13 RX ADMIN — INSULIN HUMAN 18 UNITS: 100 INJECTION, SUSPENSION SUBCUTANEOUS at 20:50

## 2025-04-13 RX ADMIN — DOCUSATE SODIUM 100 MG: 100 CAPSULE, LIQUID FILLED ORAL at 08:06

## 2025-04-13 RX ADMIN — INSULIN LISPRO 10 UNITS: 100 INJECTION, SOLUTION INTRAVENOUS; SUBCUTANEOUS at 12:05

## 2025-04-13 RX ADMIN — LOSARTAN POTASSIUM 100 MG: 50 TABLET, FILM COATED ORAL at 08:06

## 2025-04-13 RX ADMIN — METOPROLOL SUCCINATE 100 MG: 50 TABLET, EXTENDED RELEASE ORAL at 20:08

## 2025-04-13 RX ADMIN — ALLOPURINOL 100 MG: 100 TABLET ORAL at 08:06

## 2025-04-13 RX ADMIN — HYDROCHLOROTHIAZIDE 25 MG: 25 TABLET ORAL at 08:06

## 2025-04-13 RX ADMIN — CLONIDINE HYDROCHLORIDE 0.1 MG: 0.1 TABLET ORAL at 08:06

## 2025-04-13 RX ADMIN — METOPROLOL SUCCINATE 100 MG: 50 TABLET, EXTENDED RELEASE ORAL at 08:06

## 2025-04-13 RX ADMIN — ISOSORBIDE MONONITRATE 60 MG: 60 TABLET, EXTENDED RELEASE ORAL at 08:06

## 2025-04-13 RX ADMIN — ATORVASTATIN CALCIUM 40 MG: 20 TABLET, FILM COATED ORAL at 20:08

## 2025-04-13 RX ADMIN — MEROPENEM 1 G: 1 INJECTION, POWDER, FOR SOLUTION INTRAVENOUS at 20:08

## 2025-04-13 RX ADMIN — ASPIRIN 81 MG: 81 TABLET, CHEWABLE ORAL at 06:57

## 2025-04-13 RX ADMIN — INSULIN LISPRO 10 UNITS: 100 INJECTION, SOLUTION INTRAVENOUS; SUBCUTANEOUS at 20:50

## 2025-04-13 RX ADMIN — LORAZEPAM 1 MG: 1 TABLET ORAL at 20:09

## 2025-04-13 RX ADMIN — AMLODIPINE BESYLATE 10 MG: 5 TABLET ORAL at 06:57

## 2025-04-13 RX ADMIN — MIRTAZAPINE 15 MG: 15 TABLET, FILM COATED ORAL at 20:09

## 2025-04-13 RX ADMIN — SODIUM CHLORIDE AND POTASSIUM CHLORIDE 75 ML/HR: .9; .15 SOLUTION INTRAVENOUS at 22:53

## 2025-04-13 RX ADMIN — MEROPENEM 1 G: 1 INJECTION, POWDER, FOR SOLUTION INTRAVENOUS at 08:05

## 2025-04-13 RX ADMIN — CLONIDINE HYDROCHLORIDE 0.1 MG: 0.1 TABLET ORAL at 20:08

## 2025-04-13 ASSESSMENT — COGNITIVE AND FUNCTIONAL STATUS - GENERAL
TOILETING: A LITTLE
DAILY ACTIVITIY SCORE: 23
STANDING UP FROM CHAIR USING ARMS: A LITTLE
CLIMB 3 TO 5 STEPS WITH RAILING: A LITTLE
CLIMB 3 TO 5 STEPS WITH RAILING: A LITTLE
WALKING IN HOSPITAL ROOM: A LITTLE
MOBILITY SCORE: 20
WALKING IN HOSPITAL ROOM: A LITTLE
MOVING TO AND FROM BED TO CHAIR: A LITTLE
TOILETING: A LITTLE
MOBILITY SCORE: 20
STANDING UP FROM CHAIR USING ARMS: A LITTLE
DAILY ACTIVITIY SCORE: 23
MOVING TO AND FROM BED TO CHAIR: A LITTLE

## 2025-04-13 ASSESSMENT — PAIN - FUNCTIONAL ASSESSMENT: PAIN_FUNCTIONAL_ASSESSMENT: 0-10

## 2025-04-13 ASSESSMENT — PAIN SCALES - GENERAL
PAINLEVEL_OUTOF10: 0 - NO PAIN
PAINLEVEL_OUTOF10: 0 - NO PAIN

## 2025-04-13 NOTE — CARE PLAN
The patient's goals for the shift include rest and comfort    The clinical goals for the shift include Safety to prevent falls/injury, increase activity with assistance, monitor for signs/symptoms of infection, update with plan of care    Over the shift, the patient did not make progress toward the following goals. Barriers to progression include . Recommendations to address these barriers include   Problem: Safety - Adult  Goal: Free from fall injury  Outcome: Progressing     Problem: Discharge Planning  Goal: Discharge to home or other facility with appropriate resources  Outcome: Progressing     Problem: Chronic Conditions and Co-morbidities  Goal: Patient's chronic conditions and co-morbidity symptoms are monitored and maintained or improved  Outcome: Progressing     Problem: Nutrition  Goal: Nutrient intake appropriate for maintaining nutritional needs  Outcome: Progressing     Problem: Fall/Injury  Goal: Not fall by end of shift  Outcome: Progressing  Goal: Be free from injury by end of the shift  Outcome: Progressing  Goal: Verbalize understanding of personal risk factors for fall in the hospital  Outcome: Progressing  Goal: Verbalize understanding of risk factor reduction measures to prevent injury from fall in the home  Outcome: Progressing  Goal: Use assistive devices by end of the shift  Outcome: Progressing  Goal: Pace activities to prevent fatigue by end of the shift  Outcome: Progressing     Problem: Skin  Goal: Decreased wound size/increased tissue granulation at next dressing change  Outcome: Progressing  Goal: Participates in plan/prevention/treatment measures  Outcome: Progressing  Goal: Prevent/manage excess moisture  Outcome: Progressing  Goal: Prevent/minimize sheer/friction injuries  Outcome: Progressing  Goal: Promote/optimize nutrition  Outcome: Progressing  Goal: Promote skin healing  Outcome: Progressing   .

## 2025-04-13 NOTE — PROGRESS NOTES
Vancomycin Dosing by Pharmacy- Cessation of Therapy    Consult to pharmacy for vancomycin dosing has been discontinued by the prescriber, pharmacy will sign off at this time.    Please call pharmacy if there are further questions or re-enter a consult if vancomycin is resumed.     Terri Cardenas, PharmD

## 2025-04-13 NOTE — PROGRESS NOTES
"Lela Barba is a 89 y.o. female on day 6 of admission presenting with Generalized weakness.    Subjective   Patient still running a temperature, currently on meropenem  and vancomycin, ID on consult.  Will get another blood culture today  Patient had MRI of the liver today, results not available at this point  Clinically patient is the same easily agitated and very excitable but did not seem to be in distress  Blood sugars have been wildly fluctuating likewise blood pressures.  Discussed with nurse and changes made  Otherwise review of systems was essentially benign       Objective   Blood pressures are fluctuating prior blood pressures include 161/70 heart rate of 102, 151/73 heart rate of 70  Patient with elevated temperature of 38.2  Blood sugars have been wildly fluctuating but hide this evening, morning dose of Novolin 70/30 was held because of low blood sugars yesterday    Physical Exam  Patient is wide-awake alert oriented answering questions very agitated and excitable difficult to assess.  Patient did not seem to be in any distress however  Head examination benign pupils equal sclera nonicteric no facial asymmetry  Neck veins flat without bruits  Lungs are clear without wheezing crackles or rhonchi  Heart is regular  Abdomen is soft and nontender good bowel sounds despite mass on the left liver  Extremities without edema with strong peripheral pulses  No focal neurological deficits    Last Recorded Vitals  Blood pressure 170/72, pulse 73, temperature (!) 38.2 °C (100.8 °F), resp. rate 18, height 1.626 m (5' 4\"), weight 64.9 kg (143 lb), SpO2 92%.  Intake/Output last 3 Shifts:  I/O last 3 completed shifts:  In: 750 (11.6 mL/kg) [P.O.:550; IV Piggyback:200]  Out: 971 (15 mL/kg) [Urine:971 (0.4 mL/kg/hr)]  Weight: 64.9 kg     Current Facility-Administered Medications:     acetaminophen (Tylenol) tablet 650 mg, 650 mg, oral, q6h PRN, Giuseppe Francois MD, 650 mg at 04/12/25 2055    acetaminophen (Tylenol) " tablet 650 mg, 650 mg, oral, q6h PRN, Giuseppe Francois MD    allopurinol (Zyloprim) tablet 100 mg, 100 mg, oral, Daily, Giuseppe Francois MD, 100 mg at 04/12/25 0946    amLODIPine (Norvasc) tablet 10 mg, 10 mg, oral, Daily, Giuseppe Francois MD, 10 mg at 04/12/25 0627    aspirin chewable tablet 81 mg, 81 mg, oral, Daily, Giuseppe Francois MD, 81 mg at 04/12/25 0628    atorvastatin (Lipitor) tablet 40 mg, 40 mg, oral, Daily, Giuseppe Francois MD, 40 mg at 04/12/25 2022    cholecalciferol (Vitamin D-3) capsule 125 mcg, 5,000 Units, oral, Daily, Giuseppe Francois MD, 125 mcg at 04/12/25 0946    cloNIDine (Catapres) tablet 0.1 mg, 0.1 mg, oral, q12h YANG, Giuseppe Francois MD, 0.1 mg at 04/12/25 2022    clopidogrel (Plavix) tablet 75 mg, 75 mg, oral, Daily, Giuseppe Francois MD, 75 mg at 04/12/25 0947    dextrose 50 % injection 12.5 g, 12.5 g, intravenous, q15 min PRN, Giuseppe Francois MD    dextrose 50 % injection 25 g, 25 g, intravenous, q15 min PRN, Giuseppe Francois MD    docusate sodium (Colace) capsule 100 mg, 100 mg, oral, Daily, Giuseppe Francois MD, 100 mg at 04/12/25 0947    enoxaparin (Lovenox) syringe 30 mg, 30 mg, subcutaneous, q24h, Giuseppe Francois MD, 30 mg at 04/12/25 1646    ferrous sulfate tablet 325 mg, 65 mg of iron, oral, Daily with breakfast, Giuseppe Francois MD, 325 mg at 04/12/25 1140    glucagon (Glucagen) injection 1 mg, 1 mg, intramuscular, q15 min PRN, Giuseppe Francois MD    glucagon (Glucagen) injection 1 mg, 1 mg, intramuscular, q15 min PRN, Giuseppe Francois MD    hydroCHLOROthiazide (HYDRODiuril) tablet 25 mg, 25 mg, oral, Daily, Giuseppe Francois MD, 25 mg at 04/12/25 0947    insulin lispro injection 0-10 Units, 0-10 Units, subcutaneous, 4x daily, Giuseppe Francois MD    insulin NPH and regular human (HumuLIN 70-30, NovoLIN 70-30) 100 unit/mL (70-30) injection 18 Units, 18 Units, subcutaneous, Nightly, Giuseppe Francois MD, 18 Units at 04/12/25 4222    [Held by provider] insulin NPH and regular human (HumuLIN 70-30,  NovoLIN 70-30) 100 unit/mL (70-30) injection 25 Units, 25 Units, subcutaneous, q AM, Giuseppe Francois MD    isosorbide mononitrate ER (Imdur) 24 hr tablet 60 mg, 60 mg, oral, Daily, Giuseppe Francois MD, 60 mg at 04/12/25 0947    LORazepam (Ativan) tablet 1 mg, 1 mg, oral, Nightly, Giuseppe Francois MD, 1 mg at 04/12/25 2055    losartan (Cozaar) tablet 100 mg, 100 mg, oral, Daily, Giuseppe Francois MD, 100 mg at 04/12/25 0947    menthol-zinc oxide (Calmoseptine - Risamine) 0.44-20.6 % ointment 1 Application, 1 Application, Topical, 4x daily PRN, Giuseppe Francois MD    [START ON 4/13/2025] meropenem (Merrem) 1 g in sodium chloride 0.9%  mL, 1 g, intravenous, q12h, Cash Hargrove MD    metoprolol succinate XL (Toprol-XL) 24 hr tablet 100 mg, 100 mg, oral, BID, Giuseppe Francois MD, 100 mg at 04/12/25 2022    metoprolol tartrate (Lopressor) injection 5 mg, 5 mg, intravenous, q6h PRN, Giuseppe Francois MD, 5 mg at 04/12/25 0043    mirtazapine (Remeron) tablet 15 mg, 15 mg, oral, Nightly, Giuseppe Francois MD, 15 mg at 04/12/25 2022    nitroglycerin (Nitrostat) SL tablet 0.4 mg, 0.4 mg, sublingual, q5 min PRN, Giuseppe Francois MD    torsemide (Demadex) tablet 20 mg, 20 mg, oral, Every other day, Giuseppe Francois MD, 20 mg at 04/12/25 0947   Results for orders placed or performed during the hospital encounter of 04/06/25 (from the past 24 hours)   POCT GLUCOSE   Result Value Ref Range    POCT Glucose 371 (H) 74 - 99 mg/dL   Lavender Top   Result Value Ref Range    Extra Tube Hold for add-ons.    Vancomycin   Result Value Ref Range    Vancomycin 19.3 5.0 - 20.0 ug/mL   POCT GLUCOSE   Result Value Ref Range    POCT Glucose 332 (H) 74 - 99 mg/dL   POCT GLUCOSE   Result Value Ref Range    POCT Glucose 244 (H) 74 - 99 mg/dL   POCT GLUCOSE   Result Value Ref Range    POCT Glucose 289 (H) 74 - 99 mg/dL   CBC and Auto Differential   Result Value Ref Range    WBC 16.8 (H) 4.4 - 11.3 x10*3/uL    nRBC 0.0 0.0 - 0.0 /100 WBCs    RBC 4.04  4.00 - 5.20 x10*6/uL    Hemoglobin 11.1 (L) 12.0 - 16.0 g/dL    Hematocrit 33.8 (L) 36.0 - 46.0 %    MCV 84 80 - 100 fL    MCH 27.5 26.0 - 34.0 pg    MCHC 32.8 32.0 - 36.0 g/dL    RDW 13.8 11.5 - 14.5 %    Platelets 287 150 - 450 x10*3/uL    Neutrophils % 76.9 40.0 - 80.0 %    Immature Granulocytes %, Automated 0.5 0.0 - 0.9 %    Lymphocytes % 8.4 13.0 - 44.0 %    Monocytes % 13.8 2.0 - 10.0 %    Eosinophils % 0.1 0.0 - 6.0 %    Basophils % 0.3 0.0 - 2.0 %    Neutrophils Absolute 12.94 (H) 1.60 - 5.50 x10*3/uL    Immature Granulocytes Absolute, Automated 0.09 0.00 - 0.50 x10*3/uL    Lymphocytes Absolute 1.42 0.80 - 3.00 x10*3/uL    Monocytes Absolute 2.33 (H) 0.05 - 0.80 x10*3/uL    Eosinophils Absolute 0.01 0.00 - 0.40 x10*3/uL    Basophils Absolute 0.05 0.00 - 0.10 x10*3/uL   POCT GLUCOSE   Result Value Ref Range    POCT Glucose 579 (H) 74 - 99 mg/dL   POCT GLUCOSE   Result Value Ref Range    POCT Glucose 564 (H) 74 - 99 mg/dL        Relevant Results    Transesophageal Echo (ARELI)    Result Date: 4/11/2025          [place] [id number]  Tel [phone] Fax [phone] TRANSESOPHAGEAL ECHOCARDIOGRAM REPORT Patient Name:       NADEEM SHARONA   Reading Physician:    [id number] Yamil Farmer MD, [facility] Study Date:         4/11/2025            Ordering Provider:    [id number] PATRICIA PIZARRO MRN/PID:            [id number]             Fellow: Accession#:         [id number]         Nurse: Date of Birth/Age:  1935 / 89 years Sonographer:          Edwina CLEMENS Gender Assigned at  F                    Additional Staff: Birth: Height:             162.56 cm            Admit Date:           4/6/2025 Weight:             64.86 kg             Admission Status:     Inpatient -                                                                 Routine BSA / BMI:          1.70 m2 / 24.55      Department Location:  [address] [place] [address]                     kg/m2 Blood Pressure: 132 /78 mmHg Study Type:    TRANSESOPHAGEAL ECHO (ARELI) Diagnosis/ICD: 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 Agitated saline used as a contrast agent for intraseptal               flow evaluation. The patient was sedated.  PHYSICIAN INTERPRETATION: ARELI Details: The ARELI probe used was [id number]. Technically adequate omniplane transesophageal echocardiogram performed. Agitated saline contrast and color flow Doppler echo was performed to assess for the presence of a patent foramen ovale. ARELI Medication: The pharynx was anesthetized with Cetacaine spray and viscous lidocaine. The patient was sedated using moderate sedation. Midazolam and Fentanyl was used to sedate the patient for this exam. ARELI Procedure: The probe was passed without difficulty. The following complication was encountered during the procedure: Patient tolerated the procedure well without any apparent complications. Left Ventricle: The left ventricular systolic function is normal, with a visually estimated ejection fraction of 60-65%. There are no regional wall motion abnormalities. The left ventricular cavity size is normal. Left ventricular diastolic filling was not assessed. Mild concentric LVH. Left Atrium: The left atrial size is normal. There is no definite left atrial thrombus present. A bubble study using agitated saline was performed. Bubble study is negative. There is a normal sized left atrial appendage. There is no definite left atrial mass present. No evidence of right-to-left shunting on agitated saline bubble contrast Normal  left atrial appendage. Right Ventricle: The right ventricle is normal in size. There is normal right ventricular global systolic function. Right Atrium: The right atrial size is normal. Aortic Valve: The aortic valve is trileaflet. There is trace aortic valve regurgitation. The peak instantaneous gradient of the aortic valve is 22 mmHg. The mean gradient of the aortic valve is 9 mmHg. Aortic valve is trileaflet. Planimetry area 2.3 cm sq. there is trace-1+ aortic insufficiency at the base of the annulus at the level of right and left coronary cusp. This is due to mild leaflet coaptation secondary to calcification. There is no aortic stenosis. There is evidence of significant Lambl's excrescence primarily on the right coronary cusp. If multiple positive blood cultures could not completely exclude a small vegetation. The length is 6 mm and most consistent with Lambl's excrescence though vegetation could not be completely excluded. Mitral Valve: The mitral valve is mildly thickened. There is trace mitral valve regurgitation. Trace MR. Tricuspid Valve: The tricuspid valve is structurally normal. There is mild tricuspid regurgitation. The Doppler estimated RVSP is within normal limits at 22.9 mmHg. Pulmonic Valve: The pulmonic valve is structurally normal. There is trace pulmonic valve regurgitation. Pericardium: No pericardial effusion noted. Aorta: The aortic root is normal. Mild plaque descending thoracic aorta. Pulmonary Artery: The main pulmonary artery is normal in size, and position, with normal bifurcation into the left and right pulmonary arteries. Pulmonary Veins: The pulmonary veins appear normal and return normally to the left atrium.  CONCLUSIONS:  1. The left ventricular systolic function is normal, with a visually estimated ejection fraction of 60-65%.  2. Mild concentric LVH.  3. There is normal right ventricular global systolic function.  4. No evidence of right-to-left shunting on agitated saline bubble  contrast     Normal left atrial appendage.  5. Trace MR.  6. Right ventricular systolic pressure is within normal limits.  7. Aortic valve is trileaflet. Planimetry area 2.3 cm sq. there is trace-1+ aortic insufficiency at the base of the annulus at the level of right and left coronary cusp. This is due to mild leaflet coaptation secondary to calcification. There is no aortic stenosis. There is evidence of significant Lambl's excrescence primarily on the right coronary cusp. If multiple positive blood cultures could not completely exclude a small vegetation. The length is 6 mm and most consistent with Lambl's excrescence though vegetation could not be completely excluded.  8. Mild plaque descending thoracic aorta.  9. The main pulmonary artery is normal in size, and position, with normal bifurcation into the left and right pulmonary arteries. 10. The pulmonary veins appear normal and return normally to the left atrium. 11. No left atrial mass. 12. No left atrial thrombus. QUANTITATIVE DATA SUMMARY:  LV SYSTOLIC FUNCTION:                      Normal Ranges: EF-Visual:      63 % LV EF Reported: 63 %  AORTIC VALVE:           Normal Ranges: AoV Vmax:     2.36 m/s  (<=1.7m/s) AoV Peak P.3 mmHg (<20mmHg) AoV Mean P.0 mmHg  (1.7-11.5mmHg) AoV VTI:      46.20 cm  (18-25cm)  TRICUSPID VALVE/RVSP:          Normal Ranges: Peak TR Velocity:     2.23 m/s RV Syst Pressure:     23 mmHg  (< 30mmHg)  [id number] Yamil Farmer MD, FACC Electronically signed on 2025 at 11:10:09 AM  ** Final **     CT abdomen pelvis wo IV contrast    Result Date: 2025  Interpreted By:  Scooter Ribera, STUDY: CT ABDOMEN PELVIS WO IV CONTRAST;  2025 9:02 am   INDICATION: Signs/Symptoms:Possible sepsis with pyelonephritis.     COMPARISON: Renal ultrasound and CT chest, abdomen and pelvis without IV contrast 2022, both from 2022   ACCESSION NUMBER(S): [id number]   ORDERING CLINICIAN: PATRICIA PIZARRO   TECHNIQUE: CT of the  abdomen and pelvis from the lung bases through the symphysis pubis without oral or IV contrast   FINDINGS: LOWER CHEST: Dependent atelectasis in the left lung base   BONES: No acute skeletal findings.   RIGHT KIDNEY AND URETER: Radiodense stone: Negative Hydronephrosis: Negative Congenital variant anatomy: Negative. No duplicated ureter or other variant anatomy. Other: n/a   LEFT KIDNEY AND URETER: Radiodense stone: Negative Hydronephrosis: Negative Congenital variant anatomy: Negative. No duplicated ureter or other variant anatomy. Other: Previously at least 26 mm left renal cyst is now only 18 mm, has changed from simple water attenuation composition to slightly above simple water attenuation composition. Findings highly suggestive of a partially collapsed hemorrhagic cyst   URINARY BLADDER: Radiodense stone: Negative Wall thickening / other inflammatory change: Negative Diverticula: Negative Congenital variant anatomy: Negative. No variant anatomy such as urachal remnant. Other: Seems over distended but otherwise normal   LIVER:   Background liver not fatty or substantially enlarged or overtly cirrhotic   Sometime since 2 March 2022, new, approximately 10 x 10 x 7 cm low-attenuation mass occupying the majority if not the entirety of the lateral segment left hepatic lobe. Within limitations of this exam without contrast, no other new mass   SPLEEN: Normal. No enlargement.   PANCREAS: Normal. No CT evidence of acute or chronic pancreatitis. No obvious  duct dilation. No gross evidence of a mass, noting low sensitivity and specificity without IV contrast.   GALLBLADDER: Normal CT appearance. No dilation, calcified, or gas-containing stones.  Other types of gallstones could be occult on CT and detectable only by ultrasound.   BILE DUCTS: Normal. No biliary duct dilation.   ADRENAL GLANDS: On both prior CTs, a small left adrenal nodule measured in the range of a lipid rich adenoma; today it does not but it has not  changed in size. Unchanged subcentimeter right adrenal lipid rich adenoma   LYMPH NODES: No adenopathy, intraperitoneal, retroperitoneal, pelvic, inguinal or otherwise.   BOWEL: Surgical change. No dilation or inflammation   STOMACH / DUODENUM: Grossly normal by CT which has limited sensitivity and specificity for the stomach and duodenum.   RETROPERITONEUM: Normal.  No acute hemorrhage or inflammatory change. Lymph nodes in a separate dedicated section.   OMENTUM, MESENTERY AND PERITONEAL SPACES: Free intraperitoneal air: Negative Free intraperitoneal fluid: Tiny quantity not acutely hemorrhagic but nevertheless abnormal free fluid adjacent to the lower margin of the liver Abscess: Negative Other: n/a   PELVIS: No obvious acute adnexal abnormality by CT   VASCULATURE: Aortic and iliac atherosclerotic calcifications without aneurysm or other acute finding.   ABDOMINAL WALL: Hernia: Negative Other: No acute or contributory abnormality.       New large (approximately 10 x 10 x 7 cm) mass occupying the majority if not the entirety of the lateral segment left hepatic lobe needs further evaluation, but cannot be fully characterized without IV contrast. MRI liver without and with intravenous contrast would have the best sensitivity and specificity. The new liver lesion is not an abscess, but a solid mass   New tiny quantity of free fluid adjacent to the liver amounts to less than 100 mL, far too small for paracentesis   No other acute findings   No hydronephrosis on either side   No stone anywhere in the urinary tract   Urinary bladder is over distended but otherwise normal, without wall thickening, stone or acute blood clot   Small left adrenal nodule has not changed in size from 2022, but no longer meets unenhanced criteria for lipid rich adenoma. It could still be an adenoma but lipid poor adenoma   MACRO: None   Signed by: Scooter Ribera 4/11/2025 9:13 AM Dictation workstation:   [id number]    XR chest 1 view    Result  Date: 4/8/2025  Interpreted By:  Scooter Ribera, STUDY: XR CHEST 1 VIEW;  4/8/2025 10:01 am   INDICATION: Signs/Symptoms:pneumonia?.     COMPARISON: Portable chest, 6 April 2025   ACCESSION NUMBER(S): [id number]   ORDERING CLINICIAN: PATRICIA PIZARRO   TECHNIQUE: Single frontal view of the chest; Portable technique   FINDINGS: Enlarged cardiac silhouette is unchanged   The aorta is calcified at the arch and proximal descending segments, but normal in caliber, without any interval change or acute finding   Lungs clear. No consolidation/infiltrate or edema       NO ACUTE DISEASE IN THE CHEST   MACRO: None   Signed by: Scooter Ribera 4/8/2025 10:54 AM Dictation workstation:   [id number]    ECG 12 lead    Result Date: 4/7/2025  Normal sinus rhythm Septal infarct , age undetermined Abnormal ECG When compared with ECG of 31-JUL-2023 10:58, No significant change was found See ED provider note for full interpretation and clinical correlation Confirmed by Dede Smalls ([id number]) on 4/7/2025 7:32:10 PM    CT head wo IV contrast    Result Date: 4/6/2025  Interpreted By:  Stevan Disla, STUDY: CT HEAD WO IV CONTRAST;  4/6/2025 8:07 pm   INDICATION: Signs/Symptoms:tremor, generalized weakness.     COMPARISON: CT head dated 07/10/2024.   ACCESSION NUMBER(S): [id number]   ORDERING CLINICIAN: KYLE BECKHAM   TECHNIQUE: Noncontrast axial CT scan of head was performed. Angled reformats in brain and bone windows were generated. The images were reviewed in bone, brain, blood and soft tissue windows.   FINDINGS: No hyperdense intracranial hemorrhage is identified. There is no new mass effect or midline shift present.   Geographic area of cystic encephalomalacia and gliosis is present in the right frontoparietal junction, likely representing sequela of prior infarct/injury, similar in appearance to prior exam. Low volume of attenuation changes in the periventricular and subcortical white matter of bilateral cerebral hemispheres  likely represent sequela of microvascular disease. Focus of decreased attenuation in the right cerebellum likely represent sequela of prior infarct/injury, also similar in appearance to prior study.   Gray-white differentiation is intact, without evidence of CT apparent transcortical infarct.   There is redemonstration of the mildly hyperdense extra-axial mass overlying the right frontoparietal junction likely representing a meningioma, similar to prior study. There is slight effacement of the adjacent brain parenchyma without significant low-density edema.   Mild-to-moderate brain parenchymal volume loss is present without abnormal ventricular dilatation. Basal cisterns are patent. No extra-axial fluid collection is identified.   Scalp soft tissues do not demonstrate any acute abnormality. No skull fracture or other acute calvarial abnormality is present.   Mastoid air cells and middle ear cavities are clear. Complete opacification of the left maxillary sinus, similar to prior study in July of 2024.       1.  No evidence of hemorrhage, CT apparent transcortical infarct, or other acute intracranial abnormality. 2. Mild volume of attenuation changes in the periventricular and subcortical white matter of bilateral cerebral hemispheres is favored to represent sequela of microvascular disease. Geographic area of cystic encephalomalacia and gliosis in the right frontoparietal junction likely represent sequela of prior infarct/injury. 3. Extra-axial mildly hyperdense mass overlies the medial posterior right frontal lobe with slight effacement of the adjacent brain parenchyma but without significant low-density edema, essentially unchanged in appearance to prior study, likely representing a meningioma. 4. Near-complete opacification of the partially visualized left maxillary sinus, similar to prior exam. Correlate with symptoms of chronic sinusitis.   MACRO: None   Signed by: Stevan Disla 4/6/2025 8:46 PM Dictation  workstation:   [id number]    XR chest 1 view    Result Date: 4/6/2025  Interpreted By:  Elbert Patel, STUDY: XR CHEST 1 VIEW  4/6/2025 6:49 pm   INDICATION: Signs/Symptoms:Chest Pain   COMPARISON: 03/03/2022   ACCESSION NUMBER(S): [id number]   ORDERING CLINICIAN: KYLE BECKHAM   TECHNIQUE: A single AP portable radiograph of the chest was obtained.   FINDINGS: Mild hazy opacity is seen at the left lung base, and may represent atelectasis and/or pneumonia. No pneumothorax is identified. The cardiac silhouette is within normal limits for size.       Mild hazy opacity at the left lung base, as above. Clinical correlation and continued follow-up until clearing is recommended.   MACRO: None.   Signed by: Elbert Patel 4/6/2025 6:52 PM Dictation workstation:   [id number]                Assessment/Plan   Assessment & Plan  Generalized weakness    Leukocytosis with febrile episodes and elevated CRP and sedimentation rate, multiple blood cultures are negative so far urine culture negative currently on vancomycin and meropenem.  Infectious disease on consult.  Suspected SIRS syndrome cannot rule out sepsis.  Liver mass on the left side is solid possible cause of fever, cannot rule out febrile episodes secondary to malignancy.  MRI done today not ready yet.  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  Type 2 diabetes with widely fluctuating blood sugars.  High blood sugar today because morning dose of insulin was held due to low blood sugars yesterday.  Further adjustment of insulin dose made today  Hypertension with better control but also widely fluctuating  Hyperuricemia controlled on allopurinol  Dyslipidemia controlled on atorvastatin  Coronary artery disease on Plavix, isosorbide mononitrate ER without complaints of any chest pains or palpitation  Vitamin D3 deficiency on vitamin D3 5000 units daily  Insomnia on trazodone 50 mg daily  Chronic edema on Demadex with  decreased dose because no further edema at this time and no signs of decompensated congestive heart failure       I spent 40 minutes in the professional and overall care of this patient.      Giuseppe Francois MD FACP

## 2025-04-13 NOTE — CONSULTS
"  Infectious Disease      History of Present Illness:   Brought in with having chills.  Body aches.  Apparently just with new findings over several days prior to admission.  According to the daughter who helps the mother and lives close by this was a new finding mother was not really complaining of anything other than feeling tired.  The emergency room there is some question of pneumonia and UTI she was started on doxycycline and Rocephin.  She is continuing to have chills at this time she does not give any very specific symptoms otherwise.  [place] recent [place] no animal exposures.  Does not denies any headache denies any neck stiffness.  Mother's mental status seems to be at baseline according to daughter.    Patient feeling okay at this time still having fevers intermittently      Review of Systems: All other ROS reviewed and are negative other than as stated in HPI          Physical Exam:    Blood pressure 170/72, pulse 73, temperature (!) 38.2 °C (100.8 °F), resp. rate 18, height 1.626 m (5' 4\"), weight 64.9 kg (143 lb), SpO2 92%.  General: Patient appears ok at the present time. NAD  Skin: no new rashes  HEENT:  Neck is supple, No subconjunctival hemorrhages, no oral exudates  Heart: S1 S2  Lungs:diminished mildly bases  Abdomen: soft, ND, NTTP,  Back :no CVA tenderness  Extrem: No edema, non tender  Neuro exam: CN II-XII intact  Psych: cooperative    Labs:  I have reviewed all lab results by electronic record, including most recent CBC, metabolic panel, and pertinent abnormalities were addressed from an infectious disease perspective.  Trends are being monitored over time.    Lab Results   Component Value Date    WBC 16.8 (H) 04/12/2025    HGB 11.1 (L) 04/12/2025    HCT 33.8 (L) 04/12/2025    MCV 84 04/12/2025     04/12/2025     Lab Results   Component Value Date    GLUCOSE 98 04/09/2025    CALCIUM 8.8 04/09/2025     04/09/2025    K 3.5 " 04/09/2025    CO2 24 04/09/2025     04/09/2025    BUN 27 (H) 04/09/2025    CREATININE 1.08 (H) 04/09/2025       Radiology:  I have reviewed imaging results per electronic record and most pertinent abnormalities are being addressed from an infectious disease standpoint.            ASSESSMENT:  Problem List Items Addressed This Visit          Symptoms and Signs    * (Principal) Generalized weakness - Primary     Other Visit Diagnoses       Pneumonia of left lower lobe due to infectious organism        Acute cystitis without hematuria        Relevant Orders    Transesophageal Echo (ARELI) (Completed)    SBE (subacute bacterial endocarditis) ([facility])        Relevant Medications    amLODIPine (Norvasc) 5 mg tablet    Plavix 75 mg tablet    isosorbide mononitrate ER (Imdur) 60 mg 24 hr tablet    metoprolol succinate XL (Toprol-XL) 100 mg 24 hr tablet    nitroglycerin (Nitrostat) 0.4 mg SL tablet    isosorbide mononitrate ER (Imdur) 24 hr tablet 60 mg    metoprolol succinate XL (Toprol-XL) 24 hr tablet 100 mg    nitroglycerin (Nitrostat) SL tablet 0.4 mg    clopidogrel (Plavix) tablet 75 mg    NIFEdipine ER (Adalat CC) 90 mg 24 hr tablet    clopidogrel (Plavix) 75 mg tablet    NIFEdipine ER (NIFEdipine CC) 90 mg 24 hr tablet    metoprolol tartrate (Lopressor) injection 5 mg    amLODIPine (Norvasc) tablet 10 mg    Other Relevant Orders    Transesophageal Echo (ARELI) (Completed)           FUO    Urine culture was contaminated, does appear to have significant pyuria.Blood culture neg so far but was on antibiotics     CT scan noted has a large liver mass .  Liver cancers can cause fevers especially if primary.  I am unclear if there is a superimposed bacterial infection present as these fevers/chills are acute from her report and assuming this mass has been present for some time.    ARELI findings noted. Lambl's excrescence , vegetation not ruled out I do not have bacteremia but once again in setting of antibiotics difficult  to be conclusive here    PLAN:   Repeat cultures pending  Change to meropenem  Will need liver biopsy if aggressive care sought

## 2025-04-13 NOTE — CARE PLAN
The patient's goals for the shift include rest and comfort    The clinical goals for the shift include Safety to prevent falls/injury, monitor for signs/symptoms of infection, increase activity with assistnace, and update with plan of care    Problem: Safety - Adult  Goal: Free from fall injury  Outcome: Progressing     Problem: Discharge Planning  Goal: Discharge to home or other facility with appropriate resources  Outcome: Progressing     Problem: Chronic Conditions and Co-morbidities  Goal: Patient's chronic conditions and co-morbidity symptoms are monitored and maintained or improved  Outcome: Progressing     Problem: Nutrition  Goal: Nutrient intake appropriate for maintaining nutritional needs  Outcome: Progressing     Problem: Fall/Injury  Goal: Not fall by end of shift  Outcome: Progressing  Goal: Be free from injury by end of the shift  Outcome: Progressing  Goal: Verbalize understanding of personal risk factors for fall in the hospital  Outcome: Progressing  Goal: Verbalize understanding of risk factor reduction measures to prevent injury from fall in the home  Outcome: Progressing  Goal: Use assistive devices by end of the shift  Outcome: Progressing  Goal: Pace activities to prevent fatigue by end of the shift  Outcome: Progressing     Problem: Skin  Goal: Decreased wound size/increased tissue granulation at next dressing change  Outcome: Progressing  Goal: Participates in plan/prevention/treatment measures  Outcome: Progressing  Goal: Prevent/manage excess moisture  Outcome: Progressing  Goal: Prevent/minimize sheer/friction injuries  Outcome: Progressing  Goal: Promote/optimize nutrition  Outcome: Progressing  Goal: Promote skin healing  Outcome: Progressing

## 2025-04-14 LAB
ALBUMIN SERPL BCP-MCNC: 2.9 G/DL (ref 3.4–5)
ALP SERPL-CCNC: 134 U/L (ref 33–136)
ALT SERPL W P-5'-P-CCNC: 16 U/L (ref 7–45)
ANION GAP SERPL CALC-SCNC: 13 MMOL/L (ref 10–20)
ANION GAP SERPL CALC-SCNC: 16 MMOL/L (ref 10–20)
AST SERPL W P-5'-P-CCNC: 20 U/L (ref 9–39)
BASOPHILS # BLD AUTO: 0.05 X10*3/UL (ref 0–0.1)
BASOPHILS NFR BLD AUTO: 0.4 %
BILIRUB SERPL-MCNC: 0.4 MG/DL (ref 0–1.2)
BUN SERPL-MCNC: 81 MG/DL (ref 6–23)
BUN SERPL-MCNC: 84 MG/DL (ref 6–23)
CALCIUM SERPL-MCNC: 7.7 MG/DL (ref 8.6–10.3)
CALCIUM SERPL-MCNC: 8 MG/DL (ref 8.6–10.3)
CHLORIDE SERPL-SCNC: 94 MMOL/L (ref 98–107)
CHLORIDE SERPL-SCNC: 96 MMOL/L (ref 98–107)
CO2 SERPL-SCNC: 26 MMOL/L (ref 21–32)
CO2 SERPL-SCNC: 27 MMOL/L (ref 21–32)
CREAT SERPL-MCNC: 3.17 MG/DL (ref 0.5–1.05)
CREAT SERPL-MCNC: 3.42 MG/DL (ref 0.5–1.05)
EGFRCR SERPLBLD CKD-EPI 2021: 12 ML/MIN/1.73M*2
EGFRCR SERPLBLD CKD-EPI 2021: 14 ML/MIN/1.73M*2
EOSINOPHIL # BLD AUTO: 0.04 X10*3/UL (ref 0–0.4)
EOSINOPHIL NFR BLD AUTO: 0.3 %
ERYTHROCYTE [DISTWIDTH] IN BLOOD BY AUTOMATED COUNT: 13.7 % (ref 11.5–14.5)
GLUCOSE BLD MANUAL STRIP-MCNC: 151 MG/DL (ref 74–99)
GLUCOSE BLD MANUAL STRIP-MCNC: 158 MG/DL (ref 74–99)
GLUCOSE BLD MANUAL STRIP-MCNC: 207 MG/DL (ref 74–99)
GLUCOSE BLD MANUAL STRIP-MCNC: 319 MG/DL (ref 74–99)
GLUCOSE SERPL-MCNC: 167 MG/DL (ref 74–99)
GLUCOSE SERPL-MCNC: 197 MG/DL (ref 74–99)
HCT VFR BLD AUTO: 31.4 % (ref 36–46)
HGB BLD-MCNC: 10.4 G/DL (ref 12–16)
IMM GRANULOCYTES # BLD AUTO: 0.1 X10*3/UL (ref 0–0.5)
IMM GRANULOCYTES NFR BLD AUTO: 0.8 % (ref 0–0.9)
LYMPHOCYTES # BLD AUTO: 2.68 X10*3/UL (ref 0.8–3)
LYMPHOCYTES NFR BLD AUTO: 20.8 %
MCH RBC QN AUTO: 27.4 PG (ref 26–34)
MCHC RBC AUTO-ENTMCNC: 33.1 G/DL (ref 32–36)
MCV RBC AUTO: 83 FL (ref 80–100)
MONOCYTES # BLD AUTO: 2.09 X10*3/UL (ref 0.05–0.8)
MONOCYTES NFR BLD AUTO: 16.3 %
NEUTROPHILS # BLD AUTO: 7.9 X10*3/UL (ref 1.6–5.5)
NEUTROPHILS NFR BLD AUTO: 61.4 %
NRBC BLD-RTO: 0 /100 WBCS (ref 0–0)
PLATELET # BLD AUTO: 360 X10*3/UL (ref 150–450)
POTASSIUM SERPL-SCNC: 3.7 MMOL/L (ref 3.5–5.3)
POTASSIUM SERPL-SCNC: 4.1 MMOL/L (ref 3.5–5.3)
PROT SERPL-MCNC: 6.7 G/DL (ref 6.4–8.2)
RBC # BLD AUTO: 3.79 X10*6/UL (ref 4–5.2)
SODIUM SERPL-SCNC: 132 MMOL/L (ref 136–145)
SODIUM SERPL-SCNC: 132 MMOL/L (ref 136–145)
WBC # BLD AUTO: 12.9 X10*3/UL (ref 4.4–11.3)

## 2025-04-14 PROCEDURE — 97535 SELF CARE MNGMENT TRAINING: CPT | Mod: GO

## 2025-04-14 PROCEDURE — 2500000001 HC RX 250 WO HCPCS SELF ADMINISTERED DRUGS (ALT 637 FOR MEDICARE OP): Performed by: INTERNAL MEDICINE

## 2025-04-14 PROCEDURE — 2500000004 HC RX 250 GENERAL PHARMACY W/ HCPCS (ALT 636 FOR OP/ED): Performed by: INTERNAL MEDICINE

## 2025-04-14 PROCEDURE — 82947 ASSAY GLUCOSE BLOOD QUANT: CPT

## 2025-04-14 PROCEDURE — 36415 COLL VENOUS BLD VENIPUNCTURE: CPT | Performed by: INTERNAL MEDICINE

## 2025-04-14 PROCEDURE — 85025 COMPLETE CBC W/AUTO DIFF WBC: CPT | Performed by: INTERNAL MEDICINE

## 2025-04-14 PROCEDURE — 80053 COMPREHEN METABOLIC PANEL: CPT | Performed by: INTERNAL MEDICINE

## 2025-04-14 PROCEDURE — 86140 C-REACTIVE PROTEIN: CPT | Performed by: INTERNAL MEDICINE

## 2025-04-14 PROCEDURE — 80048 BASIC METABOLIC PNL TOTAL CA: CPT | Mod: CCI | Performed by: INTERNAL MEDICINE

## 2025-04-14 PROCEDURE — 2500000002 HC RX 250 W HCPCS SELF ADMINISTERED DRUGS (ALT 637 FOR MEDICARE OP, ALT 636 FOR OP/ED): Performed by: INTERNAL MEDICINE

## 2025-04-14 PROCEDURE — 99232 SBSQ HOSP IP/OBS MODERATE 35: CPT | Performed by: INTERNAL MEDICINE

## 2025-04-14 PROCEDURE — 85652 RBC SED RATE AUTOMATED: CPT | Performed by: INTERNAL MEDICINE

## 2025-04-14 PROCEDURE — 1210000001 HC SEMI-PRIVATE ROOM DAILY

## 2025-04-14 RX ORDER — FUROSEMIDE 10 MG/ML
60 INJECTION INTRAMUSCULAR; INTRAVENOUS ONCE
Status: COMPLETED | OUTPATIENT
Start: 2025-04-14 | End: 2025-04-14

## 2025-04-14 RX ORDER — CLONIDINE HYDROCHLORIDE 0.1 MG/1
0.2 TABLET ORAL EVERY 12 HOURS SCHEDULED
Status: DISCONTINUED | OUTPATIENT
Start: 2025-04-14 | End: 2025-04-23 | Stop reason: HOSPADM

## 2025-04-14 RX ORDER — SODIUM CHLORIDE AND POTASSIUM CHLORIDE 150; 900 MG/100ML; MG/100ML
100 INJECTION, SOLUTION INTRAVENOUS CONTINUOUS
Status: ACTIVE | OUTPATIENT
Start: 2025-04-14 | End: 2025-04-15

## 2025-04-14 RX ADMIN — MIRTAZAPINE 15 MG: 15 TABLET, FILM COATED ORAL at 20:53

## 2025-04-14 RX ADMIN — ACETAMINOPHEN 650 MG: 325 TABLET ORAL at 20:51

## 2025-04-14 RX ADMIN — INSULIN LISPRO 4 UNITS: 100 INJECTION, SOLUTION INTRAVENOUS; SUBCUTANEOUS at 12:13

## 2025-04-14 RX ADMIN — FUROSEMIDE 60 MG: 10 INJECTION, SOLUTION INTRAMUSCULAR; INTRAVENOUS at 14:14

## 2025-04-14 RX ADMIN — INSULIN LISPRO 8 UNITS: 100 INJECTION, SOLUTION INTRAVENOUS; SUBCUTANEOUS at 20:53

## 2025-04-14 RX ADMIN — CLONIDINE HYDROCHLORIDE 0.2 MG: 0.1 TABLET ORAL at 20:52

## 2025-04-14 RX ADMIN — CLOPIDOGREL BISULFATE 75 MG: 75 TABLET, FILM COATED ORAL at 09:25

## 2025-04-14 RX ADMIN — SODIUM CHLORIDE AND POTASSIUM CHLORIDE 100 ML/HR: .9; .15 SOLUTION INTRAVENOUS at 14:15

## 2025-04-14 RX ADMIN — HYDROCHLOROTHIAZIDE 25 MG: 25 TABLET ORAL at 09:25

## 2025-04-14 RX ADMIN — ASPIRIN 81 MG: 81 TABLET, CHEWABLE ORAL at 06:47

## 2025-04-14 RX ADMIN — MEROPENEM 1 G: 1 INJECTION, POWDER, FOR SOLUTION INTRAVENOUS at 20:52

## 2025-04-14 RX ADMIN — FERROUS SULFATE TAB 325 MG (65 MG ELEMENTAL FE) 325 MG: 325 (65 FE) TAB at 09:25

## 2025-04-14 RX ADMIN — LORAZEPAM 1 MG: 1 TABLET ORAL at 20:53

## 2025-04-14 RX ADMIN — INSULIN LISPRO 2 UNITS: 100 INJECTION, SOLUTION INTRAVENOUS; SUBCUTANEOUS at 06:47

## 2025-04-14 RX ADMIN — INSULIN LISPRO 2 UNITS: 100 INJECTION, SOLUTION INTRAVENOUS; SUBCUTANEOUS at 16:31

## 2025-04-14 RX ADMIN — DOCUSATE SODIUM 100 MG: 100 CAPSULE, LIQUID FILLED ORAL at 09:25

## 2025-04-14 RX ADMIN — ENOXAPARIN SODIUM 30 MG: 40 INJECTION SUBCUTANEOUS at 16:31

## 2025-04-14 RX ADMIN — LOSARTAN POTASSIUM 100 MG: 50 TABLET, FILM COATED ORAL at 09:25

## 2025-04-14 RX ADMIN — MEROPENEM 1 G: 1 INJECTION, POWDER, FOR SOLUTION INTRAVENOUS at 09:26

## 2025-04-14 RX ADMIN — ALLOPURINOL 100 MG: 100 TABLET ORAL at 09:25

## 2025-04-14 RX ADMIN — Medication 125 MCG: at 09:25

## 2025-04-14 RX ADMIN — METOPROLOL SUCCINATE 100 MG: 50 TABLET, EXTENDED RELEASE ORAL at 09:24

## 2025-04-14 RX ADMIN — CLONIDINE HYDROCHLORIDE 0.1 MG: 0.1 TABLET ORAL at 09:25

## 2025-04-14 RX ADMIN — METOPROLOL SUCCINATE 100 MG: 50 TABLET, EXTENDED RELEASE ORAL at 20:53

## 2025-04-14 RX ADMIN — ISOSORBIDE MONONITRATE 60 MG: 60 TABLET, EXTENDED RELEASE ORAL at 09:25

## 2025-04-14 RX ADMIN — AMLODIPINE BESYLATE 10 MG: 5 TABLET ORAL at 06:47

## 2025-04-14 ASSESSMENT — COGNITIVE AND FUNCTIONAL STATUS - GENERAL
MOBILITY SCORE: 20
HELP NEEDED FOR BATHING: A LOT
TOILETING: A LITTLE
MOVING TO AND FROM BED TO CHAIR: A LITTLE
PERSONAL GROOMING: A LITTLE
STANDING UP FROM CHAIR USING ARMS: A LITTLE
DRESSING REGULAR LOWER BODY CLOTHING: A LITTLE
CLIMB 3 TO 5 STEPS WITH RAILING: A LITTLE
WALKING IN HOSPITAL ROOM: A LITTLE
DAILY ACTIVITIY SCORE: 23
TOILETING: A LOT
DAILY ACTIVITIY SCORE: 17
DRESSING REGULAR UPPER BODY CLOTHING: A LITTLE

## 2025-04-14 ASSESSMENT — PAIN - FUNCTIONAL ASSESSMENT
PAIN_FUNCTIONAL_ASSESSMENT: 0-10
PAIN_FUNCTIONAL_ASSESSMENT: 0-10

## 2025-04-14 ASSESSMENT — PAIN SCALES - GENERAL
PAINLEVEL_OUTOF10: 0 - NO PAIN
PAINLEVEL_OUTOF10: 2
PAINLEVEL_OUTOF10: 0 - NO PAIN

## 2025-04-14 ASSESSMENT — ACTIVITIES OF DAILY LIVING (ADL): HOME_MANAGEMENT_TIME_ENTRY: 18

## 2025-04-14 ASSESSMENT — PAIN DESCRIPTION - ORIENTATION: ORIENTATION: MID

## 2025-04-14 ASSESSMENT — PAIN DESCRIPTION - LOCATION: LOCATION: HEAD

## 2025-04-14 NOTE — CARE PLAN
The patient's goals for the shift include rest and comfort    The clinical goals for the shift include Patient will remain hemodynamically stable over the course of my nursing shift.

## 2025-04-14 NOTE — PROGRESS NOTES
Infectious Disease Progress Note       4/14/2025    Patient is a followup regarding  Left lower lobe pneumonia  Acute cystitis without hematuria  Possible SBE? - no bacteremia and ARELI with Lambl's excrescence , vegetation not ruled out   FUO  Liver mass  Diabetes mellitus type 2/CAD history  NATALIE    Subjectively, feels tired.  Family is at bedside.        Lab Results   Component Value Date    WBC 12.9 (H) 04/14/2025    HGB 10.4 (L) 04/14/2025    HCT 31.4 (L) 04/14/2025    MCV 83 04/14/2025     04/14/2025     Lab Results   Component Value Date    GLUCOSE 167 (H) 04/14/2025    CALCIUM 8.0 (L) 04/14/2025     (L) 04/14/2025    K 3.7 04/14/2025    CO2 26 04/14/2025    CL 94 (L) 04/14/2025    BUN 81 (H) 04/14/2025    CREATININE 3.17 (H) 04/14/2025       WBC trends are being monitored. Antibiotic doses are being adjusted per most recent renal labs.     Vitals:    04/14/25 0926   BP: (!) 188/79   Pulse:    Resp:    Temp: 37.3 °C (99.1 °F)   SpO2: 95%     Awake alert oriented and interactive.  Falls asleep during conversation.  Neck supple  Heart S1-S2  Lungs bilaterally clear to auscultation with good respiratory effort  Abdomen soft nondistended, nontender to palpation  Extremities no pain    Blood cultures and urine cultures negative to date  Procalcitonin 0.70 and CRP 10.22    Patient Active Problem List   Diagnosis    Generalized weakness       Estimated Creatinine Clearance: 10.4 mL/min (A) (by C-G formula based on SCr of 3.17 mg/dL (H)).    MRI 4/11/2025  Large lateral segment left lobe hepatic mass noted on CT without  contrast 11 April 2025 was not present on the preceding CT without  contrast 2 March 2022, is confirmed on today's MRI to be solid (not  cystic); enhances, but not in the arterial phase (not suspect for  hepatocellular carcinoma) and with heterogeneous enhancement at that,  likely from necrosis; does not contain macroscopic fat. It is not a  cyst or hemangioma. 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 No free fluid or adenopathy      ASSESSMENT:  Left lower lobe pneumonia  Acute cystitis without hematuria  Possible SBE? - no bacteremia and ARELI with Lambl's excrescence , vegetation not ruled out   FUO  Liver mass  Diabetes mellitus type 2/CAD history  NATALIE    PLAN:  Patient changed to meropenem from doxycycline and Rocephin since she continued to have chills and nonspecific symptoms  Last fever noted to be on 4/12/2025  Blood culture negative and urine culture negative    From an infectious disease standpoint, would recommend liver biopsy    We will consider antibiotics for the treatment of pneumonia.  At this point, planning on Invanz at renal dose for 2 weeks for treatment of pneumonia.  Regarding documented possibility of SBE  -there is no definitive evidence of vegetation or bacteremia at this point.  No true evidence of SBE at this point    Discussed with primary    Imaging and labs were reviewed per medical records and any ID pertinent labs were also addressed  Time spent before, during and after care today, including coordination of care >40 min      Ariane Morse DO

## 2025-04-14 NOTE — PROGRESS NOTES
"Lela Barba is a 89 y.o. female on day 7 of admission presenting with Generalized weakness.    Subjective   Patient was afebrile today and denies any symptoms.  Specifically no shortness of breath, no chest pains.  No nausea and vomiting and denies any dysuria.  The rest of the review of systems were essentially benign.       Objective blood pressures are slightly on the high side but stable.  Prior blood pressure was 122/61 heart rate of 65 with a pulse oximeter reading of 91%  Sodium is low at 131 potassium 3.1 and creatinine is trending upwards currently at 2.02 from 1.19 yesterday.  Unfortunately intake and output is not measured accurately.  Patient was started on meropenem yesterday and white cell count is actually lower at 12.6 as opposed to 16.8 yesterday.  Hemoglobin is 10.1 platelet of 317  Blood sugars still staying high, may need further adjustment of insulin dose      Physical Exam  Alert oriented very excitable but did not seem to be in distress and afebrile  Head examination benign no facial asymmetry sclera nonicteric  Lungs are clear without crackles wheezing or rhonchi  Heart is regular  Abdomen soft nontender good bowel sounds no guarding  Extremities without edema with strong peripheral pulses  No focal neurological deficits    Last Recorded Vitals  Blood pressure 175/76, pulse 71, temperature 36.9 °C (98.4 °F), temperature source Temporal, resp. rate 17, height 1.626 m (5' 4\"), weight 64.9 kg (143 lb), SpO2 93%.  Intake/Output last 3 Shifts:  I/O last 3 completed shifts:  In: 50 (0.8 mL/kg) [IV Piggyback:50]  Out: 300 (4.6 mL/kg) [Urine:300 (0.1 mL/kg/hr)]  Weight: 64.9 kg     Current Facility-Administered Medications:     acetaminophen (Tylenol) tablet 650 mg, 650 mg, oral, q6h PRN, Giuseppe Francois MD, 650 mg at 04/12/25 2055    acetaminophen (Tylenol) tablet 650 mg, 650 mg, oral, q6h PRN, Giuseppe Francois MD    allopurinol (Zyloprim) tablet 100 mg, 100 mg, oral, Daily, Giuseppe Francois, " MD, 100 mg at 04/13/25 0806    amLODIPine (Norvasc) tablet 10 mg, 10 mg, oral, Daily, Giuseppe Francois MD, 10 mg at 04/13/25 0657    aspirin chewable tablet 81 mg, 81 mg, oral, Daily, Giuseppe Francois MD, 81 mg at 04/13/25 0657    atorvastatin (Lipitor) tablet 40 mg, 40 mg, oral, Daily, Giuseppe Francois MD, 40 mg at 04/13/25 2008    cholecalciferol (Vitamin D-3) capsule 125 mcg, 5,000 Units, oral, Daily, Giuseppe Francois MD, 125 mcg at 04/13/25 0806    cloNIDine (Catapres) tablet 0.1 mg, 0.1 mg, oral, q12h YANG, Giuseppe Francois MD, 0.1 mg at 04/13/25 2008    clopidogrel (Plavix) tablet 75 mg, 75 mg, oral, Daily, Giuseppe Francois MD, 75 mg at 04/13/25 0806    dextrose 50 % injection 12.5 g, 12.5 g, intravenous, q15 min PRN, Giuseppe Francois MD    dextrose 50 % injection 25 g, 25 g, intravenous, q15 min PRN, Giuseppe Francois MD    docusate sodium (Colace) capsule 100 mg, 100 mg, oral, Daily, Giuseppe Francois MD, 100 mg at 04/13/25 0806    enoxaparin (Lovenox) syringe 30 mg, 30 mg, subcutaneous, q24h, Giuseppe Francois MD, 30 mg at 04/13/25 1614    ferrous sulfate tablet 325 mg, 65 mg of iron, oral, Daily with breakfast, Giuseppe Francois MD, 325 mg at 04/13/25 0806    glucagon (Glucagen) injection 1 mg, 1 mg, intramuscular, q15 min PRN, Giuseppe Francois MD    glucagon (Glucagen) injection 1 mg, 1 mg, intramuscular, q15 min PRN, Giuseppe Francois MD    hydroCHLOROthiazide (HYDRODiuril) tablet 25 mg, 25 mg, oral, Daily, Giuseppe Francois MD, 25 mg at 04/13/25 0806    insulin lispro injection 0-10 Units, 0-10 Units, subcutaneous, 4x daily, Giuseppe Francois MD, 10 Units at 04/13/25 2050    insulin NPH and regular human (HumuLIN 70-30, NovoLIN 70-30) 100 unit/mL (70-30) injection 25 Units, 25 Units, subcutaneous, q AM, Giuseppe Francois MD, 25 Units at 04/13/25 0813    [START ON 4/14/2025] insulin NPH and regular human (HumuLIN 70-30, NovoLIN 70-30) 100 unit/mL (70-30) injection 25 Units, 25 Units, subcutaneous, Nightly, Giuseppe Francois MD     isosorbide mononitrate ER (Imdur) 24 hr tablet 60 mg, 60 mg, oral, Daily, Giuseppe Francois MD, 60 mg at 04/13/25 0806    LORazepam (Ativan) tablet 1 mg, 1 mg, oral, Nightly, Giuseppe Francois MD, 1 mg at 04/13/25 2009    losartan (Cozaar) tablet 100 mg, 100 mg, oral, Daily, Giuseppe Francois MD, 100 mg at 04/13/25 0806    menthol-zinc oxide (Calmoseptine - Risamine) 0.44-20.6 % ointment 1 Application, 1 Application, Topical, 4x daily PRN, Giuseppe Francois MD    meropenem (Merrem) 1 g in sodium chloride 0.9%  mL, 1 g, intravenous, q12h, Cash Hargrove MD, Stopped at 04/13/25 2044    metoprolol succinate XL (Toprol-XL) 24 hr tablet 100 mg, 100 mg, oral, BID, Giuseppe Francois MD, 100 mg at 04/13/25 2008    metoprolol tartrate (Lopressor) injection 5 mg, 5 mg, intravenous, q6h PRN, Giuseppe Francois MD, 5 mg at 04/12/25 0043    mirtazapine (Remeron) tablet 15 mg, 15 mg, oral, Nightly, Giuseppe Francois MD, 15 mg at 04/13/25 2009    nitroglycerin (Nitrostat) SL tablet 0.4 mg, 0.4 mg, sublingual, q5 min PRN, Giuseppe Francois MD    sodium chloride 0.9 % with KCl 20 mEq/L infusion, 75 mL/hr, intravenous, Continuous, Giuseppe Francois MD, Last Rate: 75 mL/hr at 04/13/25 2253, 75 mL/hr at 04/13/25 2253    [Held by provider] torsemide (Demadex) tablet 20 mg, 20 mg, oral, Every other day, Giuseppe Francois MD, 20 mg at 04/12/25 0947     Relevant Results      Results for orders placed or performed during the hospital encounter of 04/06/25 (from the past 24 hours)   POCT GLUCOSE   Result Value Ref Range    POCT Glucose 370 (H) 74 - 99 mg/dL   POCT GLUCOSE   Result Value Ref Range    POCT Glucose 274 (H) 74 - 99 mg/dL   POCT GLUCOSE   Result Value Ref Range    POCT Glucose 284 (H) 74 - 99 mg/dL   Comprehensive metabolic panel   Result Value Ref Range    Glucose 280 (H) 74 - 99 mg/dL    Sodium 131 (L) 136 - 145 mmol/L    Potassium 3.1 (L) 3.5 - 5.3 mmol/L    Chloride 91 (L) 98 - 107 mmol/L    Bicarbonate 26 21 - 32 mmol/L    Anion Gap 17  10 - 20 mmol/L    Urea Nitrogen 66 (H) 6 - 23 mg/dL    Creatinine 2.02 (H) 0.50 - 1.05 mg/dL    eGFR 23 (L) >60 mL/min/1.73m*2    Calcium 8.1 (L) 8.6 - 10.3 mg/dL    Albumin 3.0 (L) 3.4 - 5.0 g/dL    Alkaline Phosphatase 120 33 - 136 U/L    Total Protein 6.4 6.4 - 8.2 g/dL    AST 18 9 - 39 U/L    Bilirubin, Total 0.4 0.0 - 1.2 mg/dL    ALT 17 7 - 45 U/L   CBC and Auto Differential   Result Value Ref Range    WBC 12.6 (H) 4.4 - 11.3 x10*3/uL    nRBC 0.0 0.0 - 0.0 /100 WBCs    RBC 3.57 (L) 4.00 - 5.20 x10*6/uL    Hemoglobin 10.1 (L) 12.0 - 16.0 g/dL    Hematocrit 29.7 (L) 36.0 - 46.0 %    MCV 83 80 - 100 fL    MCH 28.3 26.0 - 34.0 pg    MCHC 34.0 32.0 - 36.0 g/dL    RDW 13.5 11.5 - 14.5 %    Platelets 317 150 - 450 x10*3/uL    Neutrophils % 73.0 40.0 - 80.0 %    Immature Granulocytes %, Automated 0.4 0.0 - 0.9 %    Lymphocytes % 13.3 13.0 - 44.0 %    Monocytes % 12.7 2.0 - 10.0 %    Eosinophils % 0.2 0.0 - 6.0 %    Basophils % 0.4 0.0 - 2.0 %    Neutrophils Absolute 9.22 (H) 1.60 - 5.50 x10*3/uL    Immature Granulocytes Absolute, Automated 0.05 0.00 - 0.50 x10*3/uL    Lymphocytes Absolute 1.68 0.80 - 3.00 x10*3/uL    Monocytes Absolute 1.60 (H) 0.05 - 0.80 x10*3/uL    Eosinophils Absolute 0.02 0.00 - 0.40 x10*3/uL    Basophils Absolute 0.05 0.00 - 0.10 x10*3/uL   POCT GLUCOSE   Result Value Ref Range    POCT Glucose 385 (H) 74 - 99 mg/dL   POCT GLUCOSE   Result Value Ref Range    POCT Glucose 241 (H) 74 - 99 mg/dL   POCT GLUCOSE   Result Value Ref Range    POCT Glucose 403 (H) 74 - 99 mg/dL    No results found.               Assessment/Plan   Assessment & Plan  Generalized weakness    Leukocytosis with febrile episodes and elevated CRP and sedimentation rate, blood cultures and urine cultures negative so far however since meropenem has been started white cell count seems to have dropped down from yesterday's 16.8-12.6 today may be responding to antibiotics.  Cannot rule out leukocytosis from malignancy.  MRI report is  not available yet but most likely patient will need a biopsy or aspiration of the mass on the liver.  In the meantime we will continue IV antibiotics.  ARELI no definite vegetations.  Type 2 diabetes with widely fluctuating blood sugars, insulin dose is being adjusted and blood sugars are better but still on the high side, further adjustments are made  Creatinine trending upwards with low potassium and sodium, will start IV fluids normal saline with potassium and monitor chemistries closely.  Will try to monitor intake and output closely.  Hypertension under better control still fluctuating blood pressures  Hyperuricemia on allopurinol  Dyslipidemia controlled on atorvastatin  Coronary artery disease on maintenance Plavix, isosorbide mononitrate ER without any complaints of chest pains or any shortness of breath  Vitamin D3 deficiency on vitamin D3 5000 units daily  Insomnia on trazodone 50 mg daily  Chronic edema, currently without edema, due to creatinine trending upwards we will hold Demadex.       I spent 40 minutes in the professional and overall care of this patient.      Giuseppe Francois MD FACP

## 2025-04-14 NOTE — PROGRESS NOTES
Occupational Therapy    OT Treatment    Patient Name: Lela Barba  MRN: [id number]  Department: [facility]  Room: [facility]111-  Today's Date: 4/14/2025  Time Calculation  Start Time: 0934  Stop Time: 0952  Time Calculation (min): 18 min      Assessment:  OT Assessment: Pt. and patient daughter explains that today she doesn't feel as well as she did yesterday. Pt. agreeable to participate and demonstrates CGA for tranfsers and functional mobility for ADL participation. Difficulty assessing orientation as well as command or following safety cues due to language barriers. Daughter assists. Min A for LB dressing this date.  Prognosis: Good  Barriers to Discharge Home: Physical needs  Physical Needs: High falls risk due to function or environment, Stair navigation into home limited by function/safety, Stair navigation to access bed limited by function/safety, Stair navigation to access bath limited by function/safety  Evaluation/Treatment Tolerance: Patient tolerated treatment well  End of Session Communication: Bedside nurse  End of Session Patient Position: Bed, 3 rail up, Alarm on  OT Assessment Results: Decreased ADL status, Decreased endurance, Decreased functional mobility, Decreased trunk control for functional activities  Prognosis: Good  Evaluation/Treatment Tolerance: Patient tolerated treatment well  Plan:  Treatment Interventions: ADL retraining, Functional transfer training, Endurance training  OT Frequency: 2 times per week  OT Discharge Recommendations: Low intensity level of continued care, Moderate intensity level of continued care  OT Recommended Transfer Status: Assist of 1  OT - OK to Discharge: Yes (when medically stable/cleared)    Subjective   Previous Visit Info:  OT Last Visit  OT Received On: 04/14/25  General:  General  Reason for Referral: ADL impairment  Referred By: David  Past Medical History Relevant to Rehab: includes: HTN, HLD, CAD, CVA, OA, anxiety, CKD, DM, GERD, edema, meningioma,  colon CA with colectomy, appy, osteoporosis  Family/Caregiver Present: Yes  Caregiver Feedback: daughter is supportive and assists in translating  Prior to Session Communication: Bedside nurse  Patient Position Received: Bed, 3 rail up, Alarm on  General Comment: Pt. is agreeable to OT treatment, however states she is cold and tired. RN in room at beginnig of session giving medications.  Precautions:  Medical Precautions: Fall precautions    Pain:  Pain Assessment  Pain Assessment: 0-10  0-10 (Numeric) Pain Score: 0 - No pain    Objective    Cognition:  Cognition  Overall Cognitive Status:  (Difficult to assess)  Insight: Mild  Impulsive: Moderately (requires continued cues for safety)  Flexibility of Thought: Reduced flexibility  Organization: Mildly disorganized  Activities of Daily Living:      Grooming  Grooming Comments: standing sinkside for hand hygiene tasks. WW placed at sinkside with cues for positioning and use. pt. leans forward on sinkside to wash hands. Fair dyn standing.    LE Dressing  LE Dressing:  (seated EOB. Min A to doff underwear and bring down to ankles. Pt. kicks off underwear over feet. Min A to thread LEs and to pull up over hips for clothing mgmt. WW placed in stance for UE support/safety)    Bed Mobility/Transfers: Bed Mobility  Bed Mobility: Yes  Bed Mobility 1  Bed Mobility 1: Supine to sitting  Level of Assistance 1: Minimum assistance  Bed Mobility Comments 1: Assist to bring trunk to upright sit and scooting hips to edge of bed. CGA to steady at edge  Bed Mobility 2  Bed Mobility  2: Sitting to supine  Level of Assistance 2: Contact guard  Bed Mobility Comments 2: CGA for steadying and safety ; pt.lays on side    Transfers  Transfer: Yes  Transfer 1  Technique 1: Sit to stand (x3 from EOB)  Transfer Device 1: Walker  Transfer Level of Assistance 1: Contact guard  Trials/Comments 1: VCs for safe hand placement on bedside and educated pt. daughter as well as daughter was telling the  patient to pull up from the walker. CGA for steadying upon stand and cues for hand placement on walker.    Functional Mobility:  Functional Mobility  Functional Mobility Performed:  (Pt. completes short distances in room and household further distances with WW to address endurance and balance training. CGA for safety and steadying throughout and cues for safety, walker navigation.)  Sitting Balance:  Static Sitting Balance  Static Sitting-Level of Assistance: Close supervision  Dynamic Sitting Balance  Dynamic Sitting-Level of Assistance: Contact guard  Standing Balance:  Static Standing Balance  Static Standing-Level of Assistance: Contact guard  Dynamic Standing Balance  Dynamic Standing-Comments: Fair to fair + with cues and redirection for walker safet y    Outcome Measures:[facility] Daily Activity  Putting on and taking off regular lower body clothing: A little  Bathing (including washing, rinsing, drying): A lot  Putting on and taking off regular upper body clothing: A little  Toileting, which includes using toilet, bedpan or urinal: A lot  Taking care of personal grooming such as brushing teeth: A little  Eating Meals: None  Daily Activity - Total Score: 17    Education Documentation  Body Mechanics, taught by Yoli Mayo OT at 4/14/2025  1:03 PM.  Learner: Family, Patient  Readiness: Acceptance  Method: Explanation  Response: Verbalizes Understanding, Needs Reinforcement    ADL Training, taught by Yoli Mayo OT at 4/14/2025  1:03 PM.  Learner: Family, Patient  Readiness: Acceptance  Method: Explanation  Response: Verbalizes Understanding, Needs Reinforcement    Education Comments  Fall risk mgmt, walker safety, and ADL compensatory training    IP EDUCATION:  Education  Individual(s) Educated: Patient (patient daughter)  Education Provided: Fall precautons, Risk and benefits of OT discussed with patient or other, POC discussed and agreed upon    Goals:  Encounter Problems        Encounter Problems (Active)       OT Goals       Independent with all functional transfers  (Progressing)       Start:  04/09/25    Expected End:  04/23/25            Good dyn standing balance during ADLs and functional activities  (Progressing)       Start:  04/09/25    Expected End:  04/23/25            Independent for LB dressing  (Progressing)       Start:  04/09/25    Expected End:  04/23/25            Independent for toileting tasks and clothing mgmt  (Progressing)       Start:  04/09/25    Expected End:  04/23/25            Pt. will tolerate 10 minutes ADLs/therapeutic activity without rest break.  (Progressing)       Start:  04/09/25    Expected End:  04/23/25

## 2025-04-14 NOTE — CARE PLAN
Problem: Safety - Adult  Goal: Free from fall injury  Outcome: Progressing  Flowsheets (Taken 4/14/2025 1002)  Free from fall injury:   Instruct family/caregiver on patient safety   Based on caregiver fall risk screen, instruct family/caregiver to ask for assistance with transferring infant if caregiver noted to have fall risk factors     Problem: Discharge Planning  Goal: Discharge to home or other facility with appropriate resources  Outcome: Progressing  Flowsheets (Taken 4/14/2025 1002)  Discharge to home or other facility with appropriate resources:   Identify barriers to discharge with patient and caregiver   Identify discharge learning needs (meds, wound care, etc)   Refer to discharge planning if patient needs post-hospital services based on physician order or complex needs related to functional status, cognitive ability or social support system     Problem: Chronic Conditions and Co-morbidities  Goal: Patient's chronic conditions and co-morbidity symptoms are monitored and maintained or improved  Outcome: Progressing  Flowsheets (Taken 4/14/2025 1002)  Care Plan - Patient's Chronic Conditions and Co-Morbidity Symptoms are Monitored and Maintained or Improved:   Monitor and assess patient's chronic conditions and comorbid symptoms for stability, deterioration, or improvement   Collaborate with multidisciplinary team to address chronic and comorbid conditions and prevent exacerbation or deterioration   Update acute care plan with appropriate goals if chronic or comorbid symptoms are exacerbated and prevent overall improvement and discharge     Problem: Nutrition  Goal: Nutrient intake appropriate for maintaining nutritional needs  Outcome: Progressing     Problem: Fall/Injury  Goal: Not fall by end of shift  Outcome: Progressing  Goal: Be free from injury by end of the shift  Outcome: Progressing  Goal: Verbalize understanding of personal risk factors for fall in the hospital  Outcome: Progressing  Goal:  Verbalize understanding of risk factor reduction measures to prevent injury from fall in the home  Outcome: Progressing  Goal: Use assistive devices by end of the shift  Outcome: Progressing  Goal: Pace activities to prevent fatigue by end of the shift  Outcome: Progressing     Problem: Skin  Goal: Decreased wound size/increased tissue granulation at next dressing change  Outcome: Progressing  Goal: Participates in plan/prevention/treatment measures  Outcome: Progressing  Goal: Prevent/manage excess moisture  Outcome: Progressing  Goal: Prevent/minimize sheer/friction injuries  Outcome: Progressing  Goal: Promote/optimize nutrition  Outcome: Progressing  Goal: Promote skin healing  Outcome: Progressing   The patient's goals for the shift include rest and comfort    The clinical goals for the shift include patient will be safe and free from falls for shift

## 2025-04-14 NOTE — PROGRESS NOTES
"  Infectious Disease      History of Present Illness:   Brought in with having chills.  Body aches.  Apparently just with new findings over several days prior to admission.  According to the daughter who helps the mother and lives close by this was a new finding mother was not really complaining of anything other than feeling tired.  The emergency room there is some question of pneumonia and UTI she was started on doxycycline and Rocephin.  She is continuing to have chills at this time she does not give any very specific symptoms otherwise.  No recent travel history she is originally from [place] no sick contacts no animal exposures.  Does not denies any headache denies any neck stiffness.  Mother's mental status seems to be at baseline according to daughter.    Patient feeling okay at this time , less fevers today      Physical Exam:    Blood pressure 175/76, pulse 71, temperature 36.9 °C (98.4 °F), resp. rate 18, height 1.626 m (5' 4\"), weight 64.9 kg (143 lb), SpO2 93%.  General: Patient appears ok at the present time. NAD  Skin: no new rashes  HEENT:  Neck is supple, No subconjunctival hemorrhages, no oral exudates  Heart: S1 S2  Lungs:diminished mildly bases  Abdomen: soft, ND, NTTP,  Back :no CVA tenderness  Extrem: No edema, non tender  Neuro exam: CN II-XII intact  Psych: cooperative    Labs:  I have reviewed all lab results by electronic record, including most recent CBC, metabolic panel, and pertinent abnormalities were addressed from an infectious disease perspective.  Trends are being monitored over time.    Lab Results   Component Value Date    WBC 12.6 (H) 04/13/2025    HGB 10.1 (L) 04/13/2025    HCT 29.7 (L) 04/13/2025    MCV 83 04/13/2025     04/13/2025     Lab Results   Component Value Date    GLUCOSE 280 (H) 04/13/2025    CALCIUM 8.1 (L) 04/13/2025     (L) 04/13/2025    K 3.1 (L) 04/13/2025    CO2 26 04/13/2025    CL 91 (L) 04/13/2025    BUN 66 (H) 04/13/2025    CREATININE 2.02 (H) " 04/13/2025       Radiology:  I have reviewed imaging results per electronic record and most pertinent abnormalities are being addressed from an infectious disease standpoint.            ASSESSMENT:  Problem List Items Addressed This Visit          Symptoms and Signs    * (Principal) Generalized weakness - Primary     Other Visit Diagnoses       Pneumonia of left lower lobe due to infectious organism        Acute cystitis without hematuria        Relevant Orders    Transesophageal Echo ([facility]I) (Completed)    SBE (subacute bacterial endocarditis) ([facility])        Relevant Medications    amLODIPine (Norvasc) 5 mg tablet    Plavix 75 mg tablet    isosorbide mononitrate ER (Imdur) 60 mg 24 hr tablet    metoprolol succinate XL (Toprol-XL) 100 mg 24 hr tablet    nitroglycerin (Nitrostat) 0.4 mg SL tablet    isosorbide mononitrate ER (Imdur) 24 hr tablet 60 mg    metoprolol succinate XL (Toprol-XL) 24 hr tablet 100 mg    nitroglycerin (Nitrostat) SL tablet 0.4 mg    clopidogrel (Plavix) tablet 75 mg    NIFEdipine ER (Adalat CC) 90 mg 24 hr tablet    clopidogrel (Plavix) 75 mg tablet    NIFEdipine ER (NIFEdipine CC) 90 mg 24 hr tablet    metoprolol tartrate (Lopressor) injection 5 mg    amLODIPine (Norvasc) tablet 10 mg    Other Relevant Orders    Transesophageal Echo ([facility]ELI) (Completed)           FUO    Urine culture was contaminated, does appear to have significant pyuria.Blood culture neg so far but was on antibiotics     CT scan noted has a large liver mass .  Liver cancers can cause fevers especially if primary.  I am unclear if there is a superimposed bacterial infection present as these fevers/chills are acute from her report and assuming this mass has been present for some time.    ARELI findings noted. Lambl's excrescence , vegetation not ruled out I do not have bacteremia but once again in setting of antibiotics difficult to be conclusive here    PLAN:   Repeat cultures pending  Change to meropenem, fever curve does seem  improved on it ?causative  Will need liver biopsy if aggressive care sought  MRI was ordered

## 2025-04-15 ENCOUNTER — APPOINTMENT (OUTPATIENT)
Dept: RADIOLOGY | Facility: HOSPITAL | Age: OVER 89
DRG: 194 | End: 2025-04-15
Payer: MEDICARE

## 2025-04-15 LAB
ANION GAP SERPL CALC-SCNC: 13 MMOL/L (ref 10–20)
BUN SERPL-MCNC: 80 MG/DL (ref 6–23)
CALCIUM SERPL-MCNC: 7.6 MG/DL (ref 8.6–10.3)
CHLORIDE SERPL-SCNC: 99 MMOL/L (ref 98–107)
CO2 SERPL-SCNC: 25 MMOL/L (ref 21–32)
CREAT SERPL-MCNC: 2.67 MG/DL (ref 0.5–1.05)
CRP SERPL-MCNC: 18.42 MG/DL
EGFRCR SERPLBLD CKD-EPI 2021: 17 ML/MIN/1.73M*2
ERYTHROCYTE [SEDIMENTATION RATE] IN BLOOD BY WESTERGREN METHOD: >130 MM/H (ref 0–30)
GLUCOSE BLD MANUAL STRIP-MCNC: 231 MG/DL (ref 74–99)
GLUCOSE BLD MANUAL STRIP-MCNC: 261 MG/DL (ref 74–99)
GLUCOSE BLD MANUAL STRIP-MCNC: 291 MG/DL (ref 74–99)
GLUCOSE BLD MANUAL STRIP-MCNC: 305 MG/DL (ref 74–99)
GLUCOSE BLD MANUAL STRIP-MCNC: 51 MG/DL (ref 74–99)
GLUCOSE BLD MANUAL STRIP-MCNC: 57 MG/DL (ref 74–99)
GLUCOSE BLD MANUAL STRIP-MCNC: 71 MG/DL (ref 74–99)
GLUCOSE SERPL-MCNC: 339 MG/DL (ref 74–99)
POTASSIUM SERPL-SCNC: 5.1 MMOL/L (ref 3.5–5.3)
PROCALCITONIN SERPL-MCNC: 2.52 NG/ML
SODIUM SERPL-SCNC: 132 MMOL/L (ref 136–145)

## 2025-04-15 PROCEDURE — 2500000004 HC RX 250 GENERAL PHARMACY W/ HCPCS (ALT 636 FOR OP/ED): Performed by: INTERNAL MEDICINE

## 2025-04-15 PROCEDURE — 80048 BASIC METABOLIC PNL TOTAL CA: CPT | Performed by: INTERNAL MEDICINE

## 2025-04-15 PROCEDURE — 2500000001 HC RX 250 WO HCPCS SELF ADMINISTERED DRUGS (ALT 637 FOR MEDICARE OP): Performed by: INTERNAL MEDICINE

## 2025-04-15 PROCEDURE — 89190 NASAL SMEAR FOR EOSINOPHILS: CPT | Mod: ELYLAB | Performed by: INTERNAL MEDICINE

## 2025-04-15 PROCEDURE — 2500000002 HC RX 250 W HCPCS SELF ADMINISTERED DRUGS (ALT 637 FOR MEDICARE OP, ALT 636 FOR OP/ED): Performed by: INTERNAL MEDICINE

## 2025-04-15 PROCEDURE — 84145 PROCALCITONIN (PCT): CPT | Mod: ELYLAB | Performed by: INTERNAL MEDICINE

## 2025-04-15 PROCEDURE — 82947 ASSAY GLUCOSE BLOOD QUANT: CPT

## 2025-04-15 PROCEDURE — 71250 CT THORAX DX C-: CPT | Performed by: RADIOLOGY

## 2025-04-15 PROCEDURE — 36415 COLL VENOUS BLD VENIPUNCTURE: CPT | Performed by: INTERNAL MEDICINE

## 2025-04-15 PROCEDURE — 99232 SBSQ HOSP IP/OBS MODERATE 35: CPT | Performed by: INTERNAL MEDICINE

## 2025-04-15 PROCEDURE — 71250 CT THORAX DX C-: CPT

## 2025-04-15 PROCEDURE — 1210000001 HC SEMI-PRIVATE ROOM DAILY

## 2025-04-15 RX ORDER — SODIUM CHLORIDE 9 MG/ML
75 INJECTION, SOLUTION INTRAVENOUS CONTINUOUS
Status: ACTIVE | OUTPATIENT
Start: 2025-04-15 | End: 2025-04-16

## 2025-04-15 RX ORDER — FUROSEMIDE 10 MG/ML
80 INJECTION INTRAMUSCULAR; INTRAVENOUS ONCE
Status: COMPLETED | OUTPATIENT
Start: 2025-04-15 | End: 2025-04-15

## 2025-04-15 RX ADMIN — FUROSEMIDE 80 MG: 10 INJECTION, SOLUTION INTRAMUSCULAR; INTRAVENOUS at 13:42

## 2025-04-15 RX ADMIN — CLONIDINE HYDROCHLORIDE 0.2 MG: 0.1 TABLET ORAL at 20:43

## 2025-04-15 RX ADMIN — METOPROLOL SUCCINATE 100 MG: 50 TABLET, EXTENDED RELEASE ORAL at 20:43

## 2025-04-15 RX ADMIN — SODIUM CHLORIDE 75 ML/HR: 0.9 INJECTION, SOLUTION INTRAVENOUS at 17:10

## 2025-04-15 RX ADMIN — MEROPENEM 1 G: 1 INJECTION, POWDER, FOR SOLUTION INTRAVENOUS at 08:27

## 2025-04-15 RX ADMIN — FERROUS SULFATE TAB 325 MG (65 MG ELEMENTAL FE) 325 MG: 325 (65 FE) TAB at 08:27

## 2025-04-15 RX ADMIN — ISOSORBIDE MONONITRATE 60 MG: 60 TABLET, EXTENDED RELEASE ORAL at 08:26

## 2025-04-15 RX ADMIN — MEROPENEM 1 G: 1 INJECTION, POWDER, FOR SOLUTION INTRAVENOUS at 20:43

## 2025-04-15 RX ADMIN — ENOXAPARIN SODIUM 30 MG: 40 INJECTION SUBCUTANEOUS at 17:08

## 2025-04-15 RX ADMIN — SODIUM CHLORIDE AND POTASSIUM CHLORIDE 100 ML/HR: .9; .15 SOLUTION INTRAVENOUS at 02:09

## 2025-04-15 RX ADMIN — INSULIN LISPRO 4 UNITS: 100 INJECTION, SOLUTION INTRAVENOUS; SUBCUTANEOUS at 17:08

## 2025-04-15 RX ADMIN — AMLODIPINE BESYLATE 10 MG: 5 TABLET ORAL at 06:07

## 2025-04-15 RX ADMIN — INSULIN LISPRO 8 UNITS: 100 INJECTION, SOLUTION INTRAVENOUS; SUBCUTANEOUS at 13:42

## 2025-04-15 RX ADMIN — HYDROCHLOROTHIAZIDE 25 MG: 25 TABLET ORAL at 08:27

## 2025-04-15 RX ADMIN — DOCUSATE SODIUM 100 MG: 100 CAPSULE, LIQUID FILLED ORAL at 08:27

## 2025-04-15 RX ADMIN — LORAZEPAM 1 MG: 1 TABLET ORAL at 20:43

## 2025-04-15 RX ADMIN — CLONIDINE HYDROCHLORIDE 0.2 MG: 0.1 TABLET ORAL at 08:26

## 2025-04-15 RX ADMIN — METOPROLOL SUCCINATE 100 MG: 50 TABLET, EXTENDED RELEASE ORAL at 08:27

## 2025-04-15 RX ADMIN — INSULIN LISPRO 6 UNITS: 100 INJECTION, SOLUTION INTRAVENOUS; SUBCUTANEOUS at 22:03

## 2025-04-15 RX ADMIN — MIRTAZAPINE 15 MG: 15 TABLET, FILM COATED ORAL at 20:43

## 2025-04-15 RX ADMIN — Medication 125 MCG: at 08:26

## 2025-04-15 ASSESSMENT — COGNITIVE AND FUNCTIONAL STATUS - GENERAL
DAILY ACTIVITIY SCORE: 19
HELP NEEDED FOR BATHING: A LITTLE
MOBILITY SCORE: 18
DRESSING REGULAR UPPER BODY CLOTHING: A LITTLE
TURNING FROM BACK TO SIDE WHILE IN FLAT BAD: A LITTLE
HELP NEEDED FOR BATHING: A LITTLE
STANDING UP FROM CHAIR USING ARMS: A LITTLE
DRESSING REGULAR LOWER BODY CLOTHING: A LITTLE
MOVING FROM LYING ON BACK TO SITTING ON SIDE OF FLAT BED WITH BEDRAILS: A LITTLE
PERSONAL GROOMING: A LITTLE
MOVING TO AND FROM BED TO CHAIR: A LITTLE
DRESSING REGULAR UPPER BODY CLOTHING: A LITTLE
TOILETING: A LITTLE
TOILETING: A LITTLE
DAILY ACTIVITIY SCORE: 18
TURNING FROM BACK TO SIDE WHILE IN FLAT BAD: A LITTLE
CLIMB 3 TO 5 STEPS WITH RAILING: A LOT
MOVING TO AND FROM BED TO CHAIR: A LITTLE
CLIMB 3 TO 5 STEPS WITH RAILING: A LITTLE
PERSONAL GROOMING: A LITTLE
WALKING IN HOSPITAL ROOM: A LITTLE
STANDING UP FROM CHAIR USING ARMS: A LITTLE
DRESSING REGULAR LOWER BODY CLOTHING: A LOT
WALKING IN HOSPITAL ROOM: A LITTLE
MOBILITY SCORE: 18

## 2025-04-15 ASSESSMENT — PAIN SCALES - GENERAL
PAINLEVEL_OUTOF10: 0 - NO PAIN
PAINLEVEL_OUTOF10: 0 - NO PAIN

## 2025-04-15 NOTE — NURSING NOTE
Pt on IR list for biopsy of liver mass.  Pt's last dose of asa and plavix. 4/14/2025- Hold x 5 days.  Procedure will be done on Monday.  Dr. Francois and Ashanti RAMIRES 11S informed via secure chat.

## 2025-04-15 NOTE — CARE PLAN
The patient's goals for the shift include rest and comfort    The clinical goals for the shift include patient will be safe and free from falls for shift

## 2025-04-15 NOTE — PROGRESS NOTES
"Lela Barba is a 89 y.o. female on day 9 of admission presenting with Generalized weakness.    Subjective   Patient seemingly comfortable today without any acute complaints.  Very difficult to assess because of language problems and very excitable.  Specifically patient denies any abdominal pain, she denies any unusual shortness of breath despite IV fluids, there is been no changes in her bowel habits and apparently has been urinating a lot with her diuretics.  Otherwise review of systems was essentially benign       Objective   Inflammatory process is highly suspicious with a CRP elevated at 18.42, sedimentation rate of 130, procalcitonin elevated at 2.52 with elevated white cell count.  Blood pressures have been fluctuating prior blood pressure was 170/79 heart rate of 73  Sodium slightly low at 132 potassium is normal at 4.1 with normal electrolytes and acid-base balance.  Kidney function seems to be deteriorating with BUN of 84, creatinine 3.42.  Patient is diuresing with Lasix IV.  CT scan of the chest shows no malignancy  Incidental findings of multinodular goiter.      Physical Exam   Patient is very excitable very difficult to assess hard to understand but did not seem to be in distress.  She was sitting by the side of the bed when seen tonight  Alert oriented conversant  Head examination normal no facial asymmetry  Neck veins flat without bruits no lymphadenopathy or thyroid enlargement  Lungs are clear without wheezing crackles or rhonchi  Abdomen soft and nontender good bowel sounds no guarding  Extremities without edema with good peripheral pulses  No focal neurological deficits    Last Recorded Vitals  Blood pressure 155/68, pulse 65, temperature 37.7 °C (99.9 °F), temperature source Temporal, resp. rate 16, height 1.626 m (5' 4\"), weight 74.7 kg (164 lb 9.9 oz), SpO2 96%.  Intake/Output last 3 Shifts:  I/O last 3 completed shifts:  In: 2703.8 (36.2 mL/kg) [I.V.:2303.8 (30.9 mL/kg); IV " Piggyback:400]  Out: 1002 (13.4 mL/kg) [Urine:1002 (0.4 mL/kg/hr)]  Weight: 74.7 kg     Current Facility-Administered Medications:     acetaminophen (Tylenol) tablet 650 mg, 650 mg, oral, q6h PRN, Giuseppe Francois MD, 650 mg at 04/14/25 2051    acetaminophen (Tylenol) tablet 650 mg, 650 mg, oral, q6h PRN, Giuseppe Francois MD    [Held by provider] allopurinol (Zyloprim) tablet 100 mg, 100 mg, oral, Daily, Giuseppe Francois MD, 100 mg at 04/14/25 0925    amLODIPine (Norvasc) tablet 10 mg, 10 mg, oral, Daily, Giuseppe Francois MD, 10 mg at 04/15/25 0607    [Held by provider] aspirin chewable tablet 81 mg, 81 mg, oral, Daily, Giuseppe Francois MD, 81 mg at 04/14/25 0647    [Held by provider] atorvastatin (Lipitor) tablet 40 mg, 40 mg, oral, Daily, Giuseppe Francois MD, 40 mg at 04/13/25 2008    cholecalciferol (Vitamin D-3) capsule 125 mcg, 5,000 Units, oral, Daily, Giuseppe Francois MD, 125 mcg at 04/15/25 0826    cloNIDine (Catapres) tablet 0.2 mg, 0.2 mg, oral, q12h YANG, Giuseppe Francois MD, 0.2 mg at 04/15/25 0826    [Held by provider] clopidogrel (Plavix) tablet 75 mg, 75 mg, oral, Daily, Giuseppe Francois MD, 75 mg at 04/14/25 0925    dextrose 50 % injection 12.5 g, 12.5 g, intravenous, q15 min PRN, Giuseppe Francois MD    dextrose 50 % injection 25 g, 25 g, intravenous, q15 min PRN, Giuseppe Francois MD    docusate sodium (Colace) capsule 100 mg, 100 mg, oral, Daily, Giuseppe Francois MD, 100 mg at 04/15/25 0827    enoxaparin (Lovenox) syringe 30 mg, 30 mg, subcutaneous, q24h, Giuseppe Francois MD, 30 mg at 04/15/25 1708    ferrous sulfate tablet 325 mg, 65 mg of iron, oral, Daily with breakfast, Giuseppe Francois MD, 325 mg at 04/15/25 0827    glucagon (Glucagen) injection 1 mg, 1 mg, intramuscular, q15 min PRN, Giuseppe Francois MD    glucagon (Glucagen) injection 1 mg, 1 mg, intramuscular, q15 min PRN, Giuseppe Francois MD    hydroCHLOROthiazide (HYDRODiuril) tablet 25 mg, 25 mg, oral, Daily, Giuseppe Francois MD, 25 mg at 04/15/25 0827     insulin lispro injection 0-10 Units, 0-10 Units, subcutaneous, 4x daily, Giuseppe Francois MD, 4 Units at 04/15/25 1708    insulin NPH and regular human (HumuLIN 70-30, NovoLIN 70-30) 100 unit/mL (70-30) injection 25 Units, 25 Units, subcutaneous, q AM, Giuseppe Francois MD, 25 Units at 04/14/25 0926    insulin NPH and regular human (HumuLIN 70-30, NovoLIN 70-30) 100 unit/mL (70-30) injection 25 Units, 25 Units, subcutaneous, Nightly, Giuseppe Francois MD, 25 Units at 04/14/25 2053    isosorbide mononitrate ER (Imdur) 24 hr tablet 60 mg, 60 mg, oral, Daily, Giuseppe Francois MD, 60 mg at 04/15/25 0826    LORazepam (Ativan) tablet 1 mg, 1 mg, oral, Nightly, Giuseppe Francois MD, 1 mg at 04/14/25 2053    [Held by provider] losartan (Cozaar) tablet 100 mg, 100 mg, oral, Daily, Giuseppe Francois MD, 100 mg at 04/14/25 0925    menthol-zinc oxide (Calmoseptine - Risamine) 0.44-20.6 % ointment 1 Application, 1 Application, Topical, 4x daily PRN, Giuseppe Francois MD    meropenem (Merrem) 1 g in sodium chloride 0.9%  mL, 1 g, intravenous, q12h, Cash Hargrove MD, Stopped at 04/15/25 0911    metoprolol succinate XL (Toprol-XL) 24 hr tablet 100 mg, 100 mg, oral, BID, Giuseppe Francois MD, 100 mg at 04/15/25 0827    metoprolol tartrate (Lopressor) injection 5 mg, 5 mg, intravenous, q6h PRN, Giuseppe Francois MD, 5 mg at 04/12/25 0043    mirtazapine (Remeron) tablet 15 mg, 15 mg, oral, Nightly, Giuseppe Francois MD, 15 mg at 04/14/25 2053    nitroglycerin (Nitrostat) SL tablet 0.4 mg, 0.4 mg, sublingual, q5 min PRN, Giuseppe Francois MD    sodium chloride 0.9% infusion, 75 mL/hr, intravenous, Continuous, Giuseppe Francois MD, Last Rate: 75 mL/hr at 04/15/25 1710, 75 mL/hr at 04/15/25 1710    [Held by provider] torsemide (Demadex) tablet 20 mg, 20 mg, oral, Every other day, Giuseppe Francois MD, 20 mg at 04/12/25 0947   Relevant Results      Results for orders placed or performed during the hospital encounter of 04/06/25 (from the past 24  hours)   POCT GLUCOSE   Result Value Ref Range    POCT Glucose 319 (H) 74 - 99 mg/dL   POCT GLUCOSE   Result Value Ref Range    POCT Glucose 51 (L) 74 - 99 mg/dL   POCT GLUCOSE   Result Value Ref Range    POCT Glucose 57 (L) 74 - 99 mg/dL   Procalcitonin   Result Value Ref Range    Procalcitonin 2.52 (H) <=0.07 ng/mL   POCT GLUCOSE   Result Value Ref Range    POCT Glucose 71 (L) 74 - 99 mg/dL   POCT GLUCOSE   Result Value Ref Range    POCT Glucose 291 (H) 74 - 99 mg/dL   POCT GLUCOSE   Result Value Ref Range    POCT Glucose 305 (H) 74 - 99 mg/dL   Basic metabolic panel   Result Value Ref Range    Glucose 339 (H) 74 - 99 mg/dL    Sodium 132 (L) 136 - 145 mmol/L    Potassium 5.1 3.5 - 5.3 mmol/L    Chloride 99 98 - 107 mmol/L    Bicarbonate 25 21 - 32 mmol/L    Anion Gap 13 10 - 20 mmol/L    Urea Nitrogen 80 (H) 6 - 23 mg/dL    Creatinine 2.67 (H) 0.50 - 1.05 mg/dL    eGFR 17 (L) >60 mL/min/1.73m*2    Calcium 7.6 (L) 8.6 - 10.3 mg/dL   POCT GLUCOSE   Result Value Ref Range    POCT Glucose 231 (H) 74 - 99 mg/dL   CT chest wo IV contrast    Result Date: 4/15/2025  Interpreted By:  Scooter Ribera, STUDY: CT CHEST WO IV CONTRAST;  4/15/2025 9:15 am   INDICATION: Signs/Symptoms:Liver mass possible metastatic disease primary unknown.     COMPARISON: MRI liver 12 April 2025; CT abdomen and pelvis without contrast 11 April 2025; CT chest, abdomen and pelvis without contrast 2 March 2022   ACCESSION NUMBER(S): [id number]   ORDERING CLINICIAN: PATRICIA PIZARRO   TECHNIQUE: Helical CT chest from thoracic inlet through the hemidiaphragms without intravenous contrast   FINDINGS: LUNGS / AIRSPACES / AIRWAYS:   LARGE AIRWAYS Filling defect: Negative Wall thickening: Negative Bronchiectasis: Negative Other: N/A   AIRSPACES Fibrosis: Negative Emphysema: Negative Consolidation: Negative Ground glass airspace disease: Negative Edema: Negative Nodule / Mass: Negative Other: Left-greater-than-right biapical pleural-parenchymal scarring  similar to prior. Motion artifact particularly the lung bases, but not enough to obscure large nodule   PLEURA: Effusion:  Both sides negative Pneumothorax:  Both sides negative Other:  n/a   CARDIOVASCULAR: Heart size:  Enlarged but unchanged Pericardial effusion:  Negative Thoracic aortic aneurysm:  Negative Pulmonary arteries:  No ectasia Heart failure change:  Negative.  No sign of interstitial or alveolar edema. 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 Conspicuity of the interventricular septum relative to the blood pool suggests anemia   NONVASCULAR MEDIASTINUM: Esophagus:  Grossly normal by CT Mediastinal Mass:  Negative Hiatal hernia:  Short/small sliding-type unchanged Other:  No definite interval change in the enlarged multinodular thyroid goiter   LYMPH NODES: No thoracic adenopathy   CHEST WALL: Soft tissues of the chest wall are unremarkable   SKELETON: No acute findings including no aggressive osseous lesion suspect for metastatic disease or other neoplasm   UPPER ABDOMEN: No major change from recent dedicated MRI and CT without contrast       No lung nodule or mass   No thoracic adenopathy   No pleural or pericardial effusion   Enlarged multinodular thyroid goiter has not appreciably changed compared to CT without contrast 27 February 2022, the oldest pertinent comparison exam for the thyroid   No aggressive osseous lesion in the chest   MACRO: None   Signed by: Scooter Ribera 4/15/2025 10:30 AM Dictation workstation:   [id number]                Assessment/Plan   Assessment & Plan  Generalized weakness    Leukocytosis with febrile episode, elevated CRP, sedimentation rate and procalcitonin with negative blood and urine cultures treated empirically with IV antibiotics.  Infectious disease on board.  ARELI equivocal but most likely not SB cannot rule out fever from malignancy.  Acute kidney injury most likely contrast nephropathy as well as exposure to vancomycin.  No episode  of hypotension that would result into ATN.  For now we will give the patient fluid and Lasix challenges to see if we can jumpstart her kidney function.  Urine for eosinophil has been ordered..    Liver mass with MRI showing not likely primary liver malignancy considering metastatic disease primary unknown.  CT scan of the chest without any mass noted on the chest.  Stool for occult blood is pending, liver biopsy to be done on Monday.  Plavix and aspirin have been held.  Discussed with 2 daughters.  CT scan of the chest with incidental finding of multinodular goiter  Type 2 diabetes with fluctuating blood sugars adjusting insulin dose  Hypertension with better control will keep slightly on the high side to ensure renal perfusion  Coronary artery disease, Plavix on hold, isosorbide mononitrate ER continued, no chest pains and no shortness of breath patient asymptomatic  Hyperuricemia, allopurinol on hold  Dyslipidemia, atorvastatin on hold  Vitamin D3 deficiency on vitamin D3 5000 units daily  Insomnia on trazodone 50 mg daily  History of chronic edema currently without edema however being given Lasix together with IV fluids.       I spent 40 minutes in the professional and overall care of this patient.      Giuseppe Francois MD FACP

## 2025-04-15 NOTE — CARE PLAN
The patient's goals for the shift include rest and comfort    The clinical goals for the shift included maintain safety, free from injury

## 2025-04-15 NOTE — CARE PLAN
The patient's goals for the shift include rest and comfort    The clinical goals for the shift include     Over the shift, the patient did not make progress toward the following goals. Barriers to progression include ***. Recommendations to address these barriers include ***.

## 2025-04-15 NOTE — PROGRESS NOTES
"Nutrition Follow Up Assessment:   Nutrition Assessment         Patient is a 89 y.o. female presenting with generalized weakness.       Nutrition History:  Energy Intake: Good > 75 %  Pain affecting nutrition status: N/A  Food and Nutrient History: Pt reports good appetite and intake. Has one documented meal intake of 100% on 4/9/25. Nurse confirms pt is eating well but BG has been fluctuating between high and low. Pt denies N/V/D/C.       Anthropometrics:  Height: 162.6 cm (5' 4\")   Weight: 74.7 kg (164 lb 9.9 oz)   BMI (Calculated): 28.24  IBW/kg (Dietitian Calculated): 54.5 kg                         Weight History:     Weight Change %:  Weight History / % Weight Change: New wt 74.7 kg on 4/15/25 - up 10 kg from admission wt.    Nutrition Focused Physical Exam Findings:    Subcutaneous Fat Loss:   Defer Subcutaneous Fat Loss Assessment: Defer all  Defer All Reason: completed at intial assessment  Muscle Wasting:  Defer Muscle Wasting Assessment: Defer all  Defer All Reason: completed at intial assessment  Edema:  Edema Location: non-pitting BLE per nursing assessment  Physical Findings:  Skin: Negative  Teeth Findings: Impaired dentition    Nutrition Significant Labs:    Reviewed   Nutrition Specific Medications:  Reviewed     I/O:   Last BM Date: 04/14/25; Stool Appearance: Soft (04/14/25 1300)    Dietary Orders (From admission, onward)       Start     Ordered    04/15/25 1048  Adult diet Consistent Carb; CCD 75 gm/meal; Soft and bite sized 6  Diet effective now        Question Answer Comment   Diet type Consistent Carb    Carb diet selection: CCD 75 gm/meal    Texture Soft and bite sized 6        04/15/25 1047    04/07/25 0957  May Participate in Room Service  ( ROOM SERVICE MAY PARTICIPATE)  Once        Question:  .  Answer:  Yes    04/07/25 0906                     Estimated Needs:   Total Energy Estimated Needs in 24 hours (kCal): 1625 kCal  Method for Estimating Needs: 25 kcal/kg ABW  Total Protein " Estimated Needs in 24 Hours (g): 52 g  Method for Estimating 24 Hour Protein Needs: 0.8 g/kg ABW  Total Fluid Estimated Needs in 24 Hours (mL): 1625 mL  Method for Estimating 24 Hour Fluid Needs: 1 ml/kcal or per MD  Patient on Order Fluid Restriction: No        Nutrition Diagnosis   Malnutrition Diagnosis  Patient has Malnutrition Diagnosis: No    Nutrition Diagnosis  Patient has Nutrition Diagnosis: Yes  Diagnosis Status (1): New  Nutrition Diagnosis 1: Altered nutrition related to laboratory values  Related to (1): diabetes, illness, lack of carbohydrate controlled diet  As Evidenced by (1): BG ranging from 57 to >400 mg/dl during admission  Additional Nutrition Diagnosis: Diagnosis 2  Nutrition Diagnosis 2: No nutrition diagnosis at this time       Nutrition Interventions/Recommendations   Nutrition prescription for oral nutrition    Nutrition Recommendations:  Individualized Nutrition Prescription Provided for : 75 g CHO/meal consistent carbohydrate diet; texture and consistency per SLP - cardiac diet restriction removed to promote continued adequate intake.    Nutrition Interventions/Goals:   Meals and Snacks: Carbohydrate-modified diet, Texture-modified diet  Goal: Consumes 3 meals per day  Coordination of Care with Providers: Nursing  Goal: spoke with nurse regarding diet and pt's PO intake      Education Documentation  No documentation found.     Patient with no diet related questions at this time.         Nutrition Monitoring and Evaluation   Food/Nutrient Related History Monitoring  Monitoring and Evaluation Plan: Intake / amount of food, Estimated Energy Intake  Estimated Energy Intake: Energy intake greater or equal to 75% of estimated energy needs  Intake / Amount of food: Consumes at least 75% or more of meals/snacks/supplements, Meets > 75% estimated energy needs    Anthropometric Measurements  Monitoring and Evaluation Plan: Body weight  Body Weight: Body weight - Maintain stable  weight    Biochemical Data, Medical Tests and Procedures  Monitoring and Evaluation Plan: Electrolyte/renal panel, Glucose/endocrine profile  Electrolyte and Renal Panel: Electrolytes within normal limits  Glucose/Endocrine Profile: Glucose within normal limits ( mg/dL)         Goal Status: Some progress toward goal(s)    Time Spent (min): 30 minutes

## 2025-04-15 NOTE — PROGRESS NOTES
"Lela Barba is a 89 y.o. female on day 8 of admission presenting with Generalized weakness.    Subjective   Patient is clinically stable with stable vital signs and more controlled blood pressure as well as blood sugar.  Review of systems were essentially benign.       Objective   Vital signs are stable with blood pressures fluctuating current blood pressure at 173/75 with a prior blood pressure of 156/66 heart rate of 68.  Acute kidney injury noted with creatinine trending upwards currently at 3.17 with a GFR of 14 with normal potassium and slightly low sodium otherwise normal acid-base balance  Blood sugars better controlled  White cell count is staying at 12.9 hemoglobin 10.4 and platelet of 360  MRI shows solid mass in the left liver.  Possibly metastatic disease.  Radiologist does not feel it is a primary liver malignancy.  Will do further workup  Albumin is 2.9.      Physical Exam  Patient alert oriented hyperexcitable and extremely anxious but no distress  Head examination normal no facial asymmetry  Neck veins flat without bruits no lymphadenopathy or thyroid enlargement  Lungs are clear without any wheezing crackles or rhonchi  Abdomen soft and nontender good bowel sounds no guarding  Extremities without edema with good peripheral pulses  No focal neurological deficits    Last Recorded Vitals  Blood pressure 173/75, pulse 78, temperature 37.3 °C (99.1 °F), resp. rate 17, height 1.626 m (5' 4\"), weight 71 kg (156 lb 8.4 oz), SpO2 93%.  Intake/Output last 3 Shifts:  I/O last 3 completed shifts:  In: 1128.8 (15.9 mL/kg) [I.V.:928.8 (13.1 mL/kg); IV Piggyback:200]  Out: 502 (7.1 mL/kg) [Urine:502 (0.2 mL/kg/hr)]  Weight: 71 kg     Current Facility-Administered Medications:     acetaminophen (Tylenol) tablet 650 mg, 650 mg, oral, q6h PRN, Giuseppe Francois MD, 650 mg at 04/14/25 2051    acetaminophen (Tylenol) tablet 650 mg, 650 mg, oral, q6h PRN, Giuseppe Francois MD    [Held by provider] allopurinol " (Zyloprim) tablet 100 mg, 100 mg, oral, Daily, Giuseppe Francois MD, 100 mg at 04/14/25 0925    amLODIPine (Norvasc) tablet 10 mg, 10 mg, oral, Daily, Giuseppe Francois MD, 10 mg at 04/14/25 0647    [Held by provider] aspirin chewable tablet 81 mg, 81 mg, oral, Daily, Giuseppe Francois MD, 81 mg at 04/14/25 0647    [Held by provider] atorvastatin (Lipitor) tablet 40 mg, 40 mg, oral, Daily, Giuseppe Francois MD, 40 mg at 04/13/25 2008    cholecalciferol (Vitamin D-3) capsule 125 mcg, 5,000 Units, oral, Daily, Giuseppe Francois MD, 125 mcg at 04/14/25 0925    cloNIDine (Catapres) tablet 0.2 mg, 0.2 mg, oral, q12h YANG, Giuseppe Francois MD, 0.2 mg at 04/14/25 2052    [Held by provider] clopidogrel (Plavix) tablet 75 mg, 75 mg, oral, Daily, Giuseppe Francois MD, 75 mg at 04/14/25 0925    dextrose 50 % injection 12.5 g, 12.5 g, intravenous, q15 min PRN, Giuseppe Francois MD    dextrose 50 % injection 25 g, 25 g, intravenous, q15 min PRN, Giuseppe Francois MD    docusate sodium (Colace) capsule 100 mg, 100 mg, oral, Daily, Giuseppe Francois MD, 100 mg at 04/14/25 0925    enoxaparin (Lovenox) syringe 30 mg, 30 mg, subcutaneous, q24h, Giuseppe Francois MD, 30 mg at 04/14/25 1631    ferrous sulfate tablet 325 mg, 65 mg of iron, oral, Daily with breakfast, Giuseppe Francois MD, 325 mg at 04/14/25 0925    glucagon (Glucagen) injection 1 mg, 1 mg, intramuscular, q15 min PRN, Giuseppe Francois MD    glucagon (Glucagen) injection 1 mg, 1 mg, intramuscular, q15 min PRN, Giuseppe Francois MD    hydroCHLOROthiazide (HYDRODiuril) tablet 25 mg, 25 mg, oral, Daily, Giuseppe Francois MD, 25 mg at 04/14/25 0925    insulin lispro injection 0-10 Units, 0-10 Units, subcutaneous, 4x daily, Giuseppe Francois MD, 8 Units at 04/14/25 2053    insulin NPH and regular human (HumuLIN 70-30, NovoLIN 70-30) 100 unit/mL (70-30) injection 25 Units, 25 Units, subcutaneous, q AM, Giuseppe Francois MD, 25 Units at 04/14/25 0926    insulin NPH and regular human (HumuLIN 70-30, NovoLIN  70-30) 100 unit/mL (70-30) injection 25 Units, 25 Units, subcutaneous, Nightly, Giuseppe Francois MD, 25 Units at 04/14/25 2053    isosorbide mononitrate ER (Imdur) 24 hr tablet 60 mg, 60 mg, oral, Daily, Giuseppe Francois MD, 60 mg at 04/14/25 0925    LORazepam (Ativan) tablet 1 mg, 1 mg, oral, Nightly, Giuseppe Francois MD, 1 mg at 04/14/25 2053    [Held by provider] losartan (Cozaar) tablet 100 mg, 100 mg, oral, Daily, Giuseppe Francois MD, 100 mg at 04/14/25 0925    menthol-zinc oxide (Calmoseptine - Risamine) 0.44-20.6 % ointment 1 Application, 1 Application, Topical, 4x daily PRN, Giuseppe Francois MD    meropenem (Merrem) 1 g in sodium chloride 0.9%  mL, 1 g, intravenous, q12h, Cash Hargrove MD, Stopped at 04/14/25 2131    metoprolol succinate XL (Toprol-XL) 24 hr tablet 100 mg, 100 mg, oral, BID, Giuseppe Francois MD, 100 mg at 04/14/25 2053    metoprolol tartrate (Lopressor) injection 5 mg, 5 mg, intravenous, q6h PRN, Giuseppe Francois MD, 5 mg at 04/12/25 0043    mirtazapine (Remeron) tablet 15 mg, 15 mg, oral, Nightly, Giuseppe Francois MD, 15 mg at 04/14/25 2053    nitroglycerin (Nitrostat) SL tablet 0.4 mg, 0.4 mg, sublingual, q5 min PRN, Giuseppe Francois MD    sodium chloride 0.9 % with KCl 20 mEq/L infusion, 100 mL/hr, intravenous, Continuous, Giuseppe Francois MD, Last Rate: 100 mL/hr at 04/14/25 1415, 100 mL/hr at 04/14/25 1415    [Held by provider] torsemide (Demadex) tablet 20 mg, 20 mg, oral, Every other day, Giuseppe Francois MD, 20 mg at 04/12/25 8149     Relevant Results      Results for orders placed or performed during the hospital encounter of 04/06/25 (from the past 24 hours)   POCT GLUCOSE   Result Value Ref Range    POCT Glucose 158 (H) 74 - 99 mg/dL   Comprehensive metabolic panel   Result Value Ref Range    Glucose 167 (H) 74 - 99 mg/dL    Sodium 132 (L) 136 - 145 mmol/L    Potassium 3.7 3.5 - 5.3 mmol/L    Chloride 94 (L) 98 - 107 mmol/L    Bicarbonate 26 21 - 32 mmol/L    Anion Gap 16 10 - 20  mmol/L    Urea Nitrogen 81 (H) 6 - 23 mg/dL    Creatinine 3.17 (H) 0.50 - 1.05 mg/dL    eGFR 14 (L) >60 mL/min/1.73m*2    Calcium 8.0 (L) 8.6 - 10.3 mg/dL    Albumin 2.9 (L) 3.4 - 5.0 g/dL    Alkaline Phosphatase 134 33 - 136 U/L    Total Protein 6.7 6.4 - 8.2 g/dL    AST 20 9 - 39 U/L    Bilirubin, Total 0.4 0.0 - 1.2 mg/dL    ALT 16 7 - 45 U/L   CBC and Auto Differential   Result Value Ref Range    WBC 12.9 (H) 4.4 - 11.3 x10*3/uL    nRBC 0.0 0.0 - 0.0 /100 WBCs    RBC 3.79 (L) 4.00 - 5.20 x10*6/uL    Hemoglobin 10.4 (L) 12.0 - 16.0 g/dL    Hematocrit 31.4 (L) 36.0 - 46.0 %    MCV 83 80 - 100 fL    MCH 27.4 26.0 - 34.0 pg    MCHC 33.1 32.0 - 36.0 g/dL    RDW 13.7 11.5 - 14.5 %    Platelets 360 150 - 450 x10*3/uL    Neutrophils % 61.4 40.0 - 80.0 %    Immature Granulocytes %, Automated 0.8 0.0 - 0.9 %    Lymphocytes % 20.8 13.0 - 44.0 %    Monocytes % 16.3 2.0 - 10.0 %    Eosinophils % 0.3 0.0 - 6.0 %    Basophils % 0.4 0.0 - 2.0 %    Neutrophils Absolute 7.90 (H) 1.60 - 5.50 x10*3/uL    Immature Granulocytes Absolute, Automated 0.10 0.00 - 0.50 x10*3/uL    Lymphocytes Absolute 2.68 0.80 - 3.00 x10*3/uL    Monocytes Absolute 2.09 (H) 0.05 - 0.80 x10*3/uL    Eosinophils Absolute 0.04 0.00 - 0.40 x10*3/uL    Basophils Absolute 0.05 0.00 - 0.10 x10*3/uL   POCT GLUCOSE   Result Value Ref Range    POCT Glucose 207 (H) 74 - 99 mg/dL   Basic metabolic panel   Result Value Ref Range    Glucose 197 (H) 74 - 99 mg/dL    Sodium 132 (L) 136 - 145 mmol/L    Potassium 4.1 3.5 - 5.3 mmol/L    Chloride 96 (L) 98 - 107 mmol/L    Bicarbonate 27 21 - 32 mmol/L    Anion Gap 13 10 - 20 mmol/L    Urea Nitrogen 84 (H) 6 - 23 mg/dL    Creatinine 3.42 (H) 0.50 - 1.05 mg/dL    eGFR 12 (L) >60 mL/min/1.73m*2    Calcium 7.7 (L) 8.6 - 10.3 mg/dL   POCT GLUCOSE   Result Value Ref Range    POCT Glucose 151 (H) 74 - 99 mg/dL   POCT GLUCOSE   Result Value Ref Range    POCT Glucose 319 (H) 74 - 99 mg/dL    MR liver w and wo IV contrast    Result  Date: 4/14/2025  Interpreted By:  Scooter Ribera, STUDY: MR LIVER W AND WO IV CONTRAST;  4/12/2025 10:55 am   INDICATION: Signs/Symptoms:large liver mass on CT scan.     COMPARISON: CT abdomen and pelvis without contrast 11 April 2025; right upper quadrant ultrasound 2 March 2022; CT chest, abdomen and pelvis without IV contrast 2 March 2022; CT abdomen and pelvis with contrast 25 February 2022 I do and 21 February 2022; Limited field of view CT chest without contrast for coronary artery calcium scoring dated 6 March 2018; right upper quadrant ultrasound 9 February 2018; renal ultrasound 26 April 2012   ACCESSION NUMBER(S): [id number]   ORDERING CLINICIAN: PATRICIA PIZARRO   TECHNIQUE: Multi-planar, multi-pulse sequence MRI abdomen before and after the uneventful administration of 13 mL gadolinium-based intravenous contrast material (Dotarem)   FINDINGS: LIVER: Size:  Within normal limits Cirrhotic change:  Negative Fatty change:  Negative Significant iron deposition:  Negative Portal venous system:  Patent throughout, including the splenic vein and upper SMV Hepatic veins:  Patent throughout Stigmata of portal hypertension:  None.  No ascites, splenomegaly or vascular consequences of portal hypertension such as varices or splenorenal shunt Solid mass: Positive:   Mass occupying the majority of the lateral segment left lobe measures up to 11.5 x 8.4 cm in the coronal plane, 10.9 x 6.5 cm in the axial plane. It is heterogeneously high in T2 signal intensity; uniformly low in T1 signal intensity; solid, not cystic; has none-arterial (hypo) enhancement but not uniformly so, likely from necrosis; does not contain macroscopic fat;   Not suspected based on the recent CT, there is another substantial size abnormality in the posterior segment right lobe near the dome, with early enhancement pattern suggesting multiple adjacent, abutting smaller masses, but on series 28, image 30, there is an apparent capsule/pseudo capsule  around a single larger mass potentially as large as 5.4 x 3.5 cm. It is morphologically similar to the other mass, with none arterial (hypo) enhancement and absence of macroscopic fat   Two additional enhancing lesions in the posterior segment right lobe on series 16, image 41   GALLBLADDER: Stone/s: Negative Polyp: Negative Dilation: Negative Wall thickening: Negative Other: n/a   BILE DUCTS: Intrahepatic dilation: Negative Extrahepatic dilation: Negative Stricture: Negative Stone/s: Negative Other: n/a   PANCREAS: Acute inflammatory change: Negative Morphologic changes of chronic pancreatitis: Negative Gland necrosis: Negative Peripancreatic collection: Negative Peripancreatic fat necrosis: Negative Main duct dilation: Negative Solid mass: Negative Cyst / cystic lesion:  Few scattered small, none larger than 15 mm   SPLEEN:  The spleen is normal in size, without focal lesion.   RIGHT ADRENAL GLAND:  No mass or hyperplasia.   LEFT ADRENAL GLAND:  No mass or hyperplasia.   RIGHT KIDNEY AND INCLUDED URETER:  No hydronephrosis, solid mass or any other acute findings.  Few subcentimeter simple cysts, Bosniak type 1, do not require follow-up. No complex cyst   LEFT KIDNEY AND INCLUDED URETER:  No hydronephrosis, solid mass or any other acute findings.  Few small simple cysts, all Bosniak type 1, do not need follow-up. The largest is at the upper pole, 23 mm diameter. No complex cyst   LYMPH NODES:  No abdominal adenopathy, intraperitoneal, retroperitoneal or otherwise.   INCLUDED BOWEL:  Normal in caliber and wall thickness.   RETROPERITONEUM:  Normal.  No hemorrhage or inflammatory change. (lymph nodes in dedicated section)   OMENTUM, MESENTERY AND PERITONEAL SPACES:  No fluid collection or free fluid.  Grossly normal omentum and mesentery.  (lymph nodes in dedicated section)   VASCULATURE:  No abdominal aortic aneurysm.  No large vessel occlusion or critical stenosis. Hepatic vasculature details above in the liver  section of this report.   LOWER CHEST:  No pleural effusion.   BONES:  Normal marrow signal intensity in the included skeleton.       Large lateral segment left lobe hepatic mass noted on CT without contrast 11 April 2025 was not present on the preceding CT without contrast 2 March 2022, is confirmed on today's MRI to be solid (not cystic); enhances, but not in the arterial phase (not suspect for hepatocellular carcinoma) and with heterogeneous enhancement at that, likely from necrosis; does not contain macroscopic fat. It is not a cyst or hemangioma. I do not suspect a benign solid etiology such as hepatic adenoma or focal nodular hyperplasia   Not suspected based on the prior CT, there are other small enhancing lesions in the liver   Especially in light of the multiplicity, favor metastatic disease   No primary malignancy evident   Bilateral adrenal nodules are adenomas   Cystic pancreatic lesions are almost likely side branch variety intraductal papillary mucinous neoplasms (IPMN), to be followed as detailed below, but not suspect for primary malignancy to cause liver metastases   Away from the liver, no other sign of a malignant process elsewhere in the abdomen. No free fluid or adenopathy   MACRO: None   Signed by: Scooter Ribera 4/14/2025 8:46 AM Dictation workstation:   [id number]    Transesophageal Echo (ARELI)    Result Date: 4/11/2025          [place] [address]  Tel [phone] Fax [phone] TRANSESOPHAGEAL ECHOCARDIOGRAM REPORT Patient Name:       NADEEM SHARONA   Reading Physician:    [id number] Yamil Farmer MD, [facility] Study Date:         4/11/2025            Ordering Provider:    [id number] PATRICIA PIZARRO MRN/PID:            [id number]             Fellow: Accession#:         [id number]         Nurse: Date of Birth/Age:  1935 / 89 years  Sonographer:          Edwina CLEMENS Gender Assigned at  F                    Additional Staff: Birth: Height:             162.56 cm            Admit Date:           4/6/2025 Weight:             64.86 kg             Admission Status:     Inpatient -                                                                Routine BSA / BMI:          1.70 m2 / 24.55      Department Location:  [address] [place] [address]                     kg/m2 Blood Pressure: 132 /78 mmHg Study Type:    TRANSESOPHAGEAL ECHO (ARELI) Diagnosis/ICD: 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 Agitated saline used as a contrast agent for intraseptal               flow evaluation. The patient was sedated.  PHYSICIAN INTERPRETATION: ARELI Details: The ARELI probe used was [id number]. Technically adequate omniplane transesophageal echocardiogram performed. Agitated saline contrast and color flow Doppler echo was performed to assess for the presence of a patent foramen ovale. ARELI Medication: The pharynx was anesthetized with Cetacaine spray and viscous lidocaine. The patient was sedated using moderate sedation. Midazolam and Fentanyl was used to sedate the patient for this exam. ARELI Procedure: The probe was passed without difficulty. The following complication was encountered during the procedure: Patient tolerated the procedure well without any apparent complications. Left Ventricle: The left ventricular systolic function is normal, with a visually estimated ejection fraction of 60-65%. There are no regional wall motion abnormalities. The left ventricular cavity size is normal. Left ventricular diastolic filling was not assessed. Mild concentric LVH.  Left Atrium: The left atrial size is normal. There is no definite left atrial thrombus present. A bubble study using agitated saline was performed. Bubble study is negative. There is a normal sized left atrial appendage. There is no definite left atrial mass present. No evidence of right-to-left shunting on agitated saline bubble contrast Normal left atrial appendage. Right Ventricle: The right ventricle is normal in size. There is normal right ventricular global systolic function. Right Atrium: The right atrial size is normal. Aortic Valve: The aortic valve is trileaflet. There is trace aortic valve regurgitation. The peak instantaneous gradient of the aortic valve is 22 mmHg. The mean gradient of the aortic valve is 9 mmHg. Aortic valve is trileaflet. Planimetry area 2.3 cm sq. there is trace-1+ aortic insufficiency at the base of the annulus at the level of right and left coronary cusp. This is due to mild leaflet coaptation secondary to calcification. There is no aortic stenosis. There is evidence of significant Lambl's excrescence primarily on the right coronary cusp. If multiple positive blood cultures could not completely exclude a small vegetation. The length is 6 mm and most consistent with Lambl's excrescence though vegetation could not be completely excluded. Mitral Valve: The mitral valve is mildly thickened. There is trace mitral valve regurgitation. Trace MR. Tricuspid Valve: The tricuspid valve is structurally normal. There is mild tricuspid regurgitation. The Doppler estimated RVSP is within normal limits at 22.9 mmHg. Pulmonic Valve: The pulmonic valve is structurally normal. There is trace pulmonic valve regurgitation. Pericardium: No pericardial effusion noted. Aorta: The aortic root is normal. Mild plaque descending thoracic aorta. Pulmonary Artery: The main pulmonary artery is normal in size, and position, with normal bifurcation into the left and right pulmonary arteries. Pulmonary Veins: The  pulmonary veins appear normal and return normally to the left atrium.  CONCLUSIONS:  1. The left ventricular systolic function is normal, with a visually estimated ejection fraction of 60-65%.  2. Mild concentric LVH.  3. There is normal right ventricular global systolic function.  4. No evidence of right-to-left shunting on agitated saline bubble contrast     Normal left atrial appendage.  5. Trace MR.  6. Right ventricular systolic pressure is within normal limits.  7. Aortic valve is trileaflet. Planimetry area 2.3 cm sq. there is trace-1+ aortic insufficiency at the base of the annulus at the level of right and left coronary cusp. This is due to mild leaflet coaptation secondary to calcification. There is no aortic stenosis. There is evidence of significant Lambl's excrescence primarily on the right coronary cusp. If multiple positive blood cultures could not completely exclude a small vegetation. The length is 6 mm and most consistent with Lambl's excrescence though vegetation could not be completely excluded.  8. Mild plaque descending thoracic aorta.  9. The main pulmonary artery is normal in size, and position, with normal bifurcation into the left and right pulmonary arteries. 10. The pulmonary veins appear normal and return normally to the left atrium. 11. No left atrial mass. 12. No left atrial thrombus. QUANTITATIVE DATA SUMMARY:  LV SYSTOLIC FUNCTION:                      Normal Ranges: EF-Visual:      63 % LV EF Reported: 63 %  AORTIC VALVE:           Normal Ranges: AoV Vmax:     2.36 m/s  (<=1.7m/s) AoV Peak P.3 mmHg (<20mmHg) AoV Mean P.0 mmHg  (1.7-11.5mmHg) AoV VTI:      46.20 cm  (18-25cm)  TRICUSPID VALVE/RVSP:          Normal Ranges: Peak TR Velocity:     2.23 m/s RV Syst Pressure:     23 mmHg  (< 30mmHg)  [id number] Yamil Farmer MD, FACC Electronically signed on 2025 at 11:10:09 AM  ** Final **     CT abdomen pelvis wo IV contrast    Result Date: 2025  Interpreted By:  Bacilio  Scooter, STUDY: CT ABDOMEN PELVIS WO IV CONTRAST;  4/11/2025 9:02 am   INDICATION: Signs/Symptoms:Possible sepsis with pyelonephritis.     COMPARISON: Renal ultrasound and CT chest, abdomen and pelvis without IV contrast 2 March 2022, both from 2 March 2022   ACCESSION NUMBER(S): [id number]   ORDERING CLINICIAN: PATRICIA PIZARRO   TECHNIQUE: CT of the abdomen and pelvis from the lung bases through the symphysis pubis without oral or IV contrast   FINDINGS: LOWER CHEST: Dependent atelectasis in the left lung base   BONES: No acute skeletal findings.   RIGHT KIDNEY AND URETER: Radiodense stone: Negative Hydronephrosis: Negative Congenital variant anatomy: Negative. No duplicated ureter or other variant anatomy. Other: n/a   LEFT KIDNEY AND URETER: Radiodense stone: Negative Hydronephrosis: Negative Congenital variant anatomy: Negative. No duplicated ureter or other variant anatomy. Other: Previously at least 26 mm left renal cyst is now only 18 mm, has changed from simple water attenuation composition to slightly above simple water attenuation composition. Findings highly suggestive of a partially collapsed hemorrhagic cyst   URINARY BLADDER: Radiodense stone: Negative Wall thickening / other inflammatory change: Negative Diverticula: Negative Congenital variant anatomy: Negative. No variant anatomy such as urachal remnant. Other: Seems over distended but otherwise normal   LIVER:   Background liver not fatty or substantially enlarged or overtly cirrhotic   Sometime since 2 March 2022, new, approximately 10 x 10 x 7 cm low-attenuation mass occupying the majority if not the entirety of the lateral segment left hepatic lobe. Within limitations of this exam without contrast, no other new mass   SPLEEN: Normal. No enlargement.   PANCREAS: Normal. No CT evidence of acute or chronic pancreatitis. No obvious  duct dilation. No gross evidence of a mass, noting low sensitivity and specificity without IV contrast.   GALLBLADDER:  Normal CT appearance. No dilation, calcified, or gas-containing stones.  Other types of gallstones could be occult on CT and detectable only by ultrasound.   BILE DUCTS: Normal. No biliary duct dilation.   ADRENAL GLANDS: On both prior CTs, a small left adrenal nodule measured in the range of a lipid rich adenoma; today it does not but it has not changed in size. Unchanged subcentimeter right adrenal lipid rich adenoma   LYMPH NODES: No adenopathy, intraperitoneal, retroperitoneal, pelvic, inguinal or otherwise.   BOWEL: Surgical change. No dilation or inflammation   STOMACH / DUODENUM: Grossly normal by CT which has limited sensitivity and specificity for the stomach and duodenum.   RETROPERITONEUM: Normal.  No acute hemorrhage or inflammatory change. Lymph nodes in a separate dedicated section.   OMENTUM, MESENTERY AND PERITONEAL SPACES: Free intraperitoneal air: Negative Free intraperitoneal fluid: Tiny quantity not acutely hemorrhagic but nevertheless abnormal free fluid adjacent to the lower margin of the liver Abscess: Negative Other: n/a   PELVIS: No obvious acute adnexal abnormality by CT   VASCULATURE: Aortic and iliac atherosclerotic calcifications without aneurysm or other acute finding.   ABDOMINAL WALL: Hernia: Negative Other: No acute or contributory abnormality.       New large (approximately 10 x 10 x 7 cm) mass occupying the majority if not the entirety of the lateral segment left hepatic lobe needs further evaluation, but cannot be fully characterized without IV contrast. MRI liver without and with intravenous contrast would have the best sensitivity and specificity. The new liver lesion is not an abscess, but a solid mass   New tiny quantity of free fluid adjacent to the liver amounts to less than 100 mL, far too small for paracentesis   No other acute findings   No hydronephrosis on either side   No stone anywhere in the urinary tract   Urinary bladder is over distended but otherwise normal,  without wall thickening, stone or acute blood clot   Small left adrenal nodule has not changed in size from 2022, but no longer meets unenhanced criteria for lipid rich adenoma. It could still be an adenoma but lipid poor adenoma   MACRO: None   Signed by: Scooter Ribera 4/11/2025 9:13 AM Dictation workstation:   [id number]    XR chest 1 view    Result Date: 4/8/2025  Interpreted By:  Scooter Ribera, STUDY: XR CHEST 1 VIEW;  4/8/2025 10:01 am   INDICATION: Signs/Symptoms:pneumonia?.     COMPARISON: Portable chest, 6 April 2025   ACCESSION NUMBER(S): [id number]   ORDERING CLINICIAN: PATRICIA PIZARRO   TECHNIQUE: Single frontal view of the chest; Portable technique   FINDINGS: Enlarged cardiac silhouette is unchanged   The aorta is calcified at the arch and proximal descending segments, but normal in caliber, without any interval change or acute finding   Lungs clear. No consolidation/infiltrate or edema       NO ACUTE DISEASE IN THE CHEST   MACRO: None   Signed by: Scooter Ribera 4/8/2025 10:54 AM Dictation workstation:   [id number]    ECG 12 lead    Result Date: 4/7/2025  Normal sinus rhythm Septal infarct , age undetermined Abnormal ECG When compared with ECG of 31-JUL-2023 10:58, No significant change was found See ED provider note for full interpretation and clinical correlation Confirmed by Dede Smalls ([id number]) on 4/7/2025 7:32:10 PM    CT head wo IV contrast    Result Date: 4/6/2025  Interpreted By:  Stevan Disla, STUDY: CT HEAD WO IV CONTRAST;  4/6/2025 8:07 pm   INDICATION: Signs/Symptoms:tremor, generalized weakness.     COMPARISON: CT head dated 07/10/2024.   ACCESSION NUMBER(S): [id number]   ORDERING CLINICIAN: KYLE BECKHAM   TECHNIQUE: Noncontrast axial CT scan of head was performed. Angled reformats in brain and bone windows were generated. The images were reviewed in bone, brain, blood and soft tissue windows.   FINDINGS: No hyperdense intracranial hemorrhage is identified. There is no new  mass effect or midline shift present.   Geographic area of cystic encephalomalacia and gliosis is present in the right frontoparietal junction, likely representing sequela of prior infarct/injury, similar in appearance to prior exam. Low volume of attenuation changes in the periventricular and subcortical white matter of bilateral cerebral hemispheres likely represent sequela of microvascular disease. Focus of decreased attenuation in the right cerebellum likely represent sequela of prior infarct/injury, also similar in appearance to prior study.   Gray-white differentiation is intact, without evidence of CT apparent transcortical infarct.   There is redemonstration of the mildly hyperdense extra-axial mass overlying the right frontoparietal junction likely representing a meningioma, similar to prior study. There is slight effacement of the adjacent brain parenchyma without significant low-density edema.   Mild-to-moderate brain parenchymal volume loss is present without abnormal ventricular dilatation. Basal cisterns are patent. No extra-axial fluid collection is identified.   Scalp soft tissues do not demonstrate any acute abnormality. No skull fracture or other acute calvarial abnormality is present.   Mastoid air cells and middle ear cavities are clear. Complete opacification of the left maxillary sinus, similar to prior study in July of 2024.       1.  No evidence of hemorrhage, CT apparent transcortical infarct, or other acute intracranial abnormality. 2. Mild volume of attenuation changes in the periventricular and subcortical white matter of bilateral cerebral hemispheres is favored to represent sequela of microvascular disease. Geographic area of cystic encephalomalacia and gliosis in the right frontoparietal junction likely represent sequela of prior infarct/injury. 3. Extra-axial mildly hyperdense mass overlies the medial posterior right frontal lobe with slight effacement of the adjacent brain parenchyma  but without significant low-density edema, essentially unchanged in appearance to prior study, likely representing a meningioma. 4. Near-complete opacification of the partially visualized left maxillary sinus, similar to prior exam. Correlate with symptoms of chronic sinusitis.   MACRO: None   Signed by: Stevan Disla 4/6/2025 8:46 PM Dictation workstation:   [id number]    XR chest 1 view    Result Date: 4/6/2025  Interpreted By:  Elbert Patel, STUDY: XR CHEST 1 VIEW  4/6/2025 6:49 pm   INDICATION: Signs/Symptoms:Chest Pain   COMPARISON: 03/03/2022   ACCESSION NUMBER(S): [id number]   ORDERING CLINICIAN: KYLE BECKHAM   TECHNIQUE: A single AP portable radiograph of the chest was obtained.   FINDINGS: Mild hazy opacity is seen at the left lung base, and may represent atelectasis and/or pneumonia. No pneumothorax is identified. The cardiac silhouette is within normal limits for size.       Mild hazy opacity at the left lung base, as above. Clinical correlation and continued follow-up until clearing is recommended.   MACRO: None.   Signed by: Elbert Patel 4/6/2025 6:52 PM Dictation workstation:   [id number]                Assessment/Plan   Assessment & Plan  Generalized weakness    Leukocytosis with febrile episode elevated CRP and sedimentation rate with blood cultures and urine culture negative.  Seen by Dr. Morse today and feels that the patient should be changed to Invanz to treat left lower lobe pneumonia and acute cystitis.  She does not believe patient has SBE.  Will continue to follow-up CBC and periodically CRP and sed rate as well as procalcitonin.  Still currently on meropenem but I understand Dr. Morse will change that to Invanz  Acute kidney injury most likely secondary to exposure to IV contrast, as well as vancomycin.  No evidence of sudden drop in blood pressure to cause ATN.  Will keep on IV fluids for now, will give trials of diuretics, held noncritical medications that would possibly  affect kidney function.  We will try to stay away from using any potentially nephrotoxic medications and further IV contrast.  Will continue to monitor intake and output, as well as chemistries on a daily basis and as needed bladder scans  Liver mass, MRI shows most likely not the primary liver malignancy, considered metastatic disease.  Will do a CT scan of the chest without IV contrast to screen for lung malignancy, we will check stool for occult blood, an order for a liver biopsy.  Plavix and aspirin has been held, we will hold Lovenox prior to the biopsy.  Type 2 diabetes with fluctuating blood sugars with better control after adjustment of insulin dosing  Hypertension is better controlled but stable fluctuating blood pressures we will keep slightly on the high side to promote good perfusion of the kidneys  Coronary artery disease, will hold Plavix for now continue isosorbide mononitrate ER, no chest pains and no shortness of breath  Hyperuricemia, allopurinol on hold  Dyslipidemia atorvastatin on hold  Vitamin D3 deficiency on vitamin D3 5000 units daily  Insomnia on trazodone 50 mg daily  Chronic edema currently without edema         I spent `40 minutes in the professional and overall care of this patient.      Giuseppe Francois MD FACP

## 2025-04-16 LAB
ANION GAP SERPL CALC-SCNC: 15 MMOL/L (ref 10–20)
BACTERIA BLD CULT: NORMAL
BACTERIA BLD CULT: NORMAL
BACTERIA FOR EOSINOPHIL SMEAR: ABNORMAL
BUN SERPL-MCNC: 67 MG/DL (ref 6–23)
CALCIUM SERPL-MCNC: 7.9 MG/DL (ref 8.6–10.3)
CHLORIDE SERPL-SCNC: 99 MMOL/L (ref 98–107)
CO2 SERPL-SCNC: 24 MMOL/L (ref 21–32)
CREAT SERPL-MCNC: 1.87 MG/DL (ref 0.5–1.05)
EGFRCR SERPLBLD CKD-EPI 2021: 25 ML/MIN/1.73M*2
EOSINOPHIL SMEAR: ABNORMAL
GLUCOSE BLD MANUAL STRIP-MCNC: 163 MG/DL (ref 74–99)
GLUCOSE BLD MANUAL STRIP-MCNC: 242 MG/DL (ref 74–99)
GLUCOSE BLD MANUAL STRIP-MCNC: 265 MG/DL (ref 74–99)
GLUCOSE BLD MANUAL STRIP-MCNC: 294 MG/DL (ref 74–99)
GLUCOSE BLD MANUAL STRIP-MCNC: 320 MG/DL (ref 74–99)
GLUCOSE SERPL-MCNC: 341 MG/DL (ref 74–99)
NEUTROPHILS FOR EOSINOPHIL SMEAR: ABNORMAL
POTASSIUM SERPL-SCNC: 4.1 MMOL/L (ref 3.5–5.3)
SODIUM SERPL-SCNC: 134 MMOL/L (ref 136–145)

## 2025-04-16 PROCEDURE — 2500000004 HC RX 250 GENERAL PHARMACY W/ HCPCS (ALT 636 FOR OP/ED): Mod: JZ | Performed by: INTERNAL MEDICINE

## 2025-04-16 PROCEDURE — 97530 THERAPEUTIC ACTIVITIES: CPT | Mod: GP,CQ

## 2025-04-16 PROCEDURE — 2500000001 HC RX 250 WO HCPCS SELF ADMINISTERED DRUGS (ALT 637 FOR MEDICARE OP): Performed by: INTERNAL MEDICINE

## 2025-04-16 PROCEDURE — 1210000001 HC SEMI-PRIVATE ROOM DAILY

## 2025-04-16 PROCEDURE — 2500000002 HC RX 250 W HCPCS SELF ADMINISTERED DRUGS (ALT 637 FOR MEDICARE OP, ALT 636 FOR OP/ED): Performed by: INTERNAL MEDICINE

## 2025-04-16 PROCEDURE — 82374 ASSAY BLOOD CARBON DIOXIDE: CPT | Performed by: INTERNAL MEDICINE

## 2025-04-16 PROCEDURE — 36415 COLL VENOUS BLD VENIPUNCTURE: CPT | Performed by: INTERNAL MEDICINE

## 2025-04-16 PROCEDURE — 82947 ASSAY GLUCOSE BLOOD QUANT: CPT

## 2025-04-16 RX ADMIN — FERROUS SULFATE TAB 325 MG (65 MG ELEMENTAL FE) 325 MG: 325 (65 FE) TAB at 09:05

## 2025-04-16 RX ADMIN — MIRTAZAPINE 15 MG: 15 TABLET, FILM COATED ORAL at 21:27

## 2025-04-16 RX ADMIN — ISOSORBIDE MONONITRATE 60 MG: 60 TABLET, EXTENDED RELEASE ORAL at 09:05

## 2025-04-16 RX ADMIN — DOCUSATE SODIUM 100 MG: 100 CAPSULE, LIQUID FILLED ORAL at 09:05

## 2025-04-16 RX ADMIN — MEROPENEM 1 G: 1 INJECTION, POWDER, FOR SOLUTION INTRAVENOUS at 21:00

## 2025-04-16 RX ADMIN — INSULIN LISPRO 2 UNITS: 100 INJECTION, SOLUTION INTRAVENOUS; SUBCUTANEOUS at 17:08

## 2025-04-16 RX ADMIN — METOPROLOL SUCCINATE 100 MG: 50 TABLET, EXTENDED RELEASE ORAL at 21:28

## 2025-04-16 RX ADMIN — MEROPENEM 1 G: 1 INJECTION, POWDER, FOR SOLUTION INTRAVENOUS at 09:05

## 2025-04-16 RX ADMIN — Medication 125 MCG: at 09:05

## 2025-04-16 RX ADMIN — HYDROCHLOROTHIAZIDE 25 MG: 25 TABLET ORAL at 09:05

## 2025-04-16 RX ADMIN — INSULIN LISPRO 6 UNITS: 100 INJECTION, SOLUTION INTRAVENOUS; SUBCUTANEOUS at 14:42

## 2025-04-16 RX ADMIN — CLONIDINE HYDROCHLORIDE 0.2 MG: 0.1 TABLET ORAL at 21:27

## 2025-04-16 RX ADMIN — INSULIN LISPRO 4 UNITS: 100 INJECTION, SOLUTION INTRAVENOUS; SUBCUTANEOUS at 21:26

## 2025-04-16 RX ADMIN — INSULIN LISPRO 6 UNITS: 100 INJECTION, SOLUTION INTRAVENOUS; SUBCUTANEOUS at 06:49

## 2025-04-16 RX ADMIN — LORAZEPAM 1 MG: 1 TABLET ORAL at 21:28

## 2025-04-16 RX ADMIN — AMLODIPINE BESYLATE 10 MG: 5 TABLET ORAL at 06:49

## 2025-04-16 RX ADMIN — CLONIDINE HYDROCHLORIDE 0.2 MG: 0.1 TABLET ORAL at 09:05

## 2025-04-16 RX ADMIN — ENOXAPARIN SODIUM 30 MG: 40 INJECTION SUBCUTANEOUS at 17:08

## 2025-04-16 RX ADMIN — SODIUM CHLORIDE 75 ML/HR: 0.9 INJECTION, SOLUTION INTRAVENOUS at 06:49

## 2025-04-16 RX ADMIN — METOPROLOL SUCCINATE 100 MG: 50 TABLET, EXTENDED RELEASE ORAL at 09:05

## 2025-04-16 ASSESSMENT — PAIN - FUNCTIONAL ASSESSMENT
PAIN_FUNCTIONAL_ASSESSMENT: 0-10
PAIN_FUNCTIONAL_ASSESSMENT: 0-10

## 2025-04-16 ASSESSMENT — COGNITIVE AND FUNCTIONAL STATUS - GENERAL
WALKING IN HOSPITAL ROOM: A LITTLE
PERSONAL GROOMING: A LITTLE
MOVING FROM LYING ON BACK TO SITTING ON SIDE OF FLAT BED WITH BEDRAILS: A LITTLE
CLIMB 3 TO 5 STEPS WITH RAILING: A LOT
STANDING UP FROM CHAIR USING ARMS: A LITTLE
MOVING TO AND FROM BED TO CHAIR: A LITTLE
DRESSING REGULAR UPPER BODY CLOTHING: A LITTLE
WALKING IN HOSPITAL ROOM: A LITTLE
MOBILITY SCORE: 18
TOILETING: A LITTLE
MOVING TO AND FROM BED TO CHAIR: A LITTLE
DAILY ACTIVITIY SCORE: 19
MOBILITY SCORE: 17
TURNING FROM BACK TO SIDE WHILE IN FLAT BAD: A LITTLE
STANDING UP FROM CHAIR USING ARMS: A LITTLE
HELP NEEDED FOR BATHING: A LITTLE
DRESSING REGULAR LOWER BODY CLOTHING: A LITTLE
TURNING FROM BACK TO SIDE WHILE IN FLAT BAD: A LITTLE
CLIMB 3 TO 5 STEPS WITH RAILING: A LOT

## 2025-04-16 ASSESSMENT — PAIN SCALES - GENERAL
PAINLEVEL_OUTOF10: 0 - NO PAIN

## 2025-04-16 NOTE — PROGRESS NOTES
04/16/25 0834   Discharge Planning   Home or Post Acute Services Post acute facilities (Rehab/SNF/etc)   Type of Post Acute Facility Services Skilled nursing   Expected Discharge Disposition SNF  ([facility])   Does the patient need discharge transport arranged? Yes   RoundTrip coordination needed? Yes   Has discharge transport been arranged? No     Clinical updates sent to the facility. ADOD not yet determined.

## 2025-04-16 NOTE — CARE PLAN
The patient's goals for the shift include rest and comfort    The clinical goals for the shift include Pt will be free from fall/injury throughout this shift.

## 2025-04-16 NOTE — PROGRESS NOTES
Infectious Disease Progress Note       4/15/2025    Patient is a followup regarding  Left lower lobe pneumonia  Acute cystitis without hematuria  Possible SBE? - no bacteremia and ARELI with Lambl's excrescence , vegetation not ruled out   FUO  Liver mass  Diabetes mellitus type 2/CAD history  NATALIE    Seen with nurse at bedside.  Patient is trying to eat  Case discussed with primary.  MRI concerning for possibility of metastatic disease.  Liver biopsy pending for Monday.  Aspirin and Plavix held.  No family currently at bedside.  Discussed ARELI as well with primary  -although there is new bacteremia and there is Lambl's excrescence without true vegetation, true vegetation could not be ruled out.  May need to consider repeat down the line if there is concern for endocarditis.    Lab Results   Component Value Date    WBC 12.9 (H) 04/14/2025    HGB 10.4 (L) 04/14/2025    HCT 31.4 (L) 04/14/2025    MCV 83 04/14/2025     04/14/2025     Lab Results   Component Value Date    GLUCOSE 339 (H) 04/15/2025    CALCIUM 7.6 (L) 04/15/2025     (L) 04/15/2025    K 5.1 04/15/2025    CO2 25 04/15/2025    CL 99 04/15/2025    BUN 80 (H) 04/15/2025    CREATININE 2.67 (H) 04/15/2025       WBC trends are being monitored. Antibiotic doses are being adjusted per most recent renal labs.     Vitals:    04/15/25 2039   BP: (!) 157/94   Pulse:    Resp:    Temp: 36.4 °C (97.5 °F)   SpO2: (!) 88%     Awake and interactive   neck supple  Heart S1-S2  Lungs bilaterally clear to auscultation with good respiratory effort  Abdomen soft nondistended, nontender to palpation  Extremities no pain    Blood cultures and urine cultures negative to date  Procalcitonin 0.70 and CRP 10.22    Patient Active Problem List   Diagnosis    Generalized weakness       Estimated Creatinine Clearance: 14.1 mL/min (A) (by C-G formula based on SCr of 2.67 mg/dL (H)).    MRI 4/11/2025  Large lateral segment left lobe hepatic mass noted on CT without  contrast 11  April 2025 was not present on the preceding CT without  contrast 2 March 2022, is confirmed on today's MRI to be solid (not  cystic); enhances, but not in the arterial phase (not suspect for  hepatocellular carcinoma) and with heterogeneous enhancement at that,  likely from necrosis; does not contain macroscopic fat. It is not a  cyst or hemangioma. 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 No free fluid or adenopathy      ASSESSMENT:  Left lower lobe pneumonia  Acute cystitis without hematuria  Possible SBE? - no bacteremia and ARELI with Lambl's excrescence , vegetation not ruled out   FUO  Liver mass-biopsy pending for Monday  Diabetes mellitus type 2/CAD history  NATALIE    PLAN:  Patient changed to meropenem  -no more chills no more fevers   Blood culture negative and urine culture negative  Liver biopsy Monday  Plan is to treat patient for pneumonia -plan to discontinue meropenem and continue Invanz at renal dose to finish 2 weeks course.  No evidence of bacteremia or true vegetation at this point.  Will stop antibiotics at the end of therapy.  Patient may need midline      Imaging and labs were reviewed per medical records and any ID pertinent labs were also addressed  Time spent before, during and after care today, including coordination of care >40 min      Ariane Morse DO

## 2025-04-16 NOTE — PROGRESS NOTES
"Lela Barba is a 89 y.o. female on day 10 of admission presenting with Generalized weakness.    Subjective   Patient was seen today with relatives at bedside.  Patient was drowsy but not sleeping.  She was wide-awake when aroused somewhat anxious and agitated.  Review of systems were essentially benign       Objective   Sodium was 134 otherwise electrolytes and acid-base balance are normal and creatinine starting to trend downwards currently at 1.87.  Urine for eosinophils to screen for AIN was negative most likely acute kidney injury is secondary to contrast nephropathy and/or vancomycin.  Blood sugars are still on the high side but controlled, still fluctuating widely      Physical Exam  Patient is awake and alert now with some agitation and anxiety but conversant and we talked to her together with her family close she understand what is going on.  Head examination benign no facial asymmetry  Neck veins are flat without any bruits  Lungs are clear without wheezing crackles or rhonchi  Abdomen soft and nontender with good bowel sounds no guarding  Extremities without edema with good peripheral pulses  No focal neurological deficits    Last Recorded Vitals  Blood pressure 160/77, pulse 72, temperature 37.1 °C (98.8 °F), resp. rate 16, height 1.626 m (5' 4\"), weight 74.1 kg (163 lb 5.8 oz), SpO2 (!) 88%.  Intake/Output last 3 Shifts:  I/O last 3 completed shifts:  In: 4599 (62.1 mL/kg) [P.O.:600; I.V.:3799 (51.3 mL/kg); IV Piggyback:200]  Out: 800 (10.8 mL/kg) [Urine:800 (0.3 mL/kg/hr)]  Weight: 74.1 kg    Current Medications[1]     Relevant Results      Results for orders placed or performed during the hospital encounter of 04/06/25 (from the past 24 hours)   Eosinophil smear   Result Value Ref Range    Eosinophils None (N) (none)    Neutrophils None (N) (none)    Bacteria None (N) (none)   POCT GLUCOSE   Result Value Ref Range    POCT Glucose 231 (H) 74 - 99 mg/dL   POCT GLUCOSE   Result Value Ref Range    " POCT Glucose 261 (H) 74 - 99 mg/dL   POCT GLUCOSE   Result Value Ref Range    POCT Glucose 265 (H) 74 - 99 mg/dL   POCT GLUCOSE   Result Value Ref Range    POCT Glucose 320 (H) 74 - 99 mg/dL   Basic Metabolic Panel   Result Value Ref Range    Glucose 341 (H) 74 - 99 mg/dL    Sodium 134 (L) 136 - 145 mmol/L    Potassium 4.1 3.5 - 5.3 mmol/L    Chloride 99 98 - 107 mmol/L    Bicarbonate 24 21 - 32 mmol/L    Anion Gap 15 10 - 20 mmol/L    Urea Nitrogen 67 (H) 6 - 23 mg/dL    Creatinine 1.87 (H) 0.50 - 1.05 mg/dL    eGFR 25 (L) >60 mL/min/1.73m*2    Calcium 7.9 (L) 8.6 - 10.3 mg/dL   POCT GLUCOSE   Result Value Ref Range    POCT Glucose 294 (H) 74 - 99 mg/dL    CT chest wo IV contrast  Result Date: 4/15/2025  Interpreted By:  Scooter Ribera, STUDY: CT CHEST WO IV CONTRAST;  4/15/2025 9:15 am   INDICATION: Signs/Symptoms:Liver mass possible metastatic disease primary unknown.     COMPARISON: MRI liver 12 April 2025; CT abdomen and pelvis without contrast 11 April 2025; CT chest, abdomen and pelvis without contrast 2 March 2022   ACCESSION NUMBER(S): [id number]   ORDERING CLINICIAN: PATRICIA PIZARRO   TECHNIQUE: Helical CT chest from thoracic inlet through the hemidiaphragms without intravenous contrast   FINDINGS: LUNGS / AIRSPACES / AIRWAYS:   LARGE AIRWAYS Filling defect: Negative Wall thickening: Negative Bronchiectasis: Negative Other: N/A   AIRSPACES Fibrosis: Negative Emphysema: Negative Consolidation: Negative Ground glass airspace disease: Negative Edema: Negative Nodule / Mass: Negative Other: Left-greater-than-right biapical pleural-parenchymal scarring similar to prior. Motion artifact particularly the lung bases, but not enough to obscure large nodule   PLEURA: Effusion:  Both sides negative Pneumothorax:  Both sides negative Other:  n/a   CARDIOVASCULAR: Heart size:  Enlarged but unchanged Pericardial effusion:  Negative Thoracic aortic aneurysm:  Negative Pulmonary arteries:  No ectasia Heart failure change:   Negative.  No sign of interstitial or alveolar edema. 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 Conspicuity of the interventricular septum relative to the blood pool suggests anemia   NONVASCULAR MEDIASTINUM: Esophagus:  Grossly normal by CT Mediastinal Mass:  Negative Hiatal hernia:  Short/small sliding-type unchanged Other:  No definite interval change in the enlarged multinodular thyroid goiter   LYMPH NODES: No thoracic adenopathy   CHEST WALL: Soft tissues of the chest wall are unremarkable   SKELETON: No acute findings including no aggressive osseous lesion suspect for metastatic disease or other neoplasm   UPPER ABDOMEN: No major change from recent dedicated MRI and CT without contrast       No lung nodule or mass   No thoracic adenopathy   No pleural or pericardial effusion   Enlarged multinodular thyroid goiter has not appreciably changed compared to CT without contrast 27 February 2022, the oldest pertinent comparison exam for the thyroid   No aggressive osseous lesion in the chest   MACRO: None   Signed by: Scooter Ribera 4/15/2025 10:30 AM Dictation workstation:   [id number]                 Assessment & Plan  Generalized weakness    Leukocytosis, with febrile episode, elevated CRP and sedimentation rate as well as procalcitonin with negative blood cultures and urine cultures treated empirically with IV antibiotics, infectious disease on board.  Unable to rule out leukocytosis of malignancy.  Acute kidney injury most likely contrast nephropathy.  No episode of significant drops in blood pressure that would cause ATN, and urine is negative for eosinophils.  Will continue to hydrate and diurese the patient, creatinine seems to be trending downward today.  Liver mass for biopsy on Monday cannot rule out malignancy.  Anticoagulants and NSAIDs have been held for now  Multinodular goiter, incidental finding on MRI.  Will check TSH  Type 2 diabetes with fluctuating blood sugars  slightly on the high side at this time but not as widely fluctuating  Hypertension with good control keeping systolic slightly on the high side to promote better perfusion of the kidneys during acute kidney injury phase.  Coronary artery disease, Plavix on hold, still on isosorbide mononitrate ER and no chest pain without any shortness of breath asymptomatic  Hyperuricemia allopurinol hold  Dyslipidemia on atorvastatin on hold  Vitamin D3 deficiency on vitamin D3 5000 units daily  Insomnia on trazodone 50 mg daily  History of chronic edema, currently without edema on IV fluids and diuretics until kidney function recovers.        I spent 40 minutes in the professional and overall care of this patient.      Giuseppe Francois MD FACP         [1]   Current Facility-Administered Medications:     acetaminophen (Tylenol) tablet 650 mg, 650 mg, oral, q6h PRN, Giuseppe Francois MD, 650 mg at 04/14/25 2051    acetaminophen (Tylenol) tablet 650 mg, 650 mg, oral, q6h PRN, Giuseppe Francois MD    [Held by provider] allopurinol (Zyloprim) tablet 100 mg, 100 mg, oral, Daily, Giuseppe Francois MD, 100 mg at 04/14/25 0925    amLODIPine (Norvasc) tablet 10 mg, 10 mg, oral, Daily, Giuseppe Francois MD, 10 mg at 04/16/25 0649    [Held by provider] aspirin chewable tablet 81 mg, 81 mg, oral, Daily, Giuseppe Francois MD, 81 mg at 04/14/25 0647    [Held by provider] atorvastatin (Lipitor) tablet 40 mg, 40 mg, oral, Daily, Giuseppe Francois MD, 40 mg at 04/13/25 2008    cholecalciferol (Vitamin D-3) capsule 125 mcg, 5,000 Units, oral, Daily, Giuseppe Francois MD, 125 mcg at 04/16/25 0905    cloNIDine (Catapres) tablet 0.2 mg, 0.2 mg, oral, q12h YANG, Giuseppe Francois MD, 0.2 mg at 04/16/25 0905    [Held by provider] clopidogrel (Plavix) tablet 75 mg, 75 mg, oral, Daily, Giuseppe Francois MD, 75 mg at 04/14/25 0925    dextrose 50 % injection 12.5 g, 12.5 g, intravenous, q15 min PRN, Giuseppe Francois MD    dextrose 50 % injection 25 g, 25 g, intravenous, q15 min  PRN, Giuseppe Francois MD    docusate sodium (Colace) capsule 100 mg, 100 mg, oral, Daily, Giuseppe Francois MD, 100 mg at 04/16/25 0905    enoxaparin (Lovenox) syringe 30 mg, 30 mg, subcutaneous, q24h, Giuseppe Francois MD, 30 mg at 04/15/25 1708    ferrous sulfate tablet 325 mg, 65 mg of iron, oral, Daily with breakfast, Giuseppe Francois MD, 325 mg at 04/16/25 0905    glucagon (Glucagen) injection 1 mg, 1 mg, intramuscular, q15 min PRN, Giuseppe Francois MD    glucagon (Glucagen) injection 1 mg, 1 mg, intramuscular, q15 min PRN, Giuseppe Francois MD    hydroCHLOROthiazide (HYDRODiuril) tablet 25 mg, 25 mg, oral, Daily, Giuseppe Francois MD, 25 mg at 04/16/25 0905    insulin lispro injection 0-10 Units, 0-10 Units, subcutaneous, 4x daily, Giuseppe Francois MD, 6 Units at 04/16/25 1442    insulin NPH and regular human (HumuLIN 70-30, NovoLIN 70-30) 100 unit/mL (70-30) injection 25 Units, 25 Units, subcutaneous, q AM, Giuseppe Francois MD, 25 Units at 04/16/25 0906    insulin NPH and regular human (HumuLIN 70-30, NovoLIN 70-30) 100 unit/mL (70-30) injection 25 Units, 25 Units, subcutaneous, Nightly, Giuseppe Francois MD, 25 Units at 04/15/25 2206    isosorbide mononitrate ER (Imdur) 24 hr tablet 60 mg, 60 mg, oral, Daily, Giuseppe Francois MD, 60 mg at 04/16/25 0905    LORazepam (Ativan) tablet 1 mg, 1 mg, oral, Nightly, Giuseppe Francois MD, 1 mg at 04/15/25 2043    [Held by provider] losartan (Cozaar) tablet 100 mg, 100 mg, oral, Daily, Giuseppe Francois MD, 100 mg at 04/14/25 0925    menthol-zinc oxide (Calmoseptine - Risamine) 0.44-20.6 % ointment 1 Application, 1 Application, Topical, 4x daily PRN, Giuseppe Francois MD    meropenem (Merrem) 1 g in sodium chloride 0.9%  mL, 1 g, intravenous, q12h, Cash Hargrove MD, Stopped at 04/16/25 1035    metoprolol succinate XL (Toprol-XL) 24 hr tablet 100 mg, 100 mg, oral, BID, Giuseppe Francois MD, 100 mg at 04/16/25 0905    metoprolol tartrate (Lopressor) injection 5 mg, 5 mg, intravenous,  q6h PRN, Giuseppe Francois MD, 5 mg at 04/12/25 0043    mirtazapine (Remeron) tablet 15 mg, 15 mg, oral, Nightly, Giuseppe Francois MD, 15 mg at 04/15/25 2043    nitroglycerin (Nitrostat) SL tablet 0.4 mg, 0.4 mg, sublingual, q5 min PRN, Giuseppe Francois MD    sodium chloride 0.9% infusion, 75 mL/hr, intravenous, Continuous, Giuseppe Francois MD, Last Rate: 75 mL/hr at 04/16/25 0649, 75 mL/hr at 04/16/25 0649    [Held by provider] torsemide (Demadex) tablet 20 mg, 20 mg, oral, Every other day, Giuseppe Francois MD, 20 mg at 04/12/25 0947

## 2025-04-16 NOTE — CARE PLAN
The patient's goals for the shift include rest and comfort    The clinical goals for the shift include patient will remain safe and free fromfall/injury through out shift      Problem: Fall/Injury  Goal: Not fall by end of shift  Outcome: Progressing     Problem: Fall/Injury  Goal: Be free from injury by end of the shift  Outcome: Progressing     Problem: Fall/Injury  Goal: Use assistive devices by end of the shift  Outcome: Progressing

## 2025-04-16 NOTE — PROGRESS NOTES
Physical Therapy    Physical Therapy Treatment    Patient Name: Lela Barba  MRN: [id number]  Today's Date: 4/16/2025  Time Calculation  Start Time: 1011  Stop Time: 1034  Time Calculation (min): 23 min     1116/1116-A    Assessment/Plan   Barriers to Discharge Home: Physical needs, Caregiver assistance    End of Session Communication: Bedside nurse    Assessment Comment:  (Patient would benefit from continued PT)    End of Session Patient Position:  (Patient reclined in chair.  Call light/tray in reach. Chair alarm on.)    PT Plan  Inpatient/Swing Bed or Outpatient: Inpatient  Treatment/Interventions: Bed mobility, Transfer training, Gait training, Balance training, Therapeutic activity  PT Plan: Ongoing PT  PT Frequency: 2 times per week  PT Discharge Recommendations: Low intensity level of continued care    PT Recommended Transfer Status: Contact guard    General Visit Information:   PT  Visit  PT Received On: 04/16/25    General  Family/Caregiver Present:  (Patient's daughter present and very supportive of care. Assisted with translation)  Prior to Session Communication: Bedside nurse  Patient Position Received:  (Patient presents sleeping in bed; lethargic initially but becoming more alert as treatment progressed.)    General Comment:  (Dx: Weakness, pneumonia, cystitis)    General Observations:   General Observation:  (language barrier; IV; alarm)      Precautions:  Precautions  Medical Precautions: Fall precautions  Precautions Comment:  (fall)      Pain:  Pain Assessment  Pain Assessment: 0-10  0-10 (Numeric) Pain Score: 0 - No pain  Pain Interventions:  (no interventions required)    Cognition:  Cognition  Overall Cognitive Status: Impaired (Difficulty following commands)  Orientation Level: Unable to assess  Safety/Judgement: Exceptions to WFL  Insight: Mild  Impulsive: Moderately (Moves quickly and requires frequent cues for safety)      Balance:   Dynamic Standing Balance  Dynamic Standing-Balance:   "(Fair- dynamic stand with ww)      Activity Tolerance:  Activity Tolerance  Endurance: Decreased tolerance for upright activites    Therapeutic Exercise  Therapeutic Exercise Performed:  (seated LE ther-ex: AP, LAQ, seated marching x5-8  Patient very distracted, would perform a few reps and stop. Required cues for form and technique with poor follow through.)      Bed Mobility  Bed Mobility:  (supine->sit: min x1 Bed flat to mimic home set up. Patient making no attempt initially to move, only reaching both hands out to therapist as if she wanted PTA to pull her up. Hand over hand to reach for bedrail. Increased time and instructions to complete transfer.     Ambulation/Gait Training  Ambulation/Gait Training Performed:  (Patient amb 80' with ww and min x1 plus additional staff member to manage equipment.  Quick margarito, with cues to slow down. Veers side to side in [place]. Head down, watches the floor, at times closes her eyes. Attempts to \"park\" ww off to the side. Manual assist to keep ww with her.    Transfers  Transfer:  (sit->stand: CGAx1  Three sit/stands performed (EOB; elevated toilet; chair).  Instructions for hand placement)             Outcome Measures:   [facility] Basic Mobility  Turning from your back to your side while in a flat bed without using bedrails: A little  Moving from lying on your back to sitting on the side of a flat bed without using bedrails: A little  Moving to and from bed to chair (including a wheelchair): A little  Standing up from a chair using your arms (e.g. wheelchair or bedside chair): A little  To walk in hospital room: A little  Climbing 3-5 steps with railing: A lot  Basic Mobility - Total Score: 17              Education Documentation  Mobility Training, taught by Mariza Light PTA at 4/16/2025  1:43 PM.  Learner: Patient  Readiness: Acceptance  Method: Explanation, Demonstration  Response: Needs Reinforcement      Education Comments  Individual(s) Educated: Patient  Education " Provided:  (Safety reinforced throughout treatment)  Patient Response to Education:  (Questionable understanding of education)      Encounter Problems       Encounter Problems (Active)                PT Problem       Pt. will transfer sit/stand with MOD I (Progressing)       Start:  04/09/25    Expected End:  04/23/25            Pt.will ambulate 75' with MOD I (Progressing)       Start:  04/09/25    Expected End:  04/23/25            Pt. will amb up/down 5 steps with B HR with supervision (Not Progressing)       Start:  04/09/25    Expected End:  04/23/25            Pt. will perform 2 x 15 B LE AROM exercises  (Progressing)       Start:  04/09/25    Expected End:  04/23/25

## 2025-04-17 LAB
ANION GAP SERPL CALC-SCNC: 19 MMOL/L (ref 10–20)
BACTERIA BLD CULT: NORMAL
BASOPHILS # BLD AUTO: 0.04 X10*3/UL (ref 0–0.1)
BASOPHILS NFR BLD AUTO: 0.3 %
BUN SERPL-MCNC: 53 MG/DL (ref 6–23)
CALCIUM SERPL-MCNC: 8.8 MG/DL (ref 8.6–10.3)
CHLORIDE SERPL-SCNC: 101 MMOL/L (ref 98–107)
CO2 SERPL-SCNC: 25 MMOL/L (ref 21–32)
CREAT SERPL-MCNC: 1.4 MG/DL (ref 0.5–1.05)
EGFRCR SERPLBLD CKD-EPI 2021: 36 ML/MIN/1.73M*2
EOSINOPHIL # BLD AUTO: 0.01 X10*3/UL (ref 0–0.4)
EOSINOPHIL NFR BLD AUTO: 0.1 %
ERYTHROCYTE [DISTWIDTH] IN BLOOD BY AUTOMATED COUNT: 14.4 % (ref 11.5–14.5)
GLUCOSE BLD MANUAL STRIP-MCNC: 160 MG/DL (ref 74–99)
GLUCOSE BLD MANUAL STRIP-MCNC: 176 MG/DL (ref 74–99)
GLUCOSE BLD MANUAL STRIP-MCNC: 267 MG/DL (ref 74–99)
GLUCOSE BLD MANUAL STRIP-MCNC: 288 MG/DL (ref 74–99)
GLUCOSE BLD MANUAL STRIP-MCNC: 53 MG/DL (ref 74–99)
GLUCOSE SERPL-MCNC: 132 MG/DL (ref 74–99)
HCT VFR BLD AUTO: 31.9 % (ref 36–46)
HGB BLD-MCNC: 10.5 G/DL (ref 12–16)
IMM GRANULOCYTES # BLD AUTO: 0.14 X10*3/UL (ref 0–0.5)
IMM GRANULOCYTES NFR BLD AUTO: 1 % (ref 0–0.9)
LYMPHOCYTES # BLD AUTO: 1.05 X10*3/UL (ref 0.8–3)
LYMPHOCYTES NFR BLD AUTO: 7.7 %
MCH RBC QN AUTO: 28.2 PG (ref 26–34)
MCHC RBC AUTO-ENTMCNC: 32.9 G/DL (ref 32–36)
MCV RBC AUTO: 86 FL (ref 80–100)
MONOCYTES # BLD AUTO: 1.99 X10*3/UL (ref 0.05–0.8)
MONOCYTES NFR BLD AUTO: 14.5 %
NEUTROPHILS # BLD AUTO: 10.48 X10*3/UL (ref 1.6–5.5)
NEUTROPHILS NFR BLD AUTO: 76.4 %
NRBC BLD-RTO: 0 /100 WBCS (ref 0–0)
PLATELET # BLD AUTO: 478 X10*3/UL (ref 150–450)
POTASSIUM SERPL-SCNC: 3.9 MMOL/L (ref 3.5–5.3)
RBC # BLD AUTO: 3.73 X10*6/UL (ref 4–5.2)
SODIUM SERPL-SCNC: 141 MMOL/L (ref 136–145)
WBC # BLD AUTO: 13.7 X10*3/UL (ref 4.4–11.3)

## 2025-04-17 PROCEDURE — 2500000001 HC RX 250 WO HCPCS SELF ADMINISTERED DRUGS (ALT 637 FOR MEDICARE OP): Performed by: INTERNAL MEDICINE

## 2025-04-17 PROCEDURE — 82947 ASSAY GLUCOSE BLOOD QUANT: CPT

## 2025-04-17 PROCEDURE — 85025 COMPLETE CBC W/AUTO DIFF WBC: CPT | Performed by: INTERNAL MEDICINE

## 2025-04-17 PROCEDURE — 80048 BASIC METABOLIC PNL TOTAL CA: CPT | Performed by: INTERNAL MEDICINE

## 2025-04-17 PROCEDURE — 2500000004 HC RX 250 GENERAL PHARMACY W/ HCPCS (ALT 636 FOR OP/ED): Mod: JZ | Performed by: INTERNAL MEDICINE

## 2025-04-17 PROCEDURE — 2500000005 HC RX 250 GENERAL PHARMACY W/O HCPCS: Performed by: INTERNAL MEDICINE

## 2025-04-17 PROCEDURE — 51701 INSERT BLADDER CATHETER: CPT

## 2025-04-17 PROCEDURE — 1210000001 HC SEMI-PRIVATE ROOM DAILY

## 2025-04-17 PROCEDURE — 97535 SELF CARE MNGMENT TRAINING: CPT | Mod: GO

## 2025-04-17 PROCEDURE — 2500000004 HC RX 250 GENERAL PHARMACY W/ HCPCS (ALT 636 FOR OP/ED): Performed by: INTERNAL MEDICINE

## 2025-04-17 PROCEDURE — 2500000002 HC RX 250 W HCPCS SELF ADMINISTERED DRUGS (ALT 637 FOR MEDICARE OP, ALT 636 FOR OP/ED): Performed by: INTERNAL MEDICINE

## 2025-04-17 PROCEDURE — 97165 OT EVAL LOW COMPLEX 30 MIN: CPT | Mod: GO

## 2025-04-17 PROCEDURE — 36415 COLL VENOUS BLD VENIPUNCTURE: CPT | Performed by: INTERNAL MEDICINE

## 2025-04-17 RX ORDER — TAMSULOSIN HYDROCHLORIDE 0.4 MG/1
0.4 CAPSULE ORAL DAILY
Status: DISCONTINUED | OUTPATIENT
Start: 2025-04-17 | End: 2025-04-23 | Stop reason: HOSPADM

## 2025-04-17 RX ADMIN — LORAZEPAM 1 MG: 1 TABLET ORAL at 21:34

## 2025-04-17 RX ADMIN — MIRTAZAPINE 15 MG: 15 TABLET, FILM COATED ORAL at 21:35

## 2025-04-17 RX ADMIN — ISOSORBIDE MONONITRATE 60 MG: 60 TABLET, EXTENDED RELEASE ORAL at 09:38

## 2025-04-17 RX ADMIN — Medication 125 MCG: at 09:38

## 2025-04-17 RX ADMIN — INSULIN LISPRO 6 UNITS: 100 INJECTION, SOLUTION INTRAVENOUS; SUBCUTANEOUS at 16:27

## 2025-04-17 RX ADMIN — HYDROCHLOROTHIAZIDE 25 MG: 25 TABLET ORAL at 09:38

## 2025-04-17 RX ADMIN — METOPROLOL SUCCINATE 100 MG: 50 TABLET, EXTENDED RELEASE ORAL at 09:38

## 2025-04-17 RX ADMIN — ENOXAPARIN SODIUM 30 MG: 40 INJECTION SUBCUTANEOUS at 16:27

## 2025-04-17 RX ADMIN — FERROUS SULFATE TAB 325 MG (65 MG ELEMENTAL FE) 325 MG: 325 (65 FE) TAB at 09:38

## 2025-04-17 RX ADMIN — TAMSULOSIN HYDROCHLORIDE 0.4 MG: 0.4 CAPSULE ORAL at 10:25

## 2025-04-17 RX ADMIN — METOPROLOL TARTRATE 5 MG: 5 INJECTION INTRAVENOUS at 03:48

## 2025-04-17 RX ADMIN — INSULIN LISPRO 2 UNITS: 100 INJECTION, SOLUTION INTRAVENOUS; SUBCUTANEOUS at 21:39

## 2025-04-17 RX ADMIN — MEROPENEM 1 G: 1 INJECTION, POWDER, FOR SOLUTION INTRAVENOUS at 21:11

## 2025-04-17 RX ADMIN — AMLODIPINE BESYLATE 10 MG: 5 TABLET ORAL at 06:38

## 2025-04-17 RX ADMIN — MEROPENEM 1 G: 1 INJECTION, POWDER, FOR SOLUTION INTRAVENOUS at 09:38

## 2025-04-17 RX ADMIN — CLONIDINE HYDROCHLORIDE 0.2 MG: 0.1 TABLET ORAL at 21:34

## 2025-04-17 RX ADMIN — DEXTROSE MONOHYDRATE 12.5 G: 25 INJECTION, SOLUTION INTRAVENOUS at 06:24

## 2025-04-17 RX ADMIN — CLONIDINE HYDROCHLORIDE 0.2 MG: 0.1 TABLET ORAL at 09:38

## 2025-04-17 RX ADMIN — METOPROLOL SUCCINATE 100 MG: 50 TABLET, EXTENDED RELEASE ORAL at 21:35

## 2025-04-17 RX ADMIN — DOCUSATE SODIUM 100 MG: 100 CAPSULE, LIQUID FILLED ORAL at 09:38

## 2025-04-17 RX ADMIN — INSULIN LISPRO 6 UNITS: 100 INJECTION, SOLUTION INTRAVENOUS; SUBCUTANEOUS at 13:08

## 2025-04-17 ASSESSMENT — ACTIVITIES OF DAILY LIVING (ADL): HOME_MANAGEMENT_TIME_ENTRY: 11

## 2025-04-17 ASSESSMENT — PAIN SCALES - GENERAL
PAINLEVEL_OUTOF10: 0 - NO PAIN
PAINLEVEL_OUTOF10: 0 - NO PAIN

## 2025-04-17 ASSESSMENT — COGNITIVE AND FUNCTIONAL STATUS - GENERAL
TOILETING: A LITTLE
PERSONAL GROOMING: A LITTLE
HELP NEEDED FOR BATHING: A LITTLE
DAILY ACTIVITIY SCORE: 19
DRESSING REGULAR UPPER BODY CLOTHING: A LITTLE
DRESSING REGULAR LOWER BODY CLOTHING: A LITTLE

## 2025-04-17 ASSESSMENT — PAIN - FUNCTIONAL ASSESSMENT: PAIN_FUNCTIONAL_ASSESSMENT: 0-10

## 2025-04-17 NOTE — PROGRESS NOTES
Occupational Therapy    OT Treatment    Patient Name: Lela Barba  MRN: [id number]  Department: [facility]  Room: [address] [place] [address]  Today's Date: 4/17/2025  Time Calculation  Start Time: 1105  Stop Time: 1116  Time Calculation (min): 11 min        Assessment:  OT Assessment: Pt. pleasant and motivated throughout session. requries continued cues for safety and command following, however may be impacted by language barrier. Pt. demonstrates CGA for ADls and mobility/trasnfers this date.  Prognosis: Good  Barriers to Discharge Home: Physical needs  Physical Needs: Stair navigation to access bath limited by function/safety, Stair navigation to access bed limited by function/safety  Evaluation/Treatment Tolerance: Patient tolerated treatment well  End of Session Communication: Bedside nurse  End of Session Patient Position: Alarm on, Up in chair  OT Assessment Results: Decreased ADL status, Decreased endurance, Decreased functional mobility, Decreased trunk control for functional activities  Prognosis: Good  Evaluation/Treatment Tolerance: Patient tolerated treatment well  Plan:  Treatment Interventions: ADL retraining, Functional transfer training, Endurance training  OT Frequency: 2 times per week  OT Discharge Recommendations: Low intensity level of continued care, Moderate intensity level of continued care  OT Recommended Transfer Status: Assist of 1  OT - OK to Discharge: Yes (when medically stable/cleared)      Subjective   Previous Visit Info:  OT Last Visit  OT Received On: 04/17/25  General:  General  Reason for Referral: ADL impairment  Referred By: Priscila  Past Medical History Relevant to Rehab: includes: HTN, HLD, CAD, CVA, OA, anxiety, CKD, DM, GERD, edema, meningioma, colon CA with colectomy, appy, osteoporosis  Family/Caregiver Present: No  Prior to Session Communication: Bedside nurse  Patient Position Received: Alarm on, Bed, 3 rail up  General Comment: Pt. resting supine in bed. Pleasant and agreeable to OT  treatment. Pt. language barrier impacting ability to understand safety cues and/or commands, however pt. does demonstrate mild impulsive behaviors/actions.  Precautions:  Medical Precautions: Fall precautions    Pain:  Pain Assessment  Pain Assessment: 0-10  0-10 (Numeric) Pain Score: 0 - No pain    Objective    Cognition:  Cognition  Overall Cognitive Status:  (Did not formally assess. WFL during session)  Insight: Mild  Impulsive: Mildly    Activities of Daily Living: Grooming  Grooming Comments: Sinkside standing; washing hands and mouth. WW placed for UE support, verbal cues and [place] A to position walker appropriatley in front of sink. Pt. requires cues for cleanliness/soap use. Mod VCs and SBA for safety    Toileting  Toileting Comments: Supervision for toileting and clothing mgmt. WW placed in front when in stance for clothing mgmt, pt does not use. Supervision for safety    Bed Mobility/Transfers: Bed Mobility  Bed Mobility: Yes  Bed Mobility 1  Bed Mobility 1: Supine to sitting  Bed Mobility Comments 1: CGA for steadying and safety to edge of bed.    Transfers  Transfer: Yes  Transfer 1  Technique 1: Sit to stand  Transfer Device 1: Walker  Transfer Level of Assistance 1: Contact guard  Trials/Comments 1: various sit to stand from bed level, toilet, and recliner chair. VCs for safe hand placement, pt. impuslively standing without use of UEs on sititing surface. CGA to steady and for safety .    Functional Mobility:  Functional Mobility  Functional Mobility Performed:  (Addressed distances in room for ADL participation. WW use and Min VCs to navigate/manuever safely as well as for hand placement. CGA for steadying/safety throughout room and household distances.)    Standing Balance:  Dynamic Standing Balance  Dynamic Standing-Comments: Fair + during functional transfers, mobility, and sinkside ADL tasks.    Outcome Measures:[facility] Daily Activity  Putting on and taking off regular lower body clothing: A  little  Bathing (including washing, rinsing, drying): A little  Putting on and taking off regular upper body clothing: A little  Toileting, which includes using toilet, bedpan or urinal: A little  Taking care of personal grooming such as brushing teeth: A little  Eating Meals: None  Daily Activity - Total Score: 19      Education Documentation  ADL Training, taught by Yoli Mayo OT at 4/17/2025  2:15 PM.  Learner: Patient  Readiness: Acceptance  Method: Explanation  Response: Verbalizes Understanding, Needs Reinforcement    Education Comments  Pt. Educated on safe walker use and fall risk management.       IP EDUCATION:  Education  Individual(s) Educated: Patient  Education Provided: Fall precautons, Risk and benefits of OT discussed with patient or other, POC discussed and agreed upon    Goals:  Encounter Problems       Encounter Problems (Active)       OT Goals       Independent with all functional transfers  (Progressing)       Start:  04/09/25    Expected End:  04/23/25            Good dyn standing balance during ADLs and functional activities  (Progressing)       Start:  04/09/25    Expected End:  04/23/25            Independent for LB dressing  (Progressing)       Start:  04/09/25    Expected End:  04/23/25            Independent for toileting tasks and clothing mgmt  (Progressing)       Start:  04/09/25    Expected End:  04/23/25            Pt. will tolerate 10 minutes ADLs/therapeutic activity without rest break.  (Progressing)       Start:  04/09/25    Expected End:  04/23/25

## 2025-04-17 NOTE — CARE PLAN
Problem: Safety - Adult  Goal: Free from fall injury  Outcome: Progressing  Flowsheets (Taken 4/14/2025 1002)  Free from fall injury:   Instruct family/caregiver on patient safety   Based on caregiver fall risk screen, instruct family/caregiver to ask for assistance with transferring infant if caregiver noted to have fall risk factors     Problem: Discharge Planning  Goal: Discharge to home or other facility with appropriate resources  Outcome: Progressing  Flowsheets (Taken 4/14/2025 1002)  Discharge to home or other facility with appropriate resources:   Identify barriers to discharge with patient and caregiver   Identify discharge learning needs (meds, wound care, etc)   Refer to discharge planning if patient needs post-hospital services based on physician order or complex needs related to functional status, cognitive ability or social support system     Problem: Chronic Conditions and Co-morbidities  Goal: Patient's chronic conditions and co-morbidity symptoms are monitored and maintained or improved  Outcome: Progressing  Flowsheets (Taken 4/14/2025 1002)  Care Plan - Patient's Chronic Conditions and Co-Morbidity Symptoms are Monitored and Maintained or Improved:   Monitor and assess patient's chronic conditions and comorbid symptoms for stability, deterioration, or improvement   Collaborate with multidisciplinary team to address chronic and comorbid conditions and prevent exacerbation or deterioration   Update acute care plan with appropriate goals if chronic or comorbid symptoms are exacerbated and prevent overall improvement and discharge   The patient's goals for the shift include rest and comfort    The clinical goals for the shift include patient will have complete bladder emptying by the end of the shift

## 2025-04-17 NOTE — DOCUMENTATION CLARIFICATION NOTE
"    PATIENT:               NADEEM TABOR  ACCT #:                  [id number]  MRN:                       [id number]  :                       1935  ADMIT DATE:       2025 6:12 PM  DISCH DATE:  RESPONDING PROVIDER #:        [id number]          PROVIDER RESPONSE TEXT:    Patient treated for PNA and Acute cystitis without Sepsis    CDI QUERY TEXT:    Clarification      Instruction:  Based on your assessment of the patient and the clinical information, please provide the requested documentation by clicking on the appropriate radio button and enter any additional information if prompted.    Question: Is there a diagnosis indicative of a patient meeting SIRS criteria and with organ dysfunction in the setting of PNA, acute cystitis    When answering this query, please exercise your independent professional judgment. The fact that a question is being asked, does not imply that any particular answer is desired or expected.    The patient's clinical indicators include:  Clinical Information:  89 y.o. presented with generalized weakness.    Clinical Indicators: Labs WBC  12.6  4/10 13.4   16.8,    Creatinine  1.08,  2.02,   3.17     VS at 1114 BP 95/52,  at 1543 T 38 , 4/10 VS T 39.6    4/10 \"episodes of hypotension associated with febrile episode.  White cell count is trending upwards currently up to 13,400\"  \"Possibly patient may be septic.\" \"change antibiotics to vancomycin and cefepime\"  \" T 39.6\"  \"Discussed with daughter the possibility of sepsis and inability to discharge\"     Nephrology \"Patient still running a temperature\" \"Suspected SIRS syndrome cannot rule out sepsis.  Liver mass on the left side is solid possible cause of fever, cannot rule out febrile episodes secondary to malignancy. \"     Nephrology \"changed to Invanz to treat left lower lobe pneumonia and acute cystitis \"  \"Acute kidney injury most likely secondary to exposure to IV contrast\" \"MRI shows most likely not " "the primary liver malignancy\"    Treatment: Monitoring daily labs, Bld cx, ARELI , vancomycin (Vancocin) 1,000 mg in dextrose 5%  mLq 24hrs,  cefepime (Maxipime) 1 g in dextrose 5% IV 50 mLq 12 hrs,   meropenem (Merrem) 1 g in sodium chloride 0.9%  mL ?q 12hrs.    Risk Factors: PNA, Acute Cystitis, hypotension, fevers  Options provided:  -- Sepsis with renal organ dysfunction of NATALIE  -- Sepsis with cardiac organ dysfunction of  hypotension  -- Sepsis with multi-system organ dysfunction of NATALIE and hypotension  -- Sepsis with other organ dysfunction, Please specify sepsis associated organ dysfunction below  -- Patient treated for PNA and Acute cystitis without Sepsis  -- Other - I will add my own diagnosis  -- Refer to Clinical Documentation Reviewer    Query created by: Alba Still on 4/16/2025 12:08 PM      Electronically signed by:  PATRICIA PIZARRO MD 4/16/2025 8:41 PM          "

## 2025-04-17 NOTE — CARE PLAN
Problem: Safety - Adult  Goal: Free from fall injury  Outcome: Progressing     Problem: Discharge Planning  Goal: Discharge to home or other facility with appropriate resources  Outcome: Progressing     Problem: Chronic Conditions and Co-morbidities  Goal: Patient's chronic conditions and co-morbidity symptoms are monitored and maintained or improved  Outcome: Progressing     Problem: Nutrition  Goal: Nutrient intake appropriate for maintaining nutritional needs  Outcome: Progressing     Problem: Fall/Injury  Goal: Not fall by end of shift  Outcome: Progressing  Goal: Be free from injury by end of the shift  Outcome: Progressing  Goal: Verbalize understanding of personal risk factors for fall in the hospital  Outcome: Progressing  Goal: Verbalize understanding of risk factor reduction measures to prevent injury from fall in the home  Outcome: Progressing  Goal: Use assistive devices by end of the shift  Outcome: Progressing  Goal: Pace activities to prevent fatigue by end of the shift  Outcome: Progressing     Problem: Skin  Goal: Decreased wound size/increased tissue granulation at next dressing change  Outcome: Progressing  Goal: Participates in plan/prevention/treatment measures  Outcome: Progressing  Goal: Prevent/manage excess moisture  Outcome: Progressing  Goal: Prevent/minimize sheer/friction injuries  Outcome: Progressing  Goal: Promote/optimize nutrition  Outcome: Progressing  Goal: Promote skin healing  Outcome: Progressing   The patient's goals for the shift include rest and comfort    The clinical goals for the shift include Patient will remain safe this shift.

## 2025-04-17 NOTE — NURSING NOTE
0624- This nurse made aware BG at 53 mg/dL.  Patient treated with 12.5 g Dextrose IV. Patient diaphoretic and lethargic.     0630- Message to Dr. Francois regarding BG and medication. Patient more alert at this time, encouraged to get up to urinate and clean up. Noted to have had a small urinary incontinence.     0650- Bladder scan shows 878 mL    0652- Patient up to void- misses hat so unable to measure. Call out to Dr. Francois answering service.     0700- Patient returns to bed, bed bath given with warm wipes and all linens and gown changed. 627 mL post void scan. Secure chat sent to Dr. Francois regarding Post void residual.     0719- Await return call/ message  Patient awake and alert, bed low call light in reach. No acute distress noted.

## 2025-04-18 LAB
ANION GAP SERPL CALC-SCNC: 14 MMOL/L (ref 10–20)
BUN SERPL-MCNC: 41 MG/DL (ref 6–23)
CALCIUM SERPL-MCNC: 8.3 MG/DL (ref 8.6–10.3)
CHLORIDE SERPL-SCNC: 101 MMOL/L (ref 98–107)
CO2 SERPL-SCNC: 29 MMOL/L (ref 21–32)
CREAT SERPL-MCNC: 1.14 MG/DL (ref 0.5–1.05)
EGFRCR SERPLBLD CKD-EPI 2021: 46 ML/MIN/1.73M*2
GLUCOSE BLD MANUAL STRIP-MCNC: 224 MG/DL (ref 74–99)
GLUCOSE BLD MANUAL STRIP-MCNC: 292 MG/DL (ref 74–99)
GLUCOSE BLD MANUAL STRIP-MCNC: 88 MG/DL (ref 74–99)
GLUCOSE BLD MANUAL STRIP-MCNC: 98 MG/DL (ref 74–99)
GLUCOSE SERPL-MCNC: 67 MG/DL (ref 74–99)
HOLD SPECIMEN: NORMAL
INR PPP: 1.2 (ref 0.9–1.1)
POTASSIUM SERPL-SCNC: 4.1 MMOL/L (ref 3.5–5.3)
PROTHROMBIN TIME: 13.7 SECONDS (ref 9.8–12.4)
SODIUM SERPL-SCNC: 140 MMOL/L (ref 136–145)

## 2025-04-18 PROCEDURE — 99232 SBSQ HOSP IP/OBS MODERATE 35: CPT | Performed by: INTERNAL MEDICINE

## 2025-04-18 PROCEDURE — 2500000001 HC RX 250 WO HCPCS SELF ADMINISTERED DRUGS (ALT 637 FOR MEDICARE OP): Performed by: INTERNAL MEDICINE

## 2025-04-18 PROCEDURE — 36415 COLL VENOUS BLD VENIPUNCTURE: CPT | Performed by: INTERNAL MEDICINE

## 2025-04-18 PROCEDURE — 51701 INSERT BLADDER CATHETER: CPT

## 2025-04-18 PROCEDURE — 1210000001 HC SEMI-PRIVATE ROOM DAILY

## 2025-04-18 PROCEDURE — 2500000004 HC RX 250 GENERAL PHARMACY W/ HCPCS (ALT 636 FOR OP/ED): Mod: JZ | Performed by: INTERNAL MEDICINE

## 2025-04-18 PROCEDURE — 80048 BASIC METABOLIC PNL TOTAL CA: CPT | Performed by: INTERNAL MEDICINE

## 2025-04-18 PROCEDURE — 2500000002 HC RX 250 W HCPCS SELF ADMINISTERED DRUGS (ALT 637 FOR MEDICARE OP, ALT 636 FOR OP/ED): Performed by: INTERNAL MEDICINE

## 2025-04-18 PROCEDURE — 82947 ASSAY GLUCOSE BLOOD QUANT: CPT

## 2025-04-18 PROCEDURE — 85610 PROTHROMBIN TIME: CPT | Performed by: INTERNAL MEDICINE

## 2025-04-18 RX ADMIN — METOPROLOL SUCCINATE 100 MG: 50 TABLET, EXTENDED RELEASE ORAL at 20:08

## 2025-04-18 RX ADMIN — INSULIN LISPRO 6 UNITS: 100 INJECTION, SOLUTION INTRAVENOUS; SUBCUTANEOUS at 20:09

## 2025-04-18 RX ADMIN — MIRTAZAPINE 15 MG: 15 TABLET, FILM COATED ORAL at 20:08

## 2025-04-18 RX ADMIN — HYDROCHLOROTHIAZIDE 25 MG: 25 TABLET ORAL at 08:56

## 2025-04-18 RX ADMIN — AMLODIPINE BESYLATE 10 MG: 5 TABLET ORAL at 06:26

## 2025-04-18 RX ADMIN — CLONIDINE HYDROCHLORIDE 0.2 MG: 0.1 TABLET ORAL at 08:56

## 2025-04-18 RX ADMIN — CLONIDINE HYDROCHLORIDE 0.2 MG: 0.1 TABLET ORAL at 20:08

## 2025-04-18 RX ADMIN — Medication 125 MCG: at 08:57

## 2025-04-18 RX ADMIN — METOPROLOL SUCCINATE 100 MG: 50 TABLET, EXTENDED RELEASE ORAL at 08:56

## 2025-04-18 RX ADMIN — INSULIN LISPRO 4 UNITS: 100 INJECTION, SOLUTION INTRAVENOUS; SUBCUTANEOUS at 13:10

## 2025-04-18 RX ADMIN — MEROPENEM 1 G: 1 INJECTION, POWDER, FOR SOLUTION INTRAVENOUS at 08:58

## 2025-04-18 RX ADMIN — TAMSULOSIN HYDROCHLORIDE 0.4 MG: 0.4 CAPSULE ORAL at 08:57

## 2025-04-18 RX ADMIN — FERROUS SULFATE TAB 325 MG (65 MG ELEMENTAL FE) 325 MG: 325 (65 FE) TAB at 08:56

## 2025-04-18 RX ADMIN — ISOSORBIDE MONONITRATE 60 MG: 60 TABLET, EXTENDED RELEASE ORAL at 08:56

## 2025-04-18 RX ADMIN — MEROPENEM 1 G: 1 INJECTION, POWDER, FOR SOLUTION INTRAVENOUS at 20:08

## 2025-04-18 RX ADMIN — DOCUSATE SODIUM 100 MG: 100 CAPSULE, LIQUID FILLED ORAL at 08:57

## 2025-04-18 RX ADMIN — ENOXAPARIN SODIUM 30 MG: 40 INJECTION SUBCUTANEOUS at 17:51

## 2025-04-18 RX ADMIN — LORAZEPAM 1 MG: 1 TABLET ORAL at 20:08

## 2025-04-18 ASSESSMENT — PAIN - FUNCTIONAL ASSESSMENT: PAIN_FUNCTIONAL_ASSESSMENT: 0-10

## 2025-04-18 ASSESSMENT — COGNITIVE AND FUNCTIONAL STATUS - GENERAL
MOVING TO AND FROM BED TO CHAIR: A LITTLE
DRESSING REGULAR LOWER BODY CLOTHING: A LITTLE
STANDING UP FROM CHAIR USING ARMS: A LITTLE
WALKING IN HOSPITAL ROOM: A LITTLE
DRESSING REGULAR UPPER BODY CLOTHING: A LITTLE
DRESSING REGULAR LOWER BODY CLOTHING: A LITTLE
HELP NEEDED FOR BATHING: A LITTLE
PERSONAL GROOMING: A LITTLE
DRESSING REGULAR LOWER BODY CLOTHING: A LITTLE
HELP NEEDED FOR BATHING: A LITTLE
MOBILITY SCORE: 18
DAILY ACTIVITIY SCORE: 19
HELP NEEDED FOR BATHING: A LITTLE
PERSONAL GROOMING: A LITTLE
WALKING IN HOSPITAL ROOM: A LITTLE
MOBILITY SCORE: 18
DAILY ACTIVITIY SCORE: 19
CLIMB 3 TO 5 STEPS WITH RAILING: A LOT
STANDING UP FROM CHAIR USING ARMS: A LITTLE
WALKING IN HOSPITAL ROOM: A LITTLE
DRESSING REGULAR UPPER BODY CLOTHING: A LITTLE
STANDING UP FROM CHAIR USING ARMS: A LITTLE
TOILETING: A LITTLE
MOBILITY SCORE: 21
TOILETING: A LITTLE
CLIMB 3 TO 5 STEPS WITH RAILING: A LOT
DRESSING REGULAR UPPER BODY CLOTHING: A LITTLE
MOVING TO AND FROM BED TO CHAIR: A LITTLE
TURNING FROM BACK TO SIDE WHILE IN FLAT BAD: A LITTLE
PERSONAL GROOMING: A LITTLE
DAILY ACTIVITIY SCORE: 19
CLIMB 3 TO 5 STEPS WITH RAILING: A LITTLE
TOILETING: A LITTLE
TURNING FROM BACK TO SIDE WHILE IN FLAT BAD: A LITTLE

## 2025-04-18 ASSESSMENT — PAIN SCALES - GENERAL
PAINLEVEL_OUTOF10: 0 - NO PAIN

## 2025-04-18 NOTE — CARE PLAN
The patient's goals for the shift include rest and comfort    The clinical goals for the shift include PAtient will remain hemodynamically stable throughout the night    Over the shift, the patient did not make progress toward the following goals. Barriers to progression include . Recommendations to address these barriers include .

## 2025-04-18 NOTE — CARE PLAN
The patient's goals for the shift include rest and comfort    The clinical goals for the shift include PAtient will remain hemodynamically stable throughout the night    Problem: Safety - Adult  Goal: Free from fall injury  Outcome: Progressing     Problem: Fall/Injury  Goal: Not fall by end of shift  Outcome: Progressing  Goal: Be free from injury by end of the shift  Outcome: Progressing  Goal: Verbalize understanding of personal risk factors for fall in the hospital  Outcome: Progressing  Goal: Verbalize understanding of risk factor reduction measures to prevent injury from fall in the home  Outcome: Progressing  Goal: Use assistive devices by end of the shift  Outcome: Progressing  Goal: Pace activities to prevent fatigue by end of the shift  Outcome: Progressing     Problem: Nutrition  Goal: Nutrient intake appropriate for maintaining nutritional needs  Outcome: Progressing

## 2025-04-18 NOTE — PROGRESS NOTES
04/18/25 1620   Discharge Planning   Home or Post Acute Services Post acute facilities (Rehab/SNF/etc)   Type of Post Acute Facility Services Skilled nursing   Expected Discharge Disposition SNF  ([facility])   Does the patient need discharge transport arranged? Yes   RoundTrip coordination needed? Yes   Has discharge transport been arranged? No     Clinical updates sent to the facility. Pt will need precert for SNF admission once ADOD is determined.

## 2025-04-18 NOTE — PROGRESS NOTES
"Lela Barba is a 89 y.o. female on day 11 of admission presenting with Generalized weakness.    Subjective   Patient reportedly had a full bladder and tried to get someone to bring her to urinate in the bathroom however nobody came so she walked to the bathroom herself.  Reportedly the patient had AN ultrasound of the bladder showing 878 cc of urine, I am not sure where this was retained urine or bladder volume prior to voiding.  A as needed straight cath was ordered.  Otherwise the patient seems to have no more fever and chills.  Denies any unusual shortness of breath.  Denies any coughing and no chest pains.  Rest of review of systems was benign       Objective   Acute kidney injury seems to be improving with creatinine down to 1.40 and normal electrolytes and acid-base balance  Patient currently on meropenem although afebrile white cell count persistently high at 13.7 with a hemoglobin of 10.5 and platelet of 478  Patient's blood sugars have been relatively stable slightly on the high side with 1 drop in blood sugar today at 53.  No changes were made but staff was made aware that the patient should eat 3 meals a day.  Blood pressures have been fluctuating but relatively within good range except for 1 episode of 109/49 with a heart rate of 54.    Physical Exam  Patient is awake alert no distress very anxious and comfortable.  We discussed her current condition she seems to understand.  Head examination benign no facial asymmetry  Neck veins flat without bruits  Lungs are clear without any wheezing crackles or rhonchi  Abdomen soft and nontender with good bowel sounds and no guarding.  Bladder is not distended  Extremities without edema with good peripheral pulses.  No focal neurological deficits    Last Recorded Vitals  Blood pressure 134/65, pulse 64, temperature 36.7 °C (98.1 °F), resp. rate 16, height 1.626 m (5' 4\"), weight 74.1 kg (163 lb 5.8 oz), SpO2 93%.  Intake/Output last 3 Shifts:  I/O last 3 " completed shifts:  In: 620 (8.4 mL/kg) [P.O.:320; IV Piggyback:300]  Out: 1701 (23 mL/kg) [Urine:1701 (0.6 mL/kg/hr)]  Weight: 74.1 kg   Current Medications[1]     Relevant Results      Results for orders placed or performed during the hospital encounter of 04/06/25 (from the past 24 hours)   POCT GLUCOSE   Result Value Ref Range    POCT Glucose 53 (L) 74 - 99 mg/dL   POCT GLUCOSE   Result Value Ref Range    POCT Glucose 160 (H) 74 - 99 mg/dL   Basic Metabolic Panel   Result Value Ref Range    Glucose 132 (H) 74 - 99 mg/dL    Sodium 141 136 - 145 mmol/L    Potassium 3.9 3.5 - 5.3 mmol/L    Chloride 101 98 - 107 mmol/L    Bicarbonate 25 21 - 32 mmol/L    Anion Gap 19 10 - 20 mmol/L    Urea Nitrogen 53 (H) 6 - 23 mg/dL    Creatinine 1.40 (H) 0.50 - 1.05 mg/dL    eGFR 36 (L) >60 mL/min/1.73m*2    Calcium 8.8 8.6 - 10.3 mg/dL   CBC and Auto Differential   Result Value Ref Range    WBC 13.7 (H) 4.4 - 11.3 x10*3/uL    nRBC 0.0 0.0 - 0.0 /100 WBCs    RBC 3.73 (L) 4.00 - 5.20 x10*6/uL    Hemoglobin 10.5 (L) 12.0 - 16.0 g/dL    Hematocrit 31.9 (L) 36.0 - 46.0 %    MCV 86 80 - 100 fL    MCH 28.2 26.0 - 34.0 pg    MCHC 32.9 32.0 - 36.0 g/dL    RDW 14.4 11.5 - 14.5 %    Platelets 478 (H) 150 - 450 x10*3/uL    Neutrophils % 76.4 40.0 - 80.0 %    Immature Granulocytes %, Automated 1.0 (H) 0.0 - 0.9 %    Lymphocytes % 7.7 13.0 - 44.0 %    Monocytes % 14.5 2.0 - 10.0 %    Eosinophils % 0.1 0.0 - 6.0 %    Basophils % 0.3 0.0 - 2.0 %    Neutrophils Absolute 10.48 (H) 1.60 - 5.50 x10*3/uL    Immature Granulocytes Absolute, Automated 0.14 0.00 - 0.50 x10*3/uL    Lymphocytes Absolute 1.05 0.80 - 3.00 x10*3/uL    Monocytes Absolute 1.99 (H) 0.05 - 0.80 x10*3/uL    Eosinophils Absolute 0.01 0.00 - 0.40 x10*3/uL    Basophils Absolute 0.04 0.00 - 0.10 x10*3/uL   POCT GLUCOSE   Result Value Ref Range    POCT Glucose 288 (H) 74 - 99 mg/dL   POCT GLUCOSE   Result Value Ref Range    POCT Glucose 267 (H) 74 - 99 mg/dL    No results found.                Assessment & Plan  Generalized weakness    Persistent leukocytosis with originally a febrile episode and chills with elevated CRP and sedimentation rate as well as procalcitonin with negative blood cultures and urine cultures.  Currently on meropenem empirically white cell count persistently high at 13.7.  Will continue IV antibiotics for now  Acute kidney injury most likely contrast nephropathy seems to be resolving with increasing urine output and GFR trending downwards currently down to 1.40.  Will continue as needed IV fluids with as needed Lasix and monitor chemistries closely.  Will try to hold any potentially nephrotoxic medications and try to refrain from using any IV contrast.  Will try to keep patient's blood pressure slightly on the high side to ensure renal perfusion.  Liver mass, core biopsy on Monday, Plavix and NSAIDs as well as anticoagulants held.  Will hold Lovenox on Saturday.  Hypertension with fluctuating blood pressures but relatively good control.  Latest blood pressure was 134/65 heart rate of 64 with a pulse oximeter reading of 93%  Possible urinary retention, will do postvoid ultrasounds and as needed straight cath if needed.  Flomax was started at 0.4 mg daily  Multinodular goiter incidental finding TSH ordered  Type 2 diabetes blood sugars relatively controlled except for 1 episode of hypoglycemia today.  Staff was advised to make sure patient eats 3 meals a day  Coronary artery disease, Plavix  on hold and isosorbide mononitrate ER continued.  No chest pains and no unusual shortness of breath.  Asymptomatic  Hyperuricemia allopurinol on hold due to NATALIE  Dyslipidemia on atorvastatin on hold due to NATALIE  Vitamin D3 deficiency on vitamin D3 5000 units daily  Insomnia on trazodone 50 mg daily  History of chronic edema currently without edema      I spent 40 minutes in the professional and overall care of this patient.      Giuseppe Francois MD FACP         [1]   Current  Facility-Administered Medications:     acetaminophen (Tylenol) tablet 650 mg, 650 mg, oral, q6h PRN, Giuseppe Francois MD, 650 mg at 04/14/25 2051    acetaminophen (Tylenol) tablet 650 mg, 650 mg, oral, q6h PRN, Giuseppe Francois MD    [Held by provider] allopurinol (Zyloprim) tablet 100 mg, 100 mg, oral, Daily, Giuseppe Francois MD, 100 mg at 04/14/25 0925    amLODIPine (Norvasc) tablet 10 mg, 10 mg, oral, Daily, Giuseppe Francois MD, 10 mg at 04/17/25 0638    [Held by provider] aspirin chewable tablet 81 mg, 81 mg, oral, Daily, Giuseppe Francois MD, 81 mg at 04/14/25 0647    [Held by provider] atorvastatin (Lipitor) tablet 40 mg, 40 mg, oral, Daily, Giuseppe Francois MD, 40 mg at 04/13/25 2008    cholecalciferol (Vitamin D-3) capsule 125 mcg, 5,000 Units, oral, Daily, Giuseppe Francois MD, 125 mcg at 04/17/25 0938    cloNIDine (Catapres) tablet 0.2 mg, 0.2 mg, oral, q12h YANG, Giuseppe Francois MD, 0.2 mg at 04/17/25 0938    [Held by provider] clopidogrel (Plavix) tablet 75 mg, 75 mg, oral, Daily, Giuseppe Francois MD, 75 mg at 04/14/25 0925    dextrose 50 % injection 12.5 g, 12.5 g, intravenous, q15 min PRN, Giuseppe Francois MD, 12.5 g at 04/17/25 0624    dextrose 50 % injection 25 g, 25 g, intravenous, q15 min PRN, Giuseppe Francois MD    docusate sodium (Colace) capsule 100 mg, 100 mg, oral, Daily, Giuseppe Francois MD, 100 mg at 04/17/25 0938    enoxaparin (Lovenox) syringe 30 mg, 30 mg, subcutaneous, q24h, Giuseppe Francois MD, 30 mg at 04/17/25 1627    ferrous sulfate tablet 325 mg, 65 mg of iron, oral, Daily with breakfast, Giuseppe Francios MD, 325 mg at 04/17/25 0938    glucagon (Glucagen) injection 1 mg, 1 mg, intramuscular, q15 min PRN, Giuseppe Francois MD    glucagon (Glucagen) injection 1 mg, 1 mg, intramuscular, q15 min PRN, Giuseppe Francois MD    hydroCHLOROthiazide (HYDRODiuril) tablet 25 mg, 25 mg, oral, Daily, Giuseppe Francois MD, 25 mg at 04/17/25 0938    insulin lispro injection 0-10 Units, 0-10 Units, subcutaneous, 4x  daily, Giuseppe Francois MD, 6 Units at 04/17/25 1627    insulin NPH and regular human (HumuLIN 70-30, NovoLIN 70-30) 100 unit/mL (70-30) injection 25 Units, 25 Units, subcutaneous, q AM, Giuseppe Francois MD, 25 Units at 04/16/25 0906    insulin NPH and regular human (HumuLIN 70-30, NovoLIN 70-30) 100 unit/mL (70-30) injection 25 Units, 25 Units, subcutaneous, Nightly, Giuseppe Francois MD, 25 Units at 04/16/25 2156    isosorbide mononitrate ER (Imdur) 24 hr tablet 60 mg, 60 mg, oral, Daily, Giuseppe Francois MD, 60 mg at 04/17/25 0938    LORazepam (Ativan) tablet 1 mg, 1 mg, oral, Nightly, Giuseppe Francois MD, 1 mg at 04/16/25 2128    [Held by provider] losartan (Cozaar) tablet 100 mg, 100 mg, oral, Daily, Giuseppe Francois MD, 100 mg at 04/14/25 0925    menthol-zinc oxide (Calmoseptine - Risamine) 0.44-20.6 % ointment 1 Application, 1 Application, Topical, 4x daily PRN, Giuseppe Francois MD    meropenem (Merrem) 1 g in sodium chloride 0.9%  mL, 1 g, intravenous, q12h, Cash Hargrove MD, Stopped at 04/17/25 1025    metoprolol succinate XL (Toprol-XL) 24 hr tablet 100 mg, 100 mg, oral, BID, Giuseppe Francois MD, 100 mg at 04/17/25 0938    metoprolol tartrate (Lopressor) injection 5 mg, 5 mg, intravenous, q6h PRN, Giuseppe Francois MD, 5 mg at 04/17/25 0348    mirtazapine (Remeron) tablet 15 mg, 15 mg, oral, Nightly, Giuseppe Francois MD, 15 mg at 04/16/25 2127    nitroglycerin (Nitrostat) SL tablet 0.4 mg, 0.4 mg, sublingual, q5 min PRN, Giuseppe Francois MD    tamsulosin (Flomax) 24 hr capsule 0.4 mg, 0.4 mg, oral, Daily, Giuseppe Francois MD, 0.4 mg at 04/17/25 1025    [Held by provider] torsemide (Demadex) tablet 20 mg, 20 mg, oral, Every other day, Giuseppe Francois MD, 20 mg at 04/12/25 0971

## 2025-04-19 LAB
ANION GAP SERPL CALC-SCNC: 10 MMOL/L (ref 10–20)
BUN SERPL-MCNC: 37 MG/DL (ref 6–23)
CALCIUM SERPL-MCNC: 8.1 MG/DL (ref 8.6–10.3)
CHLORIDE SERPL-SCNC: 104 MMOL/L (ref 98–107)
CO2 SERPL-SCNC: 28 MMOL/L (ref 21–32)
CREAT SERPL-MCNC: 0.99 MG/DL (ref 0.5–1.05)
EGFRCR SERPLBLD CKD-EPI 2021: 55 ML/MIN/1.73M*2
GLUCOSE BLD MANUAL STRIP-MCNC: 120 MG/DL (ref 74–99)
GLUCOSE BLD MANUAL STRIP-MCNC: 269 MG/DL (ref 74–99)
GLUCOSE BLD MANUAL STRIP-MCNC: 272 MG/DL (ref 74–99)
GLUCOSE BLD MANUAL STRIP-MCNC: 66 MG/DL (ref 74–99)
GLUCOSE BLD MANUAL STRIP-MCNC: 84 MG/DL (ref 74–99)
GLUCOSE BLD MANUAL STRIP-MCNC: 96 MG/DL (ref 74–99)
GLUCOSE SERPL-MCNC: 86 MG/DL (ref 74–99)
HOLD SPECIMEN: NORMAL
HOLD SPECIMEN: NORMAL
POTASSIUM SERPL-SCNC: 4.2 MMOL/L (ref 3.5–5.3)
SODIUM SERPL-SCNC: 138 MMOL/L (ref 136–145)

## 2025-04-19 PROCEDURE — 51701 INSERT BLADDER CATHETER: CPT

## 2025-04-19 PROCEDURE — 82947 ASSAY GLUCOSE BLOOD QUANT: CPT

## 2025-04-19 PROCEDURE — 80048 BASIC METABOLIC PNL TOTAL CA: CPT | Performed by: INTERNAL MEDICINE

## 2025-04-19 PROCEDURE — 2500000002 HC RX 250 W HCPCS SELF ADMINISTERED DRUGS (ALT 637 FOR MEDICARE OP, ALT 636 FOR OP/ED): Performed by: INTERNAL MEDICINE

## 2025-04-19 PROCEDURE — 2500000004 HC RX 250 GENERAL PHARMACY W/ HCPCS (ALT 636 FOR OP/ED): Performed by: INTERNAL MEDICINE

## 2025-04-19 PROCEDURE — 2500000001 HC RX 250 WO HCPCS SELF ADMINISTERED DRUGS (ALT 637 FOR MEDICARE OP): Performed by: INTERNAL MEDICINE

## 2025-04-19 PROCEDURE — 1210000001 HC SEMI-PRIVATE ROOM DAILY

## 2025-04-19 PROCEDURE — 36415 COLL VENOUS BLD VENIPUNCTURE: CPT | Performed by: INTERNAL MEDICINE

## 2025-04-19 RX ADMIN — MEROPENEM 1 G: 1 INJECTION, POWDER, FOR SOLUTION INTRAVENOUS at 08:56

## 2025-04-19 RX ADMIN — HYDROCHLOROTHIAZIDE 25 MG: 25 TABLET ORAL at 08:57

## 2025-04-19 RX ADMIN — DOCUSATE SODIUM 100 MG: 100 CAPSULE, LIQUID FILLED ORAL at 08:57

## 2025-04-19 RX ADMIN — LORAZEPAM 1 MG: 1 TABLET ORAL at 20:22

## 2025-04-19 RX ADMIN — TAMSULOSIN HYDROCHLORIDE 0.4 MG: 0.4 CAPSULE ORAL at 08:57

## 2025-04-19 RX ADMIN — Medication 125 MCG: at 08:57

## 2025-04-19 RX ADMIN — METOPROLOL SUCCINATE 100 MG: 50 TABLET, EXTENDED RELEASE ORAL at 08:57

## 2025-04-19 RX ADMIN — MEROPENEM 1 G: 1 INJECTION, POWDER, FOR SOLUTION INTRAVENOUS at 20:22

## 2025-04-19 RX ADMIN — FERROUS SULFATE TAB 325 MG (65 MG ELEMENTAL FE) 325 MG: 325 (65 FE) TAB at 08:57

## 2025-04-19 RX ADMIN — CLONIDINE HYDROCHLORIDE 0.2 MG: 0.1 TABLET ORAL at 20:22

## 2025-04-19 RX ADMIN — CLONIDINE HYDROCHLORIDE 0.2 MG: 0.1 TABLET ORAL at 08:57

## 2025-04-19 RX ADMIN — INSULIN LISPRO 6 UNITS: 100 INJECTION, SOLUTION INTRAVENOUS; SUBCUTANEOUS at 21:16

## 2025-04-19 RX ADMIN — MIRTAZAPINE 15 MG: 15 TABLET, FILM COATED ORAL at 20:22

## 2025-04-19 RX ADMIN — AMLODIPINE BESYLATE 10 MG: 5 TABLET ORAL at 08:56

## 2025-04-19 RX ADMIN — ISOSORBIDE MONONITRATE 60 MG: 60 TABLET, EXTENDED RELEASE ORAL at 08:57

## 2025-04-19 RX ADMIN — METOPROLOL SUCCINATE 100 MG: 50 TABLET, EXTENDED RELEASE ORAL at 20:22

## 2025-04-19 ASSESSMENT — COGNITIVE AND FUNCTIONAL STATUS - GENERAL
WALKING IN HOSPITAL ROOM: A LITTLE
DAILY ACTIVITIY SCORE: 19
TOILETING: A LITTLE
PERSONAL GROOMING: A LITTLE
DRESSING REGULAR UPPER BODY CLOTHING: A LITTLE
HELP NEEDED FOR BATHING: A LITTLE
CLIMB 3 TO 5 STEPS WITH RAILING: A LITTLE
DRESSING REGULAR LOWER BODY CLOTHING: A LITTLE
STANDING UP FROM CHAIR USING ARMS: A LITTLE
MOBILITY SCORE: 21

## 2025-04-19 ASSESSMENT — PAIN SCALES - GENERAL: PAINLEVEL_OUTOF10: 0 - NO PAIN

## 2025-04-19 NOTE — CARE PLAN
The patient's goals for the shift include rest and comfort    The clinical goals for the shift include Safety    Over the shift, the patient did not make progress toward the following goals. Barriers to progression include . Recommendations to address these barriers include .

## 2025-04-19 NOTE — PROGRESS NOTES
Infectious Disease Progress Note       4/18/2025    Patient is a followup regarding  Left lower lobe pneumonia  Acute cystitis without hematuria  Possible SBE? - no bacteremia and ARELI with Lambl's excrescence , vegetation not ruled out   FUO  Liver mass  Diabetes mellitus type 2/CAD history  NATALIE    Case discussed with primary.  MRI concerning for possibility of metastatic disease.  Liver biopsy pending for Monday.  Aspirin and Plavix held.  No family currently at bedside.    Discussed ARELI as well with primary  -although there is new bacteremia and there is Lambl's excrescence without true vegetation, true vegetation could not be ruled out.  May need to consider repeat down the line if there is concern for endocarditis.    Lab Results   Component Value Date    WBC 13.7 (H) 04/17/2025    HGB 10.5 (L) 04/17/2025    HCT 31.9 (L) 04/17/2025    MCV 86 04/17/2025     (H) 04/17/2025     Lab Results   Component Value Date    GLUCOSE 67 (L) 04/18/2025    CALCIUM 8.3 (L) 04/18/2025     04/18/2025    K 4.1 04/18/2025    CO2 29 04/18/2025     04/18/2025    BUN 41 (H) 04/18/2025    CREATININE 1.14 (H) 04/18/2025       WBC trends are being monitored. Antibiotic doses are being adjusted per most recent renal labs.     Vitals:    04/18/25 2347   BP: 139/60   Pulse: 55   Resp:    Temp: 36.7 °C (98.1 °F)   SpO2: 92%     Awake and interactive   neck supple  Heart S1-S2  Lungs bilaterally clear to auscultation with good respiratory effort  Abdomen soft nondistended, nontender to palpation  Extremities no pain    Blood cultures and urine cultures negative to date  Procalcitonin 0.70 and CRP 10.22    Patient Active Problem List   Diagnosis    Generalized weakness       Estimated Creatinine Clearance: 32.9 mL/min (A) (by C-G formula based on SCr of 1.14 mg/dL (H)).    MRI 4/11/2025  Large lateral segment left lobe hepatic mass noted on CT without  contrast 11 April 2025 was not present on the preceding CT  without  contrast 2 March 2022, is confirmed on today's MRI to be solid (not  cystic); enhances, but not in the arterial phase (not suspect for  hepatocellular carcinoma) and with heterogeneous enhancement at that,  likely from necrosis; does not contain macroscopic fat. It is not a  cyst or hemangioma. 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 No free fluid or adenopathy      ASSESSMENT:  Left lower lobe pneumonia  Acute cystitis without hematuria  Possible SBE? - no bacteremia and ARELI with Lambl's excrescence , vegetation not ruled out   FUO  Liver mass-biopsy pending for Monday  Diabetes mellitus type 2/CAD history  NATALIE    PLAN:  Patient changed to meropenem.  She is tolerating this well-no more chills no more fevers   Blood culture negative and urine culture negative  Liver biopsy Monday  Plan is to treat patient for pneumonia -plan to discontinue meropenem and continue Invanz at renal dose to finish 2 weeks course.  No evidence of bacteremia or true vegetation at this point.  Will stop antibiotics at the end of therapy.  Patient may need midline      Imaging and labs were reviewed per medical records and any ID pertinent labs were also addressed  Time spent before, during and after care today, including coordination of care >40 min      Ariane Morse DO

## 2025-04-19 NOTE — CARE PLAN
The patient's goals for the shift include rest and comfort    The clinical goals for the shift include Safety

## 2025-04-19 NOTE — PROGRESS NOTES
"Lela Barba is a 89 y.o. female on day 12 of admission presenting with Generalized weakness.    Subjective   Patient is currently asymptomatic comfortable without any distress.  Denies any new or acute ongoing health issues.  12 point review of systems benign       Objective   Vital signs are stable with blood pressure controlled at 161/73 prior to current current blood pressure heart rate of 69 pulse oximeter reading of 91%.  Despite antibiotics white cell count still elevated at 13.7 hemoglobin 10.3 and platelet of 478  Electrolytes and acid-base balance are normal and acute kidney injury seems to be resolving with creatinine trending downwards currently down to 1.14 with good urine output    Physical Exam  Patient excitable agitated but not in distress comfortable  Head examination normal no facial asymmetry  Neck veins are flat without bruits  Lungs are clear without wheezing crackles or rhonchi  Heart regular without any murmur  Abdomen soft and nontender good bowel sounds  Extremities without edema  Neurological examination benign without any focal neurological deficits    Last Recorded Vitals  Blood pressure 140/65, pulse 65, temperature 36.7 °C (98.1 °F), temperature source Temporal, resp. rate 18, height 1.626 m (5' 4\"), weight 73.4 kg (161 lb 13.1 oz), SpO2 90%.  Intake/Output last 3 Shifts:  I/O last 3 completed shifts:  In: 100 (1.4 mL/kg) [IV Piggyback:100]  Out: 2102 (28.6 mL/kg) [Urine:2102 (0.8 mL/kg/hr)]  Weight: 73.4 kg   Current Medications[1]   Relevant Results      Results for orders placed or performed during the hospital encounter of 04/06/25 (from the past 24 hours)   Lavender Top   Result Value Ref Range    Extra Tube Hold for add-ons.    Basic Metabolic Panel   Result Value Ref Range    Glucose 67 (L) 74 - 99 mg/dL    Sodium 140 136 - 145 mmol/L    Potassium 4.1 3.5 - 5.3 mmol/L    Chloride 101 98 - 107 mmol/L    Bicarbonate 29 21 - 32 mmol/L    Anion Gap 14 10 - 20 mmol/L    Urea " Nitrogen 41 (H) 6 - 23 mg/dL    Creatinine 1.14 (H) 0.50 - 1.05 mg/dL    eGFR 46 (L) >60 mL/min/1.73m*2    Calcium 8.3 (L) 8.6 - 10.3 mg/dL   POCT GLUCOSE   Result Value Ref Range    POCT Glucose 88 74 - 99 mg/dL   POCT GLUCOSE   Result Value Ref Range    POCT Glucose 224 (H) 74 - 99 mg/dL   POCT GLUCOSE   Result Value Ref Range    POCT Glucose 98 74 - 99 mg/dL   Protime-INR   Result Value Ref Range    Protime 13.7 (H) 9.8 - 12.4 seconds    INR 1.2 (H) 0.9 - 1.1   POCT GLUCOSE   Result Value Ref Range    POCT Glucose 292 (H) 74 - 99 mg/dL    No results found.               Assessment & Plan  Generalized weakness    Persistent leukocytosis with febrile episode, elevated CRP sedimentation rate and procalcitonin with negative blood cultures being treated empirically for pneumonia versus UTI with IV meropenem.  Infectious disease and pulm on board.  Unable to rule out leukocytosis of malignancy  Acute kidney injury most likely contrast nephropathy resolving with creatinine trending downwards now down to 1.14 with good urine output  Liver mass for biopsy on Monday to rule out malignancy anticoagulants and NSAIDs have been held for now and we will hold Lovenox starting tomorrow  Multinodular goiter incidental finding on MRI with normal TSH  Type 2 diabetes with fluctuating blood sugars on the high side but currently much better controlled  Hypertension with good control  Coronary artery disease, Plavix on hold still on isosorbide mononitrate ER no chest pains and no shortness of breath asymptomatic  Hyperuricemia allopurinol on hold  Dyslipidemia atorvastatin on hold vitamin D3 deficiency on vitamin D3 5000 units daily  Insomnia on trazodone 50 mg daily  History of chronic edema currently without edema      I spent 40 minutes in the professional and overall care of this patient.      Giuseppe Francois MD FACP         [1]   Current Facility-Administered Medications:     acetaminophen (Tylenol) tablet 650 mg, 650 mg, oral,  q6h PRN, Giuseppe Francois MD, 650 mg at 04/14/25 2051    [Held by provider] allopurinol (Zyloprim) tablet 100 mg, 100 mg, oral, Daily, Giuseppe Francois MD, 100 mg at 04/14/25 0925    amLODIPine (Norvasc) tablet 10 mg, 10 mg, oral, Daily, Giuseppe Francois MD, 10 mg at 04/18/25 0626    [Held by provider] aspirin chewable tablet 81 mg, 81 mg, oral, Daily, Giuseppe Francois MD, 81 mg at 04/14/25 0647    [Held by provider] atorvastatin (Lipitor) tablet 40 mg, 40 mg, oral, Daily, Giuseppe Francois MD, 40 mg at 04/13/25 2008    cholecalciferol (Vitamin D-3) capsule 125 mcg, 5,000 Units, oral, Daily, Giuseppe Francois MD, 125 mcg at 04/18/25 0857    cloNIDine (Catapres) tablet 0.2 mg, 0.2 mg, oral, q12h YANG, Giuseppe Francois MD, 0.2 mg at 04/18/25 2008    [Held by provider] clopidogrel (Plavix) tablet 75 mg, 75 mg, oral, Daily, Giuseppe Francois MD, 75 mg at 04/14/25 0925    dextrose 50 % injection 12.5 g, 12.5 g, intravenous, q15 min PRN, Giuseppe Francois MD, 12.5 g at 04/17/25 0624    dextrose 50 % injection 25 g, 25 g, intravenous, q15 min PRN, Giuseppe Francois MD    docusate sodium (Colace) capsule 100 mg, 100 mg, oral, Daily, Giuseppe Francois MD, 100 mg at 04/18/25 0857    enoxaparin (Lovenox) syringe 30 mg, 30 mg, subcutaneous, q24h, Giuseppe Francois MD, 30 mg at 04/18/25 1751    ferrous sulfate tablet 325 mg, 65 mg of iron, oral, Daily with breakfast, Giuseppe Francois MD, 325 mg at 04/18/25 0856    glucagon (Glucagen) injection 1 mg, 1 mg, intramuscular, q15 min PRN, Giuseppe Francois MD    glucagon (Glucagen) injection 1 mg, 1 mg, intramuscular, q15 min PRN, Giuseppe Francois MD    hydroCHLOROthiazide (HYDRODiuril) tablet 25 mg, 25 mg, oral, Daily, Giuseppe Francois MD, 25 mg at 04/18/25 0856    insulin lispro injection 0-10 Units, 0-10 Units, subcutaneous, 4x daily, Giuseppe Francois MD, 6 Units at 04/18/25 2009    insulin NPH and regular human (HumuLIN 70-30, NovoLIN 70-30) 100 unit/mL (70-30) injection 25 Units, 25 Units, subcutaneous, q  AM, Giuseppe Francois MD, 25 Units at 04/18/25 0903    insulin NPH and regular human (HumuLIN 70-30, NovoLIN 70-30) 100 unit/mL (70-30) injection 25 Units, 25 Units, subcutaneous, Nightly, Giuseppe Francois MD, 25 Units at 04/18/25 2009    isosorbide mononitrate ER (Imdur) 24 hr tablet 60 mg, 60 mg, oral, Daily, Giuseppe Francois MD, 60 mg at 04/18/25 0856    LORazepam (Ativan) tablet 1 mg, 1 mg, oral, Nightly, Giuseppe Francois MD, 1 mg at 04/18/25 2008    [Held by provider] losartan (Cozaar) tablet 100 mg, 100 mg, oral, Daily, Giuseppe Francois MD, 100 mg at 04/14/25 0925    menthol-zinc oxide (Calmoseptine - Risamine) 0.44-20.6 % ointment 1 Application, 1 Application, Topical, 4x daily PRN, Giuseppe Francois MD    meropenem (Merrem) 1 g in sodium chloride 0.9%  mL, 1 g, intravenous, q12h, Cash Hargrove MD, Stopped at 04/18/25 2052    metoprolol succinate XL (Toprol-XL) 24 hr tablet 100 mg, 100 mg, oral, BID, Giuseppe Francois MD, 100 mg at 04/18/25 2008    metoprolol tartrate (Lopressor) injection 5 mg, 5 mg, intravenous, q6h PRN, Giuseppe Francois MD, 5 mg at 04/17/25 0348    mirtazapine (Remeron) tablet 15 mg, 15 mg, oral, Nightly, Giuseppe Francois MD, 15 mg at 04/18/25 2008    nitroglycerin (Nitrostat) SL tablet 0.4 mg, 0.4 mg, sublingual, q5 min PRN, Giuseppe Francois MD    tamsulosin (Flomax) 24 hr capsule 0.4 mg, 0.4 mg, oral, Daily, Giuseppe Francois MD, 0.4 mg at 04/18/25 0857    [Held by provider] torsemide (Demadex) tablet 20 mg, 20 mg, oral, Every other day, Giuseppe Francois MD, 20 mg at 04/12/25 0960

## 2025-04-20 VITALS
HEIGHT: 64 IN | SYSTOLIC BLOOD PRESSURE: 163 MMHG | WEIGHT: 158.95 LBS | OXYGEN SATURATION: 94 % | HEART RATE: 66 BPM | TEMPERATURE: 98.1 F | RESPIRATION RATE: 19 BRPM | DIASTOLIC BLOOD PRESSURE: 73 MMHG | BODY MASS INDEX: 27.14 KG/M2

## 2025-04-20 LAB
ANION GAP SERPL CALC-SCNC: 15 MMOL/L (ref 10–20)
BUN SERPL-MCNC: 27 MG/DL (ref 6–23)
CALCIUM SERPL-MCNC: 8.9 MG/DL (ref 8.6–10.3)
CHLORIDE SERPL-SCNC: 101 MMOL/L (ref 98–107)
CO2 SERPL-SCNC: 29 MMOL/L (ref 21–32)
CREAT SERPL-MCNC: 0.86 MG/DL (ref 0.5–1.05)
EGFRCR SERPLBLD CKD-EPI 2021: 65 ML/MIN/1.73M*2
GLUCOSE BLD MANUAL STRIP-MCNC: 113 MG/DL (ref 74–99)
GLUCOSE BLD MANUAL STRIP-MCNC: 254 MG/DL (ref 74–99)
GLUCOSE BLD MANUAL STRIP-MCNC: 48 MG/DL (ref 74–99)
GLUCOSE BLD MANUAL STRIP-MCNC: 71 MG/DL (ref 74–99)
GLUCOSE BLD MANUAL STRIP-MCNC: 91 MG/DL (ref 74–99)
GLUCOSE BLD MANUAL STRIP-MCNC: 93 MG/DL (ref 74–99)
GLUCOSE SERPL-MCNC: 102 MG/DL (ref 74–99)
HOLD SPECIMEN: NORMAL
HOLD SPECIMEN: NORMAL
POTASSIUM SERPL-SCNC: 4.7 MMOL/L (ref 3.5–5.3)
SODIUM SERPL-SCNC: 140 MMOL/L (ref 136–145)

## 2025-04-20 PROCEDURE — 1210000001 HC SEMI-PRIVATE ROOM DAILY

## 2025-04-20 PROCEDURE — 2500000004 HC RX 250 GENERAL PHARMACY W/ HCPCS (ALT 636 FOR OP/ED): Mod: JZ | Performed by: INTERNAL MEDICINE

## 2025-04-20 PROCEDURE — 80048 BASIC METABOLIC PNL TOTAL CA: CPT | Performed by: INTERNAL MEDICINE

## 2025-04-20 PROCEDURE — 82947 ASSAY GLUCOSE BLOOD QUANT: CPT

## 2025-04-20 PROCEDURE — 36415 COLL VENOUS BLD VENIPUNCTURE: CPT | Performed by: INTERNAL MEDICINE

## 2025-04-20 PROCEDURE — 2500000001 HC RX 250 WO HCPCS SELF ADMINISTERED DRUGS (ALT 637 FOR MEDICARE OP): Performed by: INTERNAL MEDICINE

## 2025-04-20 PROCEDURE — 2500000002 HC RX 250 W HCPCS SELF ADMINISTERED DRUGS (ALT 637 FOR MEDICARE OP, ALT 636 FOR OP/ED): Performed by: INTERNAL MEDICINE

## 2025-04-20 RX ADMIN — AMLODIPINE BESYLATE 10 MG: 5 TABLET ORAL at 07:45

## 2025-04-20 RX ADMIN — LORAZEPAM 1 MG: 1 TABLET ORAL at 20:44

## 2025-04-20 RX ADMIN — METOPROLOL SUCCINATE 100 MG: 50 TABLET, EXTENDED RELEASE ORAL at 08:47

## 2025-04-20 RX ADMIN — MIRTAZAPINE 15 MG: 15 TABLET, FILM COATED ORAL at 20:44

## 2025-04-20 RX ADMIN — METOPROLOL SUCCINATE 100 MG: 50 TABLET, EXTENDED RELEASE ORAL at 20:44

## 2025-04-20 RX ADMIN — INSULIN LISPRO 6 UNITS: 100 INJECTION, SOLUTION INTRAVENOUS; SUBCUTANEOUS at 22:24

## 2025-04-20 RX ADMIN — DOCUSATE SODIUM 100 MG: 100 CAPSULE, LIQUID FILLED ORAL at 08:47

## 2025-04-20 RX ADMIN — CLONIDINE HYDROCHLORIDE 0.2 MG: 0.1 TABLET ORAL at 20:44

## 2025-04-20 RX ADMIN — ISOSORBIDE MONONITRATE 60 MG: 60 TABLET, EXTENDED RELEASE ORAL at 08:47

## 2025-04-20 RX ADMIN — Medication 125 MCG: at 08:47

## 2025-04-20 RX ADMIN — MEROPENEM 1 G: 1 INJECTION, POWDER, FOR SOLUTION INTRAVENOUS at 08:47

## 2025-04-20 RX ADMIN — TAMSULOSIN HYDROCHLORIDE 0.4 MG: 0.4 CAPSULE ORAL at 08:47

## 2025-04-20 RX ADMIN — MEROPENEM 1 G: 1 INJECTION, POWDER, FOR SOLUTION INTRAVENOUS at 20:45

## 2025-04-20 RX ADMIN — HYDROCHLOROTHIAZIDE 25 MG: 25 TABLET ORAL at 08:47

## 2025-04-20 RX ADMIN — FERROUS SULFATE TAB 325 MG (65 MG ELEMENTAL FE) 325 MG: 325 (65 FE) TAB at 08:47

## 2025-04-20 RX ADMIN — CLONIDINE HYDROCHLORIDE 0.2 MG: 0.1 TABLET ORAL at 08:47

## 2025-04-20 ASSESSMENT — COGNITIVE AND FUNCTIONAL STATUS - GENERAL
WALKING IN HOSPITAL ROOM: A LITTLE
DAILY ACTIVITIY SCORE: 23
STANDING UP FROM CHAIR USING ARMS: A LITTLE
PERSONAL GROOMING: A LITTLE
HELP NEEDED FOR BATHING: A LITTLE
DAILY ACTIVITIY SCORE: 19
MOVING TO AND FROM BED TO CHAIR: A LITTLE
DRESSING REGULAR UPPER BODY CLOTHING: A LITTLE
DRESSING REGULAR UPPER BODY CLOTHING: A LITTLE
CLIMB 3 TO 5 STEPS WITH RAILING: A LITTLE
DRESSING REGULAR LOWER BODY CLOTHING: A LITTLE
PERSONAL GROOMING: A LITTLE
TOILETING: A LITTLE
STANDING UP FROM CHAIR USING ARMS: A LITTLE
CLIMB 3 TO 5 STEPS WITH RAILING: A LOT
TOILETING: A LITTLE
CLIMB 3 TO 5 STEPS WITH RAILING: A LITTLE
DRESSING REGULAR LOWER BODY CLOTHING: A LITTLE
MOBILITY SCORE: 21
WALKING IN HOSPITAL ROOM: A LITTLE
MOBILITY SCORE: 22
HELP NEEDED FOR BATHING: A LITTLE
MOBILITY SCORE: 19
DAILY ACTIVITIY SCORE: 19
TOILETING: A LITTLE
WALKING IN HOSPITAL ROOM: A LITTLE

## 2025-04-20 ASSESSMENT — PAIN SCALES - GENERAL
PAINLEVEL_OUTOF10: 0 - NO PAIN

## 2025-04-20 NOTE — PROGRESS NOTES
"Lela Barba is a 89 y.o. female on day 13 of admission presenting with Generalized weakness.    Subjective   The patient has been afebrile since we started meropenem.  White cell count however still elevated.  Clinically patient is asymptomatic diuresing well without any complaints.  12 point review of systems negative.       Objective   Acute kidney injury secondary to contrast nephropathy resolved with creatinine down to 0.99 and normal electrolytes and acid-base balance  Vital signs are stable with a blood pressure 145/69 heart rate of 63 pulse oximeter reading of 95%  Calcium is 8.1, blood sugars are controlled at 96, 84, 292, 98    Physical Exam  Patient very excitable highly agitated but alert oriented did not seem to be in distress  Head examination benign no facial asymmetry  Neck veins flat without bruits  Lungs are clear without wheezing crackles or rhonchi  Heart regular without any murmur  Abdomen soft and nontender good bowel sounds  Extremities without edema  Neurological examination benign without any focal neurological deficits    Last Recorded Vitals  Blood pressure (!) 187/72, pulse 72, temperature 36.9 °C (98.4 °F), resp. rate 18, height 1.626 m (5' 4\"), weight 72.1 kg (158 lb 15.2 oz), SpO2 94%.  Intake/Output last 3 Shifts:  I/O last 3 completed shifts:  In: 750 (10.4 mL/kg) [P.O.:550; IV Piggyback:200]  Out: 2400 (33.3 mL/kg) [Urine:2400 (0.9 mL/kg/hr)]  Weight: 72.1 kg   Current Medications[1]     Relevant Results      Results for orders placed or performed during the hospital encounter of 04/06/25 (from the past 24 hours)   POCT GLUCOSE   Result Value Ref Range    POCT Glucose 84 74 - 99 mg/dL   Basic Metabolic Panel   Result Value Ref Range    Glucose 86 74 - 99 mg/dL    Sodium 138 136 - 145 mmol/L    Potassium 4.2 3.5 - 5.3 mmol/L    Chloride 104 98 - 107 mmol/L    Bicarbonate 28 21 - 32 mmol/L    Anion Gap 10 10 - 20 mmol/L    Urea Nitrogen 37 (H) 6 - 23 mg/dL    Creatinine 0.99 " 0.50 - 1.05 mg/dL    eGFR 55 (L) >60 mL/min/1.73m*2    Calcium 8.1 (L) 8.6 - 10.3 mg/dL   Lavender Top   Result Value Ref Range    Extra Tube Hold for add-ons.    SST TOP   Result Value Ref Range    Extra Tube Hold for add-ons.    POCT GLUCOSE   Result Value Ref Range    POCT Glucose 96 74 - 99 mg/dL   POCT GLUCOSE   Result Value Ref Range    POCT Glucose 66 (L) 74 - 99 mg/dL   POCT GLUCOSE   Result Value Ref Range    POCT Glucose 120 (H) 74 - 99 mg/dL   POCT GLUCOSE   Result Value Ref Range    POCT Glucose 272 (H) 74 - 99 mg/dL   POCT GLUCOSE   Result Value Ref Range    POCT Glucose 269 (H) 74 - 99 mg/dL    No results found.               Assessment & Plan  Generalized weakness    Persistent leukocytosis and febrile episodes elevated CRP, sedimentation rate and procalcitonin with negative blood cultures.  Patient on IV meropenem for presumed pneumonia, cystitis and possible SBP.  Since meropenem has been started patient has had no fever although white cell count is still elevated.  Acute kidney injury most likely from contrast nephropathy seems to have resolved with creatinine down to 0.99 today with good urine output  Liver mass for biopsy on Monday to rule out malignancy, all anticoagulants and NSAIDs have been held  Multinodular goiter incidental finding on MRI with normal TSH  Type 2 diabetes with fluctuating blood sugars currently better controlled  Hypertension with good control  Coronary artery disease on Plavix which is being held for the biopsy, still on isosorbide mononitrate ER, no chest pains and no shortness of breath asymptomatic  Hyperuricemia on allopurinol on hold  Dyslipidemia on atorvastatin on hold  Vitamin D3 deficiency on vitamin D3 5000 units daily  Insomnia on trazodone 50 mg daily  History of chronic edema currently without edema        I spent 40 minutes in the professional and overall care of this patient.      Giuseppe Francois MD FACP         [1]   Current Facility-Administered  Medications:     acetaminophen (Tylenol) tablet 650 mg, 650 mg, oral, q6h PRN, Giuseppe Francois MD, 650 mg at 04/14/25 2051    [Held by provider] allopurinol (Zyloprim) tablet 100 mg, 100 mg, oral, Daily, Giuseppe Francois MD, 100 mg at 04/14/25 0925    amLODIPine (Norvasc) tablet 10 mg, 10 mg, oral, Daily, Giuseppe Francois MD, 10 mg at 04/19/25 0856    [Held by provider] aspirin chewable tablet 81 mg, 81 mg, oral, Daily, Giuseppe Francois MD, 81 mg at 04/14/25 0647    [Held by provider] atorvastatin (Lipitor) tablet 40 mg, 40 mg, oral, Daily, Giuseppe Francois MD, 40 mg at 04/13/25 2008    cholecalciferol (Vitamin D-3) capsule 125 mcg, 5,000 Units, oral, Daily, Giuseppe Francois MD, 125 mcg at 04/19/25 0857    cloNIDine (Catapres) tablet 0.2 mg, 0.2 mg, oral, q12h YANG, Giuseppe Francois MD, 0.2 mg at 04/19/25 2022    [Held by provider] clopidogrel (Plavix) tablet 75 mg, 75 mg, oral, Daily, Giuseppe Francois MD, 75 mg at 04/14/25 0925    dextrose 50 % injection 12.5 g, 12.5 g, intravenous, q15 min PRN, Giuseppe Francois MD, 12.5 g at 04/17/25 0624    dextrose 50 % injection 25 g, 25 g, intravenous, q15 min PRN, Giuseppe Francois MD    docusate sodium (Colace) capsule 100 mg, 100 mg, oral, Daily, Giuseppe Francois MD, 100 mg at 04/19/25 0857    [Held by provider] enoxaparin (Lovenox) syringe 30 mg, 30 mg, subcutaneous, q24h, Giuseppe Francois MD, 30 mg at 04/18/25 1751    ferrous sulfate tablet 325 mg, 65 mg of iron, oral, Daily with breakfast, Giuseppe Francois MD, 325 mg at 04/19/25 0857    glucagon (Glucagen) injection 1 mg, 1 mg, intramuscular, q15 min PRN, Giuseppe Francois MD    glucagon (Glucagen) injection 1 mg, 1 mg, intramuscular, q15 min PRN, Giuseppe Francois MD    hydroCHLOROthiazide (HYDRODiuril) tablet 25 mg, 25 mg, oral, Daily, Giuseppe Francois MD, 25 mg at 04/19/25 0857    insulin lispro injection 0-10 Units, 0-10 Units, subcutaneous, 4x daily, Giuseppe Francois MD, 6 Units at 04/19/25 2116    insulin NPH and regular human  (HumuLIN 70-30, NovoLIN 70-30) 100 unit/mL (70-30) injection 25 Units, 25 Units, subcutaneous, q AM, Giuseppe Francois MD, 25 Units at 04/19/25 0855    insulin NPH and regular human (HumuLIN 70-30, NovoLIN 70-30) 100 unit/mL (70-30) injection 25 Units, 25 Units, subcutaneous, Nightly, Giuseppe Francois MD, 25 Units at 04/19/25 2116    isosorbide mononitrate ER (Imdur) 24 hr tablet 60 mg, 60 mg, oral, Daily, Giuseppe Francois MD, 60 mg at 04/19/25 0857    LORazepam (Ativan) tablet 1 mg, 1 mg, oral, Nightly, Giuseppe Francois MD, 1 mg at 04/19/25 2022    [Held by provider] losartan (Cozaar) tablet 100 mg, 100 mg, oral, Daily, Giuseppe Francois MD, 100 mg at 04/14/25 0925    menthol-zinc oxide (Calmoseptine - Risamine) 0.44-20.6 % ointment 1 Application, 1 Application, Topical, 4x daily PRN, Giuseppe Francois MD    meropenem (Merrem) 1 g in sodium chloride 0.9%  mL, 1 g, intravenous, q12h, Cash Hargrove MD, Stopped at 04/19/25 2119    metoprolol succinate XL (Toprol-XL) 24 hr tablet 100 mg, 100 mg, oral, BID, Giuseppe Francois MD, 100 mg at 04/19/25 2022    metoprolol tartrate (Lopressor) injection 5 mg, 5 mg, intravenous, q6h PRN, Giuseppe Francois MD, 5 mg at 04/17/25 0348    mirtazapine (Remeron) tablet 15 mg, 15 mg, oral, Nightly, Giuseppe Francois MD, 15 mg at 04/19/25 2022    nitroglycerin (Nitrostat) SL tablet 0.4 mg, 0.4 mg, sublingual, q5 min PRN, Giuseppe Francois MD    tamsulosin (Flomax) 24 hr capsule 0.4 mg, 0.4 mg, oral, Daily, Giuseppe Francois MD, 0.4 mg at 04/19/25 0857    [Held by provider] torsemide (Demadex) tablet 20 mg, 20 mg, oral, Every other day, Giuseppe Francois MD, 20 mg at 04/12/25 0999

## 2025-04-20 NOTE — CARE PLAN
The patient's goals for the shift include rest and comfort    The clinical goals for the shift include Safety      Problem: Safety - Adult  Goal: Free from fall injury  Outcome: Progressing     Problem: Discharge Planning  Goal: Discharge to home or other facility with appropriate resources  Outcome: Progressing     Problem: Chronic Conditions and Co-morbidities  Goal: Patient's chronic conditions and co-morbidity symptoms are monitored and maintained or improved  Outcome: Progressing     Problem: Nutrition  Goal: Nutrient intake appropriate for maintaining nutritional needs  Outcome: Progressing     Problem: Fall/Injury  Goal: Not fall by end of shift  Outcome: Progressing  Goal: Be free from injury by end of the shift  Outcome: Progressing  Goal: Verbalize understanding of personal risk factors for fall in the hospital  Outcome: Progressing  Goal: Verbalize understanding of risk factor reduction measures to prevent injury from fall in the home  Outcome: Progressing  Goal: Use assistive devices by end of the shift  Outcome: Progressing  Goal: Pace activities to prevent fatigue by end of the shift  Outcome: Progressing     Problem: Skin  Goal: Decreased wound size/increased tissue granulation at next dressing change  Outcome: Progressing  Flowsheets (Taken 4/20/2025 7968)  Decreased wound size/increased tissue granulation at next dressing change:   Promote sleep for wound healing   Protective dressings over bony prominences   Utilize specialty bed per algorithm  Goal: Participates in plan/prevention/treatment measures  Outcome: Progressing  Goal: Prevent/manage excess moisture  Outcome: Progressing  Goal: Prevent/minimize sheer/friction injuries  Outcome: Progressing  Goal: Promote/optimize nutrition  Outcome: Progressing  Goal: Promote skin healing  Outcome: Progressing

## 2025-04-20 NOTE — CARE PLAN
The patient's goals for the shift include rest and comfort    The clinical goals for the shift include Safety to prevent falls/injury, maintain mobility, monitor for signs/symptoms of infection    Problem: Safety - Adult  Goal: Free from fall injury  4/20/2025 1702 by Stephanie Kaur RN  Outcome: Progressing  4/20/2025 1702 by Stephanie Kaur RN  Outcome: Progressing     Problem: Discharge Planning  Goal: Discharge to home or other facility with appropriate resources  4/20/2025 1702 by Stephanie Kaur RN  Outcome: Progressing  4/20/2025 1702 by Stephanie Kaur RN  Outcome: Progressing     Problem: Chronic Conditions and Co-morbidities  Goal: Patient's chronic conditions and co-morbidity symptoms are monitored and maintained or improved  4/20/2025 1702 by Stephanie Kaur RN  Outcome: Progressing  4/20/2025 1702 by Stephanie Kaur RN  Outcome: Progressing     Problem: Nutrition  Goal: Nutrient intake appropriate for maintaining nutritional needs  4/20/2025 1702 by Stephanie Kaur RN  Outcome: Progressing  4/20/2025 1702 by Stephanie Kaur RN  Outcome: Progressing     Problem: Fall/Injury  Goal: Not fall by end of shift  4/20/2025 1702 by Stephanie Kaur RN  Outcome: Progressing  4/20/2025 1702 by Stephanie Kaur RN  Outcome: Progressing  Goal: Be free from injury by end of the shift  4/20/2025 1702 by Stephanie Kaur RN  Outcome: Progressing  4/20/2025 1702 by Stephanie Kaur RN  Outcome: Progressing  Goal: Verbalize understanding of personal risk factors for fall in the hospital  4/20/2025 1702 by Stephanie Kaur RN  Outcome: Progressing  4/20/2025 1702 by Stephanie Kaur RN  Outcome: Progressing  Goal: Verbalize understanding of risk factor reduction measures to prevent injury from fall in the home  4/20/2025 1702 by Stephanie Kaur RN  Outcome: Progressing  4/20/2025 1702 by Stephanie Kaur RN  Outcome: Progressing  Goal: Use assistive devices by end of the shift  4/20/2025 1702 by Stephanie Kaur RN  Outcome:  Progressing  4/20/2025 1702 by Stephanie Kaur RN  Outcome: Progressing  Goal: Pace activities to prevent fatigue by end of the shift  4/20/2025 1702 by Stephanie Kaur RN  Outcome: Progressing  4/20/2025 1702 by Stephanie Kaur RN  Outcome: Progressing     Problem: Skin  Goal: Decreased wound size/increased tissue granulation at next dressing change  4/20/2025 1702 by Stephanie Kaur RN  Outcome: Progressing  4/20/2025 1702 by Stephanie Kaur RN  Outcome: Progressing  Goal: Participates in plan/prevention/treatment measures  4/20/2025 1702 by Stephanie Kaur RN  Outcome: Progressing  4/20/2025 1702 by Stephanie Kaur RN  Outcome: Progressing  Goal: Prevent/manage excess moisture  4/20/2025 1702 by Stephanie Kaur RN  Outcome: Progressing  4/20/2025 1702 by Stephanie Kaur RN  Outcome: Progressing  Goal: Prevent/minimize sheer/friction injuries  4/20/2025 1702 by Stephanie Kaur RN  Outcome: Progressing  4/20/2025 1702 by Stephanie Kaur RN  Outcome: Progressing  Goal: Promote/optimize nutrition  4/20/2025 1702 by Stephanie Kaur RN  Outcome: Progressing  4/20/2025 1702 by Stephanie Kaur RN  Outcome: Progressing  Goal: Promote skin healing  4/20/2025 1702 by Stephanie Kaur RN  Outcome: Progressing  4/20/2025 1702 by Stephanie Kaur RN  Outcome: Progressing

## 2025-04-21 ENCOUNTER — APPOINTMENT (OUTPATIENT)
Dept: RADIOLOGY | Facility: HOSPITAL | Age: OVER 89
DRG: 194 | End: 2025-04-21
Payer: MEDICARE

## 2025-04-21 LAB
ANION GAP SERPL CALC-SCNC: 10 MMOL/L (ref 10–20)
BASOPHILS # BLD AUTO: 0.04 X10*3/UL (ref 0–0.1)
BASOPHILS NFR BLD AUTO: 0.5 %
BUN SERPL-MCNC: 31 MG/DL (ref 6–23)
CALCIUM SERPL-MCNC: 7.8 MG/DL (ref 8.6–10.3)
CHLORIDE SERPL-SCNC: 102 MMOL/L (ref 98–107)
CO2 SERPL-SCNC: 29 MMOL/L (ref 21–32)
CREAT SERPL-MCNC: 0.97 MG/DL (ref 0.5–1.05)
EGFRCR SERPLBLD CKD-EPI 2021: 56 ML/MIN/1.73M*2
EOSINOPHIL # BLD AUTO: 0.23 X10*3/UL (ref 0–0.4)
EOSINOPHIL NFR BLD AUTO: 2.7 %
ERYTHROCYTE [DISTWIDTH] IN BLOOD BY AUTOMATED COUNT: 13.9 % (ref 11.5–14.5)
GLUCOSE BLD MANUAL STRIP-MCNC: 116 MG/DL (ref 74–99)
GLUCOSE BLD MANUAL STRIP-MCNC: 241 MG/DL (ref 74–99)
GLUCOSE BLD MANUAL STRIP-MCNC: 242 MG/DL (ref 74–99)
GLUCOSE BLD MANUAL STRIP-MCNC: 350 MG/DL (ref 74–99)
GLUCOSE SERPL-MCNC: 244 MG/DL (ref 74–99)
HCT VFR BLD AUTO: 25.5 % (ref 36–46)
HGB BLD-MCNC: 8.3 G/DL (ref 12–16)
HOLD SPECIMEN: NORMAL
IMM GRANULOCYTES # BLD AUTO: 0.08 X10*3/UL (ref 0–0.5)
IMM GRANULOCYTES NFR BLD AUTO: 0.9 % (ref 0–0.9)
LYMPHOCYTES # BLD AUTO: 2.11 X10*3/UL (ref 0.8–3)
LYMPHOCYTES NFR BLD AUTO: 24.5 %
MCH RBC QN AUTO: 27.9 PG (ref 26–34)
MCHC RBC AUTO-ENTMCNC: 32.5 G/DL (ref 32–36)
MCV RBC AUTO: 86 FL (ref 80–100)
MONOCYTES # BLD AUTO: 1.15 X10*3/UL (ref 0.05–0.8)
MONOCYTES NFR BLD AUTO: 13.4 %
NEUTROPHILS # BLD AUTO: 5 X10*3/UL (ref 1.6–5.5)
NEUTROPHILS NFR BLD AUTO: 58 %
NRBC BLD-RTO: 0 /100 WBCS (ref 0–0)
PLATELET # BLD AUTO: 474 X10*3/UL (ref 150–450)
POTASSIUM SERPL-SCNC: 4.6 MMOL/L (ref 3.5–5.3)
RBC # BLD AUTO: 2.97 X10*6/UL (ref 4–5.2)
SODIUM SERPL-SCNC: 136 MMOL/L (ref 136–145)
WBC # BLD AUTO: 8.6 X10*3/UL (ref 4.4–11.3)

## 2025-04-21 PROCEDURE — 88307 TISSUE EXAM BY PATHOLOGIST: CPT | Mod: TC,ELYLAB | Performed by: INTERNAL MEDICINE

## 2025-04-21 PROCEDURE — 0FB23ZX EXCISION OF LEFT LOBE LIVER, PERCUTANEOUS APPROACH, DIAGNOSTIC: ICD-10-PCS | Performed by: RADIOLOGY

## 2025-04-21 PROCEDURE — 2500000002 HC RX 250 W HCPCS SELF ADMINISTERED DRUGS (ALT 637 FOR MEDICARE OP, ALT 636 FOR OP/ED): Performed by: INTERNAL MEDICINE

## 2025-04-21 PROCEDURE — 97535 SELF CARE MNGMENT TRAINING: CPT | Mod: GO,CO

## 2025-04-21 PROCEDURE — 36415 COLL VENOUS BLD VENIPUNCTURE: CPT | Performed by: INTERNAL MEDICINE

## 2025-04-21 PROCEDURE — 77012 CT SCAN FOR NEEDLE BIOPSY: CPT | Performed by: RADIOLOGY

## 2025-04-21 PROCEDURE — 97530 THERAPEUTIC ACTIVITIES: CPT | Mod: GP,CQ

## 2025-04-21 PROCEDURE — 77012 CT SCAN FOR NEEDLE BIOPSY: CPT

## 2025-04-21 PROCEDURE — 47000 NEEDLE BIOPSY OF LIVER PERQ: CPT | Performed by: RADIOLOGY

## 2025-04-21 PROCEDURE — 85025 COMPLETE CBC W/AUTO DIFF WBC: CPT | Performed by: INTERNAL MEDICINE

## 2025-04-21 PROCEDURE — 2500000001 HC RX 250 WO HCPCS SELF ADMINISTERED DRUGS (ALT 637 FOR MEDICARE OP): Performed by: INTERNAL MEDICINE

## 2025-04-21 PROCEDURE — 2500000004 HC RX 250 GENERAL PHARMACY W/ HCPCS (ALT 636 FOR OP/ED): Performed by: RADIOLOGY

## 2025-04-21 PROCEDURE — 99232 SBSQ HOSP IP/OBS MODERATE 35: CPT | Performed by: INTERNAL MEDICINE

## 2025-04-21 PROCEDURE — 1210000001 HC SEMI-PRIVATE ROOM DAILY

## 2025-04-21 PROCEDURE — 82947 ASSAY GLUCOSE BLOOD QUANT: CPT

## 2025-04-21 PROCEDURE — 2500000004 HC RX 250 GENERAL PHARMACY W/ HCPCS (ALT 636 FOR OP/ED): Performed by: INTERNAL MEDICINE

## 2025-04-21 PROCEDURE — 80048 BASIC METABOLIC PNL TOTAL CA: CPT | Performed by: INTERNAL MEDICINE

## 2025-04-21 PROCEDURE — 2720000007 HC OR 272 NO HCPCS

## 2025-04-21 RX ORDER — LIDOCAINE HYDROCHLORIDE 20 MG/ML
INJECTION, SOLUTION EPIDURAL; INFILTRATION; INTRACAUDAL; PERINEURAL
Status: COMPLETED | OUTPATIENT
Start: 2025-04-21 | End: 2025-04-21

## 2025-04-21 RX ADMIN — INSULIN LISPRO 4 UNITS: 100 INJECTION, SOLUTION INTRAVENOUS; SUBCUTANEOUS at 06:40

## 2025-04-21 RX ADMIN — INSULIN LISPRO 4 UNITS: 100 INJECTION, SOLUTION INTRAVENOUS; SUBCUTANEOUS at 20:45

## 2025-04-21 RX ADMIN — MEROPENEM 1 G: 1 INJECTION, POWDER, FOR SOLUTION INTRAVENOUS at 08:03

## 2025-04-21 RX ADMIN — MEROPENEM 1 G: 1 INJECTION, POWDER, FOR SOLUTION INTRAVENOUS at 20:45

## 2025-04-21 RX ADMIN — LIDOCAINE HYDROCHLORIDE 4 ML: 20 INJECTION, SOLUTION EPIDURAL; INFILTRATION; INTRACAUDAL; PERINEURAL at 12:02

## 2025-04-21 RX ADMIN — MIRTAZAPINE 15 MG: 15 TABLET, FILM COATED ORAL at 20:46

## 2025-04-21 RX ADMIN — METOPROLOL SUCCINATE 100 MG: 50 TABLET, EXTENDED RELEASE ORAL at 20:46

## 2025-04-21 RX ADMIN — INSULIN LISPRO 10 UNITS: 100 INJECTION, SOLUTION INTRAVENOUS; SUBCUTANEOUS at 17:05

## 2025-04-21 RX ADMIN — CLONIDINE HYDROCHLORIDE 0.2 MG: 0.1 TABLET ORAL at 20:46

## 2025-04-21 RX ADMIN — AMLODIPINE BESYLATE 10 MG: 5 TABLET ORAL at 06:40

## 2025-04-21 RX ADMIN — LORAZEPAM 1 MG: 1 TABLET ORAL at 20:46

## 2025-04-21 ASSESSMENT — PAIN SCALES - GENERAL
PAINLEVEL_OUTOF10: 3
PAINLEVEL_OUTOF10: 0 - NO PAIN
PAINLEVEL_OUTOF10: 0 - NO PAIN
PAINLEVEL_OUTOF10: 7

## 2025-04-21 ASSESSMENT — COGNITIVE AND FUNCTIONAL STATUS - GENERAL
DRESSING REGULAR LOWER BODY CLOTHING: A LITTLE
PERSONAL GROOMING: A LOT
MOBILITY SCORE: 18
CLIMB 3 TO 5 STEPS WITH RAILING: A LOT
PERSONAL GROOMING: A LITTLE
MOBILITY SCORE: 19
STANDING UP FROM CHAIR USING ARMS: A LITTLE
TOILETING: A LITTLE
DRESSING REGULAR UPPER BODY CLOTHING: A LITTLE
MOVING FROM LYING ON BACK TO SITTING ON SIDE OF FLAT BED WITH BEDRAILS: A LITTLE
TOILETING: A LITTLE
DRESSING REGULAR UPPER BODY CLOTHING: A LITTLE
TURNING FROM BACK TO SIDE WHILE IN FLAT BAD: A LITTLE
CLIMB 3 TO 5 STEPS WITH RAILING: A LITTLE
WALKING IN HOSPITAL ROOM: A LITTLE
STANDING UP FROM CHAIR USING ARMS: A LITTLE
HELP NEEDED FOR BATHING: A LITTLE
HELP NEEDED FOR BATHING: A LOT
MOVING TO AND FROM BED TO CHAIR: A LITTLE
DRESSING REGULAR LOWER BODY CLOTHING: A LITTLE
DAILY ACTIVITIY SCORE: 19
WALKING IN HOSPITAL ROOM: A LITTLE
DAILY ACTIVITIY SCORE: 17
MOVING TO AND FROM BED TO CHAIR: A LITTLE

## 2025-04-21 ASSESSMENT — ACTIVITIES OF DAILY LIVING (ADL): HOME_MANAGEMENT_TIME_ENTRY: 23

## 2025-04-21 ASSESSMENT — PAIN - FUNCTIONAL ASSESSMENT
PAIN_FUNCTIONAL_ASSESSMENT: 0-10
PAIN_FUNCTIONAL_ASSESSMENT: 0-10

## 2025-04-21 NOTE — PROGRESS NOTES
04/21/25 1724   Discharge Planning   Home or Post Acute Services Post acute facilities (Rehab/SNF/etc)   Type of Post Acute Facility Services Skilled nursing   Expected Discharge Disposition SNF  ([facility])   Does the patient need discharge transport arranged? Yes   RoundTrip coordination needed? Yes   Has discharge transport been arranged? No     Clinical updates sent to the facility. Pt will need insurance precert once ADOD is determined.

## 2025-04-21 NOTE — CARE PLAN
The patient's goals for the shift include sleep    The clinical goals for the shift include Liver bx today        Problem: Safety - Adult  Goal: Free from fall injury  Outcome: Met

## 2025-04-21 NOTE — PROGRESS NOTES
"Occupational Therapy    OT Treatment    Patient Name: Lela Barba  MRN: [id number]  Department: [facility]  Room: [address] [place], [place] [address]-A  Today's Date: 4/21/2025  Time Calculation  Start Time: 1519  Stop Time: 1542  Time Calculation (min): 23 min      Assessment:  OT Assessment:  (pt presents w/ decrease safety awareness,mildly impaired cognition and general weakness, decrease endurance w/ Adl's. Pt would benefit from con't OT to address deficits.)  End of Session Communication: Bedside nurse  End of Session Patient Position: Bed, 3 rail up, Alarm on (call light and all needs within reach)     Plan:  Treatment Interventions: ADL retraining, Functional transfer training, Endurance training  OT Frequency: 2 times per week  OT Discharge Recommendations: Low intensity level of continued care, Moderate intensity level of continued care  OT Recommended Transfer Status: Assist of 1  OT - OK to Discharge: Yes (when medically stable/cleared)  Treatment Interventions: ADL retraining, Functional transfer training, Endurance training    Subjective   Previous Visit Info:  OT Last Visit  OT Received On: 04/21/25  General:  General  Prior to Session Communication: Bedside nurse  Patient Position Received: Bed, 3 rail up, Alarm off, caregiver present  Preferred Learning Style: auditory, verbal  General Comment:  (pt agreeable to therapy pleasantly participated)  Precautions:  Medical Precautions: Fall precautions    Pain:  Pain Assessment  Pain Assessment: 0-10  0-10 (Numeric) Pain Score: 7 (only w/ movement, during activity)  Pain Location: Abdomen  Pain Orientation: Left    Objective    Cognition:  Cognition  Overall Cognitive Status: Impaired (pt ask \"which one is the phone?\" holding onto phone/call light.)  Orientation Level: Disoriented to time  Safety/Judgement: Exceptions to WFL  Impulsive: Mildly    Activities of Daily Living:    Toileting  Toileting Level of Assistance: Minimal verbal cues  Where Assessed: Toilet  Toileting " Comments:  (Min Vc to complete anterior misael care in stance w/ unilateral support to grab bar. pt demo fair (+) standing balance w/ task)  Functional Standing Tolerance:  Time: ~3  Activity: grooming/ hand hygiene  Functional Standing Tolerance Comments: demo fair (+) standing balance at sinkside  Bed Mobility/Transfers: Bed Mobility 1  Bed Mobility 1: Supine to sitting, Sitting to supine  Level of Assistance 1: Modified independent    Transfer 1  Technique 1: Sit to stand, Stand to sit  Transfer Device 1: Walker  Transfer Level of Assistance 1: Minimal verbal cues  Trials/Comments 1:  (vc for handplacement, pt denies any dizziness or lightheadedness. x1 trial EOB, x1 trial at toilet, x1 trial at recliner. No LOB w/ task)    Functional Mobility:  Functional Mobility  Functional Mobility Performed:  (pt complete household distances w/ CGA, VC for safety and fww management. Multi tc/vc to redirect for safety, pt leaves walker behind when moving around obstacles and when turning around into bathroom.)    Outcome Measures:[facility] Daily Activity  Putting on and taking off regular lower body clothing: A little  Bathing (including washing, rinsing, drying): A lot  Putting on and taking off regular upper body clothing: A little  Toileting, which includes using toilet, bedpan or urinal: A little  Taking care of personal grooming such as brushing teeth: A lot  Eating Meals: None  Daily Activity - Total Score: 17    Education Documentation  Body Mechanics, taught by KATERIN Taylor at 4/21/2025  4:22 PM.  Learner: Patient  Readiness: Acceptance  Method: Explanation, Demonstration, Teach-back  Response: Verbalizes Understanding, Needs Reinforcement    ADL Training, taught by KATERIN Taylor at 4/21/2025  4:22 PM.  Learner: Patient  Readiness: Acceptance  Method: Explanation, Demonstration, Teach-back  Response: Verbalizes Understanding, Needs Reinforcement    EDUCATION:  Education  Individual(s) Educated:  Patient  Patient Response to Education: Patient/Caregiver Verbalized Understanding of Information  Education Comment:  (Discussed and demo ww management/safety w/ pt during Adl task, pt verbalize understanding)    Goals:  Encounter Problems       Encounter Problems (Active)       OT Goals       Independent with all functional transfers  (Progressing)       Start:  04/09/25    Expected End:  04/23/25            Good dyn standing balance during ADLs and functional activities  (Progressing)       Start:  04/09/25    Expected End:  04/23/25            Independent for LB dressing  (Progressing)       Start:  04/09/25    Expected End:  04/23/25            Independent for toileting tasks and clothing mgmt  (Progressing)       Start:  04/09/25    Expected End:  04/23/25            Pt. will tolerate 10 minutes ADLs/therapeutic activity without rest break.  (Progressing)       Start:  04/09/25    Expected End:  04/23/25

## 2025-04-21 NOTE — PROGRESS NOTES
"Physical Therapy    Physical Therapy Treatment    Patient Name: Lela Barba  MRN: [id number]  Today's Date: 4/21/2025  Time Calculation  Start Time: 0807  Stop Time: 0824  Time Calculation (min): 17 min     1116/1116-A    Assessment/Plan   Barriers to Discharge Home: Physical needs, Caregiver assistance    Assessment Comment:  (Patient able in increase amb distance this date. Would benefit from continued PT)    End of Session Patient Position:  (Reclined in chair after treatment. Call light/tray in reach. Chair alarm on.)    PT Plan  Inpatient/Swing Bed or Outpatient: Inpatient  Treatment/Interventions: Bed mobility, Transfer training, Gait training, Balance training, Therapeutic activity  PT Plan: Ongoing PT  PT Frequency: 2 times per week  PT Discharge Recommendations: Low intensity level of continued care    PT Recommended Transfer Status: Contact guard    General Visit Information:   PT  Visit  PT Received On: 04/21/25    General  Prior to Session Communication:  (Cleared by RN for PT.  Patient currently NPO for liver biopsy later today)  Patient Position Received:  (Patient presents in sleeping in bed, easily roused and agreeable to PT)    General Comment:  (Dx: Weakness, pneumonia, cystitis)    General Observations:   General Observation:  (language barrier; alarm)    Subjective  \"What are you going to do?  Kill me?  I'm nothing special!  I'm an old woman!!\"    Precautions:  Precautions  Medical Precautions: Fall precautions      Pain:  Pain Assessment  Pain Assessment: 0-10  0-10 (Numeric) Pain Score: 0 - No pain  Pain Interventions:  (No interventions required)    Cognition:  Cognition  Overall Cognitive Status:  (Patient cooperative with therapy but appears somewhat agitated.  Reassurance provided.  Somewhat difficult to understand due to language barrier)  Orientation Level: Unable to assess  Safety/Judgement: Exceptions to WFL  Insight: Moderate  Impulsive: Mildly      Balance:   Dynamic Standing " "Balance  Dynamic Standing-Balance:  (Fair dynamic stand with ww)      Activity Tolerance:  Activity Tolerance  Endurance: Decreased tolerance for upright activites      Therapeutic Exercise  Therapeutic Exercise Performed:  (seated LE ther-ex: AP, LAQ, seated marching, hip add (pillow squeeze) x10 with cues for correct form and technique)     Balance/Neuromuscular Re-Education  Balance/Neuromuscular Re-Education Activity Performed:  (Patient performed multilevel standing reaching with single UE support on ww and SBAx1 for balance.)    Bed Mobility  Bed Mobility:  (supine->sit: SBAx1  Bed flat to mimic home set up. Patient in sidelying position. She clasps hands together and pushes up from mattress into upright position. Unable to answer if she was dizzy or not with change in position)    Ambulation/Gait Training  Ambulation/Gait Training Performed:  (Patient amb 100' with ww and CGAx1.  Slow margarito, decreased step length bilat. Difficulty navigating around obstacles. Wide turns with patient walking outside walkers VANDANA.)    Transfers  Transfer:  (sit->stand: CGAx1  Two stands performed (EOB; chair).  Verbal and tactile cues for hand placement with patient pulling up on ww regardless. stand->sit: CGAx1  \"ledbetter\" ww off to the side despite instructions)    Stairs  Stairs:  (Patient ascended/descended 3 steps with bilat handrails and min x1. Single step, difficulty lifting foot high enough to clear step riser. Unsteady, required min x1 for balance and steadying.)          Outcome Measures:   [facility] Basic Mobility  Turning from your back to your side while in a flat bed without using bedrails: A little  Moving from lying on your back to sitting on the side of a flat bed without using bedrails: A little  Moving to and from bed to chair (including a wheelchair): A little  Standing up from a chair using your arms (e.g. wheelchair or bedside chair): A little  To walk in hospital room: A little  Climbing 3-5 steps with " alfred boateng  Basic Mobility - Total Score: 18          Education Documentation  Mobility Training, taught by Mariza Light PTA at 4/21/2025  8:27 AM.  Learner: Patient  Readiness: Acceptance  Method: Explanation, Demonstration  Response: Needs Reinforcement      Education Comments  Individual(s) Educated: Patient  Education Provided:  (Safety reinforced throughout treatment)  Patient Response to Education:  (Questionable understanding of education)      Encounter Problems       Encounter Problems (Active)       PT Problem       Pt. will transfer sit/stand with MOD I (Progressing)       Start:  04/09/25    Expected End:  04/23/25            Pt.will ambulate 75' with MOD I (Progressing)       Start:  04/09/25    Expected End:  04/23/25            Pt. will amb up/down 5 steps with B HR with supervision (Progressing)       Start:  04/09/25    Expected End:  04/23/25            Pt. will perform 2 x 15 B LE AROM exercises  (Progressing)       Start:  04/09/25    Expected End:  04/23/25               [email or web address]

## 2025-04-21 NOTE — POST-PROCEDURE NOTE
Interventional Radiology Brief Postprocedure Note    Attending: Schoenberger    Assistant: None    Diagnosis: Liver Left Lobe Mass    Description of procedure: CT Biopsy Liver Mass     Anesthesia:  Local    Complications: None    Estimated Blood Loss: none    Medications  As of 04/21/25 1212      cefTRIAXone (Rocephin) 1 g in dextrose (iso) IV 50 mL (mL/hr) Total volume:  Not documented* Dosing weight:  64.9   *Total volume has not been documented. View each administration to see the amount administered.     Date/Time Rate/Dose/Volume Action       04/06/25  2113 1 g - 100 mL/hr (over 30 min) New Bag      2144  (over 30 min) Stopped               cefTRIAXone (Rocephin) 1 g in dextrose (iso) IV 50 mL (mL/hr) Total dose:  3 g* Dosing weight:  64.9   *From user-documented volume     Date/Time Rate/Dose/Volume Action       04/08/25  0000 1 g - 100 mL/hr (over 30 min) New Bag      0033 50 mL Stopped      2311 1 g - 100 mL/hr (over 30 min) New Bag      2340  (over 30 min) Stopped     04/09/25  2054 1 g - 100 mL/hr (over 30 min) New Bag      2152 50 mL Stopped     04/10/25  2004 1 g - 100 mL/hr (over 30 min) New Bag      2122 50 mL Stopped               doxycycline (Vibramycin) 100 mg in dextrose 5%  mL (mL/hr) Total volume:  Not documented* Dosing weight:  64.9   *Total volume has not been documented. View each administration to see the amount administered.     Date/Time Rate/Dose/Volume Action       04/06/25  2145 100 mg - 100 mL/hr (over 60 min) New Bag      2323  (over 60 min) Stopped               dextrose 50 % injection 12.5 g (g) Total dose:  12.5 g Dosing weight:  64.9      Date/Time Rate/Dose/Volume Action       04/17/25  0624 12.5 g Given               insulin lispro injection 0-5 Units (Units) Total dose:  44 Units Dosing weight:  64.9      Date/Time Rate/Dose/Volume Action       04/07/25  0816 4 Units Given      1144 5 Units Given      1642 5 Units Given     04/08/25  0639 1 Units Given      1157 2 Units  Given      1704 *Not included in total Missed     04/09/25  0636 *Not included in total Missed      1258 3 Units Given      1554 *Not included in total Missed     04/10/25  0633 *Not included in total Missed      1226 4 Units Given      1640 *Not included in total Missed     04/11/25  0616 *Not included in total Missed      1122 3 Units Given      1702 5 Units Given     04/12/25  0628 4 Units Given      1203 3 Units Given      1749 5 Units Given               insulin lispro injection 10 Units (Units) Total dose:  10 Units Dosing weight:  64.9      Date/Time Rate/Dose/Volume Action       04/12/25  2055 10 Units Given               insulin lispro injection 0-10 Units (Units) Total dose:  132 Units Dosing weight:  64.9      Date/Time Rate/Dose/Volume Action       04/13/25  0013 10 Units Given      0658 6 Units Given      1205 10 Units Given      1720 4 Units Given      2050 10 Units Given     04/14/25  0647 2 Units Given      1213 4 Units Given      1631 2 Units Given      2053 8 Units Given     04/15/25  0621 *Not included in total Missed      1342 8 Units Given      1708 4 Units Given      2203 6 Units Given     04/16/25  0649 6 Units Given      1442 6 Units Given      1708 2 Units Given      2126 4 Units Given     04/17/25  0627 *Not included in total Missed      1308 6 Units Given      1627 6 Units Given      2139 2 Units Given     04/18/25  0624 *Not included in total Missed      1310 4 Units Given      1650 *Not included in total Missed      2009 6 Units Given     04/19/25  0619 *Not included in total Missed      1237 *Not included in total Missed      1632 *Not included in total Missed      2116 6 Units Given     04/20/25  0725 *Not included in total Missed      1305 *Not included in total Missed      1644 *Not included in total Missed      2224 6 Units Given     04/21/25  0640 4 Units Given      1108 *Not included in total Missed               allopurinol (Zyloprim) tablet 100 mg (mg) Total dose:  800 mg*  Dosing weight:  64.9   *Administration not included in total     Date/Time Rate/Dose/Volume Action       04/07/25  1633 100 mg Given     04/08/25  0835 100 mg Given     04/09/25  0834 100 mg Given     04/10/25  0920 100 mg Given     04/11/25  0824 100 mg Given     04/12/25  0946 100 mg Given     04/13/25  0806 100 mg Given     04/14/25  0925 100 mg Given      1235 *Not included in total Held by provider     04/15/25  0900 *100 mg Missed     04/16/25  0900 *100 mg Missed     04/17/25  0900 *100 mg Missed     04/18/25  0900 *100 mg Missed     04/19/25  0900 *100 mg Missed     04/20/25  0900 *100 mg Missed     04/21/25  0900 *Not included in total Automatically Held               amLODIPine (Norvasc) tablet 5 mg (mg) Total dose:  10 mg Dosing weight:  64.9      Date/Time Rate/Dose/Volume Action       04/07/25  1633 5 mg Given     04/08/25  0609 5 mg Given               amLODIPine (Norvasc) tablet 10 mg (mg) Total dose:  130 mg Dosing weight:  64.9      Date/Time Rate/Dose/Volume Action       04/09/25  0625 10 mg Given     04/10/25  0637 10 mg Given     04/11/25  0616 10 mg Given     04/12/25  0627 10 mg Given     04/13/25  0657 10 mg Given     04/14/25  0647 10 mg Given     04/15/25  0607 10 mg Given     04/16/25  0649 10 mg Given     04/17/25  0638 10 mg Given     04/18/25  0626 10 mg Given     04/19/25  0856 10 mg Given     04/20/25  0745 10 mg Given     04/21/25  0640 10 mg Given               aspirin chewable tablet 81 mg (mg) Total dose:  648 mg* Dosing weight:  64.9   *Administration not included in total     Date/Time Rate/Dose/Volume Action       04/07/25  1633 81 mg Given     04/08/25  0609 81 mg Given     04/09/25  0625 81 mg Given     04/10/25  0637 81 mg Given     04/11/25  0616 81 mg Given     04/12/25  0628 81 mg Given     04/13/25  0657 81 mg Given     04/14/25  0647 81 mg Given      2212 *Not included in total Held by provider     04/15/25  0700 *81 mg Missed     04/16/25  0700 *81 mg Missed      04/17/25  0700 *81 mg Missed     04/18/25  0700 *81 mg Missed     04/19/25  0700 *81 mg Missed     04/20/25  0700 *81 mg Missed     04/21/25  0700 *Not included in total Automatically Held               atorvastatin (Lipitor) tablet 40 mg (mg) Total dose:  280 mg* Dosing weight:  64.9   *Administration not included in total     Date/Time Rate/Dose/Volume Action       04/07/25  2241 40 mg Given     04/08/25 2022 40 mg Given     04/09/25 2053 40 mg Given     04/10/25  2004 40 mg Given     04/11/25 2055 40 mg Given     04/12/25 2022 40 mg Given     04/13/25 2008 40 mg Given     04/14/25  1235 *Not included in total Held by provider      2100 *40 mg Missed     04/15/25  2100 *Not included in total Automatically Held     04/16/25  2100 *40 mg Missed     04/17/25  2100 *40 mg Missed     04/18/25  2100 *40 mg Missed     04/19/25  2100 *Not included in total Automatically Held     04/20/25  2100 *Not included in total Automatically Held               cholecalciferol (Vitamin D-3) capsule 125 mcg (mcg) Total dose:  70,000 Units* Dosing weight:  64.9   *Administration not included in total     Date/Time Rate/Dose/Volume Action       04/07/25  1633 125 mcg Given     04/08/25  0835 125 mcg Given     04/09/25  0834 125 mcg Given     04/10/25  0920 125 mcg Given     04/11/25  0824 125 mcg Given     04/12/25  0946 125 mcg Given     04/13/25  0806 125 mcg Given     04/14/25  0925 125 mcg Given     04/15/25  0826 125 mcg Given     04/16/25  0905 125 mcg Given     04/17/25  0938 125 mcg Given     04/18/25  0857 125 mcg Given     04/19/25  0857 125 mcg Given     04/20/25  0847 125 mcg Given     04/21/25  0810 *125 mcg Missed               cloNIDine (Catapres) tablet 0.2 mg (mg) Total dose:  3.2 mg Dosing weight:  64.9      Date/Time Rate/Dose/Volume Action       04/07/25  2241 0.2 mg Given     04/08/25  0835 0.2 mg Given      2021 0.2 mg Given     04/14/25 2052 0.2 mg Given     04/15/25  0826 0.2 mg Given      2043 0.2 mg Given      04/16/25  0905 0.2 mg Given      2127 0.2 mg Given     04/17/25  0938 0.2 mg Given      2134 0.2 mg Given     04/18/25  0856 0.2 mg Given      2008 0.2 mg Given     04/19/25  0857 0.2 mg Given      2022 0.2 mg Given     04/20/25  0847 0.2 mg Given      2044 0.2 mg Given     04/21/25  0810 *0.2 mg Missed               cloNIDine (Catapres) tablet 0.3 mg (mg) Total dose:  0.9 mg Dosing weight:  64.9      Date/Time Rate/Dose/Volume Action       04/09/25  0833 0.3 mg Given      2054 0.3 mg Given     04/10/25  0920 0.3 mg Given               cloNIDine (Catapres) tablet 0.1 mg (mg) Total dose:  0.8 mg Dosing weight:  64.9      Date/Time Rate/Dose/Volume Action       04/10/25  2000 0.1 mg Given     04/11/25  0824 0.1 mg Given      2055 0.1 mg Given     04/12/25  0947 0.1 mg Given      2022 0.1 mg Given     04/13/25  0806 0.1 mg Given      2008 0.1 mg Given     04/14/25  0925 0.1 mg Given               docusate sodium (Colace) capsule 100 mg (mg) Total dose:  1,400 mg* Dosing weight:  64.9   *Administration not included in total     Date/Time Rate/Dose/Volume Action       04/07/25  1633 100 mg Given     04/08/25  0835 100 mg Given     04/09/25  0833 100 mg Given     04/10/25  0920 100 mg Given     04/11/25  0824 100 mg Given     04/12/25  0947 100 mg Given     04/13/25  0806 100 mg Given     04/14/25  0925 100 mg Given     04/15/25  0827 100 mg Given     04/16/25  0905 100 mg Given     04/17/25  0938 100 mg Given     04/18/25  0857 100 mg Given     04/19/25  0857 100 mg Given     04/20/25  0847 100 mg Given     04/21/25  0811 *100 mg Missed               ferrous sulfate tablet 325 mg (mg) Total dose:  780 mg of iron*   *Administration not included in total     Date/Time Rate/Dose/Volume Action       04/08/25  0835 325 mg Given     04/09/25  0834 325 mg Given     04/10/25  0920 325 mg Given     04/11/25  0806 *325 mg Missed     04/12/25  1140 325 mg Given     04/13/25  0806 325 mg Given     04/14/25  0925 325 mg Given      04/15/25  0827 325 mg Given     04/16/25  0905 325 mg Given     04/17/25  0938 325 mg Given     04/18/25  0856 325 mg Given     04/19/25  0857 325 mg Given     04/20/25  0847 325 mg Given     04/21/25  0811 *325 mg Missed               hydroCHLOROthiazide (HYDRODiuril) tablet 25 mg (mg) Total dose:  350 mg* Dosing weight:  64.9   *Administration not included in total     Date/Time Rate/Dose/Volume Action       04/07/25  1633 25 mg Given     04/08/25  0835 25 mg Given     04/09/25  0833 25 mg Given     04/10/25  0920 25 mg Given     04/11/25  0824 25 mg Given     04/12/25  0947 25 mg Given     04/13/25  0806 25 mg Given     04/14/25  0925 25 mg Given     04/15/25  0827 25 mg Given     04/16/25  0905 25 mg Given     04/17/25  0938 25 mg Given     04/18/25  0856 25 mg Given     04/19/25  0857 25 mg Given     04/20/25  0847 25 mg Given     04/21/25  0811 *25 mg Missed               insulin NPH and regular human (HumuLIN 70-30, NovoLIN 70-30) 100 unit/mL (70-30) injection 20 Units (Units) Total dose:  60 Units* Dosing weight:  64.9   *Administration not included in total     Date/Time Rate/Dose/Volume Action       04/08/25  0835 20 Units Given     04/09/25  0837 20 Units Given     04/10/25  0925 20 Units Given     04/11/25  0807 *20 Units Missed      2248 *Not included in total Held by provider     04/12/25  0900 *20 Units Missed      1139 *Not included in total Unheld by provider               insulin NPH and regular human (HumuLIN 70-30, NovoLIN 70-30) 100 unit/mL (70-30) injection 18 Units (Units) Total dose:  126 Units Dosing weight:  64.9      Date/Time Rate/Dose/Volume Action       04/07/25  2245 18 Units Given     04/08/25  2047 18 Units Given     04/09/25  2054 18 Units Given     04/10/25  2123 18 Units Given     04/11/25  2108 18 Units Given     04/12/25  1755 18 Units Given     04/13/25 2050 18 Units Given               insulin NPH and regular human (HumuLIN 70-30, NovoLIN 70-30) 100 unit/mL (70-30) injection  10 Units (Units) Total dose:  Cannot be calculated* Dosing weight:  64.9   *Administration dose not documented     Date/Time Rate/Dose/Volume Action       04/11/25 2248 *Not included in total Held by provider     04/12/25 2133 *Not included in total Unheld by provider               insulin NPH and regular human (HumuLIN 70-30, NovoLIN 70-30) 100 unit/mL (70-30) injection 25 Units (Units) Total dose:  150 Units* Dosing weight:  64.9   *Administration not included in total     Date/Time Rate/Dose/Volume Action       04/11/25 2248 *Not included in total Held by provider     04/12/25 2231 *Not included in total Unheld by provider     04/13/25  0813 25 Units Given     04/14/25  0926 25 Units Given     04/15/25  0851 *25 Units Missed     04/16/25  0906 25 Units Given     04/17/25  0927 *25 Units Missed     04/18/25  0903 25 Units Given     04/19/25  0855 25 Units Given     04/20/25  0847 25 Units Given               insulin NPH and regular human (HumuLIN 70-30, NovoLIN 70-30) 100 unit/mL (70-30) injection 25 Units (Units) Total dose:  175 Units Dosing weight:  64.9      Date/Time Rate/Dose/Volume Action       04/14/25  2053 25 Units Given     04/15/25  2206 25 Units Given     04/16/25  2156 25 Units Given     04/17/25  2140 25 Units Given     04/18/25 2009 25 Units Given     04/19/25  2116 25 Units Given     04/20/25  2225 25 Units Given               insulin NPH and regular human (HumuLIN 70-30, NovoLIN 70-30) 100 unit/mL (70-30) injection 20 Units (Units) Total dose:  0 Units* Dosing weight:  72.1   *Administration not included in total     Date/Time Rate/Dose/Volume Action       04/21/25  1107 *20 Units Missed               isosorbide mononitrate ER (Imdur) 24 hr tablet 60 mg (mg) Total dose:  840 mg* Dosing weight:  64.9   *Administration not included in total     Date/Time Rate/Dose/Volume Action       04/07/25  1633 60 mg Given     04/08/25  0835 60 mg Given     04/09/25  0834 60 mg Given     04/10/25  0920 60  mg Given     04/11/25  0824 60 mg Given     04/12/25  0947 60 mg Given     04/13/25  0806 60 mg Given     04/14/25  0925 60 mg Given     04/15/25  0826 60 mg Given     04/16/25  0905 60 mg Given     04/17/25  0938 60 mg Given     04/18/25  0856 60 mg Given     04/19/25  0857 60 mg Given     04/20/25  0847 60 mg Given     04/21/25  0811 *60 mg Missed               LORazepam (Ativan) tablet 1 mg (mg) Total dose:  14 mg Dosing weight:  64.9      Date/Time Rate/Dose/Volume Action       04/07/25  2241 1 mg Given     04/08/25  2022 1 mg Given     04/09/25  2053 1 mg Given     04/10/25  2000 1 mg Given     04/11/25  2055 1 mg Given     04/12/25  2055 1 mg Given     04/13/25 2009 1 mg Given     04/14/25  2053 1 mg Given     04/15/25  2043 1 mg Given     04/16/25  2128 1 mg Given     04/17/25  2134 1 mg Given     04/18/25 2008 1 mg Given     04/19/25  2022 1 mg Given     04/20/25  2044 1 mg Given               losartan (Cozaar) tablet 100 mg (mg) Total dose:  800 mg* Dosing weight:  64.9   *Administration not included in total     Date/Time Rate/Dose/Volume Action       04/07/25  1633 100 mg Given     04/08/25  0835 100 mg Given     04/09/25  0834 100 mg Given     04/10/25  0920 100 mg Given     04/11/25  0824 100 mg Given     04/12/25  0947 100 mg Given     04/13/25  0806 100 mg Given     04/14/25  0925 100 mg Given      1235 *Not included in total Held by provider     04/15/25  0900 *100 mg Missed     04/16/25  0900 *100 mg Missed     04/17/25  0900 *100 mg Missed     04/18/25  0900 *100 mg Missed     04/19/25  0900 *100 mg Missed     04/20/25  0900 *100 mg Missed     04/21/25  0900 *Not included in total Automatically Held               mirtazapine (Remeron) tablet 15 mg (mg) Total dose:  210 mg Dosing weight:  64.9      Date/Time Rate/Dose/Volume Action       04/07/25 2241 15 mg Given     04/08/25 2021 15 mg Given     04/09/25 2053 15 mg Given     04/10/25  2000 15 mg Given     04/11/25 2055 15 mg Given      04/12/25  2022 15 mg Given     04/13/25  2009 15 mg Given     04/14/25  2053 15 mg Given     04/15/25  2043 15 mg Given     04/16/25  2127 15 mg Given     04/17/25  2135 15 mg Given     04/18/25  2008 15 mg Given     04/19/25 2022 15 mg Given     04/20/25  2044 15 mg Given               metoprolol succinate XL (Toprol-XL) 24 hr tablet 100 mg (mg) Total dose:  2,700 mg* Dosing weight:  64.9   *Administration not included in total     Date/Time Rate/Dose/Volume Action       04/07/25  2240 100 mg Given     04/08/25  0835 100 mg Given      2021 100 mg Given     04/09/25  0833 100 mg Given      2053 100 mg Given     04/10/25  0920 100 mg Given      2000 100 mg Given     04/11/25  0824 100 mg Given      2055 100 mg Given     04/12/25  0946 100 mg Given      2022 100 mg Given     04/13/25  0806 100 mg Given      2008 100 mg Given     04/14/25  0924 100 mg Given      2053 100 mg Given     04/15/25  0827 100 mg Given      2043 100 mg Given     04/16/25  0905 100 mg Given      2128 100 mg Given     04/17/25  0938 100 mg Given      2135 100 mg Given     04/18/25  0856 100 mg Given      2008 100 mg Given     04/19/25  0857 100 mg Given      2022 100 mg Given     04/20/25  0847 100 mg Given      2044 100 mg Given     04/21/25  0812 *100 mg Missed               clopidogrel (Plavix) tablet 75 mg (mg) Total dose:  600 mg* Dosing weight:  64.9   *Administration not included in total     Date/Time Rate/Dose/Volume Action       04/07/25  1633 75 mg Given     04/08/25  0835 75 mg Given     04/09/25  0834 75 mg Given     04/10/25  0920 75 mg Given     04/11/25  0824 75 mg Given     04/12/25  0947 75 mg Given     04/13/25  0806 75 mg Given     04/14/25  0925 75 mg Given      2212 *Not included in total Held by provider     04/15/25  0900 *75 mg Missed     04/16/25 0900 *75 mg Missed     04/17/25  0900 *75 mg Missed     04/18/25  0900 *75 mg Missed     04/19/25  0900 *75 mg Missed     04/20/25  0900 *75 mg Missed     04/21/25  0900 *Not  included in total Automatically Held               torsemide (Demadex) tablet 20 mg (mg) Total dose:  100 mg Dosing weight:  64.9      Date/Time Rate/Dose/Volume Action       04/07/25  1633 20 mg Given     04/08/25  0835 20 mg Given     04/09/25  0833 20 mg Given     04/10/25  0920 20 mg Given     04/12/25  0947 20 mg Given     04/13/25  2253 *Not included in total Held by provider     04/14/25  0900 *20 mg Missed     04/16/25  0900 *20 mg Missed     04/18/25  0900 *20 mg Missed     04/20/25  0900 *20 mg Missed               metoprolol tartrate (Lopressor) injection 5 mg (mg) Total dose:  25 mg* Dosing weight:  64.9   *Administration not included in total     Date/Time Rate/Dose/Volume Action       04/07/25  1642 5 mg Given     04/08/25  0522 *5 mg Missed     04/09/25  0549 5 mg Given      1052 *5 mg Missed     04/11/25  0140 5 mg Given     04/12/25  0043 5 mg Given     04/17/25  0348 5 mg Given               acetaminophen (Tylenol) tablet 650 mg (mg) Total dose:  4,550 mg* Dosing weight:  64.9   *Administration not included in total     Date/Time Rate/Dose/Volume Action       04/07/25  1633 650 mg Given     04/10/25  0637 650 mg Given      1957 650 mg Given     04/11/25  0824 650 mg Given     04/12/25  0043 650 mg Given      0629 *650 mg Missed      2055 650 mg Given     04/14/25  2051 650 mg Given               enoxaparin (Lovenox) syringe 30 mg (mg) Total dose:  360 mg* Dosing weight:  64.9   *Administration not included in total     Date/Time Rate/Dose/Volume Action       04/07/25  2241 30 mg Given     04/08/25  1729 30 mg Given     04/09/25  1557 30 mg Given     04/10/25  1741 30 mg Given     04/11/25  1702 30 mg Given     04/12/25  1646 30 mg Given     04/13/25  1614 30 mg Given     04/14/25  1631 30 mg Given     04/15/25  1708 30 mg Given     04/16/25  1708 30 mg Given     04/17/25  1627 30 mg Given     04/18/25  1751 30 mg Given     04/19/25  0000 *Not included in total Held by provider      1645 *30 mg  Missed     04/20/25  1645 *30 mg Missed               sodium chloride 0.9% infusion (mL/hr) Total volume:  797.92 mL* Dosing weight:  64.9   *From user-documented volume     Date/Time Rate/Dose/Volume Action       04/09/25  1555 125 mL/hr New Bag      1600 10.42 mL       1800 250 mL       2100 375 mL       2218 162.5 mL Stopped               sodium chloride 0.9% infusion (mL/hr) Total volume:  1,799 mL* Dosing weight:  74.7   *From user-documented volume     Date/Time Rate/Dose/Volume Action       04/15/25  1710 75 mL/hr New Bag     04/16/25  0249 1,799 mL       0649 75 mL/hr New Bag               cefepime (Maxipime) 1 g in dextrose 5% IV 50 mL (g) Total dose:  1 g* Dosing weight:  64.9   *From user-documented volume     Date/Time Rate/Dose/Volume Action       04/12/25  0946 1 g (over 30 min) New Bag      1017  (over 30 min) Stopped      2022 1 g (over 30 min) New Bag      2132 50 mL Stopped               vancomycin (Vancocin) 1,000 mg in dextrose 5%  mL (mL/hr) Total dose:  2,000 mg* Dosing weight:  64.9   *From user-documented volume     Date/Time Rate/Dose/Volume Action       04/11/25  0121 1,000 mg - 200 mL/hr (over 60 min) - 200 mL New Bag      0240  (over 60 min) Stopped     04/12/25  0013 1,000 mg - 200 mL/hr (over 60 min) New Bag      0121  (over 60 min) Stopped      0200 200 mL                midazolam (Versed) injection (mg) Total dose:  2 mg      Date/Time Rate/Dose/Volume Action       04/11/25  1019 2 mg Given               fentaNYL PF (Sublimaze) injection (mcg) Total dose:  25 mcg      Date/Time Rate/Dose/Volume Action       04/11/25  1020 25 mcg Given               gadoterate meglumine (Dotarem) 0.5 mmol/mL contrast injection 13 mL (mL) Total volume:  12.5 mL Dosing weight:  64.9      Date/Time Rate/Dose/Volume Action       04/12/25  1044 12.5 mL Given               meropenem (Merrem) 1 g in sodium chloride 0.9%  mL (mL/hr) Total dose:  10 g* Dosing weight:  64.9   *From user-documented  volume     Date/Time Rate/Dose/Volume Action       04/13/25  0805 1 g - 200 mL/hr (over 30 min) New Bag      0836  (over 30 min) Stopped      2008 1 g - 200 mL/hr (over 30 min) New Bag      2044 200 mL Stopped     04/14/25  0926 1 g - 200 mL/hr (over 30 min) New Bag      1014  (over 30 min) Stopped      2052 1 g - 200 mL/hr (over 30 min) New Bag      2131 200 mL Stopped     04/15/25  0827 1 g - 200 mL/hr (over 30 min) New Bag      0911  (over 30 min) Stopped      2043 1 g - 200 mL/hr (over 30 min) New Bag      2144  (over 30 min) Stopped     04/16/25  0905 1 g - 200 mL/hr (over 30 min) New Bag      1035 300 mL Stopped      2100 1 g - 200 mL/hr (over 30 min) New Bag      2130  (over 30 min) Stopped     04/17/25  0938 1 g - 200 mL/hr (over 30 min) New Bag      1025  (over 30 min) Stopped      2111 1 g - 200 mL/hr (over 30 min) New Bag      2146 100 mL Stopped     04/18/25  0858 1 g - 200 mL/hr (over 30 min) New Bag      1123  (over 30 min) Stopped      2008 1 g - 200 mL/hr (over 30 min) New Bag      2052  (over 30 min) Stopped      2100 200 mL      04/19/25  0856 1 g - 200 mL/hr (over 30 min) New Bag      0943  (over 30 min) Stopped      2022 1 g - 200 mL/hr (over 30 min) New Bag      2119  (over 30 min) Stopped     04/20/25  0847 1 g - 200 mL/hr (over 30 min) New Bag      0918  (over 30 min) Stopped      2045 1 g - 200 mL/hr (over 30 min) New Bag      2208  (over 30 min) Stopped     04/21/25  0803 1 g - 200 mL/hr (over 30 min) New Bag      0833  (over 30 min) Stopped               sodium chloride 0.9 % with KCl 20 mEq/L infusion (mL/hr) Total volume:  2,928.75 mL* Dosing weight:  64.9   *From user-documented volume     Date/Time Rate/Dose/Volume Action       04/13/25  2253 75 mL/hr New Bag     04/14/25  0535 600 mL       0958 75 mL/hr - 328.75 mL Rate Verify      1415 100 mL/hr New Bag     04/15/25  0209 100 mL/hr - 1,190 mL New Bag      0400 185 mL       0955 100 mL/hr - 591.67 mL Rate Verify      1432 33.33 mL                 furosemide (Lasix) injection 60 mg (mg) Total dose:  60 mg Dosing weight:  64.9      Date/Time Rate/Dose/Volume Action       04/14/25  1414 60 mg Given               furosemide (Lasix) injection 80 mg (mg) Total dose:  80 mg Dosing weight:  74.7      Date/Time Rate/Dose/Volume Action       04/15/25  1342 80 mg Given               tamsulosin (Flomax) 24 hr capsule 0.4 mg (mg) Total dose:  1.6 mg* Dosing weight:  74.1   *Administration not included in total     Date/Time Rate/Dose/Volume Action       04/17/25  1025 0.4 mg Given     04/18/25  0857 0.4 mg Given     04/19/25  0857 0.4 mg Given     04/20/25  0847 0.4 mg Given     04/21/25  0812 *0.4 mg Missed                 3 18 g core samples from Left Liver lobe mass with CT guidance      See detailed result report with images in PACS.    The patient tolerated the procedure well without incident or complication and is in stable condition.

## 2025-04-21 NOTE — PROGRESS NOTES
Infectious Disease Progress Note       4/21/2025    Patient is a followup regarding  Left lower lobe pneumonia  Acute cystitis without hematuria  Possible SBE? - no bacteremia and ARELI with Lambl's excrescence , vegetation not ruled out   FUO  Liver mass  Diabetes mellitus type 2/CAD history  NATALIE    Patient had liver biopsy done today left lobe mass    Lab Results   Component Value Date    WBC 8.6 04/21/2025    HGB 8.3 (L) 04/21/2025    HCT 25.5 (L) 04/21/2025    MCV 86 04/21/2025     (H) 04/21/2025     Lab Results   Component Value Date    GLUCOSE 244 (H) 04/21/2025    CALCIUM 7.8 (L) 04/21/2025     04/21/2025    K 4.6 04/21/2025    CO2 29 04/21/2025     04/21/2025    BUN 31 (H) 04/21/2025    CREATININE 0.97 04/21/2025       WBC trends are being monitored. Antibiotic doses are being adjusted per most recent renal labs.     Vitals:    04/21/25 0753   BP: 166/74   Pulse: 60   Resp: 18   Temp: 37.3 °C (99.1 °F)   SpO2: 96%     NAD  neck supple  Heart S1-S2  Lungs bilaterally clear  Abdomen soft nondistended, nontender to palpation  Extremities no pain    Blood cultures and urine cultures negative to date  Procalcitonin 0.70 and CRP 10.22    Patient Active Problem List   Diagnosis    Generalized weakness       Estimated Creatinine Clearance: 38.4 mL/min (by C-G formula based on SCr of 0.97 mg/dL).    MRI 4/11/2025  Large lateral segment left lobe hepatic mass noted on CT without  contrast 11 April 2025 was not present on the preceding CT without  contrast 2 March 2022, is confirmed on today's MRI to be solid (not  cystic); enhances, but not in the arterial phase (not suspect for  hepatocellular carcinoma) and with heterogeneous enhancement at that,  likely from necrosis; does not contain macroscopic fat. It is not a  cyst or hemangioma. 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 No free fluid or adenopathy      ASSESSMENT:  Left lower lobe pneumonia  Acute cystitis without hematuria  Possible SBE? - no bacteremia and ARELI with Lambl's excrescence , vegetation not ruled out   FUO  Liver mass-biopsy done  Diabetes mellitus type 2/CAD history  NATALIE    PLAN:  Patient has been on meropenem since 4/13/2025 for pneumonia  Blood culture negative and urine culture negative  Liver biopsy complete    Plan is to treat patient for pneumonia with meropenem-plan to finish total 2 weeks course.  No evidence of bacteremia or true vegetation at this point.  Will stop antibiotics at the end of therapy.  Patient may need midline      Imaging and labs were reviewed per medical records and any ID pertinent labs were also addressed  Time spent before, during and after care today, including coordination of care >40 min      Ariane Morse DO

## 2025-04-21 NOTE — PROGRESS NOTES
"Lela Barba is a 89 y.o. female on day 14 of admission presenting with Generalized weakness.    Subjective   Diabetes has been fairly well-controlled in the last several days but today she had 2 episodes of hypoglycemia probably not eating.  Nevertheless with adjusted insulin doses.  Patient is very difficult to understand but she did not complain of any symptoms at this point.  We advised her that she is scheduled for a liver biopsy tomorrow.       Objective   Vital signs are stable blood pressure 135/72 heart rate of 83 and pulse oximeter reading of 94% as well as a blood pressure of 187/72 heart rate of 72 prior to this current blood pressures  Electrolytes and acid-base balance are normal and acute kidney injury resolved with a creatinine down to 0.86    Physical Exam  Patient hyperexcitable but did not seem to be any distress.  He seems to be oriented  Head examination benign no facial asymmetry  Neck veins are flat without bruits  Lungs are clear without wheezing crackles or rhonchi  Heart regular without any murmur  Abdomen soft nontender good bowel sounds  Extremities without edema  Neurological examination benign without focal neurological deficits    Last Recorded Vitals  Blood pressure 163/73, pulse 66, temperature 36.7 °C (98.1 °F), resp. rate 19, height 1.626 m (5' 4\"), weight 72.1 kg (158 lb 15.2 oz), SpO2 94%.  Intake/Output last 3 Shifts:  I/O last 3 completed shifts:  In: - (0 mL/kg)   Out: 500 (6.9 mL/kg) [Urine:500 (0.2 mL/kg/hr)]  Weight: 72.1 kg Current Medications[1]   Relevant Results      Results for orders placed or performed during the hospital encounter of 04/06/25 (from the past 24 hours)   Basic Metabolic Panel   Result Value Ref Range    Glucose 102 (H) 74 - 99 mg/dL    Sodium 140 136 - 145 mmol/L    Potassium 4.7 3.5 - 5.3 mmol/L    Chloride 101 98 - 107 mmol/L    Bicarbonate 29 21 - 32 mmol/L    Anion Gap 15 10 - 20 mmol/L    Urea Nitrogen 27 (H) 6 - 23 mg/dL    Creatinine 0.86 " 0.50 - 1.05 mg/dL    eGFR 65 >60 mL/min/1.73m*2    Calcium 8.9 8.6 - 10.3 mg/dL   Lavender Top   Result Value Ref Range    Extra Tube Hold for add-ons.    SST TOP   Result Value Ref Range    Extra Tube Hold for add-ons.    POCT GLUCOSE   Result Value Ref Range    POCT Glucose 113 (H) 74 - 99 mg/dL   POCT GLUCOSE   Result Value Ref Range    POCT Glucose 91 74 - 99 mg/dL   POCT GLUCOSE   Result Value Ref Range    POCT Glucose 48 (L) 74 - 99 mg/dL   POCT GLUCOSE   Result Value Ref Range    POCT Glucose 71 (L) 74 - 99 mg/dL   POCT GLUCOSE   Result Value Ref Range    POCT Glucose 93 74 - 99 mg/dL   POCT GLUCOSE   Result Value Ref Range    POCT Glucose 254 (H) 74 - 99 mg/dL    No results found.               Assessment & Plan  Generalized weakness    Persistent leukocytosis, febrile episodes, elevated CRPs, sedimentation rate and procalcitonin's with negative blood cultures.  Empirically on meropenem and clinically seems to be responding without any further febrile episodes.  Presumed pneumonia cystitis and possible SBE.  Acute kidney injury from contrast nephropathy resolved with creatinine is down to 0.86 today which is normal  Liver mass for biopsy tomorrow to rule out malignancy, all anticoagulants and NSAIDs have been discontinued or held  Multinodular goiter incidental finding on MRI with normal TSH  Type 2 diabetes insulin-dependent with fluctuating blood sugars previously well-controlled with hypoglycemic.  Episodes today, insulin dose was adjusted  Hypertension with good control  Coronary artery disease on Plavix which is being held for the biopsy, still on isosorbide mononitrate ER no chest pains no shortness of breath asymptomatic  Hyperuricemia allopurinol on hold  Dyslipidemia atorvastatin on hold  Vitamin D3 deficiency on vitamin D3 5000 units daily  Insomnia on trazodone 50 mg daily  History of chronic edema currently without edema  After the biopsy tomorrow we are hoping that we can discharge patient to  [facility] care centers on Tuesday depending on antibiotic orders by infectious disease.      I spent 40 minutes in the professional and overall care of this patient.      Giuseppe Francois MD FACP         [1]   Current Facility-Administered Medications:     acetaminophen (Tylenol) tablet 650 mg, 650 mg, oral, q6h PRN, Giuseppe Francois MD, 650 mg at 04/14/25 2051    [Held by provider] allopurinol (Zyloprim) tablet 100 mg, 100 mg, oral, Daily, Giuseppe Francois MD, 100 mg at 04/14/25 0925    amLODIPine (Norvasc) tablet 10 mg, 10 mg, oral, Daily, Giuseppe Francois MD, 10 mg at 04/20/25 0745    [Held by provider] aspirin chewable tablet 81 mg, 81 mg, oral, Daily, Giuseppe Francois MD, 81 mg at 04/14/25 0647    [Held by provider] atorvastatin (Lipitor) tablet 40 mg, 40 mg, oral, Daily, Giuseppe Francois MD, 40 mg at 04/13/25 2008    cholecalciferol (Vitamin D-3) capsule 125 mcg, 5,000 Units, oral, Daily, Giuseppe Francois MD, 125 mcg at 04/20/25 0847    cloNIDine (Catapres) tablet 0.2 mg, 0.2 mg, oral, q12h YANG, Giuseppe Francois MD, 0.2 mg at 04/20/25 2044    [Held by provider] clopidogrel (Plavix) tablet 75 mg, 75 mg, oral, Daily, Giuseppe Francois MD, 75 mg at 04/14/25 0925    dextrose 50 % injection 12.5 g, 12.5 g, intravenous, q15 min PRN, Giuseppe Francois MD, 12.5 g at 04/17/25 0624    dextrose 50 % injection 25 g, 25 g, intravenous, q15 min PRN, Giuseppe Francois MD    docusate sodium (Colace) capsule 100 mg, 100 mg, oral, Daily, Giuseppe Francois MD, 100 mg at 04/20/25 0847    [Held by provider] enoxaparin (Lovenox) syringe 30 mg, 30 mg, subcutaneous, q24h, Giuseppe Francois MD, 30 mg at 04/18/25 1751    ferrous sulfate tablet 325 mg, 65 mg of iron, oral, Daily with breakfast, Giuseppe Francois MD, 325 mg at 04/20/25 0847    glucagon (Glucagen) injection 1 mg, 1 mg, intramuscular, q15 min PRN, Giuseppe Francois MD    glucagon (Glucagen) injection 1 mg, 1 mg, intramuscular, q15 min PRN, Giuseppe Francois MD    hydroCHLOROthiazide (HYDRODiuril)  tablet 25 mg, 25 mg, oral, Daily, Giuseppe Francois MD, 25 mg at 04/20/25 0847    insulin lispro injection 0-10 Units, 0-10 Units, subcutaneous, 4x daily, Giuseppe Francois MD, 6 Units at 04/20/25 2224    [START ON 4/21/2025] insulin NPH and regular human (HumuLIN 70-30, NovoLIN 70-30) 100 unit/mL (70-30) injection 20 Units, 20 Units, subcutaneous, q AM, Giuseppe Francois MD    [START ON 4/21/2025] insulin NPH and regular human (HumuLIN 70-30, NovoLIN 70-30) 100 unit/mL (70-30) injection 20 Units, 20 Units, subcutaneous, Nightly, Giuseppe Francois MD    isosorbide mononitrate ER (Imdur) 24 hr tablet 60 mg, 60 mg, oral, Daily, Giuseppe Francois MD, 60 mg at 04/20/25 0847    LORazepam (Ativan) tablet 1 mg, 1 mg, oral, Nightly, Giuseppe Francois MD, 1 mg at 04/20/25 2044    [Held by provider] losartan (Cozaar) tablet 100 mg, 100 mg, oral, Daily, Giuseppe Francois MD, 100 mg at 04/14/25 0925    menthol-zinc oxide (Calmoseptine - Risamine) 0.44-20.6 % ointment 1 Application, 1 Application, Topical, 4x daily PRN, Giuseppe Francois MD    meropenem (Merrem) 1 g in sodium chloride 0.9%  mL, 1 g, intravenous, q12h, Cash Hargrove MD, Stopped at 04/20/25 2208    metoprolol succinate XL (Toprol-XL) 24 hr tablet 100 mg, 100 mg, oral, BID, Giuseppe Francois MD, 100 mg at 04/20/25 2044    metoprolol tartrate (Lopressor) injection 5 mg, 5 mg, intravenous, q6h PRN, Giuseppe Francois MD, 5 mg at 04/17/25 0348    mirtazapine (Remeron) tablet 15 mg, 15 mg, oral, Nightly, Giuseppe Francois MD, 15 mg at 04/20/25 2044    nitroglycerin (Nitrostat) SL tablet 0.4 mg, 0.4 mg, sublingual, q5 min PRN, Giuseppe Francois MD    tamsulosin (Flomax) 24 hr capsule 0.4 mg, 0.4 mg, oral, Daily, Giuseppe Francois MD, 0.4 mg at 04/20/25 0847    [Held by provider] torsemide (Demadex) tablet 20 mg, 20 mg, oral, Every other day, Giuseppe Francois MD, 20 mg at 04/12/25 0904

## 2025-04-22 ENCOUNTER — APPOINTMENT (OUTPATIENT)
Dept: RADIOLOGY | Facility: HOSPITAL | Age: OVER 89
DRG: 194 | End: 2025-04-22
Payer: MEDICARE

## 2025-04-22 LAB
ANION GAP SERPL CALC-SCNC: 14 MMOL/L (ref 10–20)
BUN SERPL-MCNC: 25 MG/DL (ref 6–23)
CALCIUM SERPL-MCNC: 8.4 MG/DL (ref 8.6–10.3)
CHLORIDE SERPL-SCNC: 104 MMOL/L (ref 98–107)
CO2 SERPL-SCNC: 25 MMOL/L (ref 21–32)
CREAT SERPL-MCNC: 0.82 MG/DL (ref 0.5–1.05)
EGFRCR SERPLBLD CKD-EPI 2021: 68 ML/MIN/1.73M*2
GLUCOSE BLD MANUAL STRIP-MCNC: 149 MG/DL (ref 74–99)
GLUCOSE BLD MANUAL STRIP-MCNC: 151 MG/DL (ref 74–99)
GLUCOSE BLD MANUAL STRIP-MCNC: 152 MG/DL (ref 74–99)
GLUCOSE BLD MANUAL STRIP-MCNC: 208 MG/DL (ref 74–99)
GLUCOSE BLD MANUAL STRIP-MCNC: 241 MG/DL (ref 74–99)
GLUCOSE BLD MANUAL STRIP-MCNC: 27 MG/DL (ref 74–99)
GLUCOSE BLD MANUAL STRIP-MCNC: 32 MG/DL (ref 74–99)
GLUCOSE BLD MANUAL STRIP-MCNC: 66 MG/DL (ref 74–99)
GLUCOSE BLD MANUAL STRIP-MCNC: 74 MG/DL (ref 74–99)
GLUCOSE SERPL-MCNC: 80 MG/DL (ref 74–99)
HOLD SPECIMEN: NORMAL
HOLD SPECIMEN: NORMAL
POTASSIUM SERPL-SCNC: 4.6 MMOL/L (ref 3.5–5.3)
SODIUM SERPL-SCNC: 138 MMOL/L (ref 136–145)

## 2025-04-22 PROCEDURE — 2780000003 HC OR 278 NO HCPCS

## 2025-04-22 PROCEDURE — 36410 VNPNXR 3YR/> PHY/QHP DX/THER: CPT

## 2025-04-22 PROCEDURE — 2500000004 HC RX 250 GENERAL PHARMACY W/ HCPCS (ALT 636 FOR OP/ED): Mod: JZ | Performed by: INTERNAL MEDICINE

## 2025-04-22 PROCEDURE — 82947 ASSAY GLUCOSE BLOOD QUANT: CPT

## 2025-04-22 PROCEDURE — 2500000002 HC RX 250 W HCPCS SELF ADMINISTERED DRUGS (ALT 637 FOR MEDICARE OP, ALT 636 FOR OP/ED): Performed by: INTERNAL MEDICINE

## 2025-04-22 PROCEDURE — 2500000004 HC RX 250 GENERAL PHARMACY W/ HCPCS (ALT 636 FOR OP/ED): Performed by: INTERNAL MEDICINE

## 2025-04-22 PROCEDURE — 80048 BASIC METABOLIC PNL TOTAL CA: CPT | Performed by: INTERNAL MEDICINE

## 2025-04-22 PROCEDURE — 2500000001 HC RX 250 WO HCPCS SELF ADMINISTERED DRUGS (ALT 637 FOR MEDICARE OP): Performed by: INTERNAL MEDICINE

## 2025-04-22 PROCEDURE — 36415 COLL VENOUS BLD VENIPUNCTURE: CPT | Performed by: INTERNAL MEDICINE

## 2025-04-22 PROCEDURE — 2500000005 HC RX 250 GENERAL PHARMACY W/O HCPCS: Performed by: INTERNAL MEDICINE

## 2025-04-22 PROCEDURE — C1751 CATH, INF, PER/CENT/MIDLINE: HCPCS

## 2025-04-22 PROCEDURE — 1210000001 HC SEMI-PRIVATE ROOM DAILY

## 2025-04-22 RX ORDER — TORSEMIDE 20 MG/1
20 TABLET ORAL EVERY OTHER DAY
Qty: 15 TABLET | Refills: 2 | Status: SHIPPED | OUTPATIENT
Start: 2025-04-22

## 2025-04-22 RX ORDER — MEROPENEM 1 G/1
1 INJECTION, POWDER, FOR SOLUTION INTRAVENOUS EVERY 12 HOURS
Qty: 2000 ML | Refills: 0 | Status: SHIPPED | OUTPATIENT
Start: 2025-04-22 | End: 2025-05-02

## 2025-04-22 RX ORDER — TAMSULOSIN HYDROCHLORIDE 0.4 MG/1
0.4 CAPSULE ORAL DAILY
Qty: 30 CAPSULE | Refills: 2 | Status: SHIPPED | OUTPATIENT
Start: 2025-04-22

## 2025-04-22 RX ORDER — LIDOCAINE HYDROCHLORIDE 10 MG/ML
5 INJECTION, SOLUTION INFILTRATION; PERINEURAL ONCE
Status: DISCONTINUED | OUTPATIENT
Start: 2025-04-22 | End: 2025-04-23 | Stop reason: HOSPADM

## 2025-04-22 RX ADMIN — MEROPENEM 1 G: 1 INJECTION, POWDER, FOR SOLUTION INTRAVENOUS at 09:24

## 2025-04-22 RX ADMIN — HYDROCHLOROTHIAZIDE 25 MG: 25 TABLET ORAL at 08:44

## 2025-04-22 RX ADMIN — AMLODIPINE BESYLATE 10 MG: 5 TABLET ORAL at 09:23

## 2025-04-22 RX ADMIN — TAMSULOSIN HYDROCHLORIDE 0.4 MG: 0.4 CAPSULE ORAL at 08:46

## 2025-04-22 RX ADMIN — DOCUSATE SODIUM 100 MG: 100 CAPSULE, LIQUID FILLED ORAL at 08:44

## 2025-04-22 RX ADMIN — CLONIDINE HYDROCHLORIDE 0.2 MG: 0.1 TABLET ORAL at 08:44

## 2025-04-22 RX ADMIN — DEXTROSE MONOHYDRATE 12.5 G: 25 INJECTION, SOLUTION INTRAVENOUS at 15:31

## 2025-04-22 RX ADMIN — LORAZEPAM 1 MG: 1 TABLET ORAL at 21:22

## 2025-04-22 RX ADMIN — CLONIDINE HYDROCHLORIDE 0.2 MG: 0.1 TABLET ORAL at 21:22

## 2025-04-22 RX ADMIN — INSULIN LISPRO 4 UNITS: 100 INJECTION, SOLUTION INTRAVENOUS; SUBCUTANEOUS at 11:28

## 2025-04-22 RX ADMIN — IRON SUCROSE 300 MG: 20 INJECTION, SOLUTION INTRAVENOUS at 00:03

## 2025-04-22 RX ADMIN — METOPROLOL SUCCINATE 100 MG: 50 TABLET, EXTENDED RELEASE ORAL at 08:37

## 2025-04-22 RX ADMIN — METOPROLOL SUCCINATE 100 MG: 50 TABLET, EXTENDED RELEASE ORAL at 21:22

## 2025-04-22 RX ADMIN — MEROPENEM 1 G: 1 INJECTION, POWDER, FOR SOLUTION INTRAVENOUS at 21:22

## 2025-04-22 RX ADMIN — ISOSORBIDE MONONITRATE 60 MG: 60 TABLET, EXTENDED RELEASE ORAL at 08:45

## 2025-04-22 RX ADMIN — MIRTAZAPINE 15 MG: 15 TABLET, FILM COATED ORAL at 21:22

## 2025-04-22 RX ADMIN — Medication 125 MCG: at 08:45

## 2025-04-22 RX ADMIN — DEXTROSE MONOHYDRATE 25 G: 25 INJECTION, SOLUTION INTRAVENOUS at 00:03

## 2025-04-22 RX ADMIN — FERROUS SULFATE TAB 325 MG (65 MG ELEMENTAL FE) 325 MG: 325 (65 FE) TAB at 08:45

## 2025-04-22 ASSESSMENT — COGNITIVE AND FUNCTIONAL STATUS - GENERAL
DRESSING REGULAR LOWER BODY CLOTHING: A LITTLE
HELP NEEDED FOR BATHING: A LITTLE
DAILY ACTIVITIY SCORE: 19
PERSONAL GROOMING: A LITTLE
WALKING IN HOSPITAL ROOM: A LITTLE
TOILETING: A LITTLE
MOBILITY SCORE: 19
STANDING UP FROM CHAIR USING ARMS: A LITTLE
CLIMB 3 TO 5 STEPS WITH RAILING: A LOT
MOVING TO AND FROM BED TO CHAIR: A LITTLE
DRESSING REGULAR UPPER BODY CLOTHING: A LITTLE

## 2025-04-22 ASSESSMENT — PAIN SCALES - GENERAL
PAINLEVEL_OUTOF10: 0 - NO PAIN
PAINLEVEL_OUTOF10: 0 - NO PAIN

## 2025-04-22 NOTE — PROGRESS NOTES
"Lela Barba is a 89 y.o. female on day 15 of admission presenting with Generalized weakness.    Subjective   Patient had a liver biopsy done today which was uneventful.  Seen by Dr. Morse and patient needs to be on meropenem for another 10 days.  Planning to discharge patient.  Review of systems were essentially benign       Objective     Physical Exam  Patient is alert oriented not in distress hyperexcitable but comfortable  Head examination benign no facial asymmetry  Neck veins flat without bruits  Lungs clear without any wheezing crackles or rhonchi  Heart regular without any murmur  Abdomen soft nontender good bowel sounds  Extremities without edema  Neurological examination benign without focal neurological deficits    Last Recorded Vitals  Blood pressure 173/69, pulse 72, temperature 37.5 °C (99.5 °F), temperature source Temporal, resp. rate 20, height 1.626 m (5' 4\"), weight 72.4 kg (159 lb 9.8 oz), SpO2 90%.  Intake/Output last 3 Shifts:  No intake/output data recorded.  Current Medications[1]   Relevant Results      Results for orders placed or performed during the hospital encounter of 04/06/25 (from the past 24 hours)   POCT GLUCOSE   Result Value Ref Range    POCT Glucose 242 (H) 74 - 99 mg/dL   SST TOP   Result Value Ref Range    Extra Tube Hold for add-ons.    Basic Metabolic Panel   Result Value Ref Range    Glucose 244 (H) 74 - 99 mg/dL    Sodium 136 136 - 145 mmol/L    Potassium 4.6 3.5 - 5.3 mmol/L    Chloride 102 98 - 107 mmol/L    Bicarbonate 29 21 - 32 mmol/L    Anion Gap 10 10 - 20 mmol/L    Urea Nitrogen 31 (H) 6 - 23 mg/dL    Creatinine 0.97 0.50 - 1.05 mg/dL    eGFR 56 (L) >60 mL/min/1.73m*2    Calcium 7.8 (L) 8.6 - 10.3 mg/dL   CBC and Auto Differential   Result Value Ref Range    WBC 8.6 4.4 - 11.3 x10*3/uL    nRBC 0.0 0.0 - 0.0 /100 WBCs    RBC 2.97 (L) 4.00 - 5.20 x10*6/uL    Hemoglobin 8.3 (L) 12.0 - 16.0 g/dL    Hematocrit 25.5 (L) 36.0 - 46.0 %    MCV 86 80 - 100 fL    MCH " 27.9 26.0 - 34.0 pg    MCHC 32.5 32.0 - 36.0 g/dL    RDW 13.9 11.5 - 14.5 %    Platelets 474 (H) 150 - 450 x10*3/uL    Neutrophils % 58.0 40.0 - 80.0 %    Immature Granulocytes %, Automated 0.9 0.0 - 0.9 %    Lymphocytes % 24.5 13.0 - 44.0 %    Monocytes % 13.4 2.0 - 10.0 %    Eosinophils % 2.7 0.0 - 6.0 %    Basophils % 0.5 0.0 - 2.0 %    Neutrophils Absolute 5.00 1.60 - 5.50 x10*3/uL    Immature Granulocytes Absolute, Automated 0.08 0.00 - 0.50 x10*3/uL    Lymphocytes Absolute 2.11 0.80 - 3.00 x10*3/uL    Monocytes Absolute 1.15 (H) 0.05 - 0.80 x10*3/uL    Eosinophils Absolute 0.23 0.00 - 0.40 x10*3/uL    Basophils Absolute 0.04 0.00 - 0.10 x10*3/uL   POCT GLUCOSE   Result Value Ref Range    POCT Glucose 116 (H) 74 - 99 mg/dL   POCT GLUCOSE   Result Value Ref Range    POCT Glucose 350 (H) 74 - 99 mg/dL   POCT GLUCOSE   Result Value Ref Range    POCT Glucose 241 (H) 74 - 99 mg/dL    CT guided percutaneous biopsy liver  Result Date: 4/21/2025  Interpreted By:  Schoenberger, Joseph, STUDY: CT GUIDED PERCUTANEOUS BIOPSY LIVER; ;  4/21/2025 12:15 pm   INDICATION: Signs/Symptoms:Biopsy of liver mass.     COMPARISON: None.   ACCESSION NUMBER(S): [id number]   ORDERING CLINICIAN: PATRICIA PIZARRO   CONSENT: The procedure, its indication, its risks, and alternatives were explained to the patient who understands and consents to the procedure. A time-out is completed prior to the procedure to confirm patient identity and procedure to be performed.   MODALITIES: CT   TECHNIQUE: A CT scan of the upper abdomen was performed in use the planned safe percutaneous access to the known mass in the lateral left liver lobe segment. This site was marked.   All personnel in the procedure suite used appropriate mask and cap. Additionally, the performing physician and assistant used maximum barrier technique to include a sterile gown and gloves as per standard hospital protocol. The marked site was sterilely prepped with chlorhexidine in the  usual manner. A large sterile barrier was used to to completely draped the patient is outside of the sterilely prepped area in the usual manner per standard hospital protocol. Local anesthetic was administered. A 17 gauge outer cannula needle was advanced into the left liver lobe mass in a targeted CT demonstrates satisfactory positioning of this needle. At this 0.3 passes through this needle were made using a spring-loaded 18 gauge core biopsy needle. Samples were placed on saline moist and Telfa and into formalin per standard protocol. The needle was then removed. The patient tolerated the procedure well. There was no immediate complication.   FINDINGS: See discussion above       Successful CT-guided core biopsy of the patient's left liver lobe mass.     MACRO: None   Signed by: Joseph Schoenberger 4/21/2025 12:26 PM Dictation workstation:   [id number]                 Assessment & Plan  Generalized weakness    Persistent leukocytosis, febrile episodes, elevated CRPs and sedimentation rate as well as procalcitonin with negative blood cultures.  Patient being treated for left lower lobe pneumonia, cystitis and possible SBE,  has been placed on several medications but seems to be responding well to meropenem with white cell count now down to normal at 8.6 and patient has had no fever since meropenem has been started.  Seen by Dr. Morse and would like to have patient will continue meropenem for a total of 14 days.  Will need a midline on discharge.  Being treated for possible left lower lobe pneumonia, cystitis and unable to rule out SBE.  Acute kidney injury from contrast nephropathy resolved with creatinine down to 0.97 with normal electrolytes and acid-base balance  Liver mass biopsy done today will resume anticoagulants tomorrow  Multinodular goiter incidental finding on MRI with normal TSH  Type 2 diabetes insulin-dependent with fluctuating blood sugars relatively well-controlled  Hypertension in good  control  Coronary artery disease on Plavix which will be resumed, on isosorbide mononitrate ER without any chest pains and no shortness of breath  Hyperuricemia on allopurinol on hold due to acute kidney injury  Dyslipidemia atorvastatin on hold due to acute kidney injury but will resume  Vitamin D3 deficiency on vitamin D3 5000 units daily  Insomnia on trazodone 50 mg daily  History of chronic edema currently without any edema  Since ID wanted to resume or continue meropenem for another 10 days, will set up for a midline and hopefully we can discharge the patient in the next day or so      I spent 40 minutes in the professional and overall care of this patient.      Giuseppe Francois MD FACP         [1]   Current Facility-Administered Medications:     acetaminophen (Tylenol) tablet 650 mg, 650 mg, oral, q6h PRN, Giuseppe Francois MD, 650 mg at 04/14/25 2051    [Held by provider] allopurinol (Zyloprim) tablet 100 mg, 100 mg, oral, Daily, Giuseppe Francois MD, 100 mg at 04/14/25 0925    amLODIPine (Norvasc) tablet 10 mg, 10 mg, oral, Daily, Giuseppe Francois MD, 10 mg at 04/21/25 0640    [Held by provider] aspirin chewable tablet 81 mg, 81 mg, oral, Daily, Giuseppe Francois MD, 81 mg at 04/14/25 0647    [Held by provider] atorvastatin (Lipitor) tablet 40 mg, 40 mg, oral, Daily, Giuseppe Francois MD, 40 mg at 04/13/25 2008    cholecalciferol (Vitamin D-3) capsule 125 mcg, 5,000 Units, oral, Daily, Giuseppe Francois MD, 125 mcg at 04/20/25 0847    cloNIDine (Catapres) tablet 0.2 mg, 0.2 mg, oral, q12h YANG, Giuseppe Francois MD, 0.2 mg at 04/21/25 2046    [Held by provider] clopidogrel (Plavix) tablet 75 mg, 75 mg, oral, Daily, Giuseppe Francois MD, 75 mg at 04/14/25 0925    dextrose 50 % injection 12.5 g, 12.5 g, intravenous, q15 min PRN, Giuseppe Francois MD, 12.5 g at 04/17/25 0624    dextrose 50 % injection 25 g, 25 g, intravenous, q15 min PRN, Giuseppe Francois MD    docusate sodium (Colace) capsule 100 mg, 100 mg, oral, Daily, Giuseppe DIEGO  MD Priscila, 100 mg at 04/20/25 0847    [Held by provider] enoxaparin (Lovenox) syringe 30 mg, 30 mg, subcutaneous, q24h, Giuseppe Francois MD, 30 mg at 04/18/25 1751    ferrous sulfate tablet 325 mg, 65 mg of iron, oral, Daily with breakfast, Giuseppe Francois MD, 325 mg at 04/20/25 0847    glucagon (Glucagen) injection 1 mg, 1 mg, intramuscular, q15 min PRN, Giuseppe Francois MD    glucagon (Glucagen) injection 1 mg, 1 mg, intramuscular, q15 min PRN, Giuseppe Francois MD    hydroCHLOROthiazide (HYDRODiuril) tablet 25 mg, 25 mg, oral, Daily, Giuseppe Francois MD, 25 mg at 04/20/25 0847    insulin lispro injection 0-10 Units, 0-10 Units, subcutaneous, 4x daily, Giuseppe Francois MD, 4 Units at 04/21/25 2045    insulin NPH and regular human (HumuLIN 70-30, NovoLIN 70-30) 100 unit/mL (70-30) injection 20 Units, 20 Units, subcutaneous, q AM, Giuseppe Francois MD    insulin NPH and regular human (HumuLIN 70-30, NovoLIN 70-30) 100 unit/mL (70-30) injection 20 Units, 20 Units, subcutaneous, Nightly, Giuseppe Francois MD, 20 Units at 04/21/25 2045    iron sucrose (Venofer) 300 mg in sodium chloride 0.9% 282 mL IV, 300 mg, intravenous, Once, Giuseppe Francois MD    isosorbide mononitrate ER (Imdur) 24 hr tablet 60 mg, 60 mg, oral, Daily, Giuseppe Francois MD, 60 mg at 04/20/25 0847    LORazepam (Ativan) tablet 1 mg, 1 mg, oral, Nightly, Giuseppe Francois MD, 1 mg at 04/21/25 2046    [Held by provider] losartan (Cozaar) tablet 100 mg, 100 mg, oral, Daily, Giuseppe Francois MD, 100 mg at 04/14/25 0925    menthol-zinc oxide (Calmoseptine - Risamine) 0.44-20.6 % ointment 1 Application, 1 Application, Topical, 4x daily PRN, Giuseppe Francois MD    meropenem (Merrem) 1 g in sodium chloride 0.9%  mL, 1 g, intravenous, q12h, Cash Hargrove MD, Stopped at 04/21/25 2133    metoprolol succinate XL (Toprol-XL) 24 hr tablet 100 mg, 100 mg, oral, BID, Giuseppe Francois MD, 100 mg at 04/21/25 2046    metoprolol tartrate (Lopressor) injection 5 mg, 5 mg,  intravenous, q6h PRN, Giuesppe Francois MD, 5 mg at 04/17/25 0348    mirtazapine (Remeron) tablet 15 mg, 15 mg, oral, Nightly, Giuseppe Francois MD, 15 mg at 04/21/25 2046    nitroglycerin (Nitrostat) SL tablet 0.4 mg, 0.4 mg, sublingual, q5 min PRN, Giuseppe Francois MD    tamsulosin (Flomax) 24 hr capsule 0.4 mg, 0.4 mg, oral, Daily, Giuseppe Francois MD, 0.4 mg at 04/20/25 0847    [Held by provider] torsemide (Demadex) tablet 20 mg, 20 mg, oral, Every other day, Giuseppe Francois MD, 20 mg at 04/12/25 0991

## 2025-04-22 NOTE — CARE PLAN
The patient's goals for the shift include sleep    The clinical goals for the shift include Liver bx today    Over the shift, the patient did not make progress toward the following goals. Barriers to progression include none. Recommendations to address these barriers include liver biopsy.

## 2025-04-22 NOTE — PROCEDURES
4/22/25 Left basilic, 20g, 10cm    Pre-Procedure Checklist:  Emergent Line Insertion: No  Type of Line to be Placed: Midline  Consent Obtained: N/A  Emergency Medication Necessary: No  Patient Identified with 2 Independent Identifiers: Yes  Review of Allergies, Anticoagulation, Relevant Labs, ECG/Telemetry: Yes  Risks/Benefits/Alternatives Discussed with Patient/POA/Legal Representative: Yes  Stop Sign on Door: Yes  Time Out Performed: Yes  Catheter Exchange: No    Positioning Checklist:  All People, Including Patient, in the Room with Cap and Mask: Yes  Fluoroscopy Used to Identify Vessel and Guide Insertion: No   Sterile Cover Used: Yes  Full Barrier Precautions Followed (Mask, Cap, Gown, Gloves): Yes  Hands Washed: Yes  Monitors Attached with Sound Alarms On: No  Full Body Sterile Drape (Head-to-Toe) Used to Cover Patient: Yes  Trendelenburg Position (For IJ and Subclavian): No  CHG Skin Prep Used and Allowed to Air Dry to Skin Procedure: Yes    Procedure Checklist:  Blood Aspirated From All Lumens, All Ports Subsequently Flushed: Yes  Catheter Caps Placed on All Lumens; Lumens Clamped: Yes  Maintain Guidewire Control Throughout, Ensuring Guidewire Removal: Yes  Maintain Sterile Field Throughout Insertion: Yes  Catheter Secured: Yes  Confirmatory Test of Venous Placement: Non-Pulsatile Blood    Post Procedure Checklist:  Date and Time Written on Dressing: Yes  Sharp and Wire Count and Safe Disposal of all Sharps/Wires: Yes  Sterile Dressing Applied Per Protocol: Yes  X-ray Ordered or ECG Image: N/A    Midline Insertion Details:  Midline expires in 28 days date from 4/22/25  See LDA assessment for further midline details.  Additional Details: Line was inserted using Modified Seldinger's Technique.   Midline ready for immediate use.  Placed by: Alba Trevizo RN

## 2025-04-22 NOTE — PROGRESS NOTES
Pt medically cleared for discharge. Insurance precert requested and is pending. Facility updated and aware that pt will need V Merrem 1gram Q12, stop date 5/2.

## 2025-04-22 NOTE — CARE PLAN
Problem: Fall/Injury  Goal: Not fall by end of shift  Outcome: Met  Goal: Be free from injury by end of the shift  Outcome: Met  Goal: Verbalize understanding of personal risk factors for fall in the hospital  Outcome: Met  Goal: Verbalize understanding of risk factor reduction measures to prevent injury from fall in the home  Outcome: Met  Goal: Use assistive devices by end of the shift  Outcome: Met  Goal: Pace activities to prevent fatigue by end of the shift  Outcome: Met   The patient's goals for the shift include sleep    The clinical goals for the shift include out of bed for all three meals

## 2025-04-23 VITALS
WEIGHT: 159.61 LBS | RESPIRATION RATE: 18 BRPM | DIASTOLIC BLOOD PRESSURE: 62 MMHG | OXYGEN SATURATION: 94 % | BODY MASS INDEX: 27.25 KG/M2 | SYSTOLIC BLOOD PRESSURE: 149 MMHG | HEIGHT: 64 IN | HEART RATE: 63 BPM | TEMPERATURE: 97.3 F

## 2025-04-23 PROBLEM — R53.1 GENERALIZED WEAKNESS: Status: RESOLVED | Noted: 2025-04-06 | Resolved: 2025-04-23

## 2025-04-23 LAB
GLUCOSE BLD MANUAL STRIP-MCNC: 191 MG/DL (ref 74–99)
GLUCOSE BLD MANUAL STRIP-MCNC: 279 MG/DL (ref 74–99)
GLUCOSE BLD MANUAL STRIP-MCNC: 301 MG/DL (ref 74–99)

## 2025-04-23 PROCEDURE — 82947 ASSAY GLUCOSE BLOOD QUANT: CPT

## 2025-04-23 PROCEDURE — 2500000001 HC RX 250 WO HCPCS SELF ADMINISTERED DRUGS (ALT 637 FOR MEDICARE OP): Performed by: INTERNAL MEDICINE

## 2025-04-23 PROCEDURE — 99232 SBSQ HOSP IP/OBS MODERATE 35: CPT | Performed by: INTERNAL MEDICINE

## 2025-04-23 PROCEDURE — 2500000002 HC RX 250 W HCPCS SELF ADMINISTERED DRUGS (ALT 637 FOR MEDICARE OP, ALT 636 FOR OP/ED): Performed by: INTERNAL MEDICINE

## 2025-04-23 PROCEDURE — 2500000004 HC RX 250 GENERAL PHARMACY W/ HCPCS (ALT 636 FOR OP/ED): Mod: JZ | Performed by: INTERNAL MEDICINE

## 2025-04-23 RX ADMIN — METOPROLOL SUCCINATE 100 MG: 50 TABLET, EXTENDED RELEASE ORAL at 10:05

## 2025-04-23 RX ADMIN — Medication 125 MCG: at 10:04

## 2025-04-23 RX ADMIN — FERROUS SULFATE TAB 325 MG (65 MG ELEMENTAL FE) 325 MG: 325 (65 FE) TAB at 10:04

## 2025-04-23 RX ADMIN — TAMSULOSIN HYDROCHLORIDE 0.4 MG: 0.4 CAPSULE ORAL at 10:05

## 2025-04-23 RX ADMIN — AMLODIPINE BESYLATE 10 MG: 5 TABLET ORAL at 10:05

## 2025-04-23 RX ADMIN — ISOSORBIDE MONONITRATE 60 MG: 60 TABLET, EXTENDED RELEASE ORAL at 10:04

## 2025-04-23 RX ADMIN — DOCUSATE SODIUM 100 MG: 100 CAPSULE, LIQUID FILLED ORAL at 10:04

## 2025-04-23 RX ADMIN — CLONIDINE HYDROCHLORIDE 0.2 MG: 0.1 TABLET ORAL at 10:04

## 2025-04-23 RX ADMIN — MEROPENEM 1 G: 1 INJECTION, POWDER, FOR SOLUTION INTRAVENOUS at 10:05

## 2025-04-23 RX ADMIN — HYDROCHLOROTHIAZIDE 25 MG: 25 TABLET ORAL at 10:05

## 2025-04-23 ASSESSMENT — COGNITIVE AND FUNCTIONAL STATUS - GENERAL
HELP NEEDED FOR BATHING: A LITTLE
TURNING FROM BACK TO SIDE WHILE IN FLAT BAD: A LITTLE
CLIMB 3 TO 5 STEPS WITH RAILING: A LITTLE
MOVING FROM LYING ON BACK TO SITTING ON SIDE OF FLAT BED WITH BEDRAILS: A LITTLE
DAILY ACTIVITIY SCORE: 18
DRESSING REGULAR UPPER BODY CLOTHING: A LITTLE
WALKING IN HOSPITAL ROOM: A LITTLE
PERSONAL GROOMING: A LITTLE
TOILETING: A LITTLE
EATING MEALS: A LITTLE
DRESSING REGULAR LOWER BODY CLOTHING: A LITTLE
STANDING UP FROM CHAIR USING ARMS: A LITTLE

## 2025-04-23 ASSESSMENT — PAIN SCALES - GENERAL: PAINLEVEL_OUTOF10: 0 - NO PAIN

## 2025-04-23 NOTE — CARE PLAN
The patient's goals for the shift include sleep    The clinical goals for the shift include patient will remain safe and free from injurt through out shift    Over the shift, the patient did make progress toward the following goals. Barriers to progression include none. Recommendations to address these barriers include continue with current plan of care.

## 2025-04-23 NOTE — PROGRESS NOTES
04/23/25 1144   Discharge Planning   Home or Post Acute Services Post acute facilities (Rehab/SNF/etc)   Type of Post Acute Facility Services Skilled nursing   Expected Discharge Disposition SNF  ([facility])   Does the patient need discharge transport arranged? Yes   RoundTrip coordination needed? Yes   Has discharge transport been arranged? Yes   What day is the transport expected? 04/23/25   What time is the transport expected? 1230     Insurance precert received and pt medically cleared for discharge. Care team and facility updated with discharge details. Pt updated as well as family, both in agreement with discharge plan.

## 2025-04-23 NOTE — PROGRESS NOTES
"Nutrition Follow Up Assessment:   Nutrition Assessment         Patient is a 89 y.o. female presenting with generalized weakness      Nutrition History:  Food and Nutrient History: RD follow-up. Pt reports good appetite/intakes. No meals documented since 4/9/25. Blood glucose continues to fluctuate between high and low. S/p liver biopsy on 4/21. Pending precert for SNF. Noted is medically cleared for discharge.       Anthropometrics:  Height: 162.6 cm (5' 4\")   Weight: 72.4 kg (159 lb 9.8 oz)   BMI (Calculated): 27.38  IBW/kg (Dietitian Calculated): 54.5 kg                           Weight Change %:  Weight History / % Weight Change: down 2.3 kg since last RD visit    Nutrition Focused Physical Exam Findings:    Subcutaneous Fat Loss:   Defer Subcutaneous Fat Loss Assessment: Defer all  Defer All Reason: completed at intial assessment  Muscle Wasting:  Defer Muscle Wasting Assessment: Defer all  Defer All Reason: completed at intial assessment  Edema:  Edema Location: non-pittig BLE edema per nursing assessment  Physical Findings:  Skin: Negative  Teeth Findings: Impaired dentition    Nutrition Significant Labs:  BMP Trend:   Results from last 7 days   Lab Units 04/22/25  0630 04/21/25  0647 04/20/25  0649 04/19/25  0716   GLUCOSE mg/dL 80 244* 102* 86   CALCIUM mg/dL 8.4* 7.8* 8.9 8.1*   SODIUM mmol/L 138 136 140 138   POTASSIUM mmol/L 4.6 4.6 4.7 4.2   CO2 mmol/L 25 29 29 28   CHLORIDE mmol/L 104 102 101 104   BUN mg/dL 25* 31* 27* 37*   CREATININE mg/dL 0.82 0.97 0.86 0.99    , Renal Lab Trend:   Results from last 7 days   Lab Units 04/22/25  0630 04/21/25  0647 04/20/25  0649 04/19/25  0716   POTASSIUM mmol/L 4.6 4.6 4.7 4.2   SODIUM mmol/L 138 136 140 138   EGFR mL/min/1.73m*2 68 56* 65 55*   BUN mg/dL 25* 31* 27* 37*   CREATININE mg/dL 0.82 0.97 0.86 0.99        Nutrition Specific Medications:  Reviewed    I/O:   Last BM Date: 04/18/25; Stool Appearance: Formed (04/15/25 3982)    Dietary Orders (From admission, " onward)       Start     Ordered    04/22/25 0148  Adult diet Consistent Carb; CCD 60 gm/meal  Diet effective now        Question Answer Comment   Diet type Consistent Carb    Carb diet selection: CCD 60 gm/meal        04/22/25 0153    04/07/25 0957  May Participate in Room Service  ( ROOM SERVICE MAY PARTICIPATE)  Once        Question:  .  Answer:  Yes    04/07/25 0956                     Estimated Needs:   Total Energy Estimated Needs in 24 hours (kCal): 1625 kCal  Method for Estimating Needs: 25 kcal/kg ABW  Total Protein Estimated Needs in 24 Hours (g): 52 g  Method for Estimating 24 Hour Protein Needs: 0.8 g/kg ABW  Total Fluid Estimated Needs in 24 Hours (mL): 1625 mL  Method for Estimating 24 Hour Fluid Needs: 1 ml/kcal or per MD  Patient on Order Fluid Restriction: No        Nutrition Diagnosis   Malnutrition Diagnosis  Patient has Malnutrition Diagnosis: No    Nutrition Diagnosis  Patient has Nutrition Diagnosis: Yes  Diagnosis Status (1): Active  Nutrition Diagnosis 1: Altered nutrition related to laboratory values  Related to (1): diabetes, illness  As Evidenced by (1): BG ranging from  during admission         Nutrition Interventions/Recommendations   Nutrition prescription for oral nutrition    Nutrition Recommendations:  Individualized Nutrition Prescription Provided for : 60g carb controlled diet    Nutrition Interventions/Goals:   Goal: Consumes 3 meals per day      Education Documentation  No documentation found.     Pt denies questions on carb controlled diet.       Nutrition Monitoring and Evaluation   Food/Nutrient Related History Monitoring  Monitoring and Evaluation Plan: Intake / amount of food, Estimated Energy Intake  Estimated Energy Intake: Energy intake greater or equal to 75% of estimated energy needs  Intake / Amount of food: Consumes at least 75% or more of meals/snacks/supplements, Meets > 75% estimated energy needs    Anthropometric Measurements  Monitoring and Evaluation  Plan: Body weight  Body Weight: Body weight - Maintain stable weight    Biochemical Data, Medical Tests and Procedures  Monitoring and Evaluation Plan: Electrolyte/renal panel, Glucose/endocrine profile  Electrolyte and Renal Panel: Electrolytes within normal limits  Glucose/Endocrine Profile: Glucose within normal limits ( mg/dL)         Goal Status: Some progress toward goal(s)    Time Spent (min): 30 minutes

## 2025-04-23 NOTE — PROGRESS NOTES
"Lela Barba is a 89 y.o. female on day 16 of admission presenting with Generalized weakness.    Subjective   Patient's blood sugars are widely fluctuating with episodes of hypoglycemia earlier today  Patient waiting for pre-CERT for nursing home placement, patient will need to be on IV meropenem until May 2 and midline has already been established.  Clinically patient is asymptomatic and review of systems were essentially benign     Objective   Blood pressures slightly on the high side and has been fluctuating widely.  Prior blood pressure was 170/73 heart rate of 73 with pulse oximeter reading of 93%  Blood sugars have been at 151, 32, 241, 94 and 66, we discontinued the sliding scale scale coverage    Physical Exam  Patient is hyperexcitable very agitated at times but alert oriented cooperative did not seem to be any distress  Head examination benign no facial asymmetry  Neck veins flat without bruits  Lungs are clear without any wheezing crackles or rhonchi  Heart regular without any murmur  Abdomen soft and nontender good bowel sounds  Extremities without any edema  Neurological examination benign without focal neurological deficits      Last Recorded Vitals  Blood pressure (!) 118/49, pulse 66, temperature 37 °C (98.6 °F), resp. rate 18, height 1.626 m (5' 4\"), weight 72.4 kg (159 lb 9.8 oz), SpO2 93%.  Intake/Output last 3 Shifts:  No intake/output data recorded.  Current Medications[1]   Relevant Results      Results for orders placed or performed during the hospital encounter of 04/06/25 (from the past 24 hours)   POCT GLUCOSE   Result Value Ref Range    POCT Glucose 27 (L) 74 - 99 mg/dL   POCT GLUCOSE   Result Value Ref Range    POCT Glucose 152 (H) 74 - 99 mg/dL   POCT GLUCOSE   Result Value Ref Range    POCT Glucose 66 (L) 74 - 99 mg/dL   POCT GLUCOSE   Result Value Ref Range    POCT Glucose 74 74 - 99 mg/dL   Basic Metabolic Panel   Result Value Ref Range    Glucose 80 74 - 99 mg/dL    Sodium 138 " 136 - 145 mmol/L    Potassium 4.6 3.5 - 5.3 mmol/L    Chloride 104 98 - 107 mmol/L    Bicarbonate 25 21 - 32 mmol/L    Anion Gap 14 10 - 20 mmol/L    Urea Nitrogen 25 (H) 6 - 23 mg/dL    Creatinine 0.82 0.50 - 1.05 mg/dL    eGFR 68 >60 mL/min/1.73m*2    Calcium 8.4 (L) 8.6 - 10.3 mg/dL   SST TOP   Result Value Ref Range    Extra Tube Hold for add-ons.    Lavender Top   Result Value Ref Range    Extra Tube Hold for add-ons.    POCT GLUCOSE   Result Value Ref Range    POCT Glucose 241 (H) 74 - 99 mg/dL   POCT GLUCOSE   Result Value Ref Range    POCT Glucose 32 (L) 74 - 99 mg/dL   POCT GLUCOSE   Result Value Ref Range    POCT Glucose 151 (H) 74 - 99 mg/dL   POCT GLUCOSE   Result Value Ref Range    POCT Glucose 149 (H) 74 - 99 mg/dL   POCT GLUCOSE   Result Value Ref Range    POCT Glucose 208 (H) 74 - 99 mg/dL    Bedside Midline Imaging  Result Date: 4/22/2025  These images are not reportable by radiology and will not be interpreted by  Radiologists.    CT guided percutaneous biopsy liver  Result Date: 4/21/2025  Interpreted By:  Schoenberger, Joseph, STUDY: CT GUIDED PERCUTANEOUS BIOPSY LIVER; ;  4/21/2025 12:15 pm   INDICATION: Signs/Symptoms:Biopsy of liver mass.     COMPARISON: None.   ACCESSION NUMBER(S): [id number]   ORDERING CLINICIAN: PATRICIA PIZARRO   CONSENT: The procedure, its indication, its risks, and alternatives were explained to the patient who understands and consents to the procedure. A time-out is completed prior to the procedure to confirm patient identity and procedure to be performed.   MODALITIES: CT   TECHNIQUE: A CT scan of the upper abdomen was performed in use the planned safe percutaneous access to the known mass in the lateral left liver lobe segment. This site was marked.   All personnel in the procedure suite used appropriate mask and cap. Additionally, the performing physician and assistant used maximum barrier technique to include a sterile gown and gloves as per standard [facility]  protocol. The marked site was sterilely prepped with chlorhexidine in the usual manner. A large sterile barrier was used to to completely draped the patient is outside of the sterilely prepped area in the usual manner per standard hospital protocol. Local anesthetic was administered. A 17 gauge outer cannula needle was advanced into the left liver lobe mass in a targeted CT demonstrates satisfactory positioning of this needle. At this 0.3 passes through this needle were made using a spring-loaded 18 gauge core biopsy needle. Samples were placed on saline moist and Telfa and into formalin per standard protocol. The needle was then removed. The patient tolerated the procedure well. There was no immediate complication.   FINDINGS: See discussion above       Successful CT-guided core biopsy of the patient's left liver lobe mass.     MACRO: None   Signed by: Joseph Schoenberger 4/21/2025 12:26 PM Dictation workstation:   [id number]                 Assessment & Plan  Generalized weakness    Persistent leukocytosis, febrile episodes, elevated CRP, sedimentation rate and procalcitonin with negative blood cultures being treated for left lower lobe pneumonia, cystitis and possible SBE.  Will need to be on IV meropenem until May 2, midline has been established.  Acute kidney injury from contrast nephropathy resolved with creatinine down to 0.82 which is normal and urinating well  Liver mass biopsy done results are pending  Type 2 diabetes insulin-dependent with widely fluctuating blood sugars  Hypertension with fluctuating blood pressures today but relatively well-controlled otherwise.  Will put parameters on blood pressure medications  Coronary artery disease on Plavix, isosorbide mononitrate ER without any chest pains or any shortness of breath  Hyperuricemia on allopurinol on hold because of acute kidney injury  Dyslipidemia on atorvastatin on hold because of acute kidney injury but will resume  Vitamin D3 deficiency on  vitamin D3 5000 units daily  Insomnia on trazodone 50 mg daily  History of chronic edema, currently without any edema  Hopefully will plan to discharge tomorrow      I spent 40 minutes in the professional and overall care of this patient.      Giuseppe Francois MD FACP         [1]   Current Facility-Administered Medications:     acetaminophen (Tylenol) tablet 650 mg, 650 mg, oral, q6h PRN, Giuseppe Francois MD, 650 mg at 04/14/25 2051    [Held by provider] allopurinol (Zyloprim) tablet 100 mg, 100 mg, oral, Daily, Giuseppe Francois MD, 100 mg at 04/14/25 0925    amLODIPine (Norvasc) tablet 10 mg, 10 mg, oral, Daily, Giuseppe Francois MD, 10 mg at 04/22/25 0923    [Held by provider] aspirin chewable tablet 81 mg, 81 mg, oral, Daily, Giuseppe Francois MD, 81 mg at 04/14/25 0647    [Held by provider] atorvastatin (Lipitor) tablet 40 mg, 40 mg, oral, Daily, Giuseppe Francois MD, 40 mg at 04/13/25 2008    cholecalciferol (Vitamin D-3) capsule 125 mcg, 5,000 Units, oral, Daily, Giuseppe Francois MD, 125 mcg at 04/22/25 0845    cloNIDine (Catapres) tablet 0.2 mg, 0.2 mg, oral, q12h YANG, Giuseppe Francois MD, 0.2 mg at 04/22/25 2122    [Held by provider] clopidogrel (Plavix) tablet 75 mg, 75 mg, oral, Daily, Giuseppe Francois MD, 75 mg at 04/14/25 0925    dextrose 50 % injection 12.5 g, 12.5 g, intravenous, q15 min PRN, Giuseppe Francois MD, 12.5 g at 04/22/25 1531    dextrose 50 % injection 25 g, 25 g, intravenous, q15 min PRN, Giuseppe Francois MD, 25 g at 04/22/25 0003    docusate sodium (Colace) capsule 100 mg, 100 mg, oral, Daily, Giuseppe Francois MD, 100 mg at 04/22/25 0844    [Held by provider] enoxaparin (Lovenox) syringe 30 mg, 30 mg, subcutaneous, q24h, Giuseppe Francois MD, 30 mg at 04/18/25 1751    ferrous sulfate tablet 325 mg, 65 mg of iron, oral, Daily with breakfast, Giuseppe Francois MD, 325 mg at 04/22/25 0807    glucagon (Glucagen) injection 1 mg, 1 mg, intramuscular, q15 min PRN, Giuseppe Francois MD    glucagon (Glucagen) injection  1 mg, 1 mg, intramuscular, q15 min PRN, Giuseppe Francois MD    hydroCHLOROthiazide (HYDRODiuril) tablet 25 mg, 25 mg, oral, Daily, Giuseppe Francois MD, 25 mg at 04/22/25 0844    insulin NPH and regular human (HumuLIN 70-30, NovoLIN 70-30) 100 unit/mL (70-30) injection 20 Units, 20 Units, subcutaneous, q AM, Giuseppe Francois MD, 20 Units at 04/22/25 0923    insulin NPH and regular human (HumuLIN 70-30, NovoLIN 70-30) 100 unit/mL (70-30) injection 20 Units, 20 Units, subcutaneous, Nightly, Giuseppe Francois MD, 20 Units at 04/21/25 2045    isosorbide mononitrate ER (Imdur) 24 hr tablet 60 mg, 60 mg, oral, Daily, Giuseppe Francois MD, 60 mg at 04/22/25 0845    lidocaine (Xylocaine) 10 mg/mL (1 %) injection 5 mL, 5 mL, infiltration, Once, Giuseppe Francois MD    LORazepam (Ativan) tablet 1 mg, 1 mg, oral, Nightly, Giuseppe Francois MD, 1 mg at 04/22/25 2122    [Held by provider] losartan (Cozaar) tablet 100 mg, 100 mg, oral, Daily, Giuseppe Francois MD, 100 mg at 04/14/25 0925    menthol-zinc oxide (Calmoseptine - Risamine) 0.44-20.6 % ointment 1 Application, 1 Application, Topical, 4x daily PRN, Giuseppe Francois MD    meropenem (Merrem) 1 g in sodium chloride 0.9%  mL, 1 g, intravenous, q12h, Cash Hargrove MD, Stopped at 04/22/25 2153    metoprolol succinate XL (Toprol-XL) 24 hr tablet 100 mg, 100 mg, oral, BID, Giuseppe Francois MD, 100 mg at 04/22/25 2122    metoprolol tartrate (Lopressor) injection 5 mg, 5 mg, intravenous, q6h PRN, Giuseppe Francois MD, 5 mg at 04/17/25 0348    mirtazapine (Remeron) tablet 15 mg, 15 mg, oral, Nightly, Giuseppe Francois MD, 15 mg at 04/22/25 2122    nitroglycerin (Nitrostat) SL tablet 0.4 mg, 0.4 mg, sublingual, q5 min PRN, Giuseppe Francois MD    tamsulosin (Flomax) 24 hr capsule 0.4 mg, 0.4 mg, oral, Daily, Giuseppe Francois MD, 0.4 mg at 04/22/25 0846    [Held by provider] torsemide (Demadex) tablet 20 mg, 20 mg, oral, Every other day, Giuseppe Francois MD, 20 mg at 04/12/25 0977

## 2025-04-24 ENCOUNTER — NURSING HOME VISIT (OUTPATIENT)
Dept: POST ACUTE CARE | Facility: EXTERNAL LOCATION | Age: OVER 89
End: 2025-04-24
Payer: MEDICARE

## 2025-04-24 DIAGNOSIS — M10.9 GOUT, UNSPECIFIED CAUSE, UNSPECIFIED CHRONICITY, UNSPECIFIED SITE: ICD-10-CM

## 2025-04-24 DIAGNOSIS — G47.00 INSOMNIA, UNSPECIFIED TYPE: ICD-10-CM

## 2025-04-24 DIAGNOSIS — R50.9 FEVER OF UNKNOWN ORIGIN: ICD-10-CM

## 2025-04-24 DIAGNOSIS — J18.9 PNEUMONIA OF LEFT LOWER LOBE DUE TO INFECTIOUS ORGANISM: Primary | ICD-10-CM

## 2025-04-24 DIAGNOSIS — E11.22 TYPE 2 DIABETES MELLITUS WITH CHRONIC KIDNEY DISEASE, WITH LONG-TERM CURRENT USE OF INSULIN, UNSPECIFIED CKD STAGE (MULTI): ICD-10-CM

## 2025-04-24 DIAGNOSIS — I10 ESSENTIAL HYPERTENSION: ICD-10-CM

## 2025-04-24 DIAGNOSIS — R16.0 LIVER MASS: ICD-10-CM

## 2025-04-24 DIAGNOSIS — Z79.4 TYPE 2 DIABETES MELLITUS WITH CHRONIC KIDNEY DISEASE, WITH LONG-TERM CURRENT USE OF INSULIN, UNSPECIFIED CKD STAGE (MULTI): ICD-10-CM

## 2025-04-24 DIAGNOSIS — F32.A DEPRESSION, UNSPECIFIED DEPRESSION TYPE: ICD-10-CM

## 2025-04-24 DIAGNOSIS — E55.9 VITAMIN D DEFICIENCY: ICD-10-CM

## 2025-04-24 DIAGNOSIS — D50.9 IRON DEFICIENCY ANEMIA, UNSPECIFIED IRON DEFICIENCY ANEMIA TYPE: ICD-10-CM

## 2025-04-24 DIAGNOSIS — F41.9 ANXIETY: ICD-10-CM

## 2025-04-24 DIAGNOSIS — E78.5 HYPERLIPIDEMIA, UNSPECIFIED HYPERLIPIDEMIA TYPE: ICD-10-CM

## 2025-04-24 DIAGNOSIS — I35.8: ICD-10-CM

## 2025-04-24 DIAGNOSIS — D32.9 MENINGIOMA (MULTI): ICD-10-CM

## 2025-04-24 DIAGNOSIS — N18.9 CHRONIC KIDNEY DISEASE, UNSPECIFIED CKD STAGE: ICD-10-CM

## 2025-04-24 DIAGNOSIS — I25.10 CORONARY ARTERY DISEASE INVOLVING NATIVE HEART, UNSPECIFIED VESSEL OR LESION TYPE, UNSPECIFIED WHETHER ANGINA PRESENT: ICD-10-CM

## 2025-04-24 PROCEDURE — 99310 SBSQ NF CARE HIGH MDM 45: CPT | Performed by: NURSE PRACTITIONER

## 2025-04-24 NOTE — DISCHARGE SUMMARY
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,   Liver mass  Multinodular goiter  Type 2 diabetes insulin-dependent  Hypertension  Coronary artery disease  Hyperuricemia  Dyslipidemia  Vitamin D deficiency    Issues Requiring Follow-Up  Patient on IV meropenem to finish on 5/2  Need to follow-up result of liver biopsy    Test Results Pending At Discharge  Pending Labs       Order Current Status    Surgical Pathology Exam In process        Current Medications[1]   Results for orders placed or performed during the hospital encounter of 04/06/25 (from the past 96 hours)   Basic Metabolic Panel   Result Value Ref Range    Glucose 102 (H) 74 - 99 mg/dL    Sodium 140 136 - 145 mmol/L    Potassium 4.7 3.5 - 5.3 mmol/L    Chloride 101 98 - 107 mmol/L    Bicarbonate 29 21 - 32 mmol/L    Anion Gap 15 10 - 20 mmol/L    Urea Nitrogen 27 (H) 6 - 23 mg/dL    Creatinine 0.86 0.50 - 1.05 mg/dL    eGFR 65 >60 mL/min/1.73m*2    Calcium 8.9 8.6 - 10.3 mg/dL   Lavender Top   Result Value Ref Range    Extra Tube Hold for add-ons.    SST TOP   Result Value Ref Range    Extra Tube Hold for add-ons.    POCT GLUCOSE   Result Value Ref Range    POCT Glucose 113 (H) 74 - 99 mg/dL   POCT GLUCOSE   Result Value Ref Range    POCT Glucose 91 74 - 99 mg/dL   POCT GLUCOSE   Result Value Ref Range    POCT Glucose 48 (L) 74 - 99 mg/dL   POCT GLUCOSE   Result Value Ref Range    POCT Glucose 71 (L) 74 - 99 mg/dL   POCT GLUCOSE   Result Value Ref Range    POCT Glucose 93 74 - 99 mg/dL   POCT GLUCOSE   Result Value Ref Range    POCT Glucose 254 (H) 74 - 99 mg/dL   POCT GLUCOSE   Result Value Ref Range    POCT Glucose 242 (H) 74 - 99 mg/dL   SST TOP   Result Value Ref Range    Extra Tube Hold for add-ons.    Basic Metabolic Panel   Result Value Ref Range    Glucose 244 (H) 74 - 99 mg/dL    Sodium 136 136 - 145 mmol/L    Potassium 4.6 3.5 - 5.3 mmol/L     Chloride 102 98 - 107 mmol/L    Bicarbonate 29 21 - 32 mmol/L    Anion Gap 10 10 - 20 mmol/L    Urea Nitrogen 31 (H) 6 - 23 mg/dL    Creatinine 0.97 0.50 - 1.05 mg/dL    eGFR 56 (L) >60 mL/min/1.73m*2    Calcium 7.8 (L) 8.6 - 10.3 mg/dL   CBC and Auto Differential   Result Value Ref Range    WBC 8.6 4.4 - 11.3 x10*3/uL    nRBC 0.0 0.0 - 0.0 /100 WBCs    RBC 2.97 (L) 4.00 - 5.20 x10*6/uL    Hemoglobin 8.3 (L) 12.0 - 16.0 g/dL    Hematocrit 25.5 (L) 36.0 - 46.0 %    MCV 86 80 - 100 fL    MCH 27.9 26.0 - 34.0 pg    MCHC 32.5 32.0 - 36.0 g/dL    RDW 13.9 11.5 - 14.5 %    Platelets 474 (H) 150 - 450 x10*3/uL    Neutrophils % 58.0 40.0 - 80.0 %    Immature Granulocytes %, Automated 0.9 0.0 - 0.9 %    Lymphocytes % 24.5 13.0 - 44.0 %    Monocytes % 13.4 2.0 - 10.0 %    Eosinophils % 2.7 0.0 - 6.0 %    Basophils % 0.5 0.0 - 2.0 %    Neutrophils Absolute 5.00 1.60 - 5.50 x10*3/uL    Immature Granulocytes Absolute, Automated 0.08 0.00 - 0.50 x10*3/uL    Lymphocytes Absolute 2.11 0.80 - 3.00 x10*3/uL    Monocytes Absolute 1.15 (H) 0.05 - 0.80 x10*3/uL    Eosinophils Absolute 0.23 0.00 - 0.40 x10*3/uL    Basophils Absolute 0.04 0.00 - 0.10 x10*3/uL   POCT GLUCOSE   Result Value Ref Range    POCT Glucose 116 (H) 74 - 99 mg/dL   POCT GLUCOSE   Result Value Ref Range    POCT Glucose 350 (H) 74 - 99 mg/dL   POCT GLUCOSE   Result Value Ref Range    POCT Glucose 241 (H) 74 - 99 mg/dL   POCT GLUCOSE   Result Value Ref Range    POCT Glucose 27 (L) 74 - 99 mg/dL   POCT GLUCOSE   Result Value Ref Range    POCT Glucose 152 (H) 74 - 99 mg/dL   POCT GLUCOSE   Result Value Ref Range    POCT Glucose 66 (L) 74 - 99 mg/dL   POCT GLUCOSE   Result Value Ref Range    POCT Glucose 74 74 - 99 mg/dL   Basic Metabolic Panel   Result Value Ref Range    Glucose 80 74 - 99 mg/dL    Sodium 138 136 - 145 mmol/L    Potassium 4.6 3.5 - 5.3 mmol/L    Chloride 104 98 - 107 mmol/L    Bicarbonate 25 21 - 32 mmol/L    Anion Gap 14 10 - 20 mmol/L    Urea Nitrogen  25 (H) 6 - 23 mg/dL    Creatinine 0.82 0.50 - 1.05 mg/dL    eGFR 68 >60 mL/min/1.73m*2    Calcium 8.4 (L) 8.6 - 10.3 mg/dL   SST TOP   Result Value Ref Range    Extra Tube Hold for add-ons.    Lavender Top   Result Value Ref Range    Extra Tube Hold for add-ons.    POCT GLUCOSE   Result Value Ref Range    POCT Glucose 241 (H) 74 - 99 mg/dL   POCT GLUCOSE   Result Value Ref Range    POCT Glucose 32 (L) 74 - 99 mg/dL   POCT GLUCOSE   Result Value Ref Range    POCT Glucose 151 (H) 74 - 99 mg/dL   POCT GLUCOSE   Result Value Ref Range    POCT Glucose 149 (H) 74 - 99 mg/dL   POCT GLUCOSE   Result Value Ref Range    POCT Glucose 208 (H) 74 - 99 mg/dL   POCT GLUCOSE   Result Value Ref Range    POCT Glucose 301 (H) 74 - 99 mg/dL   POCT GLUCOSE   Result Value Ref Range    POCT Glucose 191 (H) 74 - 99 mg/dL   POCT GLUCOSE   Result Value Ref Range    POCT Glucose 279 (H) 74 - 99 mg/dL    Bedside Midline Imaging  Result Date: 4/22/2025  These images are not reportable by radiology and will not be interpreted by  Radiologists.    CT guided percutaneous biopsy liver  Result Date: 4/21/2025  Interpreted By:  Schoenberger, Joseph, STUDY: CT GUIDED PERCUTANEOUS BIOPSY LIVER; ;  4/21/2025 12:15 pm   INDICATION: Signs/Symptoms:Biopsy of liver mass.     COMPARISON: None.   ACCESSION NUMBER(S): [id number]   ORDERING CLINICIAN: PATRICIA PIZARRO   CONSENT: The procedure, its indication, its risks, and alternatives were explained to the patient who understands and consents to the procedure. A time-out is completed prior to the procedure to confirm patient identity and procedure to be performed.   MODALITIES: CT   TECHNIQUE: A CT scan of the upper abdomen was performed in use the planned safe percutaneous access to the known mass in the lateral left liver lobe segment. This site was marked.   All personnel in the procedure suite used appropriate mask and cap. Additionally, the performing physician and assistant used maximum barrier  technique to include a sterile gown and gloves as per standard hospital protocol. The marked site was sterilely prepped with chlorhexidine in the usual manner. A large sterile barrier was used to to completely draped the patient is outside of the sterilely prepped area in the usual manner per standard hospital protocol. Local anesthetic was administered. A 17 gauge outer cannula needle was advanced into the left liver lobe mass in a targeted CT demonstrates satisfactory positioning of this needle. At this 0.3 passes through this needle were made using a spring-loaded 18 gauge core biopsy needle. Samples were placed on saline moist and Telfa and into formalin per standard protocol. The needle was then removed. The patient tolerated the procedure well. There was no immediate complication.   FINDINGS: See discussion above       Successful CT-guided core biopsy of the patient's left liver lobe mass.     MACRO: None   Signed by: Joseph Schoenberger 4/21/2025 12:26 PM Dictation workstation:   [id number]    CT chest wo IV contrast  Result Date: 4/15/2025  Interpreted By:  Scooter Ribera, STUDY: CT CHEST WO IV CONTRAST;  4/15/2025 9:15 am   INDICATION: Signs/Symptoms:Liver mass possible metastatic disease primary unknown.     COMPARISON: MRI liver 12 April 2025; CT abdomen and pelvis without contrast 11 April 2025; CT chest, abdomen and pelvis without contrast 2 March 2022   ACCESSION NUMBER(S): [id number]   ORDERING CLINICIAN: PATRICIA PIZARRO   TECHNIQUE: Helical CT chest from thoracic inlet through the hemidiaphragms without intravenous contrast   FINDINGS: LUNGS / AIRSPACES / AIRWAYS:   LARGE AIRWAYS Filling defect: Negative Wall thickening: Negative Bronchiectasis: Negative Other: N/A   AIRSPACES Fibrosis: Negative Emphysema: Negative Consolidation: Negative Ground glass airspace disease: Negative Edema: Negative Nodule / Mass: Negative Other: Left-greater-than-right biapical pleural-parenchymal scarring similar to  prior. Motion artifact particularly the lung bases, but not enough to obscure large nodule   PLEURA: Effusion:  Both sides negative Pneumothorax:  Both sides negative Other:  n/a   CARDIOVASCULAR: Heart size:  Enlarged but unchanged Pericardial effusion:  Negative Thoracic aortic aneurysm:  Negative Pulmonary arteries:  No ectasia Heart failure change:  Negative.  No sign of interstitial or alveolar edema. 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 Conspicuity of the interventricular septum relative to the blood pool suggests anemia   NONVASCULAR MEDIASTINUM: Esophagus:  Grossly normal by CT Mediastinal Mass:  Negative Hiatal hernia:  Short/small sliding-type unchanged Other:  No definite interval change in the enlarged multinodular thyroid goiter   LYMPH NODES: No thoracic adenopathy   CHEST WALL: Soft tissues of the chest wall are unremarkable   SKELETON: No acute findings including no aggressive osseous lesion suspect for metastatic disease or other neoplasm   UPPER ABDOMEN: No major change from recent dedicated MRI and CT without contrast       No lung nodule or mass   No thoracic adenopathy   No pleural or pericardial effusion   Enlarged multinodular thyroid goiter has not appreciably changed compared to CT without contrast 27 February 2022, the oldest pertinent comparison exam for the thyroid   No aggressive osseous lesion in the chest   MACRO: None   Signed by: Scooter Ribera 4/15/2025 10:30 AM Dictation workstation:   [id number]    MR liver w and wo IV contrast  Result Date: 4/14/2025  Interpreted By:  Scooter Ribera, STUDY: MR LIVER W AND WO IV CONTRAST;  4/12/2025 10:55 am   INDICATION: Signs/Symptoms:large liver mass on CT scan.     COMPARISON: CT abdomen and pelvis without contrast 11 April 2025; right upper quadrant ultrasound 2 March 2022; CT chest, abdomen and pelvis without IV contrast 2 March 2022; CT abdomen and pelvis with contrast 25 February 2022 I do and 21 February  2022; Limited field of view CT chest without contrast for coronary artery calcium scoring dated 6 March 2018; right upper quadrant ultrasound 9 February 2018; renal ultrasound 26 April 2012   ACCESSION NUMBER(S): [id number]   ORDERING CLINICIAN: PATRICIA PIZARRO   TECHNIQUE: Multi-planar, multi-pulse sequence MRI abdomen before and after the uneventful administration of 13 mL gadolinium-based intravenous contrast material (Dotarem)   FINDINGS: LIVER: Size:  Within normal limits Cirrhotic change:  Negative Fatty change:  Negative Significant iron deposition:  Negative Portal venous system:  Patent throughout, including the splenic vein and upper SMV Hepatic veins:  Patent throughout Stigmata of portal hypertension:  None.  No ascites, splenomegaly or vascular consequences of portal hypertension such as varices or splenorenal shunt Solid mass: Positive:   Mass occupying the majority of the lateral segment left lobe measures up to 11.5 x 8.4 cm in the coronal plane, 10.9 x 6.5 cm in the axial plane. It is heterogeneously high in T2 signal intensity; uniformly low in T1 signal intensity; solid, not cystic; has none-arterial (hypo) enhancement but not uniformly so, likely from necrosis; does not contain macroscopic fat;   Not suspected based on the recent CT, there is another substantial size abnormality in the posterior segment right lobe near the dome, with early enhancement pattern suggesting multiple adjacent, abutting smaller masses, but on series 28, image 30, there is an apparent capsule/pseudo capsule around a single larger mass potentially as large as 5.4 x 3.5 cm. It is morphologically similar to the other mass, with none arterial (hypo) enhancement and absence of macroscopic fat   Two additional enhancing lesions in the posterior segment right lobe on series 16, image 41   GALLBLADDER: Stone/s: Negative Polyp: Negative Dilation: Negative Wall thickening: Negative Other: n/a   BILE DUCTS: Intrahepatic dilation:  Negative Extrahepatic dilation: Negative Stricture: Negative Stone/s: Negative Other: n/a   PANCREAS: Acute inflammatory change: Negative Morphologic changes of chronic pancreatitis: Negative Gland necrosis: Negative Peripancreatic collection: Negative Peripancreatic fat necrosis: Negative Main duct dilation: Negative Solid mass: Negative Cyst / cystic lesion:  Few scattered small, none larger than 15 mm   SPLEEN:  The spleen is normal in size, without focal lesion.   RIGHT ADRENAL GLAND:  No mass or hyperplasia.   LEFT ADRENAL GLAND:  No mass or hyperplasia.   RIGHT KIDNEY AND INCLUDED URETER:  No hydronephrosis, solid mass or any other acute findings.  Few subcentimeter simple cysts, Bosniak type 1, do not require follow-up. No complex cyst   LEFT KIDNEY AND INCLUDED URETER:  No hydronephrosis, solid mass or any other acute findings.  Few small simple cysts, all Bosniak type 1, do not need follow-up. The largest is at the upper pole, 23 mm diameter. No complex cyst   LYMPH NODES:  No abdominal adenopathy, intraperitoneal, retroperitoneal or otherwise.   INCLUDED BOWEL:  Normal in caliber and wall thickness.   RETROPERITONEUM:  Normal.  No hemorrhage or inflammatory change. (lymph nodes in dedicated section)   OMENTUM, MESENTERY AND PERITONEAL SPACES:  No fluid collection or free fluid.  Grossly normal omentum and mesentery.  (lymph nodes in dedicated section)   VASCULATURE:  No abdominal aortic aneurysm.  No large vessel occlusion or critical stenosis. Hepatic vasculature details above in the liver section of this report.   LOWER CHEST:  No pleural effusion.   BONES:  Normal marrow signal intensity in the included skeleton.       Large lateral segment left lobe hepatic mass noted on CT without contrast 11 April 2025 was not present on the preceding CT without contrast 2 March 2022, is confirmed on today's MRI to be solid (not cystic); enhances, but not in the arterial phase (not suspect for hepatocellular  carcinoma) and with heterogeneous enhancement at that, likely from necrosis; does not contain macroscopic fat. It is not a cyst or hemangioma. I do not suspect a benign solid etiology such as hepatic adenoma or focal nodular hyperplasia   Not suspected based on the prior CT, there are other small enhancing lesions in the liver   Especially in light of the multiplicity, favor metastatic disease   No primary malignancy evident   Bilateral adrenal nodules are adenomas   Cystic pancreatic lesions are almost likely side branch variety intraductal papillary mucinous neoplasms (IPMN), to be followed as detailed below, but not suspect for primary malignancy to cause liver metastases   Away from the liver, no other sign of a malignant process elsewhere in the abdomen. No free fluid or adenopathy   MACRO: None   Signed by: Scooter Ribera 4/14/2025 8:46 AM Dictation workstation:   [id number]    Transesophageal Echo (ARELI)  Result Date: 4/11/2025          [place] [address]  Tel [phone] Fax [phone] TRANSESOPHAGEAL ECHOCARDIOGRAM REPORT Patient Name:       NADEEM TABOR   Reading Physician:    [id number] Yamil Farmer MD, [facility] Study Date:         4/11/2025            Ordering Provider:    [id number] PATRICIA PIZARRO MRN/PID:            [id number]             Fellow: Accession#:         [id number]         Nurse: Date of Birth/Age:  1935 / 89 years Sonographer:          Edwina CLEMENS Gender Assigned at  F                    Additional Staff: Birth: Height:             162.56 cm            Admit Date:           4/6/2025 Weight:             64.86 kg             Admission Status:     Inpatient -                                                                Routine BSA / BMI:          1.70 m2 /  24.55      Department Location:  [address] [place] [address]                     kg/m2 Blood Pressure: 132 /78 mmHg Study Type:    TRANSESOPHAGEAL ECHO (ARELI) Diagnosis/ICD: 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 Agitated saline used as a contrast agent for intraseptal               flow evaluation. The patient was sedated.  PHYSICIAN INTERPRETATION: ARELI Details: The ARELI probe used was [id number]. Technically adequate omniplane transesophageal echocardiogram performed. Agitated saline contrast and color flow Doppler echo was performed to assess for the presence of a patent foramen ovale. ARELI Medication: The pharynx was anesthetized with Cetacaine spray and viscous lidocaine. The patient was sedated using moderate sedation. Midazolam and Fentanyl was used to sedate the patient for this exam. ARELI Procedure: The probe was passed without difficulty. The following complication was encountered during the procedure: Patient tolerated the procedure well without any apparent complications. Left Ventricle: The left ventricular systolic function is normal, with a visually estimated ejection fraction of 60-65%. There are no regional wall motion abnormalities. The left ventricular cavity size is normal. Left ventricular diastolic filling was not assessed. Mild concentric LVH. Left Atrium: The left atrial size is normal. There is no definite left atrial thrombus present. A bubble study using agitated saline was performed. Bubble study is negative. There is a normal sized left atrial appendage. There is no definite left atrial mass present. No evidence of right-to-left shunting on agitated saline bubble contrast Normal left atrial appendage. Right Ventricle: The right ventricle is normal in  size. There is normal right ventricular global systolic function. Right Atrium: The right atrial size is normal. Aortic Valve: The aortic valve is trileaflet. There is trace aortic valve regurgitation. The peak instantaneous gradient of the aortic valve is 22 mmHg. The mean gradient of the aortic valve is 9 mmHg. Aortic valve is trileaflet. Planimetry area 2.3 cm sq. there is trace-1+ aortic insufficiency at the base of the annulus at the level of right and left coronary cusp. This is due to mild leaflet coaptation secondary to calcification. There is no aortic stenosis. There is evidence of significant Lambl's excrescence primarily on the right coronary cusp. If multiple positive blood cultures could not completely exclude a small vegetation. The length is 6 mm and most consistent with Lambl's excrescence though vegetation could not be completely excluded. Mitral Valve: The mitral valve is mildly thickened. There is trace mitral valve regurgitation. Trace MR. Tricuspid Valve: The tricuspid valve is structurally normal. There is mild tricuspid regurgitation. The Doppler estimated RVSP is within normal limits at 22.9 mmHg. Pulmonic Valve: The pulmonic valve is structurally normal. There is trace pulmonic valve regurgitation. Pericardium: No pericardial effusion noted. Aorta: The aortic root is normal. Mild plaque descending thoracic aorta. Pulmonary Artery: The main pulmonary artery is normal in size, and position, with normal bifurcation into the left and right pulmonary arteries. Pulmonary Veins: The pulmonary veins appear normal and return normally to the left atrium.  CONCLUSIONS:  1. The left ventricular systolic function is normal, with a visually estimated ejection fraction of 60-65%.  2. Mild concentric LVH.  3. There is normal right ventricular global systolic function.  4. No evidence of right-to-left shunting on agitated saline bubble contrast     Normal left atrial appendage.  5. Trace MR.  6. Right  ventricular systolic pressure is within normal limits.  7. Aortic valve is trileaflet. Planimetry area 2.3 cm sq. there is trace-1+ aortic insufficiency at the base of the annulus at the level of right and left coronary cusp. This is due to mild leaflet coaptation secondary to calcification. There is no aortic stenosis. There is evidence of significant Lambl's excrescence primarily on the right coronary cusp. If multiple positive blood cultures could not completely exclude a small vegetation. The length is 6 mm and most consistent with Lambl's excrescence though vegetation could not be completely excluded.  8. Mild plaque descending thoracic aorta.  9. The main pulmonary artery is normal in size, and position, with normal bifurcation into the left and right pulmonary arteries. 10. The pulmonary veins appear normal and return normally to the left atrium. 11. No left atrial mass. 12. No left atrial thrombus. QUANTITATIVE DATA SUMMARY:  LV SYSTOLIC FUNCTION:                      Normal Ranges: EF-Visual:      63 % LV EF Reported: 63 %  AORTIC VALVE:           Normal Ranges: AoV Vmax:     2.36 m/s  (<=1.7m/s) AoV Peak P.3 mmHg (<20mmHg) AoV Mean P.0 mmHg  (1.7-11.5mmHg) AoV VTI:      46.20 cm  (18-25cm)  TRICUSPID VALVE/RVSP:          Normal Ranges: Peak TR Velocity:     2.23 m/s RV Syst Pressure:     23 mmHg  (< 30mmHg)  [id number] Yamil Farmer MD, [facility] Electronically signed on 2025 at 11:10:09 AM  ** Final **     CT abdomen pelvis wo IV contrast  Result Date: 2025  Interpreted By:  Scooter Ribera, STUDY: CT ABDOMEN PELVIS WO IV CONTRAST;  2025 9:02 am   INDICATION: Signs/Symptoms:Possible sepsis with pyelonephritis.     COMPARISON: Renal ultrasound and CT chest, abdomen and pelvis without IV contrast 2022, both from 2022   ACCESSION NUMBER(S): [id number]   ORDERING CLINICIAN: PATRICIA PIZARRO   TECHNIQUE: CT of the abdomen and pelvis from the lung bases through the symphysis pubis  without oral or IV contrast   FINDINGS: LOWER CHEST: Dependent atelectasis in the left lung base   BONES: No acute skeletal findings.   RIGHT KIDNEY AND URETER: Radiodense stone: Negative Hydronephrosis: Negative Congenital variant anatomy: Negative. No duplicated ureter or other variant anatomy. Other: n/a   LEFT KIDNEY AND URETER: Radiodense stone: Negative Hydronephrosis: Negative Congenital variant anatomy: Negative. No duplicated ureter or other variant anatomy. Other: Previously at least 26 mm left renal cyst is now only 18 mm, has changed from simple water attenuation composition to slightly above simple water attenuation composition. Findings highly suggestive of a partially collapsed hemorrhagic cyst   URINARY BLADDER: Radiodense stone: Negative Wall thickening / other inflammatory change: Negative Diverticula: Negative Congenital variant anatomy: Negative. No variant anatomy such as urachal remnant. Other: Seems over distended but otherwise normal   LIVER:   Background liver not fatty or substantially enlarged or overtly cirrhotic   Sometime since 2 March 2022, new, approximately 10 x 10 x 7 cm low-attenuation mass occupying the majority if not the entirety of the lateral segment left hepatic lobe. Within limitations of this exam without contrast, no other new mass   SPLEEN: Normal. No enlargement.   PANCREAS: Normal. No CT evidence of acute or chronic pancreatitis. No obvious  duct dilation. No gross evidence of a mass, noting low sensitivity and specificity without IV contrast.   GALLBLADDER: Normal CT appearance. No dilation, calcified, or gas-containing stones.  Other types of gallstones could be occult on CT and detectable only by ultrasound.   BILE DUCTS: Normal. No biliary duct dilation.   ADRENAL GLANDS: On both prior CTs, a small left adrenal nodule measured in the range of a lipid rich adenoma; today it does not but it has not changed in size. Unchanged subcentimeter right adrenal lipid rich  adenoma   LYMPH NODES: No adenopathy, intraperitoneal, retroperitoneal, pelvic, inguinal or otherwise.   BOWEL: Surgical change. No dilation or inflammation   STOMACH / DUODENUM: Grossly normal by CT which has limited sensitivity and specificity for the stomach and duodenum.   RETROPERITONEUM: Normal.  No acute hemorrhage or inflammatory change. Lymph nodes in a separate dedicated section.   OMENTUM, MESENTERY AND PERITONEAL SPACES: Free intraperitoneal air: Negative Free intraperitoneal fluid: Tiny quantity not acutely hemorrhagic but nevertheless abnormal free fluid adjacent to the lower margin of the liver Abscess: Negative Other: n/a   PELVIS: No obvious acute adnexal abnormality by CT   VASCULATURE: Aortic and iliac atherosclerotic calcifications without aneurysm or other acute finding.   ABDOMINAL WALL: Hernia: Negative Other: No acute or contributory abnormality.       New large (approximately 10 x 10 x 7 cm) mass occupying the majority if not the entirety of the lateral segment left hepatic lobe needs further evaluation, but cannot be fully characterized without IV contrast. MRI liver without and with intravenous contrast would have the best sensitivity and specificity. The new liver lesion is not an abscess, but a solid mass   New tiny quantity of free fluid adjacent to the liver amounts to less than 100 mL, far too small for paracentesis   No other acute findings   No hydronephrosis on either side   No stone anywhere in the urinary tract   Urinary bladder is over distended but otherwise normal, without wall thickening, stone or acute blood clot   Small left adrenal nodule has not changed in size from 2022, but no longer meets unenhanced criteria for lipid rich adenoma. It could still be an adenoma but lipid poor adenoma   MACRO: None   Signed by: Scooter Ribera 4/11/2025 9:13 AM Dictation workstation:   [id number]    XR chest 1 view  Result Date: 4/8/2025  Interpreted By:  Scooter Ribera, STUDY: XR CHEST 1  VIEW;  4/8/2025 10:01 am   INDICATION: Signs/Symptoms:pneumonia?.     COMPARISON: Portable chest, 6 April 2025   ACCESSION NUMBER(S): [id number]   ORDERING CLINICIAN: PATRICIA PIZARRO   TECHNIQUE: Single frontal view of the chest; Portable technique   FINDINGS: Enlarged cardiac silhouette is unchanged   The aorta is calcified at the arch and proximal descending segments, but normal in caliber, without any interval change or acute finding   Lungs clear. No consolidation/infiltrate or edema       NO ACUTE DISEASE IN THE CHEST   MACRO: None   Signed by: Scooter Ribera 4/8/2025 10:54 AM Dictation workstation:   [id number]    ECG 12 lead  Result Date: 4/7/2025  Normal sinus rhythm Septal infarct , age undetermined Abnormal ECG When compared with ECG of 31-JUL-2023 10:58, No significant change was found See ED provider note for full interpretation and clinical correlation Confirmed by Dede Smalls ([id number]) on 4/7/2025 7:32:10 PM    CT head wo IV contrast  Result Date: 4/6/2025  Interpreted By:  tSevan Disla, STUDY: CT HEAD WO IV CONTRAST;  4/6/2025 8:07 pm   INDICATION: Signs/Symptoms:tremor, generalized weakness.     COMPARISON: CT head dated 07/10/2024.   ACCESSION NUMBER(S): [id number]   ORDERING CLINICIAN: KYLE BECKHAM   TECHNIQUE: Noncontrast axial CT scan of head was performed. Angled reformats in brain and bone windows were generated. The images were reviewed in bone, brain, blood and soft tissue windows.   FINDINGS: No hyperdense intracranial hemorrhage is identified. There is no new mass effect or midline shift present.   Geographic area of cystic encephalomalacia and gliosis is present in the right frontoparietal junction, likely representing sequela of prior infarct/injury, similar in appearance to prior exam. Low volume of attenuation changes in the periventricular and subcortical white matter of bilateral cerebral hemispheres likely represent sequela of microvascular disease. Focus of  decreased attenuation in the right cerebellum likely represent sequela of prior infarct/injury, also similar in appearance to prior study.   Gray-white differentiation is intact, without evidence of CT apparent transcortical infarct.   There is redemonstration of the mildly hyperdense extra-axial mass overlying the right frontoparietal junction likely representing a meningioma, similar to prior study. There is slight effacement of the adjacent brain parenchyma without significant low-density edema.   Mild-to-moderate brain parenchymal volume loss is present without abnormal ventricular dilatation. Basal cisterns are patent. No extra-axial fluid collection is identified.   Scalp soft tissues do not demonstrate any acute abnormality. No skull fracture or other acute calvarial abnormality is present.   Mastoid air cells and middle ear cavities are clear. Complete opacification of the left maxillary sinus, similar to prior study in July of 2024.       1.  No evidence of hemorrhage, CT apparent transcortical infarct, or other acute intracranial abnormality. 2. Mild volume of attenuation changes in the periventricular and subcortical white matter of bilateral cerebral hemispheres is favored to represent sequela of microvascular disease. Geographic area of cystic encephalomalacia and gliosis in the right frontoparietal junction likely represent sequela of prior infarct/injury. 3. Extra-axial mildly hyperdense mass overlies the medial posterior right frontal lobe with slight effacement of the adjacent brain parenchyma but without significant low-density edema, essentially unchanged in appearance to prior study, likely representing a meningioma. 4. Near-complete opacification of the partially visualized left maxillary sinus, similar to prior exam. Correlate with symptoms of chronic sinusitis.   MACRO: None   Signed by: Stevan Disla 4/6/2025 8:46 PM Dictation workstation:   [id number]    XR chest 1 view  Result  Date: 4/6/2025  Interpreted By:  Elbert Patel, STUDY: XR CHEST 1 VIEW  4/6/2025 6:49 pm   INDICATION: Signs/Symptoms:Chest Pain   COMPARISON: 03/03/2022   ACCESSION NUMBER(S): [id number]   ORDERING CLINICIAN: KYLE BECKHAM   TECHNIQUE: A single AP portable radiograph of the chest was obtained.   FINDINGS: Mild hazy opacity is seen at the left lung base, and may represent atelectasis and/or pneumonia. No pneumothorax is identified. The cardiac silhouette is within normal limits for size.       Mild hazy opacity at the left lung base, as above. Clinical correlation and continued follow-up until clearing is recommended.   MACRO: None.   Signed by: Elbert Patel 4/6/2025 6:52 PM Dictation workstation:   [id number]     Hospital Course   Patient was initially admitted on 6 April at which time she presented with feeling cold and uncontrolled chills and tremors generalized body aches and weakness.  She felt dizzy with headaches.  CT scan in the emergency room shows meningioma which is chronic.  Chest x-ray possible left lung infiltrate most likely pneumonia and urinalysis suggestive of UTI.  Patient was initially started on Rocephin IV and Vibramycin and admitted.  While in the hospital despite IV antibiotics the patient persisted to have leukocytosis, with elevated CRP, procalcitonin and sedimentation rate with poor response to the initial Rocephin and was actually shifted to cefepime.  CT scan of the abdomen showed a left liver mass and MRI verified lesion to be a solid mass on the left lobe of needing a biopsy.  Patient still having fevers and persistent leukocytosis and eventually we got a consult with Dr. Morse who placed the patient on meropenem which seems to have worked.  We then decided to do further workup and was noted to have a liver mass on the left side eventually scheduled for biopsy for left liver mass, but we had to discontinue or hold  her anticoagulants.  In the meantime the patient's blood pressure  as well as blood sugars were fluctuating very widely needing adjustments of her insulin dosing as well as blood pressure medications.  It is also of note that the patient's febrile episodes decreased and eventually white cell count went down to normal and meropenem and it was decided to keep the patient on meropenem for 14 days specially after a ARELI showing possible SBE with Lambl's excrescence cannot rule out vegetation.  The patient was then sent to a nursing home today to continue and finish up 14 days of meropenem IV.  In the meantime we will follow-up the result of the liver biopsy.  While in the hospital patient initially developed acute kidney injury most likely secondary to contrast nephropathy which was treated aggressively with IV fluids and diuretics and by the time of discharge kidney function was normal.    Pertinent Physical Exam At Time of Discharge  Physical Exam  On discharge patient was alert oriented not in distress very hyperexcitable anxious but comfortable  She was afebrile on discharge with stable vital signs  Head examination benign no facial asymmetry  Neck veins flat without bruits and no lymphadenopathy  Lungs are clear without any wheezing crackles or rhonchi  Heart regular without any murmur  Abdomen soft and nontender good bowel sounds  Extremities without edema  Neurological examination benign without any focal neurological deficits    Outpatient Follow-Up  No future appointments.  Patient will be followed up in the office in 1 week    Giuseppe Francois MD FACP       [1] No current facility-administered medications for this encounter.    Current Outpatient Medications:     acetaminophen (Tylenol) 325 mg tablet, Take 2 tablets (650 mg) by mouth every 6 hours if needed for moderate pain (4 - 6) or mild pain (1 - 3)., Disp: , Rfl:     allopurinol (Zyloprim) 100 mg tablet, Take 1 tablet (100 mg) by mouth once daily., Disp: , Rfl:     atorvastatin (Lipitor) 40 mg tablet, Take 1 tablet (40 mg)  by mouth once daily., Disp: , Rfl:     cholecalciferol (Vitamin D-3) 5,000 Units tablet, Take 1 tablet (5,000 Units) by mouth once daily., Disp: , Rfl:     docusate sodium (Colace) 100 mg capsule, Take 1 capsule (100 mg) by mouth twice a day., Disp: , Rfl:     ferrous sulfate 325 (65 Fe) mg tablet, Take 1 tablet (325 mg) by mouth 2 times a day., Disp: , Rfl:     hydroCHLOROthiazide (HYDRODiuril) 25 mg tablet, Take 1 tablet (25 mg) by mouth once daily., Disp: , Rfl:     menthol-zinc oxide (Calmoseptine - Risamine) 0.44-20.6 % ointment, Apply 1 Application topically 4 times a day as needed., Disp: , Rfl:     mirtazapine (Remeron) 15 mg tablet, Take 1 tablet (15 mg) by mouth once daily at bedtime., Disp: , Rfl:     nitroglycerin (Nitrostat) 0.4 mg SL tablet, Place 1 tablet (0.4 mg) under the tongue every 5 minutes if needed., Disp: , Rfl:     amLODIPine (Norvasc) 5 mg tablet, Take 1 tablet (5 mg) by mouth once daily., Disp: , Rfl:     aspirin 81 mg chewable tablet, Chew 1 tablet (81 mg) once daily., Disp: , Rfl:     cloNIDine (Catapres) 0.2 mg tablet, Take 1 tablet (0.2 mg) by mouth 2 times a day., Disp: , Rfl:     clopidogrel (Plavix) 75 mg tablet, Take 1 tablet (75 mg) by mouth once daily., Disp: , Rfl:     insulin NPH and regular human (HumuLIN 70-30, NovoLIN 70-30) 100 unit/mL (70-30) injection, Inject 20 Units under the skin once daily in the morning., Disp: , Rfl:     insulin NPH and regular human (HumuLIN 70-30, NovoLIN 70-30) 100 unit/mL (70-30) injection, Inject 20 Units under the skin once daily at bedtime. Take as directed per insulin instructions., Disp: 6 mL, Rfl: 2    insulin NPH and regular human (HumuLIN 70-30, NovoLIN 70-30) 100 unit/mL (70-30) injection, Inject 15 Units under the skin once daily in the morning. Take as directed per insulin instructions., Disp: 4.5 mL, Rfl: 2    isosorbide mononitrate ER (Imdur) 60 mg 24 hr tablet, Take 1 tablet (60 mg) by mouth once daily., Disp: , Rfl:     lisinopril  30 mg tablet, Take 1 tablet (30 mg) by mouth once daily., Disp: , Rfl:     LORazepam (Ativan) 1 mg tablet, Take 1 tablet (1 mg) by mouth once daily at bedtime., Disp: , Rfl:     losartan (Cozaar) 100 mg tablet, Take 1 tablet (100 mg) by mouth once daily., Disp: , Rfl:     meropenem (Merrem) 1 gram/100 mL IV, Infuse 100 mL (1 g) at 200 mL/hr over 0.5 hours into a venous catheter every 12 hours for 10 days., Disp: 2000 mL, Rfl: 0    metoprolol succinate XL (Toprol-XL) 100 mg 24 hr tablet, Take 1 tablet (100 mg) by mouth 2 times a day., Disp: , Rfl:     NIFEdipine ER (Adalat CC) 90 mg 24 hr tablet, Take 1 tablet (90 mg) by mouth 2 times a day. Do not crush, chew, or split., Disp: , Rfl:     Plavix 75 mg tablet, Take 1 tablet (75 mg) by mouth once daily., Disp: , Rfl:     tamsulosin (Flomax) 0.4 mg 24 hr capsule, Take 1 capsule (0.4 mg) by mouth once daily., Disp: 30 capsule, Rfl: 2    torsemide (Demadex) 20 mg tablet, Take 1 tablet (20 mg) by mouth once daily., Disp: , Rfl:     torsemide (Demadex) 20 mg tablet, Take 1 tablet (20 mg) by mouth every other day., Disp: 15 tablet, Rfl: 2    vitamins A,C,E-zinc-copper 4,296 mcg-226 mg-90 mg capsule, Take by mouth 2 times a day., Disp: , Rfl:

## 2025-04-24 NOTE — LETTER
Patient: Lela Barba  : 1935    Encounter Date: 2025    Name: Lela Barba  YOB: 1935    INITIAL NURSE PRACTITIONER FOLLOW UP VISIT: [facility],   [facility]  - 25: Weakness, fever, LLL PNA, Cystitis, Possible SBE (ARELI - Lambl's excrescence, Liver left lobe mass, s/p liver biopsy , and incidental finding of Multinodular goiter    HPI   The patient is 89 yr old female who is here at [facility] for therapy after a recent hospitalization.  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 She was c/o HA and dizziness. CT of brain done and showed meningioma but this was chronic issue per PCP. No acute findings. CXR showed hazy opacity of the left lower lung base suggestive of PNA. UA suggestive of UTI. Rocephin and Vibramycin initiated. WBC 9.8. Kidney fx compatible w/chronic kidney disease, Hgb 12 and plt count 286. UA showed proteinuria, neg for nitrites, positive for leukocytes: 75 uL. Patient was admitted.   ID was consulted as WBC cont to trend upward - 16.8. Elevated Sed rate 99 and >130, CRP 10.22 and 18.42 and Procalcitonin 0.70 and 2.52. UA obtained in ER showed no growth and thought to be contaminated. 2nd UA Culture showed no growth.  Cardiology performed ARELI  - normal LVEF 60%, RV function normal, Normal RSVP, Tricuspid aortic valve w/trivial 1+ aortic insuff, evidence of Lambl's excrescence on the aortic valve leaflet, Normal tricuspid/pulmonic/mitral valves, Per report - Endocarditis cannot be excluded if BC positive, however all and final BC negative.   MRI done which confirms new solid mass on the lateral  segment of left lobe which was not there on the CT done 3 years prior. The mass shows signs of uneven blood flow which could indicated necrosis inside.   No fat in the mass.   Several smaller abnormal lesions noted in the liver.    Scattered small cystic lesions to the pancreas.   Adrenals both have benign nodules.   No fluid and no swollen lymph nodes.   Biopsy done on the liver mass on 4/21 and results are pending.   Per ID vegetation not ruled out. Possible SBE, however no bacteremia, ARELI w/Lambl's excrescence, and fever of unknown origin. Meropenem started on 4/13/25 for PNA. Plan to finish Meropenem for a total of 2 wk course.   The patient is resting in bed at this time. She just finished lunch. Pt appears comfortable and no acute distress. She is pleasant and tells me she is from [place] originally.   No fevers, denies chills, CP, SOB, Abd pain, N/V, constipation, diarrhea, or Urinary symptoms. Midline present in left arm.     REVIEW OF SYSTEMS:  Review of systems are negative except where noted in HPI.    There were no vitals taken for this visit.     Physical Exam  Constitutional:       Appearance: Normal appearance. She is normal weight.   HENT:      Head: Normocephalic.      Right Ear: External ear normal.      Left Ear: External ear normal.      Nose: Nose normal.      Mouth/Throat:      Mouth: Mucous membranes are moist.   Eyes:      Extraocular Movements: Extraocular movements intact.      Conjunctiva/sclera: Conjunctivae normal.      Pupils: Pupils are equal, round, and reactive to light.   Cardiovascular:      Rate and Rhythm: Normal rate and regular rhythm.      Pulses: Normal pulses.      Heart sounds: Normal heart sounds.   Pulmonary:      Effort: Pulmonary effort is normal.      Breath sounds: Normal breath sounds.   Abdominal:      General: Bowel sounds are normal. There is no distension.      Palpations: Abdomen is soft.      Tenderness: There is no abdominal tenderness. There is no guarding.    Genitourinary:     Comments: Not examined  Musculoskeletal:         General: Tenderness present. Normal range of motion.      Cervical back: Normal range of motion and neck supple.      Right lower leg: No edema.      Left lower leg: No edema.      Comments: Generalized weakness   Skin:     General: Skin is warm and dry.      Capillary Refill: Capillary refill takes less than 2 seconds.   Neurological:      General: No focal deficit present.      Mental Status: She is alert and oriented to person, place, and time. Mental status is at baseline.   Psychiatric:         Mood and Affect: Mood normal.         Behavior: Behavior normal.        ADVANCED CARE PLANNING: Discussion done with the patient and family if available regarding code status, diagnosis, and the prognosis of the patient.     CODE STATUS: DNRCCA    DISCHARGE PLANNING:  Patient will be discharge home when goals are met.   Discharge planner to communicate ongoing updates regarding discharge plan.     RECENT HOSPITALIZATION RECORDS REVIEWED:   All laboratories, diagnostic tests, progress notes, consultation notes, and discharge notes available reviewed from recent hospitalization.     RX Allergies[1]    MEDICATION LIST FROM RECENT HOSPITALIZATION:    MEDICATIONS UPDATED AND RECONCILED AT THE FACILITY:  Please see Nursing facility medication list.    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          Liver mass          Essential hypertension          Coronary artery disease involving native heart, unspecified vessel or lesion type, unspecified whether angina present          Hyperlipidemia, unspecified hyperlipidemia type          Chronic kidney disease, unspecified CKD stage          Type 2 diabetes mellitus with chronic kidney disease, with long-term current use of  insulin, unspecified CKD stage (Multi)          Gout, unspecified cause, unspecified chronicity, unspecified site          Vitamin D deficiency          Insomnia, unspecified type          Anxiety          Iron deficiency anemia, unspecified iron deficiency anemia type          Meningioma (Multi)          Depression, unspecified depression type                Skin Integrity:  Nursing to monitor skin integrity as patient is at risk for pressure injuries.  Turn and reposition Q 2 hours or more.  Air mattress and when up in chair cushion reducing device.  Dietician to evaluate and recommend.  Nutritional supplement to be implemented if needed.  Please monitor skin integrity and other pressure areas.    PLAN:   Recent nursing evaluation and notes were reviewed.     Fever of unknown origin, Leukocytosis, elevated CRP, Sed rate, and Procalcitonin w/negative BC:   CXR - LLL PNA - resolved  Cystitis - UA C/S negative  Possible Subacute bacterial endocarditis? 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  What is Lambl's Excrescences? Thin hair like strands made from fibrin and collagen. Thought to result from endothelial damage due to turbulent blood flow, normal wear and tear w/age. Often times benign and an incidental finding on a ARELI. Can resemble vegetation. Important to have all clinical information to make confident diagnosis of SBE.   Meropenem IV 1 gram BID til 5/3/25.   Lab Results   Component Value Date    WBC 8.6 04/21/2025    HGB 8.3 (L) 04/21/2025    HCT 25.5 (L) 04/21/2025    MCV 86 04/21/2025     (H) 04/21/2025     Liver Mass:  Lab Results   Component Value Date    ALT 16 04/14/2025    AST 20 04/14/2025    ALKPHOS 134 04/14/2025    BILITOT 0.4 04/14/2025   Seen on CT done in ER and not on CT 3 yrs prior  MRI done showing liver left sided lobe mass as well as other smaller lesions on the right.  Biopsy done 4/21 and pending    HTN, CKD:   Monitor and follow BP readings.   Continue home meds:   Clonidine 0.2  mg BID  Amlodipine 5 mg daily  Lisinopril 30 mg daily  HCTZ 25 mg daily  Isosorbide 60 mg daily  Losartan 100 mg daily  Metoprolol succinate 100 mg BID  Nifedipine 90 mg BID  Torsemide 20 mg QOD  Labs to be done intermittently and adjust meds as needed.  BP readings reviewed: 170/60s  Lab Results   Component Value Date    GLUCOSE 80 04/22/2025    CALCIUM 8.4 (L) 04/22/2025     04/22/2025    K 4.6 04/22/2025    CO2 25 04/22/2025     04/22/2025    BUN 25 (H) 04/22/2025    CREATININE 0.82 04/22/2025   Avoid nephrotoxic drugs.    CAD/HLD:  Cont atorvastatin, ASA, Plavix, and Imdur    Diabetes Mellitus Type 2:  Blood sugar readings to be monitored.  BS readings reviewed: 244  Continue current medications:  Novolin 70/30 20 units BID  Lab Results   Component Value Date    HGBA1C 7.9 (H) 01/06/2025   Monitor patient for hypoglycemia/hyperglycemia.   Emergency glucagon kit and glucose gel 40% on hand for hypoglycemic emergencies.     Gout:  Cont Allopurinol    Vit D3 def:  Cont Vit D3 5000 units daily    Insomnia/Anxiety/Depression:  Monitor mood  Cont Trazodone 50 mg daily, Mirtazapine 15 mg daily, and Lorazepam 1 mg QHS    Iron def Anemia:   Cont Ferrous sulfate 325 mg BID  4/21: H/H 8.3/25.5    Chronic Meningioma:    Urinary retention, Voiding dysfunction:  Tamsulosin 0.4 mg daily    Any decline or change in condition needs to be communicated with the physician or myself.    Discussion with nursing staff regarding ongoing care and management.  Communication regarding patients status, overall condition, changes with plan (medications or treatments), and any questions from family completed if present.  If family not available, would communicate in person or via phone if needed.   We will continue with the plan and medications noted above.    We will continue to follow the patient here at the facility.    *Please note that nursing facility notes, facility EMR, outside laboratory agency, and  EMR do not  interface.     Completion of the note was done through Dragon voice recognition technology and may include unintended or grammatical errors which may not have been recognized when finalizing the note.     Time:   I spent 45 minutes or greater with the patient. Greater than 50% of this time was spent in counseling and or coordination of care. The time includes prep time of reviewing vital signs, report from direct nursing staff and or therapists, hospital documentation, reviewing labs, radiographs, diagnostic tests and or consultations, time directly spent with the patient interviewing, examining, and education regarding diagnosis, treatments, and medications, as well as documentation in the electronic medical record, and reviewing the plan of care and any new orders with the patient, nursing staff and other staff directly related to the patients care.       ROLAND Branham       Electronically Signed By: ROLAND Branham   4/25/25  2:05 PM       [1]  Allergies  Allergen Reactions   • Hydralazine Other     chest pain tachycardia

## 2025-04-25 ENCOUNTER — NURSING HOME VISIT (OUTPATIENT)
Dept: POST ACUTE CARE | Facility: EXTERNAL LOCATION | Age: OVER 89
End: 2025-04-25
Payer: MEDICARE

## 2025-04-25 VITALS — SYSTOLIC BLOOD PRESSURE: 105 MMHG | DIASTOLIC BLOOD PRESSURE: 77 MMHG | HEART RATE: 90 BPM

## 2025-04-25 DIAGNOSIS — I10 ESSENTIAL HYPERTENSION: ICD-10-CM

## 2025-04-25 DIAGNOSIS — E11.22 TYPE 2 DIABETES MELLITUS WITH CHRONIC KIDNEY DISEASE, WITH LONG-TERM CURRENT USE OF INSULIN, UNSPECIFIED CKD STAGE (MULTI): ICD-10-CM

## 2025-04-25 DIAGNOSIS — R16.0 LIVER MASS: Primary | ICD-10-CM

## 2025-04-25 DIAGNOSIS — I35.8: ICD-10-CM

## 2025-04-25 DIAGNOSIS — D64.9 ANEMIA, UNSPECIFIED TYPE: ICD-10-CM

## 2025-04-25 DIAGNOSIS — Z79.4 TYPE 2 DIABETES MELLITUS WITH CHRONIC KIDNEY DISEASE, WITH LONG-TERM CURRENT USE OF INSULIN, UNSPECIFIED CKD STAGE (MULTI): ICD-10-CM

## 2025-04-25 DIAGNOSIS — I25.10 CORONARY ARTERY DISEASE INVOLVING NATIVE HEART, UNSPECIFIED VESSEL OR LESION TYPE, UNSPECIFIED WHETHER ANGINA PRESENT: ICD-10-CM

## 2025-04-25 DIAGNOSIS — J18.9 PNEUMONIA OF LEFT LOWER LOBE DUE TO INFECTIOUS ORGANISM: ICD-10-CM

## 2025-04-25 DIAGNOSIS — D32.9 MENINGIOMA (MULTI): ICD-10-CM

## 2025-04-25 LAB
LAB AP ASR DISCLAIMER: NORMAL
LABORATORY COMMENT REPORT: NORMAL
PATH REPORT.FINAL DX SPEC: NORMAL
PATH REPORT.GROSS SPEC: NORMAL
PATH REPORT.RELEVANT HX SPEC: NORMAL
PATH REPORT.TOTAL CANCER: NORMAL

## 2025-04-25 PROCEDURE — 99306 1ST NF CARE HIGH MDM 50: CPT | Performed by: INTERNAL MEDICINE

## 2025-04-25 ASSESSMENT — ENCOUNTER SYMPTOMS
APPETITE CHANGE: 1
WOUND: 0
ACTIVITY CHANGE: 1
DIZZINESS: 0
COUGH: 0
WEAKNESS: 1
ARTHRALGIAS: 1
VOMITING: 0
NERVOUS/ANXIOUS: 0
ABDOMINAL PAIN: 1
SHORTNESS OF BREATH: 0
NAUSEA: 1
WHEEZING: 0
DIFFICULTY URINATING: 0
FEVER: 0

## 2025-04-25 NOTE — PROGRESS NOTES
Name: Lela Barba  YOB: 1935    INITIAL NURSE PRACTITIONER FOLLOW UP VISIT: [facility],   [facility] 4/7 - 4/23/25: Weakness, fever, LLL PNA, Cystitis, Possible SBE (ARELI - Lambl's excrescence, Liver left lobe mass, s/p liver biopsy 4/21, and incidental finding of Multinodular goiter    HPI   The patient is 89 yr old female who is [facility] after a recent hospitalization.  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 She was c/o HA and dizziness. CT of brain done and showed meningioma but this was chronic issue per PCP. No acute findings. CXR showed hazy opacity of the left lower lung base suggestive of PNA. UA suggestive of UTI. Rocephin and Vibramycin initiated. WBC 9.8. Kidney fx compatible w/chronic kidney disease, Hgb 12 and plt count 286. UA showed proteinuria, neg for nitrites, positive for leukocytes: 75 uL. Patient was admitted.   ID was consulted as WBC cont to trend upward - 16.8. Elevated Sed rate 99 and >130, CRP 10.22 and 18.42 and Procalcitonin 0.70 and 2.52. UA obtained in ER showed no growth and thought to be contaminated. 2nd UA Culture showed no growth.  Cardiology performed ARELI 4/11 - normal LVEF 60%, RV function normal, Normal RSVP, Tricuspid aortic valve w/trivial 1+ aortic insuff, evidence of Lambl's excrescence on the aortic valve leaflet, Normal tricuspid/pulmonic/mitral valves, Per report - Endocarditis cannot be excluded if BC positive, however all and final BC negative.   MRI done which confirms new solid mass on the lateral segment of left lobe which was not there on the CT done 3 years prior. The mass  shows signs of uneven blood flow which could indicated necrosis inside.   No fat in the mass.   Several smaller abnormal lesions noted in the liver.    Scattered small cystic lesions to the pancreas.   Adrenals both have benign nodules.   No fluid and no swollen lymph nodes.   Biopsy done on the liver mass on 4/21 and results are pending.   Per ID vegetation not ruled out. Possible SBE, however no bacteremia, ARELI w/Lambl's excrescence, and fever of unknown origin. Meropenem started on 4/13/25 for PNA. Plan to finish Meropenem for a total of 2 wk course.   The patient is resting in bed at this time. She just finished lunch. Pt appears comfortable and no acute distress. She is pleasant and tells me she is from [place] originally.   No fevers, denies chills, CP, SOB, Abd pain, N/V, constipation, diarrhea, or Urinary symptoms. Midline present in left arm.     REVIEW OF SYSTEMS:  Review of systems are negative except where noted in HPI.    There were no vitals taken for this visit.     Physical Exam  Constitutional:       Appearance: Normal appearance. She is normal weight.   HENT:      Head: Normocephalic.      Right Ear: External ear normal.      Left Ear: External ear normal.      Nose: Nose normal.      Mouth/Throat:      Mouth: Mucous membranes are moist.   Eyes:      Extraocular Movements: Extraocular movements intact.      Conjunctiva/sclera: Conjunctivae normal.      Pupils: Pupils are equal, round, and reactive to light.   Cardiovascular:      Rate and Rhythm: Normal rate and regular rhythm.      Pulses: Normal pulses.      Heart sounds: Normal heart sounds.   Pulmonary:      Effort: Pulmonary effort is normal.      Breath sounds: Normal breath sounds.   Abdominal:      General: Bowel sounds are normal. There is no distension.      Palpations: Abdomen is soft.      Tenderness: There is no abdominal tenderness. There is no guarding.   Genitourinary:     Comments: Not examined  Musculoskeletal:         General:  Tenderness present. Normal range of motion.      Cervical back: Normal range of motion and neck supple.      Right lower leg: No edema.      Left lower leg: No edema.      Comments: Generalized weakness   Skin:     General: Skin is warm and dry.      Capillary Refill: Capillary refill takes less than 2 seconds.   Neurological:      General: No focal deficit present.      Mental Status: She is alert and oriented to person, place, and time. Mental status is at baseline.   Psychiatric:         Mood and Affect: Mood normal.         Behavior: Behavior normal.        ADVANCED CARE PLANNING: Discussion done with the patient and family if available regarding code status, diagnosis, and the prognosis of the patient.     CODE STATUS: DNRCCA    DISCHARGE PLANNING:  Patient will be discharge home when goals are met.   Discharge planner to communicate ongoing updates regarding discharge plan.     RECENT HOSPITALIZATION RECORDS REVIEWED:   All laboratories, diagnostic tests, progress notes, consultation notes, and discharge notes available reviewed from recent hospitalization.     RX Allergies[1]    MEDICATION LIST FROM RECENT HOSPITALIZATION:    MEDICATIONS UPDATED AND RECONCILED AT THE FACILITY:  Please see Nursing facility medication list.    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          Liver mass          Essential hypertension          Coronary artery disease involving native heart, unspecified vessel or lesion type, unspecified whether angina present          Hyperlipidemia, unspecified hyperlipidemia type          Chronic kidney disease, unspecified CKD stage          Type 2 diabetes mellitus with chronic kidney disease, with long-term current use of insulin, unspecified CKD stage (Multi)          Gout, unspecified cause,  unspecified chronicity, unspecified site          Vitamin D deficiency          Insomnia, unspecified type          Anxiety          Iron deficiency anemia, unspecified iron deficiency anemia type          Meningioma (Multi)          Depression, unspecified depression type                Skin Integrity:  Nursing to monitor skin integrity as patient is at risk for pressure injuries.  Turn and reposition Q 2 hours or more.  Air mattress and when up in chair cushion reducing device.  Dietician to evaluate and recommend.  Nutritional supplement to be implemented if needed.  Please monitor skin integrity and other pressure areas.    PLAN:   Recent nursing evaluation and notes were reviewed.     Fever of unknown origin, Leukocytosis, elevated CRP, Sed rate, and Procalcitonin w/negative BC:   CXR - LLL PNA - resolved  Cystitis - UA C/S negative  Possible Subacute bacterial endocarditis? 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  What is Lambl's Excrescences? Thin hair like strands made from fibrin and collagen. Thought to result from endothelial damage due to turbulent blood flow, normal wear and tear w/age. Often times benign and an incidental finding on a ARELI. Can resemble vegetation. Important to have all clinical information to make confident diagnosis of SBE.   Meropenem IV 1 gram BID til 5/3/25.   Lab Results   Component Value Date    WBC 8.6 04/21/2025    HGB 8.3 (L) 04/21/2025    HCT 25.5 (L) 04/21/2025    MCV 86 04/21/2025     (H) 04/21/2025     Liver Mass:  Lab Results   Component Value Date    ALT 16 04/14/2025    AST 20 04/14/2025    ALKPHOS 134 04/14/2025    BILITOT 0.4 04/14/2025   Seen on CT done in ER and not on CT 3 yrs prior  MRI done showing liver left sided lobe mass as well as other smaller lesions on the right.  Biopsy done 4/21 and pending    HTN, CKD:   Monitor and follow BP readings.   Continue home meds:   Clonidine 0.2 mg BID  Amlodipine 5 mg daily  Lisinopril 30 mg daily  HCTZ 25 mg  daily  Isosorbide 60 mg daily  Losartan 100 mg daily  Metoprolol succinate 100 mg BID  Nifedipine 90 mg BID  Torsemide 20 mg QOD  Labs to be done intermittently and adjust meds as needed.  BP readings reviewed: 170/60s  Lab Results   Component Value Date    GLUCOSE 80 04/22/2025    CALCIUM 8.4 (L) 04/22/2025     04/22/2025    K 4.6 04/22/2025    CO2 25 04/22/2025     04/22/2025    BUN 25 (H) 04/22/2025    CREATININE 0.82 04/22/2025   Avoid nephrotoxic drugs.    CAD/HLD:  Cont atorvastatin, ASA, Plavix, and Imdur    Diabetes Mellitus Type 2:  Blood sugar readings to be monitored.  BS readings reviewed: 244  Continue current medications:  Novolin 70/30 20 units BID  Lab Results   Component Value Date    HGBA1C 7.9 (H) 01/06/2025   Monitor patient for hypoglycemia/hyperglycemia.   Emergency glucagon kit and glucose gel 40% on hand for hypoglycemic emergencies.     Gout:  Cont Allopurinol    Vit D3 def:  Cont Vit D3 5000 units daily    Insomnia/Anxiety/Depression:  Monitor mood  Cont Trazodone 50 mg daily, Mirtazapine 15 mg daily, and Lorazepam 1 mg QHS    Iron def Anemia:   Cont Ferrous sulfate 325 mg BID  4/21: H/H 8.3/25.5    Chronic Meningioma:    Urinary retention, Voiding dysfunction:  Tamsulosin 0.4 mg daily    Any decline or change in condition needs to be communicated with the physician or myself.    Discussion with nursing staff regarding ongoing care and management.  Communication regarding patients status, overall condition, changes with plan (medications or treatments), and any questions from family completed if present.  If family not available, would communicate in person or via phone if needed.   We will continue with the plan and medications noted above.    We will continue to follow the patient here at the facility.    *Please note that nursing facility notes, facility EMR, outside laboratory agency, and  EMR do not interface.     Completion of the note was done through Dragon voice  recognition technology and may include unintended or grammatical errors which may not have been recognized when finalizing the note.     Time:   I spent 45 minutes or greater with the patient. Greater than 50% of this time was spent in counseling and or coordination of care. The time includes prep time of reviewing vital signs, report from direct nursing staff and or therapists, hospital documentation, reviewing labs, radiographs, diagnostic tests and or consultations, time directly spent with the patient interviewing, examining, and education regarding diagnosis, treatments, and medications, as well as documentation in the electronic medical record, and reviewing the plan of care and any new orders with the patient, nursing staff and other staff directly related to the patients care.       Silvia Daniels, APRN-CNP        [1]   Allergies  Allergen Reactions    Hydralazine Other     chest pain tachycardia

## 2025-04-25 NOTE — PROGRESS NOTES
Infectious Disease Progress Note     Late entry    Patient is a followup regarding  Left lower lobe pneumonia  Acute cystitis without hematuria  FUO  Liver mass  Diabetes mellitus type 2/CAD history  NATALIE    Patient had liver biopsy done and pathology pending  Subjectively feels fine.  Patient's brother-in-law at bedside.  Spoke to patient's family member who is currently in [place] over the phone.  Plan is for the patient to be on IV antibiotics until Sunday, 4/27/2025.  Midline placed.  Discharge to rehab pending.    Case discussed with nurse, primary, care coordination.      Lab Results   Component Value Date    WBC 8.6 04/21/2025    HGB 8.3 (L) 04/21/2025    HCT 25.5 (L) 04/21/2025    MCV 86 04/21/2025     (H) 04/21/2025     Lab Results   Component Value Date    GLUCOSE 80 04/22/2025    CALCIUM 8.4 (L) 04/22/2025     04/22/2025    K 4.6 04/22/2025    CO2 25 04/22/2025     04/22/2025    BUN 25 (H) 04/22/2025    CREATININE 0.82 04/22/2025       WBC trends are being monitored. Antibiotic doses are being adjusted per most recent renal labs.     Vitals:    04/23/25 0812   BP: 149/62   Pulse: 63   Resp:    Temp: 36.3 °C (97.3 °F)   SpO2: 94%     NAD  neck supple  Heart S1-S2  Lungs bilaterally clear, good respiratory effort  Abdomen soft nondistended, nontender to palpation  Extremities no pain    Blood cultures and urine cultures negative to date  Procalcitonin 0.70 and CRP 10.22    Patient Active Problem List   Diagnosis   (none) - all problems resolved or deleted       Estimated Creatinine Clearance: 45.4 mL/min (by C-G formula based on SCr of 0.82 mg/dL).    MRI 4/11/2025  Large lateral segment left lobe hepatic mass noted on CT without  contrast 11 April 2025 was not present on the preceding CT without  contrast 2 March 2022, is confirmed on today's MRI to be solid (not  cystic); enhances, but not in the arterial phase (not suspect for  hepatocellular carcinoma) and with heterogeneous enhancement  at that,  likely from necrosis; does not contain macroscopic fat. It is not a  cyst or hemangioma. 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 No free fluid or adenopathy      ASSESSMENT:  Left lower lobe pneumonia  Acute cystitis without hematuria  FUO  Liver mass-biopsy done  Diabetes mellitus type 2/CAD history  NATALIE    PLAN:  Patient has been on meropenem since 4/13/2025 for pneumonia  Blood culture negative and urine culture negative  Liver biopsy complete  Initially there potential concern of SBE but patient has not had any bacteremia and ARELI shows Lambl's excrescence   Plan is to treat patient for pneumonia with meropenem-plan to finish total 2 weeks course.  No evidence of bacteremia or true vegetation at this point.  Will stop antibiotics at the end of therapy.    Please ensure that patient's labs are faxed to [phone].   In case of any questions, please call the [facility] outpatient infusion center infectious disease office at [phone] Monday through Friday from 9 AM to 4 PM.  There is no answering service after hours or on weekends.  An epic chat message must be sent after hours, please direct that message to ALL Dr. Ariane Morse, Dr Dawson Hargrove, and Lizette Potts  Thank you for your cooperation in this matter.  We will not be able to follow labs without this step.        Imaging and  labs were reviewed per medical records and any ID pertinent labs were also addressed  Time spent before, during and after care today, including coordination of care >40 min      Ariane Morse DO

## 2025-04-25 NOTE — PROGRESS NOTES
Subjective   Patient ID: Lela Barba is a 89 y.o. female who is acute skilled care and presents for initial visit for skilled nursing.    89-year-old female patient has been admitted after brief hospitalization, patient was having weakness, fever, chills, during hospitalization some interesting findings came out, patient initially was not leukocytotic but then become leukocytotic, ESR was more than 100, CRP was 19, CT scans were done, there was a pulmonary infiltrate but then there is a huge mass in the left lobe of liver, I looked at the films and practically all the left lobe of the liver is occupied by this mass which has a variable density and also it has a internal echo it was not a homogeneous tissue density, it has a some fluid material could be necrotic material.  They did the aspiration biopsy of this the usual liver collection of the fluid.  Patient has a ARELI, Lambl's excrescence were seen, there are 3 sets of blood cultures all were negative.  If it is endocarditis blood cultures should have shown positivity but it is not 100% proved.  Concern about this large collection of the material or fluid not necessarily solid mass in the left lobe of liver.  Urine culture is negative, there is no UTI, patient was seen by infectious disease and discharged with meropenem for 10 days.  Patient has meningioma which is incidental finding, hemoglobin was 8.3, previously patient has a colon cancer, colon cancer could have been treated with hemicolectomy, it is not very much clear that this is a gastrointestinal source of infection something like Streptococcus bovis which has gone into the hepatic parenchyma and or there is a liver abscess.  Patient is [place], she was sleeping, she woke up, she was not looking toxic, she has a diabetes, she is immunocompromise, because of this huge collection of the fluid in the liver unknown etiology, really infectious problem, generalized weakness, not sure to be endocarditis  and no bacteremia patient is admitted here for skilled nursing and rehabilitation.  Multiple reports and information from hospital were reviewed, nurse practitioner's evaluation was reviewed and agreed upon.         Review of Systems   Constitutional:  Positive for activity change and appetite change. Negative for fever.   Respiratory:  Negative for cough, shortness of breath and wheezing.    Cardiovascular:  Negative for chest pain.   Gastrointestinal:  Positive for abdominal pain and nausea. Negative for vomiting.   Genitourinary:  Negative for difficulty urinating.   Musculoskeletal:  Positive for arthralgias and gait problem.   Skin:  Negative for wound.   Neurological:  Positive for weakness. Negative for dizziness.   Psychiatric/Behavioral:  The patient is not nervous/anxious.        Objective   /77   Pulse 90     Physical Exam  Constitutional:       Appearance: Normal appearance. She is normal weight. She is not ill-appearing or toxic-appearing.   HENT:      Head: Normocephalic.   Eyes:      Conjunctiva/sclera: Conjunctivae normal.   Cardiovascular:      Rate and Rhythm: Normal rate and regular rhythm.      Heart sounds: Normal heart sounds.   Pulmonary:      Effort: Pulmonary effort is normal.      Breath sounds: Normal breath sounds.   Abdominal:      Palpations: Abdomen is soft. There is no mass.      Tenderness: There is abdominal tenderness.   Musculoskeletal:         General: No tenderness or deformity.      Cervical back: Neck supple.   Skin:     General: Skin is warm and dry.      Findings: No bruising or erythema.   Neurological:      Mental Status: She is oriented to person, place, and time. Mental status is at baseline.   Psychiatric:         Behavior: Behavior normal.         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  She has a PICC line, PICC line was checked.  Patient is DNR CCA, medications include amlodipine, allopurinol, aspirin, atorvastatin, Shubham Kojo, probiotics, iron twice a day, hydrochlorothiazide, isosorbide mononitrate, lisinopril, lorazepam as needed, losartan, meropenem till 3 May, metoprolol, mirtazapine 15 mg, nifedipine, Novolin 70/30 insulin twice a day dosage adjustment was reviewed, last hemoglobin A1c was 7.5, clopidogrel, tamsulosin, torsemide.  The diagnostic dilemma is what is there in the liver and it seems to be liver abscess, if it is a liver abscess something will show on the aspirate, I look at the radiology report and they did not mention whether it was a pus or what kind of material was there, endocarditis may not be a big concern but this patient required follow-up, if nothing shows up on the liver as.  Then this area needs to be explored and drained and see what it is.  Accu-Chek will be done, BP readings will be monitored, aortic ball findings reviewed.  Pneumonia is resolving hopefully, no angina, anemia is because of chronic disease.  Meningioma is incidental finding.  Physical therapy, Occupational Therapy evaluation has been done, other routine safety precautions has to be taken as per the facility protocol.  Laboratories will be done and followed, patient is susceptible for complications problems fever chills, 80% or more chance for recurrent admission, patient will require careful follow-up  and evaluations.  Discussed with nurse practitioner, any change in the condition needs to be notified us.     Goals    None

## 2025-04-25 NOTE — LETTER
Patient: Lela Barba  : 1935    Encounter Date: 2025    Subjective  Patient ID: Lela Barba is a 89 y.o. female who is acute skilled care and presents for initial visit for skilled nursing.    89-year-old female patient has been admitted after brief hospitalization, patient was having weakness, fever, chills, during hospitalization some interesting findings came out, patient initially was not leukocytotic but then become leukocytotic, ESR was more than 100, CRP was 19, CT scans were done, there was a pulmonary infiltrate but then there is a huge mass in the left lobe of liver, I looked at the films and practically all the left lobe of the liver is occupied by this mass which has a variable density and also it has a internal echo it was not a homogeneous tissue density, it has a some fluid material could be necrotic material.  They did the aspiration biopsy of this the usual liver collection of the fluid.  Patient has a ARELI, Lambl's excrescence were seen, there are 3 sets of blood cultures all were negative.  If it is endocarditis blood cultures should have shown positivity but it is not 100% proved.  Concern about this large collection of the material or fluid not necessarily solid mass in the left lobe of liver.  Urine culture is negative, there is no UTI, patient was seen by infectious disease and discharged with meropenem for 10 days.  Patient has meningioma which is incidental finding, hemoglobin was 8.3, previously patient has a colon cancer, colon cancer could have been treated with hemicolectomy, it is not very much clear that this is a gastrointestinal source of infection something like Streptococcus bovis which has gone into the hepatic parenchyma and or there is a liver abscess.  Patient is [place], she was sleeping, she woke up, she was not looking toxic, she has a diabetes, she is immunocompromise, because of this huge collection of the fluid in the liver unknown etiology,  really infectious problem, generalized weakness, not sure to be endocarditis and no bacteremia patient is admitted here for skilled nursing and rehabilitation.  Multiple reports and information from hospital were reviewed, nurse practitioner's evaluation was reviewed and agreed upon.         Review of Systems   Constitutional:  Positive for activity change and appetite change. Negative for fever.   Respiratory:  Negative for cough, shortness of breath and wheezing.    Cardiovascular:  Negative for chest pain.   Gastrointestinal:  Positive for abdominal pain and nausea. Negative for vomiting.   Genitourinary:  Negative for difficulty urinating.   Musculoskeletal:  Positive for arthralgias and gait problem.   Skin:  Negative for wound.   Neurological:  Positive for weakness. Negative for dizziness.   Psychiatric/Behavioral:  The patient is not nervous/anxious.        Objective  /77   Pulse 90     Physical Exam  Constitutional:       Appearance: Normal appearance. She is normal weight. She is not ill-appearing or toxic-appearing.   HENT:      Head: Normocephalic.   Eyes:      Conjunctiva/sclera: Conjunctivae normal.   Cardiovascular:      Rate and Rhythm: Normal rate and regular rhythm.      Heart sounds: Normal heart sounds.   Pulmonary:      Effort: Pulmonary effort is normal.      Breath sounds: Normal breath sounds.   Abdominal:      Palpations: Abdomen is soft. There is no mass.      Tenderness: There is abdominal tenderness.   Musculoskeletal:         General: No tenderness or deformity.      Cervical back: Neck supple.   Skin:     General: Skin is warm and dry.      Findings: No bruising or erythema.   Neurological:      Mental Status: She is oriented to person, place, and time. Mental status is at baseline.   Psychiatric:         Behavior: Behavior normal.         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  She has a PICC line, PICC line was checked.  Patient is DNR [facility], medications include amlodipine, allopurinol, aspirin, atorvastatin, Shubham Kojo, probiotics, iron twice a day, hydrochlorothiazide, isosorbide mononitrate, lisinopril, lorazepam as needed, losartan, meropenem till 3 May, metoprolol, mirtazapine 15 mg, nifedipine, Novolin 70/30 insulin twice a day dosage adjustment was reviewed, last hemoglobin A1c was 7.5, clopidogrel, tamsulosin, torsemide.  The diagnostic dilemma is what is there in the liver and it seems to be liver abscess, if it is a liver abscess something will show on the aspirate, I look at the radiology report and they did not mention whether it was a pus or what kind of material was there, endocarditis may not be a big concern but this patient required follow-up, if nothing shows up on the liver as.  Then this area needs to be explored and drained and see what it is.  Accu-Chek will be done, BP readings will be monitored, aortic ball findings reviewed.  Pneumonia is resolving hopefully, no angina, anemia is because of chronic disease.  Meningioma is incidental finding.  Physical therapy, Occupational Therapy evaluation has been done, other routine safety precautions has to be taken as per the facility protocol.  Laboratories will be done and followed, patient is susceptible for complications problems fever chills,  80% or more chance for recurrent admission, patient will require careful follow-up and evaluations.  Discussed with nurse practitioner, any change in the condition needs to be notified us.     Goals    None         Electronically Signed By: Jamaal Patton MD   4/25/25  7:36 PM

## 2025-04-28 LAB
LAB AP ASR DISCLAIMER: NORMAL
LABORATORY COMMENT REPORT: NORMAL
PATH REPORT.ADDENDUM SPEC: NORMAL
PATH REPORT.FINAL DX SPEC: NORMAL
PATH REPORT.GROSS SPEC: NORMAL
PATH REPORT.RELEVANT HX SPEC: NORMAL
PATH REPORT.TOTAL CANCER: NORMAL
PATHOLOGY SYNOPTIC REPORT: NORMAL

## 2025-04-28 NOTE — DOCUMENTATION CLARIFICATION NOTE
"    PATIENT:               NADEEM TABOR  ACCT #:                  [id number]  MRN:                       [id number]  :                       1935  ADMIT DATE:       2025 6:12 PM  DISCH DATE:        2025 1:10 PM  RESPONDING PROVIDER #:        [id number]          PROVIDER RESPONSE TEXT:    I concur with the pathology report findings and they are clinically significant    CDI QUERY TEXT:    Clarification      Instruction:    Based on your assessment of the patient and the clinical information, please provide the requested documentation by clicking on the appropriate radio button and enter any additional information if prompted.    Question: Please document whether you concur or do not concur with the pathology report findings    When answering this query, please exercise your independent professional judgment. The fact that a question is being asked, does not imply that any particular answer is desired or expected.    The patient's clinical indicators include:  Clinical Information: 88 yo presents with dizziness and weakness found to have PNA and Cystitis    Clinical Indicators and Pathology Findings: on  \"A. Liver, Left Lobe, Mass, Biopsy :  Adenocarcinoma with extensive necrosis in a fibrotic stroma. No hepatic parenchyma identified. See note.\"  \"Note: The patient's history of ascending colon adenocarcinoma status post right hemicolectomy in  is noted. Immunostains show that the neoplastic cells are positive for CK7 (very focal), CK20 (patchy), and CDX2 (diffuse). The morphological and immunophenotypic features are compatible with involvement by the patient?s known colonic primary. Clinical and radiological correlation is recommended.\"      Treatment: Biopsy    Risk Factors: HX  Options provided:  -- I concur with the pathology report findings and they are clinically significant  -- I do not concur with the pathology report findings  -- Other - I will add my own diagnosis  -- Refer to " Clinical Documentation Reviewer    Query created by: Alba Still on 4/28/2025 10:31 AM      Electronically signed by:  PATRICIA PIZARRO MD 4/28/2025 12:14 PM

## 2025-04-30 ENCOUNTER — NURSING HOME VISIT (OUTPATIENT)
Dept: POST ACUTE CARE | Facility: EXTERNAL LOCATION | Age: OVER 89
End: 2025-04-30
Payer: MEDICARE

## 2025-04-30 DIAGNOSIS — C18.9 METASTATIC COLON CANCER TO LIVER (MULTI): Primary | ICD-10-CM

## 2025-04-30 DIAGNOSIS — J18.9 PNEUMONIA OF LEFT LOWER LOBE DUE TO INFECTIOUS ORGANISM: ICD-10-CM

## 2025-04-30 DIAGNOSIS — I25.10 CORONARY ARTERY DISEASE INVOLVING NATIVE HEART, UNSPECIFIED VESSEL OR LESION TYPE, UNSPECIFIED WHETHER ANGINA PRESENT: ICD-10-CM

## 2025-04-30 DIAGNOSIS — Z79.4 TYPE 2 DIABETES MELLITUS WITH CHRONIC KIDNEY DISEASE, WITH LONG-TERM CURRENT USE OF INSULIN, UNSPECIFIED CKD STAGE (MULTI): ICD-10-CM

## 2025-04-30 DIAGNOSIS — D64.9 ANEMIA, UNSPECIFIED TYPE: ICD-10-CM

## 2025-04-30 DIAGNOSIS — I10 ESSENTIAL HYPERTENSION: ICD-10-CM

## 2025-04-30 DIAGNOSIS — N18.9 CHRONIC KIDNEY DISEASE, UNSPECIFIED CKD STAGE: ICD-10-CM

## 2025-04-30 DIAGNOSIS — R16.0 LIVER MASS: ICD-10-CM

## 2025-04-30 DIAGNOSIS — E11.22 TYPE 2 DIABETES MELLITUS WITH CHRONIC KIDNEY DISEASE, WITH LONG-TERM CURRENT USE OF INSULIN, UNSPECIFIED CKD STAGE (MULTI): ICD-10-CM

## 2025-04-30 DIAGNOSIS — C78.7 METASTATIC COLON CANCER TO LIVER (MULTI): Primary | ICD-10-CM

## 2025-04-30 PROCEDURE — 99308 SBSQ NF CARE LOW MDM 20: CPT | Performed by: INTERNAL MEDICINE

## 2025-04-30 NOTE — LETTER
Patient: Lela Barba  : 1935    Encounter Date: 2025    Subjective  Patient ID: Lela Barba is a 89 y.o. female who is acute skilled care being seen and evaluated for multiple medical problems.    This patient was seen for follow-up and as I look at the biopsy report of that liver mass it is a necrotic malignancy, it seems like it is a metastatic deposit from colon cancer, patient is a colon cancer by history.  That means it is a necrotic metastatic colon cancer in the liver.  It is a huge mass, this is going to create a problem.  Patient has been given IV antibiotics thinking that it was infectious mass and also there was a pulmonary infiltrate.  Patient has been given meropenem.  Creatinine is 1.14, hemoglobin is 9.5, platelets are 585.  Patient's blood pressure readings has been low on several occasions.  Patient has been on nifedipine double dose, HCTZ, lisinopril, metoprolol.  Patient is not looking that well, appetite is poor, today when I saw her she was bedbound, nursing staff states that she goes for physical therapy, appetite remains poor, if it is necrotic metastatic colon cancer into the liver patient's prognosis would be a problem.  This needs to be followed by oncology and patient's primary care physician.  Do not have any progress report about the patient's recovery and discharge planning.  Lambl's excrescence may not be a big deal, they did not report any fever or chills.  Patient continued to be anemic.         Review of Systems   Constitutional:  Positive for activity change, appetite change and fatigue.   Respiratory:  Negative for cough, shortness of breath and wheezing.    Cardiovascular:  Negative for chest pain.   Gastrointestinal:  Negative for abdominal pain, diarrhea and nausea.   Musculoskeletal:  Positive for arthralgias.   Skin:  Negative for wound.   Neurological:  Positive for weakness.   Psychiatric/Behavioral:  Positive for confusion. Negative for  behavioral problems.        Objective  /60   Pulse 90     Physical Exam  Vitals reviewed.   Constitutional:       Appearance: Normal appearance. She is normal weight. She is ill-appearing. She is not toxic-appearing.   HENT:      Head: Normocephalic.   Eyes:      Conjunctiva/sclera: Conjunctivae normal.   Cardiovascular:      Rate and Rhythm: Normal rate and regular rhythm. Extrasystoles are present.     Heart sounds: No murmur heard.  Pulmonary:      Effort: Pulmonary effort is normal.      Breath sounds: Rales present.   Abdominal:      General: There is no distension.      Palpations: Abdomen is soft. There is no mass.   Musculoskeletal:         General: Deformity present.      Cervical back: Neck supple.   Skin:     General: Skin is warm and dry.      Findings: Bruising present.   Neurological:      Mental Status: Mental status is at baseline.   Psychiatric:         Behavior: Behavior normal.         Assessment/Plan  Problem List Items Addressed This Visit    None  Visit Diagnoses         Codes      Metastatic colon cancer to liver (Multi)    -  Primary C18.9, C78.7      Liver mass     R16.0      Essential hypertension     I10      Type 2 diabetes mellitus with chronic kidney disease, with long-term current use of insulin, unspecified CKD stage (Multi)     E11.22, Z79.4      Pneumonia of left lower lobe due to infectious organism     J18.9      Coronary artery disease involving native heart, unspecified vessel or lesion type, unspecified whether angina present     I25.10      Anemia, unspecified type     D64.9      Chronic kidney disease, unspecified CKD stage     N18.9        Patient's condition is not looking that great, discontinue nifedipine HCTZ, decrease lisinopril dosage, metoprolol can be continued at the same dose, anemia remains, CKD remains, patient has history of CAD no angina, we hope that pneumonia has been resolving.  Hemoglobin will be followed, blood sugars are reviewed, appetite remains  poor, need to know about more definitive plan and disposition of this patient I will check with the  later on.  Rest of medications reviewed which include amlodipine, aspirin, statins, clopidogrel, insulin 7013 2 dosages, isosorbide.  If it is a huge necrotic metastatic mass then patient's prognosis is poor.  Would communicate with  in next few days.  Meanwhile continue skilled nursing and rehabilitation, any change in the condition needs to be notified, thrombocytosis is secondary to extensive necrotic metastasis into the liver, whole left lobe of liver is involved with the necrotic metastasis.     Goals    None         Electronically Signed By: Jamaal Patton MD   5/3/25  4:41 PM

## 2025-05-01 ENCOUNTER — NURSING HOME VISIT (OUTPATIENT)
Dept: POST ACUTE CARE | Facility: EXTERNAL LOCATION | Age: OVER 89
End: 2025-05-01
Payer: MEDICARE

## 2025-05-01 DIAGNOSIS — D64.9 ANEMIA, UNSPECIFIED TYPE: ICD-10-CM

## 2025-05-01 DIAGNOSIS — C78.7 METASTATIC COLON CANCER TO LIVER (MULTI): Primary | ICD-10-CM

## 2025-05-01 DIAGNOSIS — D32.9 MENINGIOMA (MULTI): ICD-10-CM

## 2025-05-01 DIAGNOSIS — I25.10 CORONARY ARTERY DISEASE INVOLVING NATIVE HEART, UNSPECIFIED VESSEL OR LESION TYPE, UNSPECIFIED WHETHER ANGINA PRESENT: ICD-10-CM

## 2025-05-01 DIAGNOSIS — J18.9 PNEUMONIA OF LEFT LOWER LOBE DUE TO INFECTIOUS ORGANISM: ICD-10-CM

## 2025-05-01 DIAGNOSIS — E55.9 VITAMIN D DEFICIENCY: ICD-10-CM

## 2025-05-01 DIAGNOSIS — I10 ESSENTIAL HYPERTENSION: ICD-10-CM

## 2025-05-01 DIAGNOSIS — Z79.4 TYPE 2 DIABETES MELLITUS WITH CHRONIC KIDNEY DISEASE, WITH LONG-TERM CURRENT USE OF INSULIN, UNSPECIFIED CKD STAGE (MULTI): ICD-10-CM

## 2025-05-01 DIAGNOSIS — R53.81 PHYSICAL DECONDITIONING: ICD-10-CM

## 2025-05-01 DIAGNOSIS — M10.9 GOUT, UNSPECIFIED CAUSE, UNSPECIFIED CHRONICITY, UNSPECIFIED SITE: ICD-10-CM

## 2025-05-01 DIAGNOSIS — N18.9 CHRONIC KIDNEY DISEASE, UNSPECIFIED CKD STAGE: ICD-10-CM

## 2025-05-01 DIAGNOSIS — E11.22 TYPE 2 DIABETES MELLITUS WITH CHRONIC KIDNEY DISEASE, WITH LONG-TERM CURRENT USE OF INSULIN, UNSPECIFIED CKD STAGE (MULTI): ICD-10-CM

## 2025-05-01 DIAGNOSIS — C18.9 METASTATIC COLON CANCER TO LIVER (MULTI): Primary | ICD-10-CM

## 2025-05-01 DIAGNOSIS — D50.9 IRON DEFICIENCY ANEMIA, UNSPECIFIED IRON DEFICIENCY ANEMIA TYPE: ICD-10-CM

## 2025-05-01 DIAGNOSIS — R16.0 LIVER MASS: ICD-10-CM

## 2025-05-01 PROCEDURE — 99310 SBSQ NF CARE HIGH MDM 45: CPT | Performed by: NURSE PRACTITIONER

## 2025-05-01 NOTE — LETTER
Patient: Lela Barba  : 1935    Encounter Date: 2025    Name: Lela Barba  YOB: 1935    FOLLOW UP VISIT: [facility],   [facility]  - 25: Weakness, fever, LLL PNA, Cystitis, Possible SBE (ARELI - Lambl's excrescence, Liver left lobe mass, s/p liver biopsy , and incidental finding of Multinodular goiter     SUBJECTIVE:  Nursing reported that patient and daughter want to know the results of the test that was done in the hospital.   The patient is resting comfortably and without signs of distress. Daughter is at the bedside.   Long discussion done regarding the results of the liver mass biopsy which did show Adenocarcinoma. The biopsy of the mass of the liver shows that it is made up of the same type of cancer cells that were found in the colon two years ago. In the sample, most of the tissue is dead (necrotic) and surrounded by scar-like tissue, but special laboratory tests confirm that these cells match the colon cancer (they tested positive for markers called CK20 and CDX2, which are common in colon tumors). This means the colon cancer has spread to the liver. The lab is also running an additional test for a protein called HER2 to see if targeted therapies might be an option, and those results will need to be reviewed when available by your PCP.  Family asked for treatment options which again I reiterated that all information was not available and to f/up w/PCP outpatient to review options.   The patient currently has no physical complaints at this time.     REVIEW OF SYSTEMS:   All review of systems are negative unless otherwise stated above under subjective.    LABS REVIEWED AT FACILITY:  CBC and Differential  REPORTED 2025 09:18  White Blood Cell Count   10.31 k/uL 3.70-11.00 FH  RBC L 3.46 m/uL 3.90-5.20 FH  Hemoglobin L 9.5 g/dL 11.5-15.5 FH  Hematocrit L 29.8 % 36.0-46.0 FH  MCV   86.1 fL 80.0-100.0 FH  MCH   27.5 pg 26.0-34.0 FH  MCHC   31.9 g/dL  30.5-36.0 FH  RDW-CV   14.2 % 11.5-15.0 FH  Platelet Count H 585 k/uL 150-400 FH  MPV   11.1 fL 9.0-12.7 FH  Neut%   80.8 % FH  Abs Neut H 8.34 k/uL 1.45-7.50 FH  Lymph%   9.5 % FH  Abs Lymph L 0.98 k/uL 1.00-4.00 FH  Mono%   7.9 % FH  Abs Mono   0.81 k/uL <0.87 FH  Eosin%   0.3 % FH  Abs Eosin   0.03 k/uL <0.46 FH  Baso%   0.4 % FH  Abs Baso   0.04 k/uL <0.11 FH  Immature Gran %%   1.1 % FH  Abs Immature Gran H 0.11 k/uL <0.10 FH  NRBC   0.0 /100 WBC FH  Absolute nRBC   <0.01 k/uL <0.01 FH  Diff Type   Auto     FH         Comp Metabolic Panel  REPORTED 04/25/2025 09:44  Protein, Total   7.3 g/dL 6.3-8.0 FH  Albumin L 3.0 g/dL 3.9-4.9 FH  Calcium, Total   8.8 mg/dL 8.5-10.2 FH  Bilirubin, Total   0.2 mg/dL 0.2-1.3 FH  Alkaline Phosphatase H 203 U/L  FH  AST   27 U/L 13-35 FH  ALT   12 U/L 7-38 FH  Glucose L 68 mg/dL 74-99 FH  BUN H 35 mg/dL 7-21 FH  Creatinine H 1.14 mg/dL 0.58-0.96 FH  Sodium   140 mmol/L 136-144 FH  Potassium   3.9 mmol/L 3.7-5.1 FH  Chloride   100 mmol/L  FH  CO2   24 mmol/L 22-30 FH  Anion Gap H 16 mmol/L 8-15 FH  Estimated Glomerular Filtration Rate L 46 mL/min/1.73 meters squared >=60 FH    Urinalysis With Micro  REPORTED 04/30/2025 10:07  Color   Light Yellow     Yellow FH  Clarity   Clear     Clear FH  Glucose, Urine   Trace     Trace, Negative FH  Bilirubin, Urine   Negative     Negative FH  Ketones, Urine   Negative     Negative, Trace FH  Specific Gravity, Ur   1.016     1.005-1.030 FH  Hemoglobin/Blood,Ur   Negative     Negative, Trace FH  pH, Urine   6.5     5.0-8.0 FH  Protein, Urine A 3+     Trace, Negative FH  Urobilinogen   Normal     Normal FH  Nitrites   Negative     Negative FH  Leuk Esterase   Negative     Negative, 25 Aleksey/uL FH  WBC, Urine   0-5 /HPF     0-5 /HPF FH  RBC, Urine   0-3 /HPF     0-3 /HPF FH  Squamous Epithelial Cells   Few /HPF FH    Living will related issues reviewed-Code status: DNRCCA    /50   Pulse 60   Physical Exam  Constitutional:        Appearance: Normal appearance. She is normal weight.   HENT:      Head: Normocephalic.      Right Ear: External ear normal.      Left Ear: External ear normal.      Nose: Nose normal.      Mouth/Throat:      Mouth: Mucous membranes are moist.   Eyes:      Extraocular Movements: Extraocular movements intact.      Conjunctiva/sclera: Conjunctivae normal.      Pupils: Pupils are equal, round, and reactive to light.   Cardiovascular:      Rate and Rhythm: Normal rate and regular rhythm.      Pulses: Normal pulses.      Heart sounds: Normal heart sounds.   Pulmonary:      Effort: Pulmonary effort is normal.      Breath sounds: Normal breath sounds.   Abdominal:      General: Bowel sounds are normal. There is no distension.      Palpations: Abdomen is soft.      Tenderness: There is no abdominal tenderness. There is no guarding.   Genitourinary:     Comments: Not examined  Musculoskeletal:         General: Tenderness present. Normal range of motion.      Cervical back: Normal range of motion and neck supple.      Right lower leg: No edema.      Left lower leg: No edema.      Comments: Generalized weakness   Skin:     General: Skin is warm and dry.      Capillary Refill: Capillary refill takes less than 2 seconds.   Neurological:      General: No focal deficit present.      Mental Status: She is alert and oriented to person, place, and time. Mental status is at baseline.   Psychiatric:         Mood and Affect: Mood normal.         Behavior: Behavior normal.        Assessment/Plan  Problem List Items Addressed This Visit    None  Visit Diagnoses         Metastatic colon cancer to liver (Multi)    -  Primary      Liver mass          Essential hypertension          Type 2 diabetes mellitus with chronic kidney disease, with long-term current use of insulin, unspecified CKD stage (Multi)          Physical deconditioning          Coronary artery disease involving native heart, unspecified vessel or lesion type, unspecified  whether angina present          Chronic kidney disease, unspecified CKD stage          Pneumonia of left lower lobe due to infectious organism          Anemia, unspecified type          Meningioma (Multi)          Gout, unspecified cause, unspecified chronicity, unspecified site          Vitamin D deficiency          Iron deficiency anemia, unspecified iron deficiency anemia type                PLAN:  Pt has been seen for follow up visit.  Recent therapy notes have been reviewed.  The patient remains engaged in skilled therapy services aimed at improving strength, functional mobility, and endurance.   Lela Barba is demonstrating progress consistent with their current abilities.   Refer to PT/OT/ST documentation in the facility records for detailed updates on therapy progression and response.  Recent nursing evaluation and notes were reviewed.   Medication list reviewed here at the facility, please see facility list for complete list of medications and dosages.     Fever of unknown origin, Liver Mass, Leukocytosis, elevated CRP, Sed rate, and Procalcitonin w/negative BC:   CXR - LLL PNA - resolved (treated w/Rocephin IV and Vibramycin IV initially in the ER, then Rocephin IV, and Cefepime IV)  Cystitis - UA C/S negative  Possible Subacute bacterial endocarditis? 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  What is Lambl's Excrescences? Thin hair like strands made from fibrin and collagen. Thought to result from endothelial damage due to turbulent blood flow, normal wear and tear w/age. Often times benign and an incidental finding on a ARELI. Can resemble vegetation. Important to have all clinical information to make confident diagnosis of SBE.   Liver Mass, FUO, and Leukocytosis - Meropenem IV 1 gram BID til 5/3/25 (switched on 4/13 for ongoing fever of unknown origin and leukocytosis).   FINAL DIAGNOSIS   A. Liver, Left Lobe, Mass, Biopsy :   -- Adenocarcinoma with extensive necrosis in a fibrotic stroma. No  hepatic parenchyma identified     Liver Mass:  Seen on CT done in ER and not on CT 3 yrs prior  MRI done showing liver left sided lobe mass as well as other smaller lesions on the right.  FINAL DIAGNOSIS   A. Liver, Left Lobe, Mass, Biopsy :   -- Adenocarcinoma with extensive necrosis in a fibrotic stroma. No hepatic parenchyma identified   Meropenem IV 1 gram BID x 2 wks     HTN, CKD:   Monitor and follow BP readings.   Continue home meds:   Clonidine 0.2 mg BID  Amlodipine 5 mg daily  Lisinopril 30 mg daily  HCTZ 25 mg daily  Isosorbide 60 mg daily  Losartan 100 mg daily  Metoprolol succinate 100 mg BID  Nifedipine 90 mg BID  Torsemide 20 mg QOD  Class             Medication Dose Information   ACE inhibitor Lisinopril 30 mg QD Renal protective, BP lowering   ARB Losartan 100 mg QD Renal protective, BP lowering   Beta Blocker Metoprolol succinate  mg BID Negative inotrope, BP/HR lowering, CAD protection   CCB Amlodipine 5 mg QD Long acting vasodilator- peripheral dilation   CCB  Nifedipine ER 90 mg BID Potent vasodilator   Thiazide diuretic HCTZ 25 mg QD Diuretic effect   Loop Diuretic Torsemide 20 mg QOD Diuretic effect   Nitrate/vasodilator Isosorbide mononitrate ER 60 mg QD Vasodilator, BP lowering, lowering preload   Alpha Agonist/CCB Clonidine 0.2 mg BID BP lowering, risk for sedation   Benzo Lorazepam 1 mg QHS Sedative, risk for hypotension   TCA Mirtazapine 15 mg QHS Sedative, risk for hypotension   Patient is on A LOT of medications.   There is some clinical concern regarding the overlapping and stacking of antihypertensives, patient is on dual RAAS - losartan and lisinopril which is not usually recommended given together due to risk for acute kidney injury and hyperkalemia risk.  There is also concerns regarding nifedipine stacked with amlodipine which are both calcium channel blockers.  Both are vasodilators with nifedipine being a potent vasodilator with a prescribed amount of 90 mg BID which is  out of the norm. Patient is on clonidine and this puts the patient at high risk for rebound hypertension as well as sedation leading to falls.  Patient is also on multiple agents that are lowering preload/afterload which can lead to wide pulse pressures and falls.    Concern for possible hypotension episodes, volume depletion, and medication overdose.  With these medications - polypharmacy is a concern as well as he risk for orthostasis which can lead to falls.    The patient is seen by a nephrologist/internist in the community who has been managing her medication regimen for years. The patient came to the facility with all of these medications prescribed and clearly noted on her discharge progress note, au george, and after care summary. At this time we will not make any changes and we will monitor blood pressures closely.  Medications need to be staggered. We will adjust as needed.  Labs to be done intermittently and adjust meds as needed.  BP readings reviewed: 170/50s  Avoid nephrotoxic drugs.     CAD/HLD:  Cont atorvastatin, ASA, Plavix, and Imdur     Diabetes Mellitus Type 2:  Blood sugar readings to be monitored.  BS readings reviewed: 113  Continue current medications:  Novolin 70/30 20 units BID    HGBA1C 7.9 (H) 01/06/2025   Monitor patient for hypoglycemia/hyperglycemia.   Emergency glucagon kit and glucose gel 40% on hand for hypoglycemic emergencies.      Gout:  Cont Allopurinol     Vit D3 def:  Cont Vit D3 5000 units daily     Insomnia/Anxiety/Depression:  Monitor mood - stable  Cont Trazodone 50 mg daily, Mirtazapine 15 mg daily, and Lorazepam 1 mg QHS     Iron def Anemia:   Cont Ferrous sulfate 325 mg BID  Follow labs     Chronic Meningioma:     Urinary retention, Voiding dysfunction:  Tamsulosin 0.4 mg daily    Skin integrity:  Nursing to monitor skin integrity as patient is at risk for pressure injuries.  Turn and reposition Q 2 hours or more.  Air mattress and when up in chair cushion reducing  device.  Dietician to evaluate and recommend.  Nutritional supplement to be implemented if needed.  Please monitor skin integrity and other pressure areas.    Any decline or change in condition needs to be communicated with the physician or myself.    Discussion with nursing staff regarding ongoing care and management.  Communication regarding patients status, overall condition, changes with plan (medications or treatments), and any questions from family completed if present.  If family not available, would communicate in person or via phone if needed.   We will continue with the plan and medications noted above.    We will continue to follow the patient here at the facility.    Discharge planning:   Patient to be discharged home when goals are met.   Ongoing discussion with patient and family if available regarding discharge per discharge planner at facility.     *Please note that nursing facility notes, nursing EMR, outside laboratory agency, and  EMR do not interface.     Completion of the note was done through Dragon voice recognition technology and may include unintended or grammatical errors which may not have been recognized when finalizing the note.     Time: A total of 45 minutes or more was spent in evaluation and management of the patient, including time with the patient and family (if present).   Over 50% of this time was dedicated to counseling and coordination of care.  This includes time spent:  Reviewing vital signs, nursing/therapy reports, hospital records, labs, imaging, and consults.  Conducting a face-to-face assessment, including history, physical exam, and patient education.  Discussing the diagnosis, treatment options, and current medications prescribed.  Reviewing and updating the plan of care with the patient, family if available, nursing staff, and interdisciplinary team.  Documenting in the electronic medical record.       LIONEL Branham-NARINDER      Electronically Signed By: Silvia HARLEY  LIONEL Daniels-CNP   5/25/25 10:43 PM

## 2025-05-03 VITALS — DIASTOLIC BLOOD PRESSURE: 60 MMHG | HEART RATE: 90 BPM | SYSTOLIC BLOOD PRESSURE: 101 MMHG

## 2025-05-03 ASSESSMENT — ENCOUNTER SYMPTOMS
ARTHRALGIAS: 1
FATIGUE: 1
APPETITE CHANGE: 1
ACTIVITY CHANGE: 1
DIARRHEA: 0
ABDOMINAL PAIN: 0
SHORTNESS OF BREATH: 0
NAUSEA: 0
WEAKNESS: 1
WHEEZING: 0
CONFUSION: 1
COUGH: 0
WOUND: 0

## 2025-05-03 NOTE — PROGRESS NOTES
Subjective   Patient ID: Lela Barba is a 89 y.o. female who is acute skilled care being seen and evaluated for multiple medical problems.    This patient was seen for follow-up and as I look at the biopsy report of that liver mass it is a necrotic malignancy, it seems like it is a metastatic deposit from colon cancer, patient is a colon cancer by history.  That means it is a necrotic metastatic colon cancer in the liver.  It is a huge mass, this is going to create a problem.  Patient has been given IV antibiotics thinking that it was infectious mass and also there was a pulmonary infiltrate.  Patient has been given meropenem.  Creatinine is 1.14, hemoglobin is 9.5, platelets are 585.  Patient's blood pressure readings has been low on several occasions.  Patient has been on nifedipine double dose, HCTZ, lisinopril, metoprolol.  Patient is not looking that well, appetite is poor, today when I saw her she was bedbound, nursing staff states that she goes for physical therapy, appetite remains poor, if it is necrotic metastatic colon cancer into the liver patient's prognosis would be a problem.  This needs to be followed by oncology and patient's primary care physician.  Do not have any progress report about the patient's recovery and discharge planning.  Lambl's excrescence may not be a big deal, they did not report any fever or chills.  Patient continued to be anemic.         Review of Systems   Constitutional:  Positive for activity change, appetite change and fatigue.   Respiratory:  Negative for cough, shortness of breath and wheezing.    Cardiovascular:  Negative for chest pain.   Gastrointestinal:  Negative for abdominal pain, diarrhea and nausea.   Musculoskeletal:  Positive for arthralgias.   Skin:  Negative for wound.   Neurological:  Positive for weakness.   Psychiatric/Behavioral:  Positive for confusion. Negative for behavioral problems.        Objective   /60   Pulse 90     Physical  Exam  Vitals reviewed.   Constitutional:       Appearance: Normal appearance. She is normal weight. She is ill-appearing. She is not toxic-appearing.   HENT:      Head: Normocephalic.   Eyes:      Conjunctiva/sclera: Conjunctivae normal.   Cardiovascular:      Rate and Rhythm: Normal rate and regular rhythm. Extrasystoles are present.     Heart sounds: No murmur heard.  Pulmonary:      Effort: Pulmonary effort is normal.      Breath sounds: Rales present.   Abdominal:      General: There is no distension.      Palpations: Abdomen is soft. There is no mass.   Musculoskeletal:         General: Deformity present.      Cervical back: Neck supple.   Skin:     General: Skin is warm and dry.      Findings: Bruising present.   Neurological:      Mental Status: Mental status is at baseline.   Psychiatric:         Behavior: Behavior normal.         Assessment/Plan   Problem List Items Addressed This Visit    None  Visit Diagnoses         Codes      Metastatic colon cancer to liver (Multi)    -  Primary C18.9, C78.7      Liver mass     R16.0      Essential hypertension     I10      Type 2 diabetes mellitus with chronic kidney disease, with long-term current use of insulin, unspecified CKD stage (Multi)     E11.22, Z79.4      Pneumonia of left lower lobe due to infectious organism     J18.9      Coronary artery disease involving native heart, unspecified vessel or lesion type, unspecified whether angina present     I25.10      Anemia, unspecified type     D64.9      Chronic kidney disease, unspecified CKD stage     N18.9        Patient's condition is not looking that great, discontinue nifedipine HCTZ, decrease lisinopril dosage, metoprolol can be continued at the same dose, anemia remains, CKD remains, patient has history of CAD no angina, we hope that pneumonia has been resolving.  Hemoglobin will be followed, blood sugars are reviewed, appetite remains poor, need to know about more definitive plan and disposition of this  patient I will check with the  later on.  Rest of medications reviewed which include amlodipine, aspirin, statins, clopidogrel, insulin 7013 2 dosages, isosorbide.  If it is a huge necrotic metastatic mass then patient's prognosis is poor.  Would communicate with  in next few days.  Meanwhile continue skilled nursing and rehabilitation, any change in the condition needs to be notified, thrombocytosis is secondary to extensive necrotic metastasis into the liver, whole left lobe of liver is involved with the necrotic metastasis.     Goals    None

## 2025-05-05 ENCOUNTER — NURSING HOME VISIT (OUTPATIENT)
Dept: POST ACUTE CARE | Facility: EXTERNAL LOCATION | Age: OVER 89
End: 2025-05-05
Payer: MEDICARE

## 2025-05-05 DIAGNOSIS — D32.9 MENINGIOMA (MULTI): ICD-10-CM

## 2025-05-05 DIAGNOSIS — F32.A DEPRESSION, UNSPECIFIED DEPRESSION TYPE: ICD-10-CM

## 2025-05-05 DIAGNOSIS — R53.81 PHYSICAL DECONDITIONING: ICD-10-CM

## 2025-05-05 DIAGNOSIS — I10 ESSENTIAL HYPERTENSION: ICD-10-CM

## 2025-05-05 DIAGNOSIS — E55.9 VITAMIN D DEFICIENCY: ICD-10-CM

## 2025-05-05 DIAGNOSIS — J18.9 PNEUMONIA OF LEFT LOWER LOBE DUE TO INFECTIOUS ORGANISM: ICD-10-CM

## 2025-05-05 DIAGNOSIS — C18.9 METASTATIC COLON CANCER TO LIVER (MULTI): Primary | ICD-10-CM

## 2025-05-05 DIAGNOSIS — D50.9 IRON DEFICIENCY ANEMIA, UNSPECIFIED IRON DEFICIENCY ANEMIA TYPE: ICD-10-CM

## 2025-05-05 DIAGNOSIS — E11.22 TYPE 2 DIABETES MELLITUS WITH CHRONIC KIDNEY DISEASE, WITH LONG-TERM CURRENT USE OF INSULIN, UNSPECIFIED CKD STAGE (MULTI): ICD-10-CM

## 2025-05-05 DIAGNOSIS — C78.7 METASTATIC COLON CANCER TO LIVER (MULTI): Primary | ICD-10-CM

## 2025-05-05 DIAGNOSIS — M10.9 GOUT, UNSPECIFIED CAUSE, UNSPECIFIED CHRONICITY, UNSPECIFIED SITE: ICD-10-CM

## 2025-05-05 DIAGNOSIS — D64.9 ANEMIA, UNSPECIFIED TYPE: ICD-10-CM

## 2025-05-05 DIAGNOSIS — N18.9 CHRONIC KIDNEY DISEASE, UNSPECIFIED CKD STAGE: ICD-10-CM

## 2025-05-05 DIAGNOSIS — R16.0 LIVER MASS: ICD-10-CM

## 2025-05-05 DIAGNOSIS — Z79.4 TYPE 2 DIABETES MELLITUS WITH CHRONIC KIDNEY DISEASE, WITH LONG-TERM CURRENT USE OF INSULIN, UNSPECIFIED CKD STAGE (MULTI): ICD-10-CM

## 2025-05-05 DIAGNOSIS — I25.10 CORONARY ARTERY DISEASE INVOLVING NATIVE HEART, UNSPECIFIED VESSEL OR LESION TYPE, UNSPECIFIED WHETHER ANGINA PRESENT: ICD-10-CM

## 2025-05-05 PROCEDURE — 99316 NF DSCHRG MGMT 30 MIN+: CPT | Performed by: NURSE PRACTITIONER

## 2025-05-05 NOTE — LETTER
Patient: Lela Barba  : 1935    Encounter Date: 2025    Name: Lela Barba  YOB: 1935    DISCHARGE VISIT: [facility],   [facility]  - 25: Weakness, fever, LLL PNA, Cystitis, Possible SBE (ARELI - Lambl's excrescence, Liver left lobe mass, s/p liver biopsy , and incidental finding of Multinodular goiter     SUBJECTIVE:  Lela Barba is a 89 y.o. female who is here at [facility] after a recent hospitalization.   PMH of chronic kidney disease, diabetes, hypertension, dyslipidemia, GERD, prior gastrointestinal bleeding, osteoporosis, carotid disease, and a history of colon cancer. She presented to ER with rigors, chills, headache, and weakness and was found to have a left lower lobe pneumonia on chest X-ray and a urinary specimen suggestive of infection but ultimately negative on repeat culture. Despite initial treatment with ceftriaxone and doxycycline, her leukocytosis and fever of unknown origin persisted, prompting escalation to cefepime and then a two-week course of meropenem under infectious disease guidance; transesophageal echocardiography ruled out julian endocarditis, and imaging revealed a new complex liver lesion. Biopsy of that lesion confirmed metastatic adenocarcinoma consistent with her prior colon primary, and HER2 testing is pending to inform targeted therapy options. She remained afebrile, symptom-free, and at her baseline by discharge, and her daughter has been counseled on the findings and advised to follow up with her primary care provider and oncology team to review treatment possibilities. Recent UA C/S done here is negative.   The patient is seated upright in the wheelchair, resting comfortably, and without signs of distress.   Denies any current symptoms.   Nursing staff reports no new concerns.  Discussion to be done by nursing with Lela Barba and family/friend representative if present regarding discharge - follow up with  PCP and other speciality physicians as instructed or as scheduled, compliance with home medications, and safety within the home.   Nursing to review discharge instructions home medications prior to discharging home.     REVIEW OF SYSTEMS:   All review of systems are negative unless otherwise stated above under subjective.    LABS REVIEWED AT FACILITY:  Urine Culture  REPORTED 05/01/2025 12:28  Culture Urine   See Below     PLDEF  CULTURE, URINE                   No growth (<1,000 CFU/ml)          SOURCE:                          Urine (Nonspecific)       CBC and Differential  REPORTED 05/02/2025 09:57  White Blood Cell Count   4.40 k/uL 3.70-11.00 FH  RBC L 3.64 m/uL 3.90-5.20 FH  Hemoglobin L 9.9 g/dL 11.5-15.5 FH  Hematocrit L 30.5 % 36.0-46.0 FH  MCV   83.8 fL 80.0-100.0 FH  MCH   27.2 pg 26.0-34.0 FH  MCHC   32.5 g/dL 30.5-36.0 FH  RDW-CV   14.3 % 11.5-15.0 FH  Platelet Count   270 k/uL 150-400 FH  MPV   11.6 fL 9.0-12.7 FH  Neut%   46.7 % FH  Abs Neut   2.06 k/uL 1.45-7.50 FH  Lymph%   34.5 % FH  Abs Lymph   1.52 k/uL 1.00-4.00 FH  Mono%   15.5 % FH  Abs Mono   0.68 k/uL <0.87 FH  Eosin%   1.4 % FH  Abs Eosin   0.06 k/uL <0.46 FH  Baso%   0.5 % FH  Abs Baso   <0.03 k/uL <0.11 FH  Immature Gran %%   1.4 % FH  Abs Immature Gran   0.06 k/uL <0.10 FH  NRBC   0.0 /100 WBC FH  Absolute nRBC   <0.01 k/uL <0.01 FH  Diff Type   Auto     FH         Comp Metabolic Panel  REPORTED 05/02/2025 10:38  Protein, Total L 6.1 g/dL 6.3-8.0 FH  Albumin L 2.4 g/dL 3.9-4.9 FH  Calcium, Total L 7.9 mg/dL 8.5-10.2 FH  Bilirubin, Total   0.2 mg/dL 0.2-1.3 FH  Alkaline Phosphatase H 179 U/L  FH  AST   See Below     FH  ALT   See Below     FH  Glucose C 32 mg/dL 74-99 FH  BUN H 50 mg/dL 7-21 FH  Creatinine H 1.05 mg/dL 0.58-0.96 FH  Sodium   142 mmol/L 136-144 FH  Potassium   4.4 mmol/L 3.7-5.1 FH  Chloride   103 mmol/L  FH  CO2   27 mmol/L 22-30 FH  Anion Gap   12 mmol/L 8-15 FH  Estimated Glomerular Filtration  Rate L 51 mL/min/1.73 meters squared >=60 FH    RX Allergies[1]    Living will related issues reviewed-Code status: DNRCCA    OBJECTIVE:  BP (!) 158/92   Pulse 78   Physical Exam  Constitutional:       Appearance: Normal appearance. She is normal weight.   HENT:      Head: Normocephalic.      Mouth: Mucous membranes are moist.   Eyes:      Extraocular Movements: Extraocular movements intact.      Conjunctiva/sclera: Conjunctivae normal.      Pupils: Pupils are equal, round, and reactive to light.   Cardiovascular:      Rate and Rhythm: Normal rate and regular rhythm.      Pulses: Normal pulses.      Heart sounds: Normal heart sounds.   Pulmonary:      Effort: Pulmonary effort is normal.      Breath sounds: Normal breath sounds.   Abdominal:      General: Bowel sounds are normal. There is no distension.      Palpations: Abdomen is soft.      Tenderness: There is no abdominal tenderness. There is no guarding.   Genitourinary:     Comments: Not examined  Musculoskeletal:         General: Tenderness present. Normal range of motion.      Cervical back: Normal range of motion and neck supple.      Right lower leg: No edema.      Left lower leg: No edema.      Comments: Generalized weakness   Skin:     General: Skin is warm and dry.      Capillary Refill: Capillary refill takes less than 2 seconds.   Neurological:      General: No focal deficit present.      Mental Status: She is alert and oriented to person, place, and time. Mental status is at baseline.   Psychiatric:         Mood and Affect: Mood normal.         Behavior: Behavior normal.     Assessment/Plan  Problem List Items Addressed This Visit    None  Visit Diagnoses         Metastatic colon cancer to liver (Multi)    -  Primary      Liver mass          Essential hypertension          Type 2 diabetes mellitus with chronic kidney disease, with long-term current use of insulin, unspecified CKD stage (Multi)          Physical deconditioning          Coronary artery  disease involving native heart, unspecified vessel or lesion type, unspecified whether angina present          Chronic kidney disease, unspecified CKD stage          Pneumonia of left lower lobe due to infectious organism          Anemia, unspecified type          Meningioma (Multi)          Gout, unspecified cause, unspecified chronicity, unspecified site          Vitamin D deficiency          Iron deficiency anemia, unspecified iron deficiency anemia type          Depression, unspecified depression type                PLAN:  Pt has been seen for discharge visit.  The patient has been here at facility for PT/OT/ST to maximize strength, function, endurance and safety.  Recent therapy notes reviewed.  The patient is participating in therapy.   Lela Barba is making progress to the best of his/her ability.   Please see PT/OT/ST notes in the facility for detailed information regarding progression of patients progress.   Recent nursing evaluation and notes were reviewed.   Medication list reviewed here at the facility.   The medication list will need to be updated in the electronic record by the patients primary care provider in the community upon follow up in the office.     Fever of unknown origin, Liver Mass, Leukocytosis, elevated CRP, Sed rate, and Procalcitonin w/negative BC:   CXR - LLL PNA - resolved (treated w/Rocephin IV and Vibramycin IV initially in the ER, then Rocephin IV, and Cefepime IV)  Cystitis - UA C/S negative  Possible Subacute bacterial endocarditis? 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  What is Lambl's Excrescences? Thin hair like strands made from fibrin and collagen. Thought to result from endothelial damage due to turbulent blood flow, normal wear and tear w/age. Often times benign and an incidental finding on a ARELI. Can resemble vegetation. Important to have all clinical information to make confident diagnosis of SBE.   Liver Mass, FUO, and Leukocytosis - Meropenem IV 1 gram BID til  5/3/25 (switched on 4/13 for ongoing fever of unknown origin and leukocytosis).   FINAL DIAGNOSIS   A. Liver, Left Lobe, Mass, Biopsy :   -- Adenocarcinoma with extensive necrosis in a fibrotic stroma. No hepatic parenchyma identified      Liver Mass:  Seen on CT done in ER and not on CT 3 yrs prior  MRI done showing liver left sided lobe mass as well as other smaller lesions on the right.  FINAL DIAGNOSIS   A. Liver, Left Lobe, Mass, Biopsy :   -- Adenocarcinoma with extensive necrosis in a fibrotic stroma. No hepatic parenchyma identified   Meropenem IV 1 gram BID x 2 wks     HTN, CKD:   Monitor and follow BP readings.   Continue home meds:   Clonidine 0.2 mg BID  Amlodipine 5 mg daily  Lisinopril 30 mg daily  HCTZ 25 mg daily  Isosorbide 60 mg daily  Losartan 100 mg daily  Metoprolol succinate 100 mg BID  Nifedipine 90 mg BID  Torsemide 20 mg QOD  Class             Medication Dose Information   ACE inhibitor Lisinopril 30 mg QD Renal protective, BP lowering   ARB Losartan 100 mg QD Renal protective, BP lowering   Beta Blocker Metoprolol succinate  mg BID Negative inotrope, BP/HR lowering, CAD protection   CCB Amlodipine 5 mg QD Long acting vasodilator- peripheral dilation   CCB  Nifedipine ER 90 mg BID Potent vasodilator   Thiazide diuretic HCTZ 25 mg QD Diuretic effect   Loop Diuretic Torsemide 20 mg QOD Diuretic effect   Nitrate/vasodilator Isosorbide mononitrate ER 60 mg QD Vasodilator, BP lowering, lowering preload   Alpha Agonist/CCB Clonidine 0.2 mg BID BP lowering, risk for sedation   Benzo Lorazepam 1 mg QHS Sedative, risk for hypotension   TCA Mirtazapine 15 mg QHS Sedative, risk for hypotension   Patient is on A LOT of medications.   There is some clinical concern regarding the overlapping and stacking of antihypertensives, patient is on dual RAAS - losartan and lisinopril which is not usually recommended given together due to risk for acute kidney injury and hyperkalemia risk.  There is also  concerns regarding nifedipine stacked with amlodipine which are both calcium channel blockers.  Both are vasodilators with nifedipine being a potent vasodilator with a prescribed amount of 90 mg BID which is out of the norm. Patient is on clonidine and this puts the patient at high risk for rebound hypertension as well as sedation leading to falls.  Patient is also on multiple agents that are lowering preload/afterload which can lead to wide pulse pressures and falls.    Concern for possible hypotension episodes, volume depletion, and medication overdose.  With these medications - polypharmacy is a concern as well as he risk for orthostasis which can lead to falls.    The patient is seen by a nephrologist/internist in the community who has been managing her medication regimen for years. The patient came to the facility with all of these medications prescribed and clearly noted on her discharge progress note, au george, and after care summary. At this time we will not make any changes and we will monitor blood pressures closely.  Medications need to be staggered. We will adjust as needed.  Labs to be done intermittently and adjust meds as needed.  Labs reviewed from 5/2  BP readings reviewed: 150/90s  Avoid nephrotoxic drugs.     CAD/HLD:  Cont atorvastatin, ASA, Plavix, and Imdur     Diabetes Mellitus Type 2:  Blood sugar readings to be monitored.  BS readings reviewed: 298  Continue current medications:  Novolin 70/30 20 units BID    HGBA1C 7.9 (H) 01/06/2025   Monitor patient for hypoglycemia/hyperglycemia.   Emergency glucagon kit and glucose gel 40% on hand for hypoglycemic emergencies.      Gout:  Cont Allopurinol     Vit D3 def:  Cont Vit D3 5000 units daily     Insomnia/Anxiety/Depression:  Monitor mood - stable  Cont Trazodone 50 mg daily, Mirtazapine 15 mg daily, and Lorazepam 1 mg QHS     Iron def Anemia:   Cont Ferrous sulfate 325 mg BID  Follow labs     Chronic Meningioma:     Urinary retention, Voiding  dysfunction:  UA C/S done and negative  Tamsulosin 0.4 mg daily    Skin integrity:  Nursing to monitor skin integrity as patient is at risk for pressure injuries.  Turn and reposition Q2 hours or more.  Air mattress and when up in chair cushion reducing device.  Dietician to evaluate and recommend.  Labs done intermittently and followed.  Nutritional supplement to be implemented if needed.  Please monitor skin integrity and other pressure areas.    Any decline or change in condition needs to be communicated with the physician or myself.  Discussion with nursing staff regarding ongoing care, management, and discharge planning.   Communication regarding patients status, overall condition, changes with plan (medications or treatments), and any questions from family completed if present.  If family not available, would communicate in person or via phone if needed.   We will continue with the plan and medications noted above until discharged home.    We will continue to follow the patient here at the facility until discharged home.     DISCHARGE PLANNING    Patient to be discharged home on the date discussed under subjective.   Patient to follow up with PCP in 1-2 weeks after discharge from facility.   Patient to follow up with any Speciality physicians as directed after discharge.  If desired and in agreement, [facility] to follow after discharge from the facility for continued PT/OT and Nursing.    Medication list for home:  Please see facility medication list as medications may have changed from recent hospitalization and skilled stay.    OARRS Report if narcotics are prescribed upon discharge:   If indicated, I have personally reviewed the OARRS report for Lela Barba and I have considered the risks of abuse, dependence, addiction, and diversion.    *Please note that nursing facility notes, facility EMR, outside laboratory agency, and  EMR do not interface.     Completion of the note was done through Dragon voice  recognition technology and may include unintended or grammatical errors which may not have been recognized when finalizing the note.     Time:   I spent 31 minutes or greater with the patient reviewing discharge information which include compliance of medications, follow up appointments with PCP and or Speciality physicians. Greater than 50% of this time was spent in counseling and or coordination of care.       ROLAND Branham          Electronically Signed By: ROLAND Branham   5/25/25 10:52 PM       [1]  Allergies  Allergen Reactions   • Hydralazine Other     chest pain tachycardia

## 2025-05-14 ENCOUNTER — HOSPITAL ENCOUNTER (OUTPATIENT)
Dept: LAB | Age: 89
Discharge: HOME | End: 2025-05-14
Payer: MEDICARE

## 2025-05-14 LAB
ALBUMIN SERPL-MCNC: 2.9 G/DL (ref 3.5–4.6)
ALP SERPL-CCNC: 171 U/L (ref 40–130)
ALT SERPL-CCNC: 9 U/L (ref 0–33)
ANION GAP SERPL CALCULATED.3IONS-SCNC: 10 MEQ/L (ref 9–15)
AST SERPL-CCNC: 20 U/L (ref 0–35)
BASOPHILS # BLD: 0 K/UL (ref 0–0.2)
BASOPHILS NFR BLD: 0.5 %
BILIRUB SERPL-MCNC: 0.4 MG/DL (ref 0.2–0.7)
BUN SERPL-MCNC: 20 MG/DL (ref 8–23)
CALCIUM SERPL-MCNC: 8.6 MG/DL (ref 8.5–9.9)
CHLORIDE SERPL-SCNC: 97 MEQ/L (ref 95–107)
CHOLEST SERPL-MCNC: 144 MG/DL (ref 0–199)
CO2 SERPL-SCNC: 26 MEQ/L (ref 20–31)
CREAT SERPL-MCNC: 1.05 MG/DL (ref 0.5–0.9)
EOSINOPHIL # BLD: 0.2 K/UL (ref 0–0.7)
EOSINOPHIL NFR BLD: 2.6 %
ERYTHROCYTE [DISTWIDTH] IN BLOOD BY AUTOMATED COUNT: 14.7 % (ref 11.5–14.5)
GLOBULIN SER CALC-MCNC: 4 G/DL (ref 2.3–3.5)
GLUCOSE SERPL-MCNC: 396 MG/DL (ref 70–99)
HCT VFR BLD AUTO: 31.2 % (ref 37–47)
HDLC SERPL-MCNC: 43 MG/DL (ref 40–59)
HGB BLD-MCNC: 10.2 G/DL (ref 12–16)
LDLC SERPL CALC-MCNC: 84 MG/DL (ref 0–129)
LYMPHOCYTES # BLD: 1.4 K/UL (ref 1–4.8)
LYMPHOCYTES NFR BLD: 20.8 %
MCH RBC QN AUTO: 27.5 PG (ref 27–31.3)
MCHC RBC AUTO-ENTMCNC: 32.7 % (ref 33–37)
MCV RBC AUTO: 84.1 FL (ref 79.4–94.8)
MONOCYTES # BLD: 0.6 K/UL (ref 0.2–0.8)
MONOCYTES NFR BLD: 8.9 %
NEUTROPHILS # BLD: 4.4 K/UL (ref 1.4–6.5)
NEUTS SEG NFR BLD: 66.7 %
PHOSPHATE SERPL-MCNC: 3.6 MG/DL (ref 2.3–4.8)
PLATELET # BLD AUTO: 336 K/UL (ref 130–400)
POTASSIUM SERPL-SCNC: 4.8 MEQ/L (ref 3.4–4.9)
PROT SERPL-MCNC: 6.9 G/DL (ref 6.3–8)
RBC # BLD AUTO: 3.71 M/UL (ref 4.2–5.4)
SODIUM SERPL-SCNC: 133 MEQ/L (ref 135–144)
TRIGL SERPL-MCNC: 83 MG/DL (ref 0–150)
URATE SERPL-MCNC: 3.8 MG/DL (ref 2.4–5.7)
WBC # BLD AUTO: 6.6 K/UL (ref 4.8–10.8)

## 2025-05-14 PROCEDURE — 84550 ASSAY OF BLOOD/URIC ACID: CPT

## 2025-05-14 PROCEDURE — 80053 COMPREHEN METABOLIC PANEL: CPT

## 2025-05-14 PROCEDURE — 36415 COLL VENOUS BLD VENIPUNCTURE: CPT

## 2025-05-14 PROCEDURE — 83036 HEMOGLOBIN GLYCOSYLATED A1C: CPT

## 2025-05-14 PROCEDURE — 80061 LIPID PANEL: CPT

## 2025-05-14 PROCEDURE — 83970 ASSAY OF PARATHORMONE: CPT

## 2025-05-14 PROCEDURE — 84100 ASSAY OF PHOSPHORUS: CPT

## 2025-05-14 PROCEDURE — 85025 COMPLETE CBC W/AUTO DIFF WBC: CPT

## 2025-05-15 LAB
ESTIMATED AVERAGE GLUCOSE: 243 MG/DL
HBA1C MFR BLD: 10.1 % (ref 4–6)
PTH-INTACT SERPL-MCNC: 63 PG/ML (ref 15–65)

## 2025-05-25 VITALS — DIASTOLIC BLOOD PRESSURE: 50 MMHG | HEART RATE: 60 BPM | SYSTOLIC BLOOD PRESSURE: 178 MMHG

## 2025-05-25 VITALS — HEART RATE: 78 BPM | SYSTOLIC BLOOD PRESSURE: 158 MMHG | DIASTOLIC BLOOD PRESSURE: 92 MMHG

## 2025-05-26 NOTE — PROGRESS NOTES
Name: Lela Barba  YOB: 1935    FOLLOW UP VISIT: [facility],   [facility] 4/7 - 4/23/25: Weakness, fever, LLL PNA, Cystitis, Possible SBE (ARELI - Lambl's excrescence, Liver left lobe mass, s/p liver biopsy 4/21, and incidental finding of Multinodular goiter     SUBJECTIVE:  Nursing reported that patient and daughter want to know the results of the test that was done in the hospital.   The patient is resting comfortably and without signs of distress. Daughter is at the bedside.   Long discussion done regarding the results of the liver mass biopsy which did show Adenocarcinoma. The biopsy of the mass of the liver shows that it is made up of the same type of cancer cells that were found in the colon two years ago. In the sample, most of the tissue is dead (necrotic) and surrounded by scar?like tissue, but special laboratory tests confirm that these cells match the colon cancer (they tested positive for markers called CK20 and CDX2, which are common in colon tumors). This means the colon cancer has spread to the liver. The lab is also running an additional test for a protein called HER2 to see if targeted therapies might be an option, and those results will need to be reviewed when available by your PCP.  Family asked for treatment options which again I reiterated that all information was not available and to f/up w/PCP outpatient to review options.   The patient currently has no physical complaints at this time.     REVIEW OF SYSTEMS:   All review of systems are negative unless otherwise stated above under subjective.    LABS REVIEWED AT FACILITY:  CBC and Differential  REPORTED 04/25/2025 09:18  White Blood Cell Count   10.31 k/uL 3.70-11.00 FH  RBC L 3.46 m/uL 3.90-5.20 FH  Hemoglobin L 9.5 g/dL 11.5-15.5 FH  Hematocrit L 29.8 % 36.0-46.0 FH  MCV   86.1 fL 80.0-100.0 FH  MCH   27.5 pg 26.0-34.0 FH  MCHC   31.9 g/dL 30.5-36.0 FH  RDW-CV   14.2 % 11.5-15.0 FH  Platelet Count H 585 k/uL 150-400 FH  MPV    11.1 fL 9.0-12.7 FH  Neut%   80.8 % FH  Abs Neut H 8.34 k/uL 1.45-7.50 FH  Lymph%   9.5 % FH  Abs Lymph L 0.98 k/uL 1.00-4.00 FH  Mono%   7.9 % FH  Abs Mono   0.81 k/uL <0.87 FH  Eosin%   0.3 % FH  Abs Eosin   0.03 k/uL <0.46 FH  Baso%   0.4 % FH  Abs Baso   0.04 k/uL <0.11 FH  Immature Gran %%   1.1 % FH  Abs Immature Gran H 0.11 k/uL <0.10 FH  NRBC   0.0 /100 WBC FH  Absolute nRBC   <0.01 k/uL <0.01 FH  Diff Type   Auto     FH         Comp Metabolic Panel  REPORTED 04/25/2025 09:44  Protein, Total   7.3 g/dL 6.3-8.0 FH  Albumin L 3.0 g/dL 3.9-4.9 FH  Calcium, Total   8.8 mg/dL 8.5-10.2 FH  Bilirubin, Total   0.2 mg/dL 0.2-1.3 FH  Alkaline Phosphatase H 203 U/L  FH  AST   27 U/L 13-35 FH  ALT   12 U/L 7-38 FH  Glucose L 68 mg/dL 74-99 FH  BUN H 35 mg/dL 7-21 FH  Creatinine H 1.14 mg/dL 0.58-0.96 FH  Sodium   140 mmol/L 136-144 FH  Potassium   3.9 mmol/L 3.7-5.1 FH  Chloride   100 mmol/L  FH  CO2   24 mmol/L 22-30 FH  Anion Gap H 16 mmol/L 8-15 FH  Estimated Glomerular Filtration Rate L 46 mL/min/1.73 meters squared >=60 FH    Urinalysis With Micro  REPORTED 04/30/2025 10:07  Color   Light Yellow     Yellow FH  Clarity   Clear     Clear FH  Glucose, Urine   Trace     Trace, Negative FH  Bilirubin, Urine   Negative     Negative FH  Ketones, Urine   Negative     Negative, Trace FH  Specific Gravity, Ur   1.016     1.005-1.030 FH  Hemoglobin/Blood,Ur   Negative     Negative, Trace FH  pH, Urine   6.5     5.0-8.0 FH  Protein, Urine A 3+     Trace, Negative FH  Urobilinogen   Normal     Normal FH  Nitrites   Negative     Negative FH  Leuk Esterase   Negative     Negative, 25 Aleksey/uL FH  WBC, Urine   0-5 /HPF     0-5 /HPF FH  RBC, Urine   0-3 /HPF     0-3 /HPF FH  Squamous Epithelial Cells   Few /HPF FH    Living will related issues reviewed-Code status: DNRCCA    /50   Pulse 60   Physical Exam  Constitutional:       Appearance: Normal appearance. She is normal weight.   HENT:      Head: Normocephalic.       Right Ear: External ear normal.      Left Ear: External ear normal.      Nose: Nose normal.      Mouth/Throat:      Mouth: Mucous membranes are moist.   Eyes:      Extraocular Movements: Extraocular movements intact.      Conjunctiva/sclera: Conjunctivae normal.      Pupils: Pupils are equal, round, and reactive to light.   Cardiovascular:      Rate and Rhythm: Normal rate and regular rhythm.      Pulses: Normal pulses.      Heart sounds: Normal heart sounds.   Pulmonary:      Effort: Pulmonary effort is normal.      Breath sounds: Normal breath sounds.   Abdominal:      General: Bowel sounds are normal. There is no distension.      Palpations: Abdomen is soft.      Tenderness: There is no abdominal tenderness. There is no guarding.   Genitourinary:     Comments: Not examined  Musculoskeletal:         General: Tenderness present. Normal range of motion.      Cervical back: Normal range of motion and neck supple.      Right lower leg: No edema.      Left lower leg: No edema.      Comments: Generalized weakness   Skin:     General: Skin is warm and dry.      Capillary Refill: Capillary refill takes less than 2 seconds.   Neurological:      General: No focal deficit present.      Mental Status: She is alert and oriented to person, place, and time. Mental status is at baseline.   Psychiatric:         Mood and Affect: Mood normal.         Behavior: Behavior normal.        Assessment/Plan   Problem List Items Addressed This Visit    None  Visit Diagnoses         Metastatic colon cancer to liver (Multi)    -  Primary      Liver mass          Essential hypertension          Type 2 diabetes mellitus with chronic kidney disease, with long-term current use of insulin, unspecified CKD stage (Multi)          Physical deconditioning          Coronary artery disease involving native heart, unspecified vessel or lesion type, unspecified whether angina present          Chronic kidney disease, unspecified CKD stage           Pneumonia of left lower lobe due to infectious organism          Anemia, unspecified type          Meningioma (Multi)          Gout, unspecified cause, unspecified chronicity, unspecified site          Vitamin D deficiency          Iron deficiency anemia, unspecified iron deficiency anemia type                PLAN:  Pt has been seen for follow up visit.  Recent therapy notes have been reviewed.  The patient remains engaged in skilled therapy services aimed at improving strength, functional mobility, and endurance.   Lela Barba is demonstrating progress consistent with their current abilities.   Refer to PT/OT/ST documentation in the facility records for detailed updates on therapy progression and response.  Recent nursing evaluation and notes were reviewed.   Medication list reviewed here at the facility, please see facility list for complete list of medications and dosages.     Fever of unknown origin, Liver Mass, Leukocytosis, elevated CRP, Sed rate, and Procalcitonin w/negative BC:   CXR - LLL PNA - resolved (treated w/Rocephin IV and Vibramycin IV initially in the ER, then Rocephin IV, and Cefepime IV)  Cystitis - UA C/S negative  Possible Subacute bacterial endocarditis? 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  What is Lambl's Excrescences? Thin hair like strands made from fibrin and collagen. Thought to result from endothelial damage due to turbulent blood flow, normal wear and tear w/age. Often times benign and an incidental finding on a ARELI. Can resemble vegetation. Important to have all clinical information to make confident diagnosis of SBE.   Liver Mass, FUO, and Leukocytosis - Meropenem IV 1 gram BID til 5/3/25 (switched on 4/13 for ongoing fever of unknown origin and leukocytosis).   FINAL DIAGNOSIS   A. Liver, Left Lobe, Mass, Biopsy :   -- Adenocarcinoma with extensive necrosis in a fibrotic stroma. No hepatic parenchyma identified     Liver Mass:  Seen on CT done in ER and not on CT 3 yrs  prior  MRI done showing liver left sided lobe mass as well as other smaller lesions on the right.  FINAL DIAGNOSIS   A. Liver, Left Lobe, Mass, Biopsy :   -- Adenocarcinoma with extensive necrosis in a fibrotic stroma. No hepatic parenchyma identified   Meropenem IV 1 gram BID x 2 wks     HTN, CKD:   Monitor and follow BP readings.   Continue home meds:   Clonidine 0.2 mg BID  Amlodipine 5 mg daily  Lisinopril 30 mg daily  HCTZ 25 mg daily  Isosorbide 60 mg daily  Losartan 100 mg daily  Metoprolol succinate 100 mg BID  Nifedipine 90 mg BID  Torsemide 20 mg QOD  Class             Medication Dose Information   ACE inhibitor Lisinopril 30 mg QD Renal protective, BP lowering   ARB Losartan 100 mg QD Renal protective, BP lowering   Beta Blocker Metoprolol succinate  mg BID Negative inotrope, BP/HR lowering, CAD protection   CCB Amlodipine 5 mg QD Long acting vasodilator- peripheral dilation   CCB  Nifedipine ER 90 mg BID Potent vasodilator   Thiazide diuretic HCTZ 25 mg QD Diuretic effect   Loop Diuretic Torsemide 20 mg QOD Diuretic effect   Nitrate/vasodilator Isosorbide mononitrate ER 60 mg QD Vasodilator, BP lowering, lowering preload   Alpha Agonist/CCB Clonidine 0.2 mg BID BP lowering, risk for sedation   Benzo Lorazepam 1 mg QHS Sedative, risk for hypotension   TCA Mirtazapine 15 mg QHS Sedative, risk for hypotension   Patient is on A LOT of medications.   There is some clinical concern regarding the overlapping and stacking of antihypertensives, patient is on dual RAAS - losartan and lisinopril which is not usually recommended given together due to risk for acute kidney injury and hyperkalemia risk.  There is also concerns regarding nifedipine stacked with amlodipine which are both calcium channel blockers.  Both are vasodilators with nifedipine being a potent vasodilator with a prescribed amount of 90 mg BID which is out of the norm. Patient is on clonidine and this puts the patient at high risk for rebound  hypertension as well as sedation leading to falls.  Patient is also on multiple agents that are lowering preload/afterload which can lead to wide pulse pressures and falls.    Concern for possible hypotension episodes, volume depletion, and medication overdose.  With these medications - polypharmacy is a concern as well as he risk for orthostasis which can lead to falls.    The patient is seen by a nephrologist/internist in the community who has been managing her medication regimen for years. The patient came to the facility with all of these medications prescribed and clearly noted on her discharge progress note, au george, and after care summary. At this time we will not make any changes and we will monitor blood pressures closely.  Medications need to be staggered. We will adjust as needed.  Labs to be done intermittently and adjust meds as needed.  BP readings reviewed: 170/50s  Avoid nephrotoxic drugs.     CAD/HLD:  Cont atorvastatin, ASA, Plavix, and Imdur     Diabetes Mellitus Type 2:  Blood sugar readings to be monitored.  BS readings reviewed: 113  Continue current medications:  Novolin 70/30 20 units BID    HGBA1C 7.9 (H) 01/06/2025   Monitor patient for hypoglycemia/hyperglycemia.   Emergency glucagon kit and glucose gel 40% on hand for hypoglycemic emergencies.      Gout:  Cont Allopurinol     Vit D3 def:  Cont Vit D3 5000 units daily     Insomnia/Anxiety/Depression:  Monitor mood - stable  Cont Trazodone 50 mg daily, Mirtazapine 15 mg daily, and Lorazepam 1 mg QHS     Iron def Anemia:   Cont Ferrous sulfate 325 mg BID  Follow labs     Chronic Meningioma:     Urinary retention, Voiding dysfunction:  Tamsulosin 0.4 mg daily    Skin integrity:  Nursing to monitor skin integrity as patient is at risk for pressure injuries.  Turn and reposition Q 2 hours or more.  Air mattress and when up in chair cushion reducing device.  Dietician to evaluate and recommend.  Nutritional supplement to be implemented if  needed.  Please monitor skin integrity and other pressure areas.    Any decline or change in condition needs to be communicated with the physician or myself.    Discussion with nursing staff regarding ongoing care and management.  Communication regarding patients status, overall condition, changes with plan (medications or treatments), and any questions from family completed if present.  If family not available, would communicate in person or via phone if needed.   We will continue with the plan and medications noted above.    We will continue to follow the patient here at the facility.    Discharge planning:   Patient to be discharged home when goals are met.   Ongoing discussion with patient and family if available regarding discharge per discharge planner at facility.     *Please note that nursing facility notes, nursing EMR, outside laboratory agency, and  EMR do not interface.     Completion of the note was done through Dragon voice recognition technology and may include unintended or grammatical errors which may not have been recognized when finalizing the note.     Time: A total of 45 minutes or more was spent in evaluation and management of the patient, including time with the patient and family (if present).   Over 50% of this time was dedicated to counseling and coordination of care.  This includes time spent:  Reviewing vital signs, nursing/therapy reports, hospital records, labs, imaging, and consults.  Conducting a face-to-face assessment, including history, physical exam, and patient education.  Discussing the diagnosis, treatment options, and current medications prescribed.  Reviewing and updating the plan of care with the patient, family if available, nursing staff, and interdisciplinary team.  Documenting in the electronic medical record.       Silvia Daniels, APRN-CNP

## 2025-05-26 NOTE — PROGRESS NOTES
Name: Lela Barba  YOB: 1935    DISCHARGE VISIT: [facility],   [facility] 4/7 - 4/23/25: Weakness, fever, LLL PNA, Cystitis, Possible SBE (ARELI - Lambl's excrescence, Liver left lobe mass, s/p liver biopsy 4/21, and incidental finding of Multinodular goiter     SUBJECTIVE:  Lela Barba is a 89 y.o. female who is here at [facility] after a recent hospitalization.   PMH of chronic kidney disease, diabetes, hypertension, dyslipidemia, GERD, prior gastrointestinal bleeding, osteoporosis, carotid disease, and a history of colon cancer. She presented to ER with rigors, chills, headache, and weakness and was found to have a left lower lobe pneumonia on chest X-ray and a urinary specimen suggestive of infection but ultimately negative on repeat culture. Despite initial treatment with ceftriaxone and doxycycline, her leukocytosis and fever of unknown origin persisted, prompting escalation to cefepime and then a two-week course of meropenem under infectious disease guidance; transesophageal echocardiography ruled out julian endocarditis, and imaging revealed a new complex liver lesion. Biopsy of that lesion confirmed metastatic adenocarcinoma consistent with her prior colon primary, and HER2 testing is pending to inform targeted therapy options. She remained afebrile, symptom-free, and at her baseline by discharge, and her daughter has been counseled on the findings and advised to follow up with her primary care provider and oncology team to review treatment possibilities. Recent UA C/S done here is negative.   The patient is seated upright in the wheelchair, resting comfortably, and without signs of distress.   Denies any current symptoms.   Nursing staff reports no new concerns.  Discussion to be done by nursing with Lela Barba and family/friend representative if present regarding discharge - follow up with PCP and other speciality physicians as instructed or as scheduled, compliance  with home medications, and safety within the home.   Nursing to review discharge instructions home medications prior to discharging home.     REVIEW OF SYSTEMS:   All review of systems are negative unless otherwise stated above under subjective.    LABS REVIEWED AT FACILITY:  Urine Culture  REPORTED 05/01/2025 12:28  Culture Urine   See Below     PLDEF  CULTURE, URINE                   No growth (<1,000 CFU/ml)          SOURCE:                          Urine (Nonspecific)       CBC and Differential  REPORTED 05/02/2025 09:57  White Blood Cell Count   4.40 k/uL 3.70-11.00 FH  RBC L 3.64 m/uL 3.90-5.20 FH  Hemoglobin L 9.9 g/dL 11.5-15.5 FH  Hematocrit L 30.5 % 36.0-46.0 FH  MCV   83.8 fL 80.0-100.0 FH  MCH   27.2 pg 26.0-34.0 FH  MCHC   32.5 g/dL 30.5-36.0 FH  RDW-CV   14.3 % 11.5-15.0 FH  Platelet Count   270 k/uL 150-400 FH  MPV   11.6 fL 9.0-12.7 FH  Neut%   46.7 % FH  Abs Neut   2.06 k/uL 1.45-7.50 FH  Lymph%   34.5 % FH  Abs Lymph   1.52 k/uL 1.00-4.00 FH  Mono%   15.5 % FH  Abs Mono   0.68 k/uL <0.87 FH  Eosin%   1.4 % FH  Abs Eosin   0.06 k/uL <0.46 FH  Baso%   0.5 % FH  Abs Baso   <0.03 k/uL <0.11 FH  Immature Gran %%   1.4 % FH  Abs Immature Gran   0.06 k/uL <0.10 FH  NRBC   0.0 /100 WBC FH  Absolute nRBC   <0.01 k/uL <0.01 FH  Diff Type   Auto     FH         Comp Metabolic Panel  REPORTED 05/02/2025 10:38  Protein, Total L 6.1 g/dL 6.3-8.0 FH  Albumin L 2.4 g/dL 3.9-4.9 FH  Calcium, Total L 7.9 mg/dL 8.5-10.2 FH  Bilirubin, Total   0.2 mg/dL 0.2-1.3 FH  Alkaline Phosphatase H 179 U/L  FH  AST   See Below     FH  ALT   See Below     FH  Glucose C 32 mg/dL 74-99 FH  BUN H 50 mg/dL 7-21 FH  Creatinine H 1.05 mg/dL 0.58-0.96 FH  Sodium   142 mmol/L 136-144 FH  Potassium   4.4 mmol/L 3.7-5.1 FH  Chloride   103 mmol/L  FH  CO2   27 mmol/L 22-30 FH  Anion Gap   12 mmol/L 8-15 FH  Estimated Glomerular Filtration Rate L 51 mL/min/1.73 meters squared >=60 FH    RX Allergies[1]    Living will related  issues reviewed-Code status: DNCA    OBJECTIVE:  BP (!) 158/92   Pulse 78   Physical Exam  Constitutional:       Appearance: Normal appearance. She is normal weight.   HENT:      Head: Normocephalic.      Mouth: Mucous membranes are moist.   Eyes:      Extraocular Movements: Extraocular movements intact.      Conjunctiva/sclera: Conjunctivae normal.      Pupils: Pupils are equal, round, and reactive to light.   Cardiovascular:      Rate and Rhythm: Normal rate and regular rhythm.      Pulses: Normal pulses.      Heart sounds: Normal heart sounds.   Pulmonary:      Effort: Pulmonary effort is normal.      Breath sounds: Normal breath sounds.   Abdominal:      General: Bowel sounds are normal. There is no distension.      Palpations: Abdomen is soft.      Tenderness: There is no abdominal tenderness. There is no guarding.   Genitourinary:     Comments: Not examined  Musculoskeletal:         General: Tenderness present. Normal range of motion.      Cervical back: Normal range of motion and neck supple.      Right lower leg: No edema.      Left lower leg: No edema.      Comments: Generalized weakness   Skin:     General: Skin is warm and dry.      Capillary Refill: Capillary refill takes less than 2 seconds.   Neurological:      General: No focal deficit present.      Mental Status: She is alert and oriented to person, place, and time. Mental status is at baseline.   Psychiatric:         Mood and Affect: Mood normal.         Behavior: Behavior normal.     Assessment/Plan   Problem List Items Addressed This Visit    None  Visit Diagnoses         Metastatic colon cancer to liver (Multi)    -  Primary      Liver mass          Essential hypertension          Type 2 diabetes mellitus with chronic kidney disease, with long-term current use of insulin, unspecified CKD stage (Multi)          Physical deconditioning          Coronary artery disease involving native heart, unspecified vessel or lesion type, unspecified whether  angina present          Chronic kidney disease, unspecified CKD stage          Pneumonia of left lower lobe due to infectious organism          Anemia, unspecified type          Meningioma (Multi)          Gout, unspecified cause, unspecified chronicity, unspecified site          Vitamin D deficiency          Iron deficiency anemia, unspecified iron deficiency anemia type          Depression, unspecified depression type                PLAN:  Pt has been seen for discharge visit.  The patient has been here at facility for PT/OT/ST to maximize strength, function, endurance and safety.  Recent therapy notes reviewed.  The patient is participating in therapy.   Lela Barba is making progress to the best of his/her ability.   Please see PT/OT/ST notes in the facility for detailed information regarding progression of patients progress.   Recent nursing evaluation and notes were reviewed.   Medication list reviewed here at the facility.   The medication list will need to be updated in the electronic record by the patients primary care provider in the community upon follow up in the office.     Fever of unknown origin, Liver Mass, Leukocytosis, elevated CRP, Sed rate, and Procalcitonin w/negative BC:   CXR - LLL PNA - resolved (treated w/Rocephin IV and Vibramycin IV initially in the ER, then Rocephin IV, and Cefepime IV)  Cystitis - UA C/S negative  Possible Subacute bacterial endocarditis? 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  What is Lambl's Excrescences? Thin hair like strands made from fibrin and collagen. Thought to result from endothelial damage due to turbulent blood flow, normal wear and tear w/age. Often times benign and an incidental finding on a ARELI. Can resemble vegetation. Important to have all clinical information to make confident diagnosis of SBE.   Liver Mass, FUO, and Leukocytosis - Meropenem IV 1 gram BID til 5/3/25 (switched on 4/13 for ongoing fever of unknown origin and leukocytosis).   FINAL  DIAGNOSIS   A. Liver, Left Lobe, Mass, Biopsy :   -- Adenocarcinoma with extensive necrosis in a fibrotic stroma. No hepatic parenchyma identified      Liver Mass:  Seen on CT done in ER and not on CT 3 yrs prior  MRI done showing liver left sided lobe mass as well as other smaller lesions on the right.  FINAL DIAGNOSIS   A. Liver, Left Lobe, Mass, Biopsy :   -- Adenocarcinoma with extensive necrosis in a fibrotic stroma. No hepatic parenchyma identified   Meropenem IV 1 gram BID x 2 wks     HTN, CKD:   Monitor and follow BP readings.   Continue home meds:   Clonidine 0.2 mg BID  Amlodipine 5 mg daily  Lisinopril 30 mg daily  HCTZ 25 mg daily  Isosorbide 60 mg daily  Losartan 100 mg daily  Metoprolol succinate 100 mg BID  Nifedipine 90 mg BID  Torsemide 20 mg QOD  Class             Medication Dose Information   ACE inhibitor Lisinopril 30 mg QD Renal protective, BP lowering   ARB Losartan 100 mg QD Renal protective, BP lowering   Beta Blocker Metoprolol succinate  mg BID Negative inotrope, BP/HR lowering, CAD protection   CCB Amlodipine 5 mg QD Long acting vasodilator- peripheral dilation   CCB  Nifedipine ER 90 mg BID Potent vasodilator   Thiazide diuretic HCTZ 25 mg QD Diuretic effect   Loop Diuretic Torsemide 20 mg QOD Diuretic effect   Nitrate/vasodilator Isosorbide mononitrate ER 60 mg QD Vasodilator, BP lowering, lowering preload   Alpha Agonist/CCB Clonidine 0.2 mg BID BP lowering, risk for sedation   Benzo Lorazepam 1 mg QHS Sedative, risk for hypotension   TCA Mirtazapine 15 mg QHS Sedative, risk for hypotension   Patient is on A LOT of medications.   There is some clinical concern regarding the overlapping and stacking of antihypertensives, patient is on dual RAAS - losartan and lisinopril which is not usually recommended given together due to risk for acute kidney injury and hyperkalemia risk.  There is also concerns regarding nifedipine stacked with amlodipine which are both calcium channel  blockers.  Both are vasodilators with nifedipine being a potent vasodilator with a prescribed amount of 90 mg BID which is out of the norm. Patient is on clonidine and this puts the patient at high risk for rebound hypertension as well as sedation leading to falls.  Patient is also on multiple agents that are lowering preload/afterload which can lead to wide pulse pressures and falls.    Concern for possible hypotension episodes, volume depletion, and medication overdose.  With these medications - polypharmacy is a concern as well as he risk for orthostasis which can lead to falls.    The patient is seen by a nephrologist/internist in the community who has been managing her medication regimen for years. The patient came to the facility with all of these medications prescribed and clearly noted on her discharge progress note, au george, and after care summary. At this time we will not make any changes and we will monitor blood pressures closely.  Medications need to be staggered. We will adjust as needed.  Labs to be done intermittently and adjust meds as needed.  Labs reviewed from 5/2  BP readings reviewed: 150/90s  Avoid nephrotoxic drugs.     CAD/HLD:  Cont atorvastatin, ASA, Plavix, and Imdur     Diabetes Mellitus Type 2:  Blood sugar readings to be monitored.  BS readings reviewed: 298  Continue current medications:  Novolin 70/30 20 units BID    HGBA1C 7.9 (H) 01/06/2025   Monitor patient for hypoglycemia/hyperglycemia.   Emergency glucagon kit and glucose gel 40% on hand for hypoglycemic emergencies.      Gout:  Cont Allopurinol     Vit D3 def:  Cont Vit D3 5000 units daily     Insomnia/Anxiety/Depression:  Monitor mood - stable  Cont Trazodone 50 mg daily, Mirtazapine 15 mg daily, and Lorazepam 1 mg QHS     Iron def Anemia:   Cont Ferrous sulfate 325 mg BID  Follow labs     Chronic Meningioma:     Urinary retention, Voiding dysfunction:  UA C/S done and negative  Tamsulosin 0.4 mg daily    Skin  integrity:  Nursing to monitor skin integrity as patient is at risk for pressure injuries.  Turn and reposition Q2 hours or more.  Air mattress and when up in chair cushion reducing device.  Dietician to evaluate and recommend.  Labs done intermittently and followed.  Nutritional supplement to be implemented if needed.  Please monitor skin integrity and other pressure areas.    Any decline or change in condition needs to be communicated with the physician or myself.  Discussion with nursing staff regarding ongoing care, management, and discharge planning.   Communication regarding patients status, overall condition, changes with plan (medications or treatments), and any questions from family completed if present.  If family not available, would communicate in person or via phone if needed.   We will continue with the plan and medications noted above until discharged home.    We will continue to follow the patient here at the facility until discharged home.     DISCHARGE PLANNING    Patient to be discharged home on the date discussed under subjective.   Patient to follow up with PCP in 1-2 weeks after discharge from facility.   Patient to follow up with any Speciality physicians as directed after discharge.  If desired and in agreement, [facility] to follow after discharge from the facility for continued PT/OT and Nursing.    Medication list for home:  Please see facility medication list as medications may have changed from recent hospitalization and skilled stay.    OARRS Report if narcotics are prescribed upon discharge:   If indicated, I have personally reviewed the OARRS report for Lela Barba and I have considered the risks of abuse, dependence, addiction, and diversion.    *Please note that nursing facility notes, facility EMR, outside laboratory agency, and  EMR do not interface.     Completion of the note was done through Dragon voice recognition technology and may include unintended or grammatical errors  which may not have been recognized when finalizing the note.     Time:   I spent 31 minutes or greater with the patient reviewing discharge information which include compliance of medications, follow up appointments with PCP and or Speciality physicians. Greater than 50% of this time was spent in counseling and or coordination of care.       Silvia Daniels, APRN-CNP           [1]   Allergies  Allergen Reactions    Hydralazine Other     chest pain tachycardia

## 2025-06-02 LAB — SCAN RESULT: NORMAL

## 2025-07-18 ENCOUNTER — HOSPITAL ENCOUNTER (OUTPATIENT)
Dept: LAB | Age: 89
Discharge: HOME | End: 2025-07-18
Payer: MEDICARE

## 2025-07-18 LAB
ALBUMIN SERPL-MCNC: 3.3 G/DL (ref 3.5–4.6)
ALP SERPL-CCNC: 215 U/L (ref 40–130)
ALT SERPL-CCNC: 14 U/L (ref 0–33)
ANION GAP SERPL CALCULATED.3IONS-SCNC: 15 MEQ/L (ref 9–15)
AST SERPL-CCNC: 27 U/L (ref 0–35)
BASOPHILS # BLD: 0.1 K/UL (ref 0–0.2)
BASOPHILS NFR BLD: 0.5 %
BILIRUB SERPL-MCNC: 0.4 MG/DL (ref 0.2–0.7)
BUN SERPL-MCNC: 31 MG/DL (ref 8–23)
CALCIUM SERPL-MCNC: 9 MG/DL (ref 8.5–9.9)
CHLORIDE SERPL-SCNC: 94 MEQ/L (ref 95–107)
CHOLEST SERPL-MCNC: 121 MG/DL (ref 0–199)
CO2 SERPL-SCNC: 23 MEQ/L (ref 20–31)
CREAT SERPL-MCNC: 1.19 MG/DL (ref 0.5–0.9)
EOSINOPHIL # BLD: 0.2 K/UL (ref 0–0.7)
EOSINOPHIL NFR BLD: 2.1 %
ERYTHROCYTE [DISTWIDTH] IN BLOOD BY AUTOMATED COUNT: 15.9 % (ref 11.5–14.5)
GLOBULIN SER CALC-MCNC: 4.1 G/DL (ref 2.3–3.5)
GLUCOSE SERPL-MCNC: 518 MG/DL (ref 70–99)
HCT VFR BLD AUTO: 31.9 % (ref 37–47)
HDLC SERPL-MCNC: 41 MG/DL (ref 40–59)
HGB BLD-MCNC: 10.5 G/DL (ref 12–16)
LDLC SERPL CALC-MCNC: 57 MG/DL (ref 0–129)
LYMPHOCYTES # BLD: 1.7 K/UL (ref 1–4.8)
LYMPHOCYTES NFR BLD: 15.2 %
MCH RBC QN AUTO: 27.3 PG (ref 27–31.3)
MCHC RBC AUTO-ENTMCNC: 32.9 % (ref 33–37)
MCV RBC AUTO: 82.9 FL (ref 79.4–94.8)
MONOCYTES # BLD: 0.9 K/UL (ref 0.2–0.8)
MONOCYTES NFR BLD: 7.7 %
NEUTROPHILS # BLD: 8.5 K/UL (ref 1.4–6.5)
NEUTS SEG NFR BLD: 74 %
PHOSPHATE SERPL-MCNC: 3.7 MG/DL (ref 2.3–4.8)
PLATELET # BLD AUTO: 499 K/UL (ref 130–400)
POTASSIUM SERPL-SCNC: 5.1 MEQ/L (ref 3.4–4.9)
PROT SERPL-MCNC: 7.4 G/DL (ref 6.3–8)
PTH-INTACT SERPL-MCNC: 91.6 PG/ML (ref 15–65)
RBC # BLD AUTO: 3.85 M/UL (ref 4.2–5.4)
SODIUM SERPL-SCNC: 132 MEQ/L (ref 135–144)
TRIGL SERPL-MCNC: 117 MG/DL (ref 0–150)
URATE SERPL-MCNC: 5.9 MG/DL (ref 2.4–5.7)
WBC # BLD AUTO: 11.5 K/UL (ref 4.8–10.8)

## 2025-07-18 PROCEDURE — 80053 COMPREHEN METABOLIC PANEL: CPT

## 2025-07-18 PROCEDURE — 83970 ASSAY OF PARATHORMONE: CPT

## 2025-07-18 PROCEDURE — 84550 ASSAY OF BLOOD/URIC ACID: CPT

## 2025-07-18 PROCEDURE — 84100 ASSAY OF PHOSPHORUS: CPT

## 2025-07-18 PROCEDURE — 85025 COMPLETE CBC W/AUTO DIFF WBC: CPT

## 2025-07-18 PROCEDURE — 80061 LIPID PANEL: CPT

## 2025-07-18 PROCEDURE — 83036 HEMOGLOBIN GLYCOSYLATED A1C: CPT

## 2025-07-18 PROCEDURE — 36415 COLL VENOUS BLD VENIPUNCTURE: CPT

## 2025-07-19 LAB
ESTIMATED AVERAGE GLUCOSE: 263 MG/DL
HBA1C MFR BLD: 10.8 % (ref 4–6)